# Patient Record
Sex: MALE | Race: WHITE | URBAN - METROPOLITAN AREA
[De-identification: names, ages, dates, MRNs, and addresses within clinical notes are randomized per-mention and may not be internally consistent; named-entity substitution may affect disease eponyms.]

---

## 2017-01-01 ENCOUNTER — APPOINTMENT (OUTPATIENT)
Dept: OCCUPATIONAL THERAPY | Facility: CLINIC | Age: 52
DRG: 166 | End: 2017-01-01
Payer: COMMERCIAL

## 2017-01-01 ENCOUNTER — APPOINTMENT (OUTPATIENT)
Dept: GENERAL RADIOLOGY | Facility: CLINIC | Age: 52
DRG: 166 | End: 2017-01-01
Attending: INTERNAL MEDICINE
Payer: COMMERCIAL

## 2017-01-01 ENCOUNTER — APPOINTMENT (OUTPATIENT)
Dept: GENERAL RADIOLOGY | Facility: CLINIC | Age: 52
DRG: 166 | End: 2017-01-01
Attending: PHYSICIAN ASSISTANT
Payer: COMMERCIAL

## 2017-01-01 ENCOUNTER — APPOINTMENT (OUTPATIENT)
Dept: CT IMAGING | Facility: CLINIC | Age: 52
DRG: 166 | End: 2017-01-01
Attending: INTERNAL MEDICINE
Payer: COMMERCIAL

## 2017-01-01 ENCOUNTER — APPOINTMENT (OUTPATIENT)
Dept: CARDIOLOGY | Facility: CLINIC | Age: 52
DRG: 166 | End: 2017-01-01
Attending: INTERNAL MEDICINE
Payer: COMMERCIAL

## 2017-01-01 ENCOUNTER — APPOINTMENT (OUTPATIENT)
Dept: OCCUPATIONAL THERAPY | Facility: CLINIC | Age: 52
DRG: 166 | End: 2017-01-01
Attending: INTERNAL MEDICINE
Payer: COMMERCIAL

## 2017-01-01 ENCOUNTER — HOSPITAL ENCOUNTER (INPATIENT)
Facility: CLINIC | Age: 52
LOS: 27 days | Discharge: NURSING, REHAB OR LONG-TERM CARE | DRG: 166 | End: 2017-09-18
Attending: EMERGENCY MEDICINE | Admitting: INTERNAL MEDICINE
Payer: COMMERCIAL

## 2017-01-01 ENCOUNTER — APPOINTMENT (OUTPATIENT)
Dept: PHYSICAL THERAPY | Facility: CLINIC | Age: 52
DRG: 166 | End: 2017-01-01
Attending: INTERNAL MEDICINE
Payer: COMMERCIAL

## 2017-01-01 ENCOUNTER — APPOINTMENT (OUTPATIENT)
Dept: GENERAL RADIOLOGY | Facility: CLINIC | Age: 52
DRG: 166 | End: 2017-01-01
Attending: EMERGENCY MEDICINE
Payer: COMMERCIAL

## 2017-01-01 ENCOUNTER — DISCHARGE SUMMARY NURSING HOME (OUTPATIENT)
Dept: GERIATRICS | Facility: CLINIC | Age: 52
End: 2017-01-01
Payer: COMMERCIAL

## 2017-01-01 ENCOUNTER — APPOINTMENT (OUTPATIENT)
Dept: GENERAL RADIOLOGY | Facility: CLINIC | Age: 52
DRG: 166 | End: 2017-01-01
Attending: HOSPITALIST
Payer: COMMERCIAL

## 2017-01-01 ENCOUNTER — ANESTHESIA (OUTPATIENT)
Dept: INTENSIVE CARE | Facility: CLINIC | Age: 52
DRG: 166 | End: 2017-01-01
Payer: COMMERCIAL

## 2017-01-01 ENCOUNTER — APPOINTMENT (OUTPATIENT)
Dept: ULTRASOUND IMAGING | Facility: CLINIC | Age: 52
DRG: 166 | End: 2017-01-01
Attending: INTERNAL MEDICINE
Payer: COMMERCIAL

## 2017-01-01 ENCOUNTER — NURSING HOME VISIT (OUTPATIENT)
Dept: GERIATRICS | Facility: CLINIC | Age: 52
End: 2017-01-01
Payer: COMMERCIAL

## 2017-01-01 ENCOUNTER — APPOINTMENT (OUTPATIENT)
Dept: PHYSICAL THERAPY | Facility: CLINIC | Age: 52
DRG: 166 | End: 2017-01-01
Payer: COMMERCIAL

## 2017-01-01 ENCOUNTER — APPOINTMENT (OUTPATIENT)
Dept: SPEECH THERAPY | Facility: CLINIC | Age: 52
DRG: 166 | End: 2017-01-01
Payer: COMMERCIAL

## 2017-01-01 ENCOUNTER — ANESTHESIA EVENT (OUTPATIENT)
Dept: INTENSIVE CARE | Facility: CLINIC | Age: 52
DRG: 166 | End: 2017-01-01
Payer: COMMERCIAL

## 2017-01-01 ENCOUNTER — APPOINTMENT (OUTPATIENT)
Dept: SPEECH THERAPY | Facility: CLINIC | Age: 52
DRG: 166 | End: 2017-01-01
Attending: INTERNAL MEDICINE
Payer: COMMERCIAL

## 2017-01-01 ENCOUNTER — APPOINTMENT (OUTPATIENT)
Dept: CT IMAGING | Facility: CLINIC | Age: 52
DRG: 166 | End: 2017-01-01
Attending: EMERGENCY MEDICINE
Payer: COMMERCIAL

## 2017-01-01 ENCOUNTER — APPOINTMENT (OUTPATIENT)
Dept: NUCLEAR MEDICINE | Facility: CLINIC | Age: 52
DRG: 166 | End: 2017-01-01
Attending: INTERNAL MEDICINE
Payer: COMMERCIAL

## 2017-01-01 VITALS
BODY MASS INDEX: 28.37 KG/M2 | RESPIRATION RATE: 18 BRPM | WEIGHT: 215 LBS | SYSTOLIC BLOOD PRESSURE: 119 MMHG | OXYGEN SATURATION: 98 % | DIASTOLIC BLOOD PRESSURE: 81 MMHG | TEMPERATURE: 97.2 F | HEART RATE: 92 BPM

## 2017-01-01 VITALS
RESPIRATION RATE: 18 BRPM | HEIGHT: 73 IN | OXYGEN SATURATION: 95 % | WEIGHT: 200.84 LBS | HEART RATE: 96 BPM | BODY MASS INDEX: 26.62 KG/M2 | TEMPERATURE: 98.6 F | DIASTOLIC BLOOD PRESSURE: 68 MMHG | SYSTOLIC BLOOD PRESSURE: 112 MMHG

## 2017-01-01 VITALS
TEMPERATURE: 98.3 F | SYSTOLIC BLOOD PRESSURE: 119 MMHG | WEIGHT: 215 LBS | DIASTOLIC BLOOD PRESSURE: 72 MMHG | OXYGEN SATURATION: 97 % | BODY MASS INDEX: 28.37 KG/M2 | HEART RATE: 86 BPM | RESPIRATION RATE: 18 BRPM

## 2017-01-01 VITALS
RESPIRATION RATE: 18 BRPM | OXYGEN SATURATION: 98 % | DIASTOLIC BLOOD PRESSURE: 72 MMHG | HEART RATE: 88 BPM | TEMPERATURE: 99.1 F | SYSTOLIC BLOOD PRESSURE: 119 MMHG

## 2017-01-01 DIAGNOSIS — M54.40 CHRONIC LOW BACK PAIN WITH SCIATICA, SCIATICA LATERALITY UNSPECIFIED, UNSPECIFIED BACK PAIN LATERALITY: ICD-10-CM

## 2017-01-01 DIAGNOSIS — J80 ARDS (ADULT RESPIRATORY DISTRESS SYNDROME) (H): Primary | ICD-10-CM

## 2017-01-01 DIAGNOSIS — K57.32 DIVERTICULITIS OF COLON: ICD-10-CM

## 2017-01-01 DIAGNOSIS — G92.9 TOXIC ENCEPHALOPATHY: ICD-10-CM

## 2017-01-01 DIAGNOSIS — N28.9 ACUTE RENAL INSUFFICIENCY: ICD-10-CM

## 2017-01-01 DIAGNOSIS — G62.9 NEUROPATHY: ICD-10-CM

## 2017-01-01 DIAGNOSIS — J80 ARDS (ADULT RESPIRATORY DISTRESS SYNDROME) (H): ICD-10-CM

## 2017-01-01 DIAGNOSIS — F33.1 MODERATE EPISODE OF RECURRENT MAJOR DEPRESSIVE DISORDER (H): ICD-10-CM

## 2017-01-01 DIAGNOSIS — I10 ESSENTIAL HYPERTENSION: ICD-10-CM

## 2017-01-01 DIAGNOSIS — I10 BENIGN ESSENTIAL HYPERTENSION: ICD-10-CM

## 2017-01-01 DIAGNOSIS — R53.81 PHYSICAL DECONDITIONING: ICD-10-CM

## 2017-01-01 DIAGNOSIS — J18.9 PNEUMONIA OF LEFT LOWER LOBE DUE TO INFECTIOUS ORGANISM: ICD-10-CM

## 2017-01-01 DIAGNOSIS — G89.29 CHRONIC LOW BACK PAIN WITH SCIATICA, SCIATICA LATERALITY UNSPECIFIED, UNSPECIFIED BACK PAIN LATERALITY: ICD-10-CM

## 2017-01-01 DIAGNOSIS — I82.4Y1 ACUTE DEEP VEIN THROMBOSIS (DVT) OF PROXIMAL VEIN OF RIGHT LOWER EXTREMITY (H): Primary | ICD-10-CM

## 2017-01-01 DIAGNOSIS — K21.9 GASTROESOPHAGEAL REFLUX DISEASE WITHOUT ESOPHAGITIS: ICD-10-CM

## 2017-01-01 DIAGNOSIS — J18.9 PNEUMONIA DUE TO INFECTIOUS ORGANISM, UNSPECIFIED LATERALITY, UNSPECIFIED PART OF LUNG: ICD-10-CM

## 2017-01-01 DIAGNOSIS — R13.10 DYSPHAGIA, UNSPECIFIED TYPE: ICD-10-CM

## 2017-01-01 DIAGNOSIS — F42.9 OBSESSIVE-COMPULSIVE DISORDER, UNSPECIFIED TYPE: ICD-10-CM

## 2017-01-01 DIAGNOSIS — J80: Primary | ICD-10-CM

## 2017-01-01 DIAGNOSIS — N32.81 OVERACTIVE BLADDER: ICD-10-CM

## 2017-01-01 DIAGNOSIS — I82.401 ACUTE DEEP VEIN THROMBOSIS (DVT) OF RIGHT LOWER EXTREMITY, UNSPECIFIED VEIN (H): ICD-10-CM

## 2017-01-01 DIAGNOSIS — E86.0 DEHYDRATION: ICD-10-CM

## 2017-01-01 DIAGNOSIS — R07.9 ACUTE CHEST PAIN: ICD-10-CM

## 2017-01-01 DIAGNOSIS — J15.9 COMMUNITY ACQUIRED BACTERIAL PNEUMONIA: Primary | ICD-10-CM

## 2017-01-01 LAB
ACID FAST STN SPEC QL: NORMAL
ALBUMIN SERPL-MCNC: 1.4 G/DL (ref 3.4–5)
ALBUMIN SERPL-MCNC: 1.5 G/DL (ref 3.4–5)
ALBUMIN SERPL-MCNC: 1.5 G/DL (ref 3.4–5)
ALBUMIN SERPL-MCNC: 1.7 G/DL (ref 3.4–5)
ALBUMIN SERPL-MCNC: 1.8 G/DL (ref 3.4–5)
ALBUMIN SERPL-MCNC: 1.8 G/DL (ref 3.4–5)
ALBUMIN SERPL-MCNC: 1.9 G/DL (ref 3.4–5)
ALBUMIN SERPL-MCNC: 2.1 G/DL (ref 3.4–5)
ALBUMIN SERPL-MCNC: 2.3 G/DL (ref 3.4–5)
ALBUMIN SERPL-MCNC: 2.4 G/DL (ref 3.4–5)
ALBUMIN SERPL-MCNC: 2.4 G/DL (ref 3.4–5)
ALBUMIN SERPL-MCNC: 2.5 G/DL (ref 3.4–5)
ALBUMIN SERPL-MCNC: NORMAL G/DL (ref 3.4–5)
ALBUMIN UR-MCNC: 10 MG/DL
ALBUMIN UR-MCNC: 10 MG/DL
ALP SERPL-CCNC: 104 U/L (ref 40–150)
ALP SERPL-CCNC: 109 U/L (ref 40–150)
ALP SERPL-CCNC: 112 U/L (ref 40–150)
ALP SERPL-CCNC: 116 U/L (ref 40–150)
ALP SERPL-CCNC: 120 U/L (ref 40–150)
ALP SERPL-CCNC: 138 U/L (ref 40–150)
ALP SERPL-CCNC: 146 U/L (ref 40–150)
ALP SERPL-CCNC: 152 U/L (ref 40–150)
ALP SERPL-CCNC: 154 U/L (ref 40–150)
ALP SERPL-CCNC: 156 U/L (ref 40–150)
ALP SERPL-CCNC: 158 U/L (ref 40–150)
ALP SERPL-CCNC: 183 U/L (ref 40–150)
ALP SERPL-CCNC: 83 U/L (ref 40–150)
ALP SERPL-CCNC: 87 U/L (ref 40–150)
ALP SERPL-CCNC: NORMAL U/L (ref 40–150)
ALT SERPL W P-5'-P-CCNC: 104 U/L (ref 0–70)
ALT SERPL W P-5'-P-CCNC: 34 U/L (ref 0–70)
ALT SERPL W P-5'-P-CCNC: 39 U/L (ref 0–70)
ALT SERPL W P-5'-P-CCNC: 42 U/L (ref 0–70)
ALT SERPL W P-5'-P-CCNC: 54 U/L (ref 0–70)
ALT SERPL W P-5'-P-CCNC: 66 U/L (ref 0–70)
ALT SERPL W P-5'-P-CCNC: 70 U/L (ref 0–70)
ALT SERPL W P-5'-P-CCNC: 70 U/L (ref 0–70)
ALT SERPL W P-5'-P-CCNC: 76 U/L (ref 0–70)
ALT SERPL W P-5'-P-CCNC: 89 U/L (ref 0–70)
ALT SERPL W P-5'-P-CCNC: 91 U/L (ref 0–70)
ALT SERPL W P-5'-P-CCNC: 96 U/L (ref 0–70)
ALT SERPL W P-5'-P-CCNC: 96 U/L (ref 0–70)
ALT SERPL W P-5'-P-CCNC: 98 U/L (ref 0–70)
ALT SERPL W P-5'-P-CCNC: NORMAL U/L (ref 0–70)
AMMONIA PLAS-SCNC: 15 UMOL/L (ref 10–50)
AMYLASE FLD-CCNC: 19 U/L
ANION GAP SERPL CALCULATED.3IONS-SCNC: 10 MMOL/L (ref 3–14)
ANION GAP SERPL CALCULATED.3IONS-SCNC: 11 MMOL/L (ref 3–14)
ANION GAP SERPL CALCULATED.3IONS-SCNC: 11 MMOL/L (ref 3–14)
ANION GAP SERPL CALCULATED.3IONS-SCNC: 5 MMOL/L (ref 3–14)
ANION GAP SERPL CALCULATED.3IONS-SCNC: 6 MMOL/L (ref 3–14)
ANION GAP SERPL CALCULATED.3IONS-SCNC: 7 MMOL/L (ref 3–14)
ANION GAP SERPL CALCULATED.3IONS-SCNC: 8 MMOL/L (ref 3–14)
ANION GAP SERPL CALCULATED.3IONS-SCNC: 9 MMOL/L (ref 3–14)
ANION GAP SERPL CALCULATED.3IONS-SCNC: NORMAL MMOL/L (ref 6–17)
APPEARANCE FLD: NORMAL
APPEARANCE UR: CLEAR
APPEARANCE UR: CLEAR
APTT PPP: 28 SEC (ref 22–37)
APTT PPP: 30 SEC (ref 22–37)
APTT PPP: 33 SEC (ref 22–37)
APTT PPP: 34 SEC (ref 22–37)
APTT PPP: 37 SEC (ref 22–37)
AST SERPL W P-5'-P-CCNC: 102 U/L (ref 0–45)
AST SERPL W P-5'-P-CCNC: 104 U/L (ref 0–45)
AST SERPL W P-5'-P-CCNC: 112 U/L (ref 0–45)
AST SERPL W P-5'-P-CCNC: 114 U/L (ref 0–45)
AST SERPL W P-5'-P-CCNC: 126 U/L (ref 0–45)
AST SERPL W P-5'-P-CCNC: 130 U/L (ref 0–45)
AST SERPL W P-5'-P-CCNC: 33 U/L (ref 0–45)
AST SERPL W P-5'-P-CCNC: 34 U/L (ref 0–45)
AST SERPL W P-5'-P-CCNC: 38 U/L (ref 0–45)
AST SERPL W P-5'-P-CCNC: 49 U/L (ref 0–45)
AST SERPL W P-5'-P-CCNC: 56 U/L (ref 0–45)
AST SERPL W P-5'-P-CCNC: 73 U/L (ref 0–45)
AST SERPL W P-5'-P-CCNC: 85 U/L (ref 0–45)
AST SERPL W P-5'-P-CCNC: 93 U/L (ref 0–45)
AST SERPL W P-5'-P-CCNC: NORMAL U/L (ref 0–45)
BACTERIA SPEC CULT: ABNORMAL
BACTERIA SPEC CULT: NO GROWTH
BACTERIA SPEC CULT: NORMAL
BASE DEFICIT BLDA-SCNC: 4.6 MMOL/L
BASE DEFICIT BLDA-SCNC: 5.6 MMOL/L
BASE DEFICIT BLDA-SCNC: 5.8 MMOL/L
BASE DEFICIT BLDA-SCNC: 6.1 MMOL/L
BASE DEFICIT BLDA-SCNC: 6.1 MMOL/L
BASE DEFICIT BLDA-SCNC: 6.3 MMOL/L
BASE DEFICIT BLDA-SCNC: 6.7 MMOL/L
BASE DEFICIT BLDA-SCNC: 6.8 MMOL/L
BASE DEFICIT BLDA-SCNC: 6.9 MMOL/L
BASE DEFICIT BLDA-SCNC: 7.1 MMOL/L
BASE DEFICIT BLDA-SCNC: 7.2 MMOL/L
BASE DEFICIT BLDA-SCNC: 7.2 MMOL/L
BASE DEFICIT BLDA-SCNC: 7.3 MMOL/L
BASE DEFICIT BLDA-SCNC: 7.3 MMOL/L
BASE DEFICIT BLDA-SCNC: 7.4 MMOL/L
BASE DEFICIT BLDA-SCNC: 7.5 MMOL/L
BASE DEFICIT BLDA-SCNC: 7.5 MMOL/L
BASE DEFICIT BLDA-SCNC: 8.5 MMOL/L
BASE DEFICIT BLDA-SCNC: 8.6 MMOL/L
BASE DEFICIT BLDA-SCNC: 9.3 MMOL/L
BASE EXCESS BLDA CALC-SCNC: 2.3 MMOL/L
BASE EXCESS BLDA CALC-SCNC: 5.5 MMOL/L
BASE EXCESS BLDA CALC-SCNC: 5.9 MMOL/L
BASE EXCESS BLDA CALC-SCNC: 7.4 MMOL/L
BASE EXCESS BLDA CALC-SCNC: 8.5 MMOL/L
BASE EXCESS BLDV CALC-SCNC: 6.4 MMOL/L
BASOPHILS # BLD AUTO: 0 10E9/L (ref 0–0.2)
BASOPHILS # BLD AUTO: 0.1 10E9/L (ref 0–0.2)
BASOPHILS NFR BLD AUTO: 0.1 %
BASOPHILS NFR BLD AUTO: 0.2 %
BASOPHILS NFR BLD AUTO: 0.3 %
BASOPHILS NFR BLD AUTO: 0.3 %
BASOPHILS NFR BLD AUTO: 0.4 %
BASOPHILS NFR BLD AUTO: 0.4 %
BILIRUB DIRECT SERPL-MCNC: 0.9 MG/DL (ref 0–0.2)
BILIRUB DIRECT SERPL-MCNC: 1 MG/DL (ref 0–0.2)
BILIRUB DIRECT SERPL-MCNC: 2.8 MG/DL (ref 0–0.2)
BILIRUB DIRECT SERPL-MCNC: 5.6 MG/DL (ref 0–0.2)
BILIRUB SERPL-MCNC: 0.8 MG/DL (ref 0.2–1.3)
BILIRUB SERPL-MCNC: 1 MG/DL (ref 0.2–1.3)
BILIRUB SERPL-MCNC: 1.2 MG/DL (ref 0.2–1.3)
BILIRUB SERPL-MCNC: 1.2 MG/DL (ref 0.2–1.3)
BILIRUB SERPL-MCNC: 1.3 MG/DL (ref 0.2–1.3)
BILIRUB SERPL-MCNC: 1.3 MG/DL (ref 0.2–1.3)
BILIRUB SERPL-MCNC: 1.4 MG/DL (ref 0.2–1.3)
BILIRUB SERPL-MCNC: 1.4 MG/DL (ref 0.2–1.3)
BILIRUB SERPL-MCNC: 1.5 MG/DL (ref 0.2–1.3)
BILIRUB SERPL-MCNC: 2.7 MG/DL (ref 0.2–1.3)
BILIRUB SERPL-MCNC: 3.2 MG/DL (ref 0.2–1.3)
BILIRUB SERPL-MCNC: 6.3 MG/DL (ref 0.2–1.3)
BILIRUB SERPL-MCNC: NORMAL MG/DL (ref 0.2–1.3)
BILIRUB UR QL STRIP: ABNORMAL
BILIRUB UR QL STRIP: NEGATIVE
BUN SERPL-MCNC: 20 MG/DL (ref 7–30)
BUN SERPL-MCNC: 22 MG/DL (ref 7–30)
BUN SERPL-MCNC: 23 MG/DL (ref 7–30)
BUN SERPL-MCNC: 23 MG/DL (ref 7–30)
BUN SERPL-MCNC: 25 MG/DL (ref 7–30)
BUN SERPL-MCNC: 26 MG/DL (ref 7–30)
BUN SERPL-MCNC: 27 MG/DL (ref 7–30)
BUN SERPL-MCNC: 28 MG/DL (ref 7–30)
BUN SERPL-MCNC: 29 MG/DL (ref 7–30)
BUN SERPL-MCNC: 29 MG/DL (ref 7–30)
BUN SERPL-MCNC: 30 MG/DL (ref 7–30)
BUN SERPL-MCNC: 32 MG/DL (ref 7–30)
BUN SERPL-MCNC: 33 MG/DL (ref 7–30)
BUN SERPL-MCNC: 35 MG/DL (ref 7–30)
BUN SERPL-MCNC: 40 MG/DL (ref 7–30)
BUN SERPL-MCNC: 41 MG/DL (ref 7–30)
BUN SERPL-MCNC: 43 MG/DL (ref 7–30)
BUN SERPL-MCNC: 45 MG/DL (ref 7–30)
BUN SERPL-MCNC: 47 MG/DL (ref 7–30)
BUN SERPL-MCNC: 48 MG/DL (ref 7–30)
BUN SERPL-MCNC: 52 MG/DL (ref 7–30)
BUN SERPL-MCNC: 54 MG/DL (ref 7–30)
BUN SERPL-MCNC: 55 MG/DL (ref 7–30)
BUN SERPL-MCNC: NORMAL MG/DL (ref 7–30)
CALCIUM SERPL-MCNC: 8.2 MG/DL (ref 8.5–10.1)
CALCIUM SERPL-MCNC: 8.3 MG/DL (ref 8.5–10.1)
CALCIUM SERPL-MCNC: 8.4 MG/DL (ref 8.5–10.1)
CALCIUM SERPL-MCNC: 8.5 MG/DL (ref 8.5–10.1)
CALCIUM SERPL-MCNC: 8.5 MG/DL (ref 8.5–10.1)
CALCIUM SERPL-MCNC: 8.6 MG/DL (ref 8.5–10.1)
CALCIUM SERPL-MCNC: 8.7 MG/DL (ref 8.5–10.1)
CALCIUM SERPL-MCNC: 8.8 MG/DL (ref 8.5–10.1)
CALCIUM SERPL-MCNC: 8.9 MG/DL (ref 8.5–10.1)
CALCIUM SERPL-MCNC: 9 MG/DL (ref 8.5–10.1)
CALCIUM SERPL-MCNC: 9.1 MG/DL (ref 8.5–10.1)
CALCIUM SERPL-MCNC: 9.1 MG/DL (ref 8.5–10.1)
CALCIUM SERPL-MCNC: 9.4 MG/DL (ref 8.5–10.1)
CALCIUM SERPL-MCNC: 9.4 MG/DL (ref 8.5–10.1)
CALCIUM SERPL-MCNC: 9.6 MG/DL (ref 8.5–10.1)
CALCIUM SERPL-MCNC: NORMAL MG/DL (ref 8.5–10.1)
CHLORIDE SERPL-SCNC: 100 MMOL/L (ref 94–109)
CHLORIDE SERPL-SCNC: 103 MMOL/L (ref 94–109)
CHLORIDE SERPL-SCNC: 105 MMOL/L (ref 94–109)
CHLORIDE SERPL-SCNC: 105 MMOL/L (ref 94–109)
CHLORIDE SERPL-SCNC: 106 MMOL/L (ref 94–109)
CHLORIDE SERPL-SCNC: 107 MMOL/L (ref 94–109)
CHLORIDE SERPL-SCNC: 107 MMOL/L (ref 94–109)
CHLORIDE SERPL-SCNC: 108 MMOL/L (ref 94–109)
CHLORIDE SERPL-SCNC: 109 MMOL/L (ref 94–109)
CHLORIDE SERPL-SCNC: 110 MMOL/L (ref 94–109)
CHLORIDE SERPL-SCNC: 110 MMOL/L (ref 94–109)
CHLORIDE SERPL-SCNC: 111 MMOL/L (ref 94–109)
CHLORIDE SERPL-SCNC: 111 MMOL/L (ref 94–109)
CHLORIDE SERPL-SCNC: 112 MMOL/L (ref 94–109)
CHLORIDE SERPL-SCNC: 113 MMOL/L (ref 94–109)
CHLORIDE SERPL-SCNC: 114 MMOL/L (ref 94–109)
CHLORIDE SERPL-SCNC: 115 MMOL/L (ref 94–109)
CHLORIDE SERPL-SCNC: 115 MMOL/L (ref 94–109)
CHLORIDE SERPL-SCNC: 116 MMOL/L (ref 94–109)
CHLORIDE SERPL-SCNC: 117 MMOL/L (ref 94–109)
CHLORIDE SERPL-SCNC: 118 MMOL/L (ref 94–109)
CHLORIDE SERPL-SCNC: 118 MMOL/L (ref 94–109)
CHLORIDE SERPL-SCNC: 119 MMOL/L (ref 94–109)
CHLORIDE SERPL-SCNC: NORMAL MMOL/L (ref 94–109)
CK SERPL-CCNC: 439 U/L (ref 30–300)
CK SERPL-CCNC: 454 U/L (ref 30–300)
CK SERPL-CCNC: 775 U/L (ref 30–300)
CK SERPL-CCNC: 998 U/L (ref 30–300)
CO2 SERPL-SCNC: 19 MMOL/L (ref 20–32)
CO2 SERPL-SCNC: 19 MMOL/L (ref 20–32)
CO2 SERPL-SCNC: 20 MMOL/L (ref 20–32)
CO2 SERPL-SCNC: 21 MMOL/L (ref 20–32)
CO2 SERPL-SCNC: 22 MMOL/L (ref 20–32)
CO2 SERPL-SCNC: 23 MMOL/L (ref 20–32)
CO2 SERPL-SCNC: 24 MMOL/L (ref 20–32)
CO2 SERPL-SCNC: 26 MMOL/L (ref 20–32)
CO2 SERPL-SCNC: 27 MMOL/L (ref 20–32)
CO2 SERPL-SCNC: 28 MMOL/L (ref 20–32)
CO2 SERPL-SCNC: 28 MMOL/L (ref 20–32)
CO2 SERPL-SCNC: 29 MMOL/L (ref 20–32)
CO2 SERPL-SCNC: 31 MMOL/L (ref 20–32)
CO2 SERPL-SCNC: 32 MMOL/L (ref 20–32)
CO2 SERPL-SCNC: 32 MMOL/L (ref 20–32)
CO2 SERPL-SCNC: NORMAL MMOL/L (ref 20–32)
COLOR FLD: NORMAL
COLOR FLD: NORMAL
COLOR FLD: YELLOW
COLOR UR AUTO: ABNORMAL
COLOR UR AUTO: YELLOW
COPATH REPORT: NORMAL
COPATH REPORT: NORMAL
CREAT SERPL-MCNC: 1 MG/DL (ref 0.66–1.25)
CREAT SERPL-MCNC: 1.04 MG/DL (ref 0.66–1.25)
CREAT SERPL-MCNC: 1.05 MG/DL (ref 0.66–1.25)
CREAT SERPL-MCNC: 1.05 MG/DL (ref 0.66–1.25)
CREAT SERPL-MCNC: 1.06 MG/DL (ref 0.66–1.25)
CREAT SERPL-MCNC: 1.09 MG/DL (ref 0.66–1.25)
CREAT SERPL-MCNC: 1.16 MG/DL (ref 0.66–1.25)
CREAT SERPL-MCNC: 1.22 MG/DL (ref 0.66–1.25)
CREAT SERPL-MCNC: 1.22 MG/DL (ref 0.66–1.25)
CREAT SERPL-MCNC: 1.23 MG/DL (ref 0.66–1.25)
CREAT SERPL-MCNC: 1.25 MG/DL (ref 0.66–1.25)
CREAT SERPL-MCNC: 1.25 MG/DL (ref 0.66–1.25)
CREAT SERPL-MCNC: 1.26 MG/DL (ref 0.66–1.25)
CREAT SERPL-MCNC: 1.28 MG/DL (ref 0.66–1.25)
CREAT SERPL-MCNC: 1.32 MG/DL (ref 0.66–1.25)
CREAT SERPL-MCNC: 1.33 MG/DL (ref 0.66–1.25)
CREAT SERPL-MCNC: 1.35 MG/DL (ref 0.66–1.25)
CREAT SERPL-MCNC: 1.37 MG/DL (ref 0.66–1.25)
CREAT SERPL-MCNC: 1.4 MG/DL (ref 0.66–1.25)
CREAT SERPL-MCNC: 1.42 MG/DL (ref 0.66–1.25)
CREAT SERPL-MCNC: 1.43 MG/DL (ref 0.66–1.25)
CREAT SERPL-MCNC: 1.44 MG/DL (ref 0.66–1.25)
CREAT SERPL-MCNC: 1.45 MG/DL (ref 0.66–1.25)
CREAT SERPL-MCNC: 1.47 MG/DL (ref 0.66–1.25)
CREAT SERPL-MCNC: 1.5 MG/DL (ref 0.66–1.25)
CREAT SERPL-MCNC: 1.51 MG/DL (ref 0.66–1.25)
CREAT SERPL-MCNC: 1.54 MG/DL (ref 0.66–1.25)
CREAT SERPL-MCNC: 2.31 MG/DL (ref 0.66–1.25)
CREAT SERPL-MCNC: NORMAL MG/DL (ref 0.66–1.25)
D DIMER PPP FEU-MCNC: 0.7 UG/ML FEU (ref 0–0.5)
DIFFERENTIAL METHOD BLD: ABNORMAL
DIFFERENTIAL METHOD BLD: NORMAL
DIFFERENTIAL METHOD BLD: NORMAL
EOSINOPHIL # BLD AUTO: 0 10E9/L (ref 0–0.7)
EOSINOPHIL # BLD AUTO: 0 10E9/L (ref 0–0.7)
EOSINOPHIL # BLD AUTO: 0.1 10E9/L (ref 0–0.7)
EOSINOPHIL # BLD AUTO: 0.2 10E9/L (ref 0–0.7)
EOSINOPHIL # BLD AUTO: 0.3 10E9/L (ref 0–0.7)
EOSINOPHIL # BLD AUTO: 0.3 10E9/L (ref 0–0.7)
EOSINOPHIL NFR BLD AUTO: 0.2 %
EOSINOPHIL NFR BLD AUTO: 0.4 %
EOSINOPHIL NFR BLD AUTO: 0.6 %
EOSINOPHIL NFR BLD AUTO: 1.9 %
EOSINOPHIL NFR BLD AUTO: 2.7 %
EOSINOPHIL NFR BLD AUTO: 3.3 %
ERYTHROCYTE [DISTWIDTH] IN BLOOD BY AUTOMATED COUNT: 13.7 % (ref 10–15)
ERYTHROCYTE [DISTWIDTH] IN BLOOD BY AUTOMATED COUNT: 13.7 % (ref 10–15)
ERYTHROCYTE [DISTWIDTH] IN BLOOD BY AUTOMATED COUNT: 14.1 % (ref 10–15)
ERYTHROCYTE [DISTWIDTH] IN BLOOD BY AUTOMATED COUNT: 14.2 % (ref 10–15)
ERYTHROCYTE [DISTWIDTH] IN BLOOD BY AUTOMATED COUNT: 14.6 % (ref 10–15)
ERYTHROCYTE [DISTWIDTH] IN BLOOD BY AUTOMATED COUNT: 14.7 % (ref 10–15)
ERYTHROCYTE [DISTWIDTH] IN BLOOD BY AUTOMATED COUNT: 14.8 % (ref 10–15)
ERYTHROCYTE [DISTWIDTH] IN BLOOD BY AUTOMATED COUNT: 14.9 % (ref 10–15)
ERYTHROCYTE [DISTWIDTH] IN BLOOD BY AUTOMATED COUNT: 14.9 % (ref 10–15)
ERYTHROCYTE [DISTWIDTH] IN BLOOD BY AUTOMATED COUNT: 15 % (ref 10–15)
ERYTHROCYTE [DISTWIDTH] IN BLOOD BY AUTOMATED COUNT: 15.1 % (ref 10–15)
ERYTHROCYTE [DISTWIDTH] IN BLOOD BY AUTOMATED COUNT: 15.2 % (ref 10–15)
ERYTHROCYTE [DISTWIDTH] IN BLOOD BY AUTOMATED COUNT: 15.2 % (ref 10–15)
ERYTHROCYTE [DISTWIDTH] IN BLOOD BY AUTOMATED COUNT: NORMAL % (ref 10–15)
ERYTHROCYTE [DISTWIDTH] IN BLOOD BY AUTOMATED COUNT: NORMAL % (ref 10–15)
FLUAV H1 2009 PAND RNA SPEC QL NAA+PROBE: NEGATIVE
FLUAV H1 2009 PAND RNA SPEC QL NAA+PROBE: NEGATIVE
FLUAV H1 RNA SPEC QL NAA+PROBE: NEGATIVE
FLUAV H1 RNA SPEC QL NAA+PROBE: NEGATIVE
FLUAV H3 RNA SPEC QL NAA+PROBE: NEGATIVE
FLUAV H3 RNA SPEC QL NAA+PROBE: NEGATIVE
FLUAV RNA SPEC QL NAA+PROBE: NEGATIVE
FLUAV RNA SPEC QL NAA+PROBE: NEGATIVE
FLUBV RNA SPEC QL NAA+PROBE: NEGATIVE
FLUBV RNA SPEC QL NAA+PROBE: NEGATIVE
FUNGUS SPEC CULT: ABNORMAL
FUNGUS SPEC CULT: NORMAL
GFR SERPL CREATININE-BSD FRML MDRD: 30 ML/MIN/1.7M2
GFR SERPL CREATININE-BSD FRML MDRD: 47 ML/MIN/1.7M2
GFR SERPL CREATININE-BSD FRML MDRD: 49 ML/MIN/1.7M2
GFR SERPL CREATININE-BSD FRML MDRD: 49 ML/MIN/1.7M2
GFR SERPL CREATININE-BSD FRML MDRD: 50 ML/MIN/1.7M2
GFR SERPL CREATININE-BSD FRML MDRD: 51 ML/MIN/1.7M2
GFR SERPL CREATININE-BSD FRML MDRD: 51 ML/MIN/1.7M2
GFR SERPL CREATININE-BSD FRML MDRD: 52 ML/MIN/1.7M2
GFR SERPL CREATININE-BSD FRML MDRD: 52 ML/MIN/1.7M2
GFR SERPL CREATININE-BSD FRML MDRD: 53 ML/MIN/1.7M2
GFR SERPL CREATININE-BSD FRML MDRD: 54 ML/MIN/1.7M2
GFR SERPL CREATININE-BSD FRML MDRD: 55 ML/MIN/1.7M2
GFR SERPL CREATININE-BSD FRML MDRD: 56 ML/MIN/1.7M2
GFR SERPL CREATININE-BSD FRML MDRD: 57 ML/MIN/1.7M2
GFR SERPL CREATININE-BSD FRML MDRD: 59 ML/MIN/1.7M2
GFR SERPL CREATININE-BSD FRML MDRD: 60 ML/MIN/1.7M2
GFR SERPL CREATININE-BSD FRML MDRD: 62 ML/MIN/1.7M2
GFR SERPL CREATININE-BSD FRML MDRD: 66 ML/MIN/1.7M2
GFR SERPL CREATININE-BSD FRML MDRD: 71 ML/MIN/1.7M2
GFR SERPL CREATININE-BSD FRML MDRD: 73 ML/MIN/1.7M2
GFR SERPL CREATININE-BSD FRML MDRD: 74 ML/MIN/1.7M2
GFR SERPL CREATININE-BSD FRML MDRD: 74 ML/MIN/1.7M2
GFR SERPL CREATININE-BSD FRML MDRD: 75 ML/MIN/1.7M2
GFR SERPL CREATININE-BSD FRML MDRD: 78 ML/MIN/1.7M2
GFR SERPL CREATININE-BSD FRML MDRD: NORMAL ML/MIN/1.7M2
GLUCOSE BLDC GLUCOMTR-MCNC: 100 MG/DL (ref 70–99)
GLUCOSE BLDC GLUCOMTR-MCNC: 100 MG/DL (ref 70–99)
GLUCOSE BLDC GLUCOMTR-MCNC: 101 MG/DL (ref 70–99)
GLUCOSE BLDC GLUCOMTR-MCNC: 102 MG/DL (ref 70–99)
GLUCOSE BLDC GLUCOMTR-MCNC: 102 MG/DL (ref 70–99)
GLUCOSE BLDC GLUCOMTR-MCNC: 103 MG/DL (ref 70–99)
GLUCOSE BLDC GLUCOMTR-MCNC: 104 MG/DL (ref 70–99)
GLUCOSE BLDC GLUCOMTR-MCNC: 104 MG/DL (ref 70–99)
GLUCOSE BLDC GLUCOMTR-MCNC: 105 MG/DL (ref 70–99)
GLUCOSE BLDC GLUCOMTR-MCNC: 105 MG/DL (ref 70–99)
GLUCOSE BLDC GLUCOMTR-MCNC: 107 MG/DL (ref 70–99)
GLUCOSE BLDC GLUCOMTR-MCNC: 108 MG/DL (ref 70–99)
GLUCOSE BLDC GLUCOMTR-MCNC: 109 MG/DL (ref 70–99)
GLUCOSE BLDC GLUCOMTR-MCNC: 110 MG/DL (ref 70–99)
GLUCOSE BLDC GLUCOMTR-MCNC: 111 MG/DL (ref 70–99)
GLUCOSE BLDC GLUCOMTR-MCNC: 112 MG/DL (ref 70–99)
GLUCOSE BLDC GLUCOMTR-MCNC: 112 MG/DL (ref 70–99)
GLUCOSE BLDC GLUCOMTR-MCNC: 113 MG/DL (ref 70–99)
GLUCOSE BLDC GLUCOMTR-MCNC: 114 MG/DL (ref 70–99)
GLUCOSE BLDC GLUCOMTR-MCNC: 114 MG/DL (ref 70–99)
GLUCOSE BLDC GLUCOMTR-MCNC: 116 MG/DL (ref 70–99)
GLUCOSE BLDC GLUCOMTR-MCNC: 117 MG/DL (ref 70–99)
GLUCOSE BLDC GLUCOMTR-MCNC: 118 MG/DL (ref 70–99)
GLUCOSE BLDC GLUCOMTR-MCNC: 119 MG/DL (ref 70–99)
GLUCOSE BLDC GLUCOMTR-MCNC: 120 MG/DL (ref 70–99)
GLUCOSE BLDC GLUCOMTR-MCNC: 121 MG/DL (ref 70–99)
GLUCOSE BLDC GLUCOMTR-MCNC: 122 MG/DL (ref 70–99)
GLUCOSE BLDC GLUCOMTR-MCNC: 122 MG/DL (ref 70–99)
GLUCOSE BLDC GLUCOMTR-MCNC: 123 MG/DL (ref 70–99)
GLUCOSE BLDC GLUCOMTR-MCNC: 124 MG/DL (ref 70–99)
GLUCOSE BLDC GLUCOMTR-MCNC: 125 MG/DL (ref 70–99)
GLUCOSE BLDC GLUCOMTR-MCNC: 126 MG/DL (ref 70–99)
GLUCOSE BLDC GLUCOMTR-MCNC: 127 MG/DL (ref 70–99)
GLUCOSE BLDC GLUCOMTR-MCNC: 128 MG/DL (ref 70–99)
GLUCOSE BLDC GLUCOMTR-MCNC: 129 MG/DL (ref 70–99)
GLUCOSE BLDC GLUCOMTR-MCNC: 130 MG/DL (ref 70–99)
GLUCOSE BLDC GLUCOMTR-MCNC: 131 MG/DL (ref 70–99)
GLUCOSE BLDC GLUCOMTR-MCNC: 132 MG/DL (ref 70–99)
GLUCOSE BLDC GLUCOMTR-MCNC: 133 MG/DL (ref 70–99)
GLUCOSE BLDC GLUCOMTR-MCNC: 134 MG/DL (ref 70–99)
GLUCOSE BLDC GLUCOMTR-MCNC: 135 MG/DL (ref 70–99)
GLUCOSE BLDC GLUCOMTR-MCNC: 137 MG/DL (ref 70–99)
GLUCOSE BLDC GLUCOMTR-MCNC: 139 MG/DL (ref 70–99)
GLUCOSE BLDC GLUCOMTR-MCNC: 141 MG/DL (ref 70–99)
GLUCOSE BLDC GLUCOMTR-MCNC: 142 MG/DL (ref 70–99)
GLUCOSE BLDC GLUCOMTR-MCNC: 147 MG/DL (ref 70–99)
GLUCOSE BLDC GLUCOMTR-MCNC: 152 MG/DL (ref 70–99)
GLUCOSE BLDC GLUCOMTR-MCNC: 166 MG/DL (ref 70–99)
GLUCOSE BLDC GLUCOMTR-MCNC: 76 MG/DL (ref 70–99)
GLUCOSE BLDC GLUCOMTR-MCNC: 77 MG/DL (ref 70–99)
GLUCOSE BLDC GLUCOMTR-MCNC: 78 MG/DL (ref 70–99)
GLUCOSE BLDC GLUCOMTR-MCNC: 80 MG/DL (ref 70–99)
GLUCOSE BLDC GLUCOMTR-MCNC: 81 MG/DL (ref 70–99)
GLUCOSE BLDC GLUCOMTR-MCNC: 82 MG/DL (ref 70–99)
GLUCOSE BLDC GLUCOMTR-MCNC: 87 MG/DL (ref 70–99)
GLUCOSE BLDC GLUCOMTR-MCNC: 87 MG/DL (ref 70–99)
GLUCOSE BLDC GLUCOMTR-MCNC: 89 MG/DL (ref 70–99)
GLUCOSE BLDC GLUCOMTR-MCNC: 91 MG/DL (ref 70–99)
GLUCOSE BLDC GLUCOMTR-MCNC: 92 MG/DL (ref 70–99)
GLUCOSE BLDC GLUCOMTR-MCNC: 92 MG/DL (ref 70–99)
GLUCOSE BLDC GLUCOMTR-MCNC: 94 MG/DL (ref 70–99)
GLUCOSE BLDC GLUCOMTR-MCNC: 94 MG/DL (ref 70–99)
GLUCOSE BLDC GLUCOMTR-MCNC: 99 MG/DL (ref 70–99)
GLUCOSE FLD-MCNC: 72 MG/DL
GLUCOSE SERPL-MCNC: 108 MG/DL (ref 70–99)
GLUCOSE SERPL-MCNC: 108 MG/DL (ref 70–99)
GLUCOSE SERPL-MCNC: 109 MG/DL (ref 70–99)
GLUCOSE SERPL-MCNC: 112 MG/DL (ref 70–99)
GLUCOSE SERPL-MCNC: 113 MG/DL (ref 70–99)
GLUCOSE SERPL-MCNC: 114 MG/DL (ref 70–99)
GLUCOSE SERPL-MCNC: 117 MG/DL (ref 70–99)
GLUCOSE SERPL-MCNC: 117 MG/DL (ref 70–99)
GLUCOSE SERPL-MCNC: 118 MG/DL (ref 70–99)
GLUCOSE SERPL-MCNC: 120 MG/DL (ref 70–99)
GLUCOSE SERPL-MCNC: 121 MG/DL (ref 70–99)
GLUCOSE SERPL-MCNC: 122 MG/DL (ref 70–99)
GLUCOSE SERPL-MCNC: 127 MG/DL (ref 70–99)
GLUCOSE SERPL-MCNC: 129 MG/DL (ref 70–99)
GLUCOSE SERPL-MCNC: 130 MG/DL (ref 70–99)
GLUCOSE SERPL-MCNC: 131 MG/DL (ref 70–99)
GLUCOSE SERPL-MCNC: 133 MG/DL (ref 70–99)
GLUCOSE SERPL-MCNC: 135 MG/DL (ref 70–99)
GLUCOSE SERPL-MCNC: 138 MG/DL (ref 70–99)
GLUCOSE SERPL-MCNC: 140 MG/DL (ref 70–99)
GLUCOSE SERPL-MCNC: 141 MG/DL (ref 70–99)
GLUCOSE SERPL-MCNC: 143 MG/DL (ref 70–99)
GLUCOSE SERPL-MCNC: 148 MG/DL (ref 70–99)
GLUCOSE SERPL-MCNC: 155 MG/DL (ref 70–99)
GLUCOSE SERPL-MCNC: 160 MG/DL (ref 70–99)
GLUCOSE SERPL-MCNC: 94 MG/DL (ref 70–99)
GLUCOSE SERPL-MCNC: 95 MG/DL (ref 70–99)
GLUCOSE SERPL-MCNC: 97 MG/DL (ref 70–99)
GLUCOSE SERPL-MCNC: NORMAL MG/DL (ref 70–99)
GLUCOSE UR STRIP-MCNC: NEGATIVE MG/DL
GLUCOSE UR STRIP-MCNC: NEGATIVE MG/DL
GRAM STN SPEC: ABNORMAL
GRAM STN SPEC: NORMAL
HADV DNA SPEC QL NAA+PROBE: NEGATIVE
HBA1C MFR BLD: 5.5 % (ref 4.3–6)
HCO3 BLD-SCNC: 18 MMOL/L (ref 21–28)
HCO3 BLD-SCNC: 18 MMOL/L (ref 21–28)
HCO3 BLD-SCNC: 19 MMOL/L (ref 21–28)
HCO3 BLD-SCNC: 20 MMOL/L (ref 21–28)
HCO3 BLD-SCNC: 21 MMOL/L (ref 21–28)
HCO3 BLD-SCNC: 22 MMOL/L (ref 21–28)
HCO3 BLD-SCNC: 27 MMOL/L (ref 21–28)
HCO3 BLD-SCNC: 30 MMOL/L (ref 21–28)
HCO3 BLD-SCNC: 31 MMOL/L (ref 21–28)
HCO3 BLD-SCNC: 32 MMOL/L (ref 21–28)
HCO3 BLD-SCNC: 33 MMOL/L (ref 21–28)
HCO3 BLDV-SCNC: 33 MMOL/L (ref 21–28)
HCT VFR BLD AUTO: 28.7 % (ref 40–53)
HCT VFR BLD AUTO: 29.2 % (ref 40–53)
HCT VFR BLD AUTO: 29.7 % (ref 40–53)
HCT VFR BLD AUTO: 30.8 % (ref 40–53)
HCT VFR BLD AUTO: 31.2 % (ref 40–53)
HCT VFR BLD AUTO: 31.6 % (ref 40–53)
HCT VFR BLD AUTO: 32.6 % (ref 40–53)
HCT VFR BLD AUTO: 33 % (ref 40–53)
HCT VFR BLD AUTO: 34 % (ref 40–53)
HCT VFR BLD AUTO: 34.2 % (ref 40–53)
HCT VFR BLD AUTO: 35.1 % (ref 40–53)
HCT VFR BLD AUTO: 35.2 % (ref 40–53)
HCT VFR BLD AUTO: 35.3 % (ref 40–53)
HCT VFR BLD AUTO: 36.6 % (ref 40–53)
HCT VFR BLD AUTO: 37.9 % (ref 40–53)
HCT VFR BLD AUTO: 38 % (ref 40–53)
HCT VFR BLD AUTO: 38.4 % (ref 40–53)
HCT VFR BLD AUTO: 40 % (ref 40–53)
HCT VFR BLD AUTO: 40 % (ref 40–53)
HCT VFR BLD AUTO: NORMAL % (ref 40–53)
HCT VFR BLD AUTO: NORMAL % (ref 40–53)
HGB BLD-MCNC: 10.2 G/DL (ref 13.3–17.7)
HGB BLD-MCNC: 10.5 G/DL (ref 13.3–17.7)
HGB BLD-MCNC: 10.7 G/DL (ref 13.3–17.7)
HGB BLD-MCNC: 10.8 G/DL (ref 13.3–17.7)
HGB BLD-MCNC: 10.9 G/DL (ref 13.3–17.7)
HGB BLD-MCNC: 11.3 G/DL (ref 13.3–17.7)
HGB BLD-MCNC: 11.4 G/DL (ref 13.3–17.7)
HGB BLD-MCNC: 11.5 G/DL (ref 13.3–17.7)
HGB BLD-MCNC: 11.8 G/DL (ref 13.3–17.7)
HGB BLD-MCNC: 11.9 G/DL (ref 13.3–17.7)
HGB BLD-MCNC: 12 G/DL (ref 13.3–17.7)
HGB BLD-MCNC: 12.1 G/DL (ref 13.3–17.7)
HGB BLD-MCNC: 12.1 G/DL (ref 13.3–17.7)
HGB BLD-MCNC: 12.6 G/DL (ref 13.3–17.7)
HGB BLD-MCNC: 12.7 G/DL (ref 13.3–17.7)
HGB BLD-MCNC: 9.3 G/DL (ref 13.3–17.7)
HGB BLD-MCNC: 9.6 G/DL (ref 13.3–17.7)
HGB BLD-MCNC: 9.8 G/DL (ref 13.3–17.7)
HGB BLD-MCNC: 9.8 G/DL (ref 13.3–17.7)
HGB BLD-MCNC: NORMAL G/DL (ref 13.3–17.7)
HGB BLD-MCNC: NORMAL G/DL (ref 13.3–17.7)
HGB UR QL STRIP: NEGATIVE
HGB UR QL STRIP: NEGATIVE
HMPV RNA SPEC QL NAA+PROBE: NEGATIVE
HMPV RNA SPEC QL NAA+PROBE: NEGATIVE
HPIV1 RNA SPEC QL NAA+PROBE: NEGATIVE
HPIV1 RNA SPEC QL NAA+PROBE: NEGATIVE
HPIV2 RNA SPEC QL NAA+PROBE: NEGATIVE
HPIV2 RNA SPEC QL NAA+PROBE: NEGATIVE
HPIV3 RNA SPEC QL NAA+PROBE: NEGATIVE
HPIV3 RNA SPEC QL NAA+PROBE: NEGATIVE
HYALINE CASTS #/AREA URNS LPF: 5 /LPF (ref 0–2)
IMM GRANULOCYTES # BLD: 0 10E9/L (ref 0–0.4)
IMM GRANULOCYTES # BLD: 0.1 10E9/L (ref 0–0.4)
IMM GRANULOCYTES # BLD: 0.2 10E9/L (ref 0–0.4)
IMM GRANULOCYTES # BLD: 0.2 10E9/L (ref 0–0.4)
IMM GRANULOCYTES # BLD: 0.3 10E9/L (ref 0–0.4)
IMM GRANULOCYTES NFR BLD: 0.2 %
IMM GRANULOCYTES NFR BLD: 0.4 %
IMM GRANULOCYTES NFR BLD: 0.5 %
IMM GRANULOCYTES NFR BLD: 0.5 %
IMM GRANULOCYTES NFR BLD: 0.9 %
IMM GRANULOCYTES NFR BLD: 1.8 %
IMM GRANULOCYTES NFR BLD: 2.6 %
IMM GRANULOCYTES NFR BLD: 3.2 %
INR PPP: 1.12 (ref 0.86–1.14)
INR PPP: 1.19 (ref 0.86–1.14)
INR PPP: 1.21 (ref 0.86–1.14)
INR PPP: 1.23 (ref 0.86–1.14)
INR PPP: 1.24 (ref 0.86–1.14)
INR PPP: 1.32 (ref 0.86–1.14)
INTERPRETATION ECG - MUSE: NORMAL
INTERPRETATION ECG - MUSE: NORMAL
KETONES UR STRIP-MCNC: NEGATIVE MG/DL
KETONES UR STRIP-MCNC: NEGATIVE MG/DL
L PNEUMO DNA SPEC QL NAA+PROBE: NOT DETECTED
LACTATE BLD-SCNC: 0.7 MMOL/L (ref 0.7–2)
LACTATE BLD-SCNC: 0.8 MMOL/L (ref 0.7–2)
LACTATE BLD-SCNC: 0.8 MMOL/L (ref 0.7–2)
LACTATE BLD-SCNC: 0.9 MMOL/L (ref 0.7–2)
LDH FLD L TO P-CCNC: 526 U/L
LDH SERPL L TO P-CCNC: 258 U/L (ref 85–227)
LEGIONELLA DNA SPEC NAA+PROBE: NOT DETECTED
LEUKOCYTE ESTERASE UR QL STRIP: NEGATIVE
LEUKOCYTE ESTERASE UR QL STRIP: NEGATIVE
LIPASE SERPL-CCNC: 1106 U/L (ref 73–393)
LIPASE SERPL-CCNC: 1312 U/L (ref 73–393)
LIPASE SERPL-CCNC: 1764 U/L (ref 73–393)
LIPASE SERPL-CCNC: 1840 U/L (ref 73–393)
LIPASE SERPL-CCNC: 40 U/L (ref 73–393)
LIPASE SERPL-CCNC: NORMAL U/L (ref 73–393)
LMWH PPP CHRO-ACNC: 0.12 IU/ML
LMWH PPP CHRO-ACNC: 0.21 IU/ML
LMWH PPP CHRO-ACNC: 0.27 IU/ML
LMWH PPP CHRO-ACNC: 1.41 IU/ML
LMWH PPP CHRO-ACNC: <0.1 IU/ML
LYMPHOCYTES # BLD AUTO: 0.5 10E9/L (ref 0.8–5.3)
LYMPHOCYTES # BLD AUTO: 0.5 10E9/L (ref 0.8–5.3)
LYMPHOCYTES # BLD AUTO: 0.6 10E9/L (ref 0.8–5.3)
LYMPHOCYTES # BLD AUTO: 0.8 10E9/L (ref 0.8–5.3)
LYMPHOCYTES # BLD AUTO: 1.4 10E9/L (ref 0.8–5.3)
LYMPHOCYTES # BLD AUTO: 1.5 10E9/L (ref 0.8–5.3)
LYMPHOCYTES # BLD AUTO: 1.6 10E9/L (ref 0.8–5.3)
LYMPHOCYTES # BLD AUTO: 1.9 10E9/L (ref 0.8–5.3)
LYMPHOCYTES NFR BLD AUTO: 12.3 %
LYMPHOCYTES NFR BLD AUTO: 15.7 %
LYMPHOCYTES NFR BLD AUTO: 3.2 %
LYMPHOCYTES NFR BLD AUTO: 4.7 %
LYMPHOCYTES NFR BLD AUTO: 6.3 %
LYMPHOCYTES NFR BLD AUTO: 6.7 %
LYMPHOCYTES NFR BLD AUTO: 7.5 %
LYMPHOCYTES NFR BLD AUTO: 9.6 %
LYMPHOCYTES NFR FLD MANUAL: 15 %
LYMPHOCYTES NFR FLD MANUAL: 2 %
LYMPHOCYTES NFR FLD MANUAL: 5 %
Lab: ABNORMAL
Lab: NORMAL
M PNEUMO DNA SPEC QL NAA+PROBE: NOT DETECTED
MAGNESIUM SERPL-MCNC: 2.1 MG/DL (ref 1.6–2.3)
MAGNESIUM SERPL-MCNC: 2.1 MG/DL (ref 1.6–2.3)
MAGNESIUM SERPL-MCNC: 2.4 MG/DL (ref 1.6–2.3)
MAGNESIUM SERPL-MCNC: 2.7 MG/DL (ref 1.6–2.3)
MCH RBC QN AUTO: 29.6 PG (ref 26.5–33)
MCH RBC QN AUTO: 29.7 PG (ref 26.5–33)
MCH RBC QN AUTO: 29.8 PG (ref 26.5–33)
MCH RBC QN AUTO: 29.9 PG (ref 26.5–33)
MCH RBC QN AUTO: 29.9 PG (ref 26.5–33)
MCH RBC QN AUTO: 30 PG (ref 26.5–33)
MCH RBC QN AUTO: 30.1 PG (ref 26.5–33)
MCH RBC QN AUTO: 30.2 PG (ref 26.5–33)
MCH RBC QN AUTO: 30.2 PG (ref 26.5–33)
MCH RBC QN AUTO: 30.3 PG (ref 26.5–33)
MCH RBC QN AUTO: 30.4 PG (ref 26.5–33)
MCH RBC QN AUTO: 30.6 PG (ref 26.5–33)
MCH RBC QN AUTO: 30.6 PG (ref 26.5–33)
MCH RBC QN AUTO: NORMAL PG (ref 26.5–33)
MCH RBC QN AUTO: NORMAL PG (ref 26.5–33)
MCHC RBC AUTO-ENTMCNC: 30.7 G/DL (ref 31.5–36.5)
MCHC RBC AUTO-ENTMCNC: 31.3 G/DL (ref 31.5–36.5)
MCHC RBC AUTO-ENTMCNC: 31.5 G/DL (ref 31.5–36.5)
MCHC RBC AUTO-ENTMCNC: 31.5 G/DL (ref 31.5–36.5)
MCHC RBC AUTO-ENTMCNC: 31.8 G/DL (ref 31.5–36.5)
MCHC RBC AUTO-ENTMCNC: 31.8 G/DL (ref 31.5–36.5)
MCHC RBC AUTO-ENTMCNC: 31.9 G/DL (ref 31.5–36.5)
MCHC RBC AUTO-ENTMCNC: 32.2 G/DL (ref 31.5–36.5)
MCHC RBC AUTO-ENTMCNC: 32.3 G/DL (ref 31.5–36.5)
MCHC RBC AUTO-ENTMCNC: 32.4 G/DL (ref 31.5–36.5)
MCHC RBC AUTO-ENTMCNC: 32.4 G/DL (ref 31.5–36.5)
MCHC RBC AUTO-ENTMCNC: 32.6 G/DL (ref 31.5–36.5)
MCHC RBC AUTO-ENTMCNC: 32.9 G/DL (ref 31.5–36.5)
MCHC RBC AUTO-ENTMCNC: 33 G/DL (ref 31.5–36.5)
MCHC RBC AUTO-ENTMCNC: 33.2 G/DL (ref 31.5–36.5)
MCHC RBC AUTO-ENTMCNC: 33.3 G/DL (ref 31.5–36.5)
MCHC RBC AUTO-ENTMCNC: 33.4 G/DL (ref 31.5–36.5)
MCHC RBC AUTO-ENTMCNC: 33.7 G/DL (ref 31.5–36.5)
MCHC RBC AUTO-ENTMCNC: 34.2 G/DL (ref 31.5–36.5)
MCHC RBC AUTO-ENTMCNC: NORMAL G/DL (ref 31.5–36.5)
MCHC RBC AUTO-ENTMCNC: NORMAL G/DL (ref 31.5–36.5)
MCV RBC AUTO: 88 FL (ref 78–100)
MCV RBC AUTO: 90 FL (ref 78–100)
MCV RBC AUTO: 91 FL (ref 78–100)
MCV RBC AUTO: 91 FL (ref 78–100)
MCV RBC AUTO: 92 FL (ref 78–100)
MCV RBC AUTO: 92 FL (ref 78–100)
MCV RBC AUTO: 93 FL (ref 78–100)
MCV RBC AUTO: 94 FL (ref 78–100)
MCV RBC AUTO: 95 FL (ref 78–100)
MCV RBC AUTO: 96 FL (ref 78–100)
MCV RBC AUTO: 96 FL (ref 78–100)
MCV RBC AUTO: NORMAL FL (ref 78–100)
MCV RBC AUTO: NORMAL FL (ref 78–100)
MICROBIOLOGIST REVIEW: NORMAL
MICROBIOLOGIST REVIEW: NORMAL
MONOCYTES # BLD AUTO: 0.6 10E9/L (ref 0–1.3)
MONOCYTES # BLD AUTO: 0.8 10E9/L (ref 0–1.3)
MONOCYTES # BLD AUTO: 1.1 10E9/L (ref 0–1.3)
MONOCYTES # BLD AUTO: 1.2 10E9/L (ref 0–1.3)
MONOCYTES # BLD AUTO: 1.4 10E9/L (ref 0–1.3)
MONOCYTES # BLD AUTO: 1.7 10E9/L (ref 0–1.3)
MONOCYTES # BLD AUTO: 1.9 10E9/L (ref 0–1.3)
MONOCYTES # BLD AUTO: 2 10E9/L (ref 0–1.3)
MONOCYTES NFR BLD AUTO: 10.6 %
MONOCYTES NFR BLD AUTO: 10.8 %
MONOCYTES NFR BLD AUTO: 11.1 %
MONOCYTES NFR BLD AUTO: 11.8 %
MONOCYTES NFR BLD AUTO: 6.5 %
MONOCYTES NFR BLD AUTO: 8.6 %
MONOCYTES NFR BLD AUTO: 9 %
MONOCYTES NFR BLD AUTO: 9.6 %
MONOS+MACROS NFR FLD MANUAL: 2 %
MONOS+MACROS NFR FLD MANUAL: 22 %
MRSA DNA SPEC QL NAA+PROBE: POSITIVE
MUCOUS THREADS #/AREA URNS LPF: PRESENT /LPF
MYCOBACTERIUM SPEC CULT: NORMAL
MYCOBACTERIUM SPEC CULT: NORMAL
NEUTROPHILS # BLD AUTO: 12.7 10E9/L (ref 1.6–8.3)
NEUTROPHILS # BLD AUTO: 13.1 10E9/L (ref 1.6–8.3)
NEUTROPHILS # BLD AUTO: 13.8 10E9/L (ref 1.6–8.3)
NEUTROPHILS # BLD AUTO: 14.7 10E9/L (ref 1.6–8.3)
NEUTROPHILS # BLD AUTO: 5.8 10E9/L (ref 1.6–8.3)
NEUTROPHILS # BLD AUTO: 8 10E9/L (ref 1.6–8.3)
NEUTROPHILS # BLD AUTO: 8.6 10E9/L (ref 1.6–8.3)
NEUTROPHILS # BLD AUTO: 8.8 10E9/L (ref 1.6–8.3)
NEUTROPHILS NFR BLD AUTO: 70.4 %
NEUTROPHILS NFR BLD AUTO: 73.4 %
NEUTROPHILS NFR BLD AUTO: 76.1 %
NEUTROPHILS NFR BLD AUTO: 78.1 %
NEUTROPHILS NFR BLD AUTO: 80.2 %
NEUTROPHILS NFR BLD AUTO: 83 %
NEUTROPHILS NFR BLD AUTO: 85.8 %
NEUTROPHILS NFR BLD AUTO: 87.1 %
NEUTS BAND NFR FLD MANUAL: 41 %
NEUTS BAND NFR FLD MANUAL: 83 %
NEUTS BAND NFR FLD MANUAL: 98 %
NITRATE UR QL: NEGATIVE
NITRATE UR QL: NEGATIVE
NRBC # BLD AUTO: 0 10*3/UL
NRBC BLD AUTO-RTO: 0 /100
NT-PROBNP SERPL-MCNC: 1120 PG/ML (ref 0–900)
O2/TOTAL GAS SETTING VFR VENT: 40 %
O2/TOTAL GAS SETTING VFR VENT: 40 %
O2/TOTAL GAS SETTING VFR VENT: 50 %
O2/TOTAL GAS SETTING VFR VENT: 60 %
O2/TOTAL GAS SETTING VFR VENT: 95 %
O2/TOTAL GAS SETTING VFR VENT: ABNORMAL %
OTHER CELLS FLD MANUAL: 32 %
OXYHGB MFR BLD: 82 % (ref 92–100)
OXYHGB MFR BLD: 89 % (ref 92–100)
OXYHGB MFR BLD: 90 % (ref 92–100)
OXYHGB MFR BLD: 90 % (ref 92–100)
OXYHGB MFR BLD: 91 % (ref 92–100)
OXYHGB MFR BLD: 91 % (ref 92–100)
OXYHGB MFR BLD: 92 % (ref 92–100)
OXYHGB MFR BLD: 93 % (ref 92–100)
OXYHGB MFR BLD: 94 % (ref 92–100)
OXYHGB MFR BLD: 95 % (ref 92–100)
OXYHGB MFR BLD: 95 % (ref 92–100)
OXYHGB MFR BLD: 96 % (ref 92–100)
OXYHGB MFR BLD: 97 % (ref 92–100)
OXYHGB MFR BLD: 98 % (ref 92–100)
OXYHGB MFR BLD: 98 % (ref 92–100)
OXYHGB MFR BLDV: 70 %
PCO2 BLD: 38 MM HG (ref 35–45)
PCO2 BLD: 38 MM HG (ref 35–45)
PCO2 BLD: 40 MM HG (ref 35–45)
PCO2 BLD: 41 MM HG (ref 35–45)
PCO2 BLD: 42 MM HG (ref 35–45)
PCO2 BLD: 43 MM HG (ref 35–45)
PCO2 BLD: 45 MM HG (ref 35–45)
PCO2 BLD: 46 MM HG (ref 35–45)
PCO2 BLD: 47 MM HG (ref 35–45)
PCO2 BLD: 49 MM HG (ref 35–45)
PCO2 BLD: 50 MM HG (ref 35–45)
PCO2 BLD: 53 MM HG (ref 35–45)
PCO2 BLD: 54 MM HG (ref 35–45)
PCO2 BLD: 56 MM HG (ref 35–45)
PCO2 BLD: 57 MM HG (ref 35–45)
PCO2 BLD: 59 MM HG (ref 35–45)
PCO2 BLD: 62 MM HG (ref 35–45)
PCO2 BLDV: 54 MM HG (ref 40–50)
PH BLD: 7.14 PH (ref 7.35–7.45)
PH BLD: 7.18 PH (ref 7.35–7.45)
PH BLD: 7.19 PH (ref 7.35–7.45)
PH BLD: 7.19 PH (ref 7.35–7.45)
PH BLD: 7.2 PH (ref 7.35–7.45)
PH BLD: 7.2 PH (ref 7.35–7.45)
PH BLD: 7.21 PH (ref 7.35–7.45)
PH BLD: 7.21 PH (ref 7.35–7.45)
PH BLD: 7.22 PH (ref 7.35–7.45)
PH BLD: 7.24 PH (ref 7.35–7.45)
PH BLD: 7.25 PH (ref 7.35–7.45)
PH BLD: 7.26 PH (ref 7.35–7.45)
PH BLD: 7.27 PH (ref 7.35–7.45)
PH BLD: 7.28 PH (ref 7.35–7.45)
PH BLD: 7.29 PH (ref 7.35–7.45)
PH BLD: 7.3 PH (ref 7.35–7.45)
PH BLD: 7.31 PH (ref 7.35–7.45)
PH BLD: 7.32 PH (ref 7.35–7.45)
PH BLD: 7.32 PH (ref 7.35–7.45)
PH BLD: 7.42 PH (ref 7.35–7.45)
PH BLD: 7.42 PH (ref 7.35–7.45)
PH BLD: 7.45 PH (ref 7.35–7.45)
PH BLD: 7.46 PH (ref 7.35–7.45)
PH BLD: 7.48 PH (ref 7.35–7.45)
PH BLDV: 7.39 PH (ref 7.32–7.43)
PH FLD: 8 PH
PH UR STRIP: 5.5 PH (ref 5–7)
PH UR STRIP: 5.5 PH (ref 5–7)
PHOSPHATE SERPL-MCNC: 2.5 MG/DL (ref 2.5–4.5)
PHOSPHATE SERPL-MCNC: 3 MG/DL (ref 2.5–4.5)
PHOSPHATE SERPL-MCNC: 4.1 MG/DL (ref 2.5–4.5)
PHOSPHATE SERPL-MCNC: 4.4 MG/DL (ref 2.5–4.5)
PLATELET # BLD AUTO: 242 10E9/L (ref 150–450)
PLATELET # BLD AUTO: 261 10E9/L (ref 150–450)
PLATELET # BLD AUTO: 274 10E9/L (ref 150–450)
PLATELET # BLD AUTO: 283 10E9/L (ref 150–450)
PLATELET # BLD AUTO: 283 10E9/L (ref 150–450)
PLATELET # BLD AUTO: 287 10E9/L (ref 150–450)
PLATELET # BLD AUTO: 299 10E9/L (ref 150–450)
PLATELET # BLD AUTO: 334 10E9/L (ref 150–450)
PLATELET # BLD AUTO: 361 10E9/L (ref 150–450)
PLATELET # BLD AUTO: 374 10E9/L (ref 150–450)
PLATELET # BLD AUTO: 382 10E9/L (ref 150–450)
PLATELET # BLD AUTO: 399 10E9/L (ref 150–450)
PLATELET # BLD AUTO: 402 10E9/L (ref 150–450)
PLATELET # BLD AUTO: 407 10E9/L (ref 150–450)
PLATELET # BLD AUTO: 407 10E9/L (ref 150–450)
PLATELET # BLD AUTO: 417 10E9/L (ref 150–450)
PLATELET # BLD AUTO: 418 10E9/L (ref 150–450)
PLATELET # BLD AUTO: 432 10E9/L (ref 150–450)
PLATELET # BLD AUTO: 438 10E9/L (ref 150–450)
PLATELET # BLD AUTO: 463 10E9/L (ref 150–450)
PLATELET # BLD AUTO: 468 10E9/L (ref 150–450)
PLATELET # BLD AUTO: NORMAL 10E9/L (ref 150–450)
PLATELET # BLD AUTO: NORMAL 10E9/L (ref 150–450)
PO2 BLD: 101 MM HG (ref 80–105)
PO2 BLD: 110 MM HG (ref 80–105)
PO2 BLD: 111 MM HG (ref 80–105)
PO2 BLD: 113 MM HG (ref 80–105)
PO2 BLD: 118 MM HG (ref 80–105)
PO2 BLD: 143 MM HG (ref 80–105)
PO2 BLD: 159 MM HG (ref 80–105)
PO2 BLD: 257 MM HG (ref 80–105)
PO2 BLD: 50 MM HG (ref 80–105)
PO2 BLD: 59 MM HG (ref 80–105)
PO2 BLD: 61 MM HG (ref 80–105)
PO2 BLD: 63 MM HG (ref 80–105)
PO2 BLD: 65 MM HG (ref 80–105)
PO2 BLD: 68 MM HG (ref 80–105)
PO2 BLD: 71 MM HG (ref 80–105)
PO2 BLD: 75 MM HG (ref 80–105)
PO2 BLD: 76 MM HG (ref 80–105)
PO2 BLD: 77 MM HG (ref 80–105)
PO2 BLD: 77 MM HG (ref 80–105)
PO2 BLD: 79 MM HG (ref 80–105)
PO2 BLD: 82 MM HG (ref 80–105)
PO2 BLD: 82 MM HG (ref 80–105)
PO2 BLD: 84 MM HG (ref 80–105)
PO2 BLD: 85 MM HG (ref 80–105)
PO2 BLD: 88 MM HG (ref 80–105)
PO2 BLD: 90 MM HG (ref 80–105)
PO2 BLD: 93 MM HG (ref 80–105)
PO2 BLDV: 39 MM HG (ref 25–47)
POTASSIUM SERPL-SCNC: 2.7 MMOL/L (ref 3.4–5.3)
POTASSIUM SERPL-SCNC: 3.1 MMOL/L (ref 3.4–5.3)
POTASSIUM SERPL-SCNC: 3.6 MMOL/L (ref 3.4–5.3)
POTASSIUM SERPL-SCNC: 3.6 MMOL/L (ref 3.4–5.3)
POTASSIUM SERPL-SCNC: 3.7 MMOL/L (ref 3.4–5.3)
POTASSIUM SERPL-SCNC: 3.8 MMOL/L (ref 3.4–5.3)
POTASSIUM SERPL-SCNC: 3.9 MMOL/L (ref 3.4–5.3)
POTASSIUM SERPL-SCNC: 4 MMOL/L (ref 3.4–5.3)
POTASSIUM SERPL-SCNC: 4.1 MMOL/L (ref 3.4–5.3)
POTASSIUM SERPL-SCNC: 4.2 MMOL/L (ref 3.4–5.3)
POTASSIUM SERPL-SCNC: 4.3 MMOL/L (ref 3.4–5.3)
POTASSIUM SERPL-SCNC: 4.4 MMOL/L (ref 3.4–5.3)
POTASSIUM SERPL-SCNC: 4.5 MMOL/L (ref 3.4–5.3)
POTASSIUM SERPL-SCNC: 4.6 MMOL/L (ref 3.4–5.3)
POTASSIUM SERPL-SCNC: 4.7 MMOL/L (ref 3.4–5.3)
POTASSIUM SERPL-SCNC: 4.7 MMOL/L (ref 3.4–5.3)
POTASSIUM SERPL-SCNC: 4.8 MMOL/L (ref 3.4–5.3)
POTASSIUM SERPL-SCNC: 4.8 MMOL/L (ref 3.4–5.3)
POTASSIUM SERPL-SCNC: 5 MMOL/L (ref 3.4–5.3)
POTASSIUM SERPL-SCNC: 5.4 MMOL/L (ref 3.4–5.3)
POTASSIUM SERPL-SCNC: NORMAL MMOL/L (ref 3.4–5.3)
PREALB SERPL IA-MCNC: 27 MG/DL (ref 15–45)
PREALB SERPL IA-MCNC: 28 MG/DL (ref 15–45)
PROCALCITONIN SERPL-MCNC: 0.12 NG/ML
PROCALCITONIN SERPL-MCNC: 0.3 NG/ML
PROCALCITONIN SERPL-MCNC: 0.34 NG/ML
PROCALCITONIN SERPL-MCNC: 0.7 NG/ML
PROCALCITONIN SERPL-MCNC: 0.87 NG/ML
PROCALCITONIN SERPL-MCNC: 1.05 NG/ML
PROCALCITONIN SERPL-MCNC: <0.05 NG/ML
PROCALCITONIN SERPL-MCNC: NORMAL NG/ML
PROT FLD-MCNC: 4.7 G/DL
PROT SERPL-MCNC: 6.1 G/DL (ref 6.8–8.8)
PROT SERPL-MCNC: 6.1 G/DL (ref 6.8–8.8)
PROT SERPL-MCNC: 6.4 G/DL (ref 6.8–8.8)
PROT SERPL-MCNC: 6.7 G/DL (ref 6.8–8.8)
PROT SERPL-MCNC: 6.8 G/DL (ref 6.8–8.8)
PROT SERPL-MCNC: 6.8 G/DL (ref 6.8–8.8)
PROT SERPL-MCNC: 7 G/DL (ref 6.8–8.8)
PROT SERPL-MCNC: 7.2 G/DL (ref 6.8–8.8)
PROT SERPL-MCNC: 7.3 G/DL (ref 6.8–8.8)
PROT SERPL-MCNC: 7.3 G/DL (ref 6.8–8.8)
PROT SERPL-MCNC: 7.5 G/DL (ref 6.8–8.8)
PROT SERPL-MCNC: 7.6 G/DL (ref 6.8–8.8)
PROT SERPL-MCNC: 8.1 G/DL (ref 6.8–8.8)
PROT SERPL-MCNC: 8.5 G/DL (ref 6.8–8.8)
PROT SERPL-MCNC: NORMAL G/DL (ref 6.8–8.8)
RBC # BLD AUTO: 3.1 10E12/L (ref 4.4–5.9)
RBC # BLD AUTO: 3.22 10E12/L (ref 4.4–5.9)
RBC # BLD AUTO: 3.27 10E12/L (ref 4.4–5.9)
RBC # BLD AUTO: 3.29 10E12/L (ref 4.4–5.9)
RBC # BLD AUTO: 3.35 10E12/L (ref 4.4–5.9)
RBC # BLD AUTO: 3.53 10E12/L (ref 4.4–5.9)
RBC # BLD AUTO: 3.53 10E12/L (ref 4.4–5.9)
RBC # BLD AUTO: 3.61 10E12/L (ref 4.4–5.9)
RBC # BLD AUTO: 3.65 10E12/L (ref 4.4–5.9)
RBC # BLD AUTO: 3.72 10E12/L (ref 4.4–5.9)
RBC # BLD AUTO: 3.77 10E12/L (ref 4.4–5.9)
RBC # BLD AUTO: 3.87 10E12/L (ref 4.4–5.9)
RBC # BLD AUTO: 3.92 10E12/L (ref 4.4–5.9)
RBC # BLD AUTO: 3.97 10E12/L (ref 4.4–5.9)
RBC # BLD AUTO: 3.98 10E12/L (ref 4.4–5.9)
RBC # BLD AUTO: 4.01 10E12/L (ref 4.4–5.9)
RBC # BLD AUTO: 4.06 10E12/L (ref 4.4–5.9)
RBC # BLD AUTO: 4.22 10E12/L (ref 4.4–5.9)
RBC # BLD AUTO: 4.22 10E12/L (ref 4.4–5.9)
RBC # BLD AUTO: NORMAL 10E12/L (ref 4.4–5.9)
RBC # BLD AUTO: NORMAL 10E12/L (ref 4.4–5.9)
RBC #/AREA URNS AUTO: 0 /HPF (ref 0–2)
RBC #/AREA URNS AUTO: 2 /HPF (ref 0–2)
RHINOVIRUS RNA SPEC QL NAA+PROBE: NEGATIVE
RHINOVIRUS RNA SPEC QL NAA+PROBE: NEGATIVE
RSV RNA SPEC QL NAA+PROBE: NEGATIVE
SODIUM SERPL-SCNC: 132 MMOL/L (ref 133–144)
SODIUM SERPL-SCNC: 136 MMOL/L (ref 133–144)
SODIUM SERPL-SCNC: 136 MMOL/L (ref 133–144)
SODIUM SERPL-SCNC: 137 MMOL/L (ref 133–144)
SODIUM SERPL-SCNC: 138 MMOL/L (ref 133–144)
SODIUM SERPL-SCNC: 139 MMOL/L (ref 133–144)
SODIUM SERPL-SCNC: 140 MMOL/L (ref 133–144)
SODIUM SERPL-SCNC: 141 MMOL/L (ref 133–144)
SODIUM SERPL-SCNC: 141 MMOL/L (ref 133–144)
SODIUM SERPL-SCNC: 143 MMOL/L (ref 133–144)
SODIUM SERPL-SCNC: 144 MMOL/L (ref 133–144)
SODIUM SERPL-SCNC: 144 MMOL/L (ref 133–144)
SODIUM SERPL-SCNC: 145 MMOL/L (ref 133–144)
SODIUM SERPL-SCNC: 146 MMOL/L (ref 133–144)
SODIUM SERPL-SCNC: 147 MMOL/L (ref 133–144)
SODIUM SERPL-SCNC: 147 MMOL/L (ref 133–144)
SODIUM SERPL-SCNC: 148 MMOL/L (ref 133–144)
SODIUM SERPL-SCNC: 149 MMOL/L (ref 133–144)
SODIUM SERPL-SCNC: 151 MMOL/L (ref 133–144)
SODIUM SERPL-SCNC: 152 MMOL/L (ref 133–144)
SODIUM SERPL-SCNC: NORMAL MMOL/L (ref 133–144)
SOURCE: ABNORMAL
SOURCE: ABNORMAL
SP GR UR STRIP: 1.01 (ref 1–1.03)
SP GR UR STRIP: 1.02 (ref 1–1.03)
SPECIMEN SOURCE FLD: NORMAL
SPECIMEN SOURCE: ABNORMAL
SPECIMEN SOURCE: NORMAL
TRIGL SERPL-MCNC: 360 MG/DL
TRIGL SERPL-MCNC: 388 MG/DL
TRIGL SERPL-MCNC: 463 MG/DL
TRIGL SERPL-MCNC: 496 MG/DL
TROPONIN I SERPL-MCNC: <0.015 UG/L (ref 0–0.04)
TROPONIN I SERPL-MCNC: <0.015 UG/L (ref 0–0.04)
UROBILINOGEN UR STRIP-MCNC: >12 MG/DL (ref 0–2)
UROBILINOGEN UR STRIP-MCNC: NORMAL MG/DL (ref 0–2)
VANCOMYCIN SERPL-MCNC: 13.8 MG/L
VANCOMYCIN SERPL-MCNC: 17.4 MG/L
VANCOMYCIN SERPL-MCNC: 18.7 MG/L
VANCOMYCIN SERPL-MCNC: 22.1 MG/L
VANCOMYCIN SERPL-MCNC: 22.3 MG/L
VANCOMYCIN SERPL-MCNC: 22.4 MG/L
WBC # BLD AUTO: 10.3 10E9/L (ref 4–11)
WBC # BLD AUTO: 10.4 10E9/L (ref 4–11)
WBC # BLD AUTO: 10.7 10E9/L (ref 4–11)
WBC # BLD AUTO: 10.7 10E9/L (ref 4–11)
WBC # BLD AUTO: 11.8 10E9/L (ref 4–11)
WBC # BLD AUTO: 12 10E9/L (ref 4–11)
WBC # BLD AUTO: 12.2 10E9/L (ref 4–11)
WBC # BLD AUTO: 12.5 10E9/L (ref 4–11)
WBC # BLD AUTO: 12.6 10E9/L (ref 4–11)
WBC # BLD AUTO: 14.8 10E9/L (ref 4–11)
WBC # BLD AUTO: 15.8 10E9/L (ref 4–11)
WBC # BLD AUTO: 15.9 10E9/L (ref 4–11)
WBC # BLD AUTO: 16.3 10E9/L (ref 4–11)
WBC # BLD AUTO: 18.3 10E9/L (ref 4–11)
WBC # BLD AUTO: 7 10E9/L (ref 4–11)
WBC # BLD AUTO: 7.6 10E9/L (ref 4–11)
WBC # BLD AUTO: 7.8 10E9/L (ref 4–11)
WBC # BLD AUTO: 9.1 10E9/L (ref 4–11)
WBC # BLD AUTO: 9.2 10E9/L (ref 4–11)
WBC # BLD AUTO: 9.4 10E9/L (ref 4–11)
WBC # BLD AUTO: NORMAL 10E9/L (ref 4–11)
WBC # BLD AUTO: NORMAL 10E9/L (ref 4–11)
WBC # FLD AUTO: 1091 /UL
WBC # FLD AUTO: 2150 /UL
WBC # FLD AUTO: 780 /UL
WBC #/AREA URNS AUTO: 1 /HPF (ref 0–2)
WBC #/AREA URNS AUTO: 3 /HPF (ref 0–2)

## 2017-01-01 PROCEDURE — 85730 THROMBOPLASTIN TIME PARTIAL: CPT | Performed by: INTERNAL MEDICINE

## 2017-01-01 PROCEDURE — 80048 BASIC METABOLIC PNL TOTAL CA: CPT | Performed by: INTERNAL MEDICINE

## 2017-01-01 PROCEDURE — 87015 SPECIMEN INFECT AGNT CONCNTJ: CPT | Performed by: INTERNAL MEDICINE

## 2017-01-01 PROCEDURE — 40000133 ZZH STATISTIC OT WARD VISIT: Performed by: OCCUPATIONAL THERAPY ASSISTANT

## 2017-01-01 PROCEDURE — 40000275 ZZH STATISTIC RCP TIME EA 10 MIN

## 2017-01-01 PROCEDURE — 25000132 ZZH RX MED GY IP 250 OP 250 PS 637: Performed by: INTERNAL MEDICINE

## 2017-01-01 PROCEDURE — 99207 ZZC MOONLIGHTING INDICATOR: CPT | Performed by: INTERNAL MEDICINE

## 2017-01-01 PROCEDURE — 76705 ECHO EXAM OF ABDOMEN: CPT

## 2017-01-01 PROCEDURE — 25000128 H RX IP 250 OP 636: Performed by: INTERNAL MEDICINE

## 2017-01-01 PROCEDURE — 20000003 ZZH R&B ICU

## 2017-01-01 PROCEDURE — 36593 DECLOT VASCULAR DEVICE: CPT

## 2017-01-01 PROCEDURE — 87541 LEGION PNEUMO DNA AMP PROB: CPT | Performed by: INTERNAL MEDICINE

## 2017-01-01 PROCEDURE — 12000000 ZZH R&B MED SURG/OB

## 2017-01-01 PROCEDURE — 99239 HOSP IP/OBS DSCHRG MGMT >30: CPT | Performed by: HOSPITALIST

## 2017-01-01 PROCEDURE — 94645 CONT INHLJ TX EACH ADDL HOUR: CPT

## 2017-01-01 PROCEDURE — 85520 HEPARIN ASSAY: CPT | Performed by: INTERNAL MEDICINE

## 2017-01-01 PROCEDURE — 94640 AIRWAY INHALATION TREATMENT: CPT

## 2017-01-01 PROCEDURE — 87075 CULTR BACTERIA EXCEPT BLOOD: CPT | Performed by: PHYSICIAN ASSISTANT

## 2017-01-01 PROCEDURE — 40000193 ZZH STATISTIC PT WARD VISIT: Performed by: PHYSICAL THERAPIST

## 2017-01-01 PROCEDURE — 87106 FUNGI IDENTIFICATION YEAST: CPT | Performed by: INTERNAL MEDICINE

## 2017-01-01 PROCEDURE — 99291 CRITICAL CARE FIRST HOUR: CPT | Performed by: INTERNAL MEDICINE

## 2017-01-01 PROCEDURE — 94003 VENT MGMT INPAT SUBQ DAY: CPT

## 2017-01-01 PROCEDURE — 99291 CRITICAL CARE FIRST HOUR: CPT | Mod: 25 | Performed by: INTERNAL MEDICINE

## 2017-01-01 PROCEDURE — 00000146 ZZHCL STATISTIC GLUCOSE BY METER IP

## 2017-01-01 PROCEDURE — 97530 THERAPEUTIC ACTIVITIES: CPT | Mod: GP

## 2017-01-01 PROCEDURE — 27210995 ZZH RX 272: Performed by: INTERNAL MEDICINE

## 2017-01-01 PROCEDURE — 85027 COMPLETE CBC AUTOMATED: CPT | Performed by: INTERNAL MEDICINE

## 2017-01-01 PROCEDURE — 40000239 ZZH STATISTIC VAT ROUNDS

## 2017-01-01 PROCEDURE — 27210429 ZZH NUTRITION PRODUCT INTERMEDIATE LITER

## 2017-01-01 PROCEDURE — 94640 AIRWAY INHALATION TREATMENT: CPT | Mod: 76

## 2017-01-01 PROCEDURE — 82945 GLUCOSE OTHER FLUID: CPT | Performed by: PHYSICIAN ASSISTANT

## 2017-01-01 PROCEDURE — 25000125 ZZHC RX 250: Performed by: INTERNAL MEDICINE

## 2017-01-01 PROCEDURE — 36569 INSJ PICC 5 YR+ W/O IMAGING: CPT

## 2017-01-01 PROCEDURE — 82805 BLOOD GASES W/O2 SATURATION: CPT | Performed by: INTERNAL MEDICINE

## 2017-01-01 PROCEDURE — 27210300 ZZH CANNULA HIGH FLOW, ADULT

## 2017-01-01 PROCEDURE — 80053 COMPREHEN METABOLIC PANEL: CPT | Performed by: INTERNAL MEDICINE

## 2017-01-01 PROCEDURE — 94799 UNLISTED PULMONARY SVC/PX: CPT

## 2017-01-01 PROCEDURE — 88313 SPECIAL STAINS GROUP 2: CPT | Performed by: INTERNAL MEDICINE

## 2017-01-01 PROCEDURE — 40000986 XR ABDOMEN PORT F1 VW

## 2017-01-01 PROCEDURE — 89051 BODY FLUID CELL COUNT: CPT | Performed by: INTERNAL MEDICINE

## 2017-01-01 PROCEDURE — 84478 ASSAY OF TRIGLYCERIDES: CPT | Performed by: INTERNAL MEDICINE

## 2017-01-01 PROCEDURE — 27210995 ZZH RX 272

## 2017-01-01 PROCEDURE — S0166 INJ OLANZAPINE 2.5MG: HCPCS | Performed by: INTERNAL MEDICINE

## 2017-01-01 PROCEDURE — 97530 THERAPEUTIC ACTIVITIES: CPT | Mod: GO | Performed by: OCCUPATIONAL THERAPY ASSISTANT

## 2017-01-01 PROCEDURE — 80202 ASSAY OF VANCOMYCIN: CPT | Performed by: INTERNAL MEDICINE

## 2017-01-01 PROCEDURE — 84145 PROCALCITONIN (PCT): CPT | Performed by: INTERNAL MEDICINE

## 2017-01-01 PROCEDURE — 85610 PROTHROMBIN TIME: CPT | Performed by: INTERNAL MEDICINE

## 2017-01-01 PROCEDURE — 36600 WITHDRAWAL OF ARTERIAL BLOOD: CPT

## 2017-01-01 PROCEDURE — 99310 SBSQ NF CARE HIGH MDM 45: CPT | Performed by: NURSE PRACTITIONER

## 2017-01-01 PROCEDURE — 27210222 ZZH KIT SHRLOCK 6FR POWER PICC

## 2017-01-01 PROCEDURE — 84132 ASSAY OF SERUM POTASSIUM: CPT | Performed by: INTERNAL MEDICINE

## 2017-01-01 PROCEDURE — 25000131 ZZH RX MED GY IP 250 OP 636 PS 637: Performed by: INTERNAL MEDICINE

## 2017-01-01 PROCEDURE — 87070 CULTURE OTHR SPECIMN AEROBIC: CPT | Performed by: INTERNAL MEDICINE

## 2017-01-01 PROCEDURE — 99292 CRITICAL CARE ADDL 30 MIN: CPT | Mod: 25 | Performed by: INTERNAL MEDICINE

## 2017-01-01 PROCEDURE — 99207 ZZC NON-BILLABLE SERV PER CHARTING: CPT | Performed by: INTERNAL MEDICINE

## 2017-01-01 PROCEDURE — 97530 THERAPEUTIC ACTIVITIES: CPT | Mod: GO | Performed by: OCCUPATIONAL THERAPIST

## 2017-01-01 PROCEDURE — 40000257 ZZH STATISTIC CONSULT NO CHARGE VASC ACCESS

## 2017-01-01 PROCEDURE — 93970 EXTREMITY STUDY: CPT

## 2017-01-01 PROCEDURE — 87081 CULTURE SCREEN ONLY: CPT | Performed by: INTERNAL MEDICINE

## 2017-01-01 PROCEDURE — 36415 COLL VENOUS BLD VENIPUNCTURE: CPT | Performed by: INTERNAL MEDICINE

## 2017-01-01 PROCEDURE — 82550 ASSAY OF CK (CPK): CPT | Performed by: INTERNAL MEDICINE

## 2017-01-01 PROCEDURE — 25000128 H RX IP 250 OP 636

## 2017-01-01 PROCEDURE — 87206 SMEAR FLUORESCENT/ACID STAI: CPT | Performed by: INTERNAL MEDICINE

## 2017-01-01 PROCEDURE — 94644 CONT INHLJ TX 1ST HOUR: CPT

## 2017-01-01 PROCEDURE — 85048 AUTOMATED LEUKOCYTE COUNT: CPT | Performed by: INTERNAL MEDICINE

## 2017-01-01 PROCEDURE — 99233 SBSQ HOSP IP/OBS HIGH 50: CPT | Performed by: INTERNAL MEDICINE

## 2017-01-01 PROCEDURE — 87116 MYCOBACTERIA CULTURE: CPT | Performed by: INTERNAL MEDICINE

## 2017-01-01 PROCEDURE — 80076 HEPATIC FUNCTION PANEL: CPT | Performed by: INTERNAL MEDICINE

## 2017-01-01 PROCEDURE — 97110 THERAPEUTIC EXERCISES: CPT | Mod: GP

## 2017-01-01 PROCEDURE — 88312 SPECIAL STAINS GROUP 1: CPT | Mod: 26 | Performed by: INTERNAL MEDICINE

## 2017-01-01 PROCEDURE — 84100 ASSAY OF PHOSPHORUS: CPT | Performed by: INTERNAL MEDICINE

## 2017-01-01 PROCEDURE — 88112 CYTOPATH CELL ENHANCE TECH: CPT | Mod: 26 | Performed by: PHYSICIAN ASSISTANT

## 2017-01-01 PROCEDURE — 40000225 ZZH STATISTIC SLP WARD VISIT: Performed by: SPEECH-LANGUAGE PATHOLOGIST

## 2017-01-01 PROCEDURE — 80048 BASIC METABOLIC PNL TOTAL CA: CPT | Performed by: EMERGENCY MEDICINE

## 2017-01-01 PROCEDURE — 85025 COMPLETE CBC W/AUTO DIFF WBC: CPT | Performed by: INTERNAL MEDICINE

## 2017-01-01 PROCEDURE — 40000008 ZZH STATISTIC AIRWAY CARE

## 2017-01-01 PROCEDURE — 27210338 ZZH CIRCUIT HUMID FACE/TRACH MSK

## 2017-01-01 PROCEDURE — 97535 SELF CARE MNGMENT TRAINING: CPT | Mod: GO | Performed by: OCCUPATIONAL THERAPY ASSISTANT

## 2017-01-01 PROCEDURE — 87798 DETECT AGENT NOS DNA AMP: CPT | Performed by: INTERNAL MEDICINE

## 2017-01-01 PROCEDURE — 99232 SBSQ HOSP IP/OBS MODERATE 35: CPT | Performed by: INTERNAL MEDICINE

## 2017-01-01 PROCEDURE — 87641 MR-STAPH DNA AMP PROBE: CPT | Performed by: INTERNAL MEDICINE

## 2017-01-01 PROCEDURE — 5A1955Z RESPIRATORY VENTILATION, GREATER THAN 96 CONSECUTIVE HOURS: ICD-10-PCS | Performed by: INTERNAL MEDICINE

## 2017-01-01 PROCEDURE — 84484 ASSAY OF TROPONIN QUANT: CPT | Performed by: EMERGENCY MEDICINE

## 2017-01-01 PROCEDURE — 93005 ELECTROCARDIOGRAM TRACING: CPT

## 2017-01-01 PROCEDURE — 25000132 ZZH RX MED GY IP 250 OP 250 PS 637: Performed by: PSYCHIATRY & NEUROLOGY

## 2017-01-01 PROCEDURE — 97110 THERAPEUTIC EXERCISES: CPT | Mod: GP | Performed by: PHYSICAL THERAPIST

## 2017-01-01 PROCEDURE — 83605 ASSAY OF LACTIC ACID: CPT | Performed by: INTERNAL MEDICINE

## 2017-01-01 PROCEDURE — A9567 TECHNETIUM TC-99M AEROSOL: HCPCS | Performed by: INTERNAL MEDICINE

## 2017-01-01 PROCEDURE — 87205 SMEAR GRAM STAIN: CPT | Performed by: INTERNAL MEDICINE

## 2017-01-01 PROCEDURE — 85049 AUTOMATED PLATELET COUNT: CPT | Performed by: INTERNAL MEDICINE

## 2017-01-01 PROCEDURE — 84134 ASSAY OF PREALBUMIN: CPT | Performed by: INTERNAL MEDICINE

## 2017-01-01 PROCEDURE — 81001 URINALYSIS AUTO W/SCOPE: CPT | Performed by: INTERNAL MEDICINE

## 2017-01-01 PROCEDURE — 85025 COMPLETE CBC W/AUTO DIFF WBC: CPT | Performed by: EMERGENCY MEDICINE

## 2017-01-01 PROCEDURE — 27210210 NM LUNG SCAN VENTILATION AND PERFUSION

## 2017-01-01 PROCEDURE — 32555 ASPIRATE PLEURA W/ IMAGING: CPT | Performed by: INTERNAL MEDICINE

## 2017-01-01 PROCEDURE — 99223 1ST HOSP IP/OBS HIGH 75: CPT | Mod: AI | Performed by: INTERNAL MEDICINE

## 2017-01-01 PROCEDURE — 25000128 H RX IP 250 OP 636: Performed by: EMERGENCY MEDICINE

## 2017-01-01 PROCEDURE — 71010 XR CHEST PORT 1 VW: CPT | Mod: 76

## 2017-01-01 PROCEDURE — 87205 SMEAR GRAM STAIN: CPT | Performed by: PHYSICIAN ASSISTANT

## 2017-01-01 PROCEDURE — 88112 CYTOPATH CELL ENHANCE TECH: CPT | Performed by: PHYSICIAN ASSISTANT

## 2017-01-01 PROCEDURE — 84484 ASSAY OF TROPONIN QUANT: CPT | Performed by: INTERNAL MEDICINE

## 2017-01-01 PROCEDURE — 85379 FIBRIN DEGRADATION QUANT: CPT | Performed by: EMERGENCY MEDICINE

## 2017-01-01 PROCEDURE — 88108 CYTOPATH CONCENTRATE TECH: CPT | Mod: 26 | Performed by: INTERNAL MEDICINE

## 2017-01-01 PROCEDURE — 87040 BLOOD CULTURE FOR BACTERIA: CPT | Performed by: INTERNAL MEDICINE

## 2017-01-01 PROCEDURE — 71010 XR CHEST PORT 1 VW: CPT

## 2017-01-01 PROCEDURE — 40000986 XR CHEST PORT 1 VW

## 2017-01-01 PROCEDURE — 97530 THERAPEUTIC ACTIVITIES: CPT | Mod: GP | Performed by: PHYSICAL THERAPIST

## 2017-01-01 PROCEDURE — 99316 NF DSCHRG MGMT 30 MIN+: CPT | Performed by: NURSE PRACTITIONER

## 2017-01-01 PROCEDURE — 93010 ELECTROCARDIOGRAM REPORT: CPT | Performed by: INTERNAL MEDICINE

## 2017-01-01 PROCEDURE — 36415 COLL VENOUS BLD VENIPUNCTURE: CPT

## 2017-01-01 PROCEDURE — 25500064 ZZH RX 255 OP 636: Performed by: INTERNAL MEDICINE

## 2017-01-01 PROCEDURE — 99221 1ST HOSP IP/OBS SF/LOW 40: CPT | Performed by: PSYCHIATRY & NEUROLOGY

## 2017-01-01 PROCEDURE — 0B9D8ZX DRAINAGE OF RIGHT MIDDLE LUNG LOBE, VIA NATURAL OR ARTIFICIAL OPENING ENDOSCOPIC, DIAGNOSTIC: ICD-10-PCS | Performed by: INTERNAL MEDICINE

## 2017-01-01 PROCEDURE — 40000193 ZZH STATISTIC PT WARD VISIT

## 2017-01-01 PROCEDURE — 40000133 ZZH STATISTIC OT WARD VISIT

## 2017-01-01 PROCEDURE — 96365 THER/PROPH/DIAG IV INF INIT: CPT | Mod: 59

## 2017-01-01 PROCEDURE — 82803 BLOOD GASES ANY COMBINATION: CPT | Performed by: INTERNAL MEDICINE

## 2017-01-01 PROCEDURE — 82805 BLOOD GASES W/O2 SATURATION: CPT | Performed by: PHYSICIAN ASSISTANT

## 2017-01-01 PROCEDURE — 99232 SBSQ HOSP IP/OBS MODERATE 35: CPT | Performed by: PSYCHIATRY & NEUROLOGY

## 2017-01-01 PROCEDURE — 84145 PROCALCITONIN (PCT): CPT | Performed by: EMERGENCY MEDICINE

## 2017-01-01 PROCEDURE — 97535 SELF CARE MNGMENT TRAINING: CPT | Mod: GO

## 2017-01-01 PROCEDURE — 36556 INSERT NON-TUNNEL CV CATH: CPT | Performed by: PHYSICIAN ASSISTANT

## 2017-01-01 PROCEDURE — 40000965 ZZH STATISTIC END TITIAL CO2 MONITORING

## 2017-01-01 PROCEDURE — 87102 FUNGUS ISOLATION CULTURE: CPT | Performed by: PHYSICIAN ASSISTANT

## 2017-01-01 PROCEDURE — 71260 CT THORAX DX C+: CPT

## 2017-01-01 PROCEDURE — 31500 INSERT EMERGENCY AIRWAY: CPT | Performed by: ANESTHESIOLOGY

## 2017-01-01 PROCEDURE — 87070 CULTURE OTHR SPECIMN AEROBIC: CPT | Performed by: PHYSICIAN ASSISTANT

## 2017-01-01 PROCEDURE — 87102 FUNGUS ISOLATION CULTURE: CPT | Performed by: INTERNAL MEDICINE

## 2017-01-01 PROCEDURE — 97161 PT EVAL LOW COMPLEX 20 MIN: CPT | Mod: GP | Performed by: PHYSICAL THERAPIST

## 2017-01-01 PROCEDURE — 76604 US EXAM CHEST: CPT

## 2017-01-01 PROCEDURE — 71020 XR CHEST 2 VW: CPT

## 2017-01-01 PROCEDURE — 92526 ORAL FUNCTION THERAPY: CPT | Mod: GN | Performed by: SPEECH-LANGUAGE PATHOLOGIST

## 2017-01-01 PROCEDURE — 92610 EVALUATE SWALLOWING FUNCTION: CPT | Mod: GN | Performed by: SPEECH-LANGUAGE PATHOLOGIST

## 2017-01-01 PROCEDURE — 93306 TTE W/DOPPLER COMPLETE: CPT | Mod: 26 | Performed by: INTERNAL MEDICINE

## 2017-01-01 PROCEDURE — 89051 BODY FLUID CELL COUNT: CPT | Performed by: PHYSICIAN ASSISTANT

## 2017-01-01 PROCEDURE — 99207 ZZC CDG-MDM COMPONENT: MEETS LOW - DOWN CODED: CPT | Performed by: INTERNAL MEDICINE

## 2017-01-01 PROCEDURE — 83690 ASSAY OF LIPASE: CPT | Performed by: INTERNAL MEDICINE

## 2017-01-01 PROCEDURE — 83735 ASSAY OF MAGNESIUM: CPT | Performed by: INTERNAL MEDICINE

## 2017-01-01 PROCEDURE — 88312 SPECIAL STAINS GROUP 1: CPT | Performed by: INTERNAL MEDICINE

## 2017-01-01 PROCEDURE — 94660 CPAP INITIATION&MGMT: CPT

## 2017-01-01 PROCEDURE — 99306 1ST NF CARE HIGH MDM 50: CPT | Performed by: INTERNAL MEDICINE

## 2017-01-01 PROCEDURE — 31622 DX BRONCHOSCOPE/WASH: CPT | Performed by: INTERNAL MEDICINE

## 2017-01-01 PROCEDURE — 87040 BLOOD CULTURE FOR BACTERIA: CPT | Performed by: EMERGENCY MEDICINE

## 2017-01-01 PROCEDURE — 83036 HEMOGLOBIN GLYCOSYLATED A1C: CPT | Performed by: INTERNAL MEDICINE

## 2017-01-01 PROCEDURE — 25000128 H RX IP 250 OP 636: Performed by: SURGERY

## 2017-01-01 PROCEDURE — 25000132 ZZH RX MED GY IP 250 OP 250 PS 637: Performed by: HOSPITALIST

## 2017-01-01 PROCEDURE — A9540 TC99M MAA: HCPCS | Performed by: INTERNAL MEDICINE

## 2017-01-01 PROCEDURE — 74177 CT ABD & PELVIS W/CONTRAST: CPT

## 2017-01-01 PROCEDURE — 34300033 ZZH RX 343: Performed by: INTERNAL MEDICINE

## 2017-01-01 PROCEDURE — 88305 TISSUE EXAM BY PATHOLOGIST: CPT | Performed by: PHYSICIAN ASSISTANT

## 2017-01-01 PROCEDURE — 96375 TX/PRO/DX INJ NEW DRUG ADDON: CPT

## 2017-01-01 PROCEDURE — 25000128 H RX IP 250 OP 636: Performed by: NURSE PRACTITIONER

## 2017-01-01 PROCEDURE — 40000133 ZZH STATISTIC OT WARD VISIT: Performed by: OCCUPATIONAL THERAPIST

## 2017-01-01 PROCEDURE — 88313 SPECIAL STAINS GROUP 2: CPT | Mod: 26 | Performed by: INTERNAL MEDICINE

## 2017-01-01 PROCEDURE — 94002 VENT MGMT INPAT INIT DAY: CPT

## 2017-01-01 PROCEDURE — 87107 FUNGI IDENTIFICATION MOLD: CPT | Performed by: INTERNAL MEDICINE

## 2017-01-01 PROCEDURE — 87633 RESP VIRUS 12-25 TARGETS: CPT | Performed by: INTERNAL MEDICINE

## 2017-01-01 PROCEDURE — 82140 ASSAY OF AMMONIA: CPT | Performed by: INTERNAL MEDICINE

## 2017-01-01 PROCEDURE — 27210339 ZZH CIRCUIT HUMIDITY W/CPAP BIP

## 2017-01-01 PROCEDURE — 0W9B3ZX DRAINAGE OF LEFT PLEURAL CAVITY, PERCUTANEOUS APPROACH, DIAGNOSTIC: ICD-10-PCS | Performed by: INTERNAL MEDICINE

## 2017-01-01 PROCEDURE — 97116 GAIT TRAINING THERAPY: CPT | Mod: GP | Performed by: PHYSICAL THERAPIST

## 2017-01-01 PROCEDURE — 83615 LACTATE (LD) (LDH) ENZYME: CPT | Performed by: PHYSICIAN ASSISTANT

## 2017-01-01 PROCEDURE — 97165 OT EVAL LOW COMPLEX 30 MIN: CPT | Mod: GO | Performed by: OCCUPATIONAL THERAPIST

## 2017-01-01 PROCEDURE — 88305 TISSUE EXAM BY PATHOLOGIST: CPT | Mod: 26 | Performed by: PHYSICIAN ASSISTANT

## 2017-01-01 PROCEDURE — 40000671 ZZH STATISTIC ANESTHESIA CASE

## 2017-01-01 PROCEDURE — 97116 GAIT TRAINING THERAPY: CPT | Mod: GP

## 2017-01-01 PROCEDURE — 96360 HYDRATION IV INFUSION INIT: CPT | Mod: 59

## 2017-01-01 PROCEDURE — 70450 CT HEAD/BRAIN W/O DYE: CPT

## 2017-01-01 PROCEDURE — 99211 OFF/OP EST MAY X REQ PHY/QHP: CPT

## 2017-01-01 PROCEDURE — 31624 DX BRONCHOSCOPE/LAVAGE: CPT | Performed by: INTERNAL MEDICINE

## 2017-01-01 PROCEDURE — 99207 ZZC APP CREDIT; MD BILLING SHARED VISIT: CPT | Performed by: INTERNAL MEDICINE

## 2017-01-01 PROCEDURE — 80053 COMPREHEN METABOLIC PANEL: CPT | Performed by: PHYSICIAN ASSISTANT

## 2017-01-01 PROCEDURE — 74000 XR ABDOMEN PORT F1 VW: CPT

## 2017-01-01 PROCEDURE — 36415 COLL VENOUS BLD VENIPUNCTURE: CPT | Performed by: PHYSICIAN ASSISTANT

## 2017-01-01 PROCEDURE — 40000986 XR CHEST 1 VW

## 2017-01-01 PROCEDURE — 37000011 ZZH ANESTHESIA WARD SERVICE

## 2017-01-01 PROCEDURE — 99233 SBSQ HOSP IP/OBS HIGH 50: CPT | Performed by: PSYCHIATRY & NEUROLOGY

## 2017-01-01 PROCEDURE — 84157 ASSAY OF PROTEIN OTHER: CPT | Performed by: PHYSICIAN ASSISTANT

## 2017-01-01 PROCEDURE — 82150 ASSAY OF AMYLASE: CPT | Performed by: PHYSICIAN ASSISTANT

## 2017-01-01 PROCEDURE — 87640 STAPH A DNA AMP PROBE: CPT | Performed by: INTERNAL MEDICINE

## 2017-01-01 PROCEDURE — 83986 ASSAY PH BODY FLUID NOS: CPT | Performed by: PHYSICIAN ASSISTANT

## 2017-01-01 PROCEDURE — 36620 INSERTION CATHETER ARTERY: CPT | Performed by: PHYSICIAN ASSISTANT

## 2017-01-01 PROCEDURE — 87581 M.PNEUMON DNA AMP PROBE: CPT | Performed by: INTERNAL MEDICINE

## 2017-01-01 PROCEDURE — 88108 CYTOPATH CONCENTRATE TECH: CPT | Performed by: INTERNAL MEDICINE

## 2017-01-01 PROCEDURE — 99285 EMERGENCY DEPT VISIT HI MDM: CPT | Mod: 25

## 2017-01-01 PROCEDURE — 96361 HYDRATE IV INFUSION ADD-ON: CPT

## 2017-01-01 PROCEDURE — 83880 ASSAY OF NATRIURETIC PEPTIDE: CPT | Performed by: INTERNAL MEDICINE

## 2017-01-01 PROCEDURE — 74176 CT ABD & PELVIS W/O CONTRAST: CPT

## 2017-01-01 PROCEDURE — 87086 URINE CULTURE/COLONY COUNT: CPT | Performed by: INTERNAL MEDICINE

## 2017-01-01 PROCEDURE — 40000893 ZZH STATISTIC PT IP EVAL DEFER

## 2017-01-01 PROCEDURE — 00000155 ZZHCL STATISTIC H-CELL BLOCK W/STAIN: Performed by: PHYSICIAN ASSISTANT

## 2017-01-01 PROCEDURE — 80048 BASIC METABOLIC PNL TOTAL CA: CPT | Performed by: NURSE PRACTITIONER

## 2017-01-01 PROCEDURE — 40000264 ECHO COMPLETE WITH LUMASON

## 2017-01-01 PROCEDURE — 25000128 H RX IP 250 OP 636: Performed by: NURSE ANESTHETIST, CERTIFIED REGISTERED

## 2017-01-01 PROCEDURE — 0B9H8ZX DRAINAGE OF LUNG LINGULA, VIA NATURAL OR ARTIFICIAL OPENING ENDOSCOPIC, DIAGNOSTIC: ICD-10-PCS | Performed by: INTERNAL MEDICINE

## 2017-01-01 RX ORDER — DEXTROSE MONOHYDRATE 25 G/50ML
25-50 INJECTION, SOLUTION INTRAVENOUS
Status: DISCONTINUED | OUTPATIENT
Start: 2017-01-01 | End: 2017-01-01

## 2017-01-01 RX ORDER — IPRATROPIUM BROMIDE AND ALBUTEROL SULFATE 2.5; .5 MG/3ML; MG/3ML
3 SOLUTION RESPIRATORY (INHALATION)
Status: DISCONTINUED | OUTPATIENT
Start: 2017-01-01 | End: 2017-01-01 | Stop reason: HOSPADM

## 2017-01-01 RX ORDER — VECURONIUM BROMIDE 1 MG/ML
10 INJECTION, POWDER, LYOPHILIZED, FOR SOLUTION INTRAVENOUS ONCE
Status: COMPLETED | OUTPATIENT
Start: 2017-01-01 | End: 2017-01-01

## 2017-01-01 RX ORDER — FUROSEMIDE 10 MG/ML
20 INJECTION INTRAMUSCULAR; INTRAVENOUS ONCE
Status: COMPLETED | OUTPATIENT
Start: 2017-01-01 | End: 2017-01-01

## 2017-01-01 RX ORDER — CEFTRIAXONE 2 G/1
2 INJECTION, POWDER, FOR SOLUTION INTRAMUSCULAR; INTRAVENOUS EVERY 24 HOURS
Status: DISCONTINUED | OUTPATIENT
Start: 2017-01-01 | End: 2017-01-01

## 2017-01-01 RX ORDER — LIDOCAINE HYDROCHLORIDE 10 MG/ML
10 INJECTION, SOLUTION INFILTRATION; PERINEURAL ONCE
Status: DISCONTINUED | OUTPATIENT
Start: 2017-01-01 | End: 2017-01-01

## 2017-01-01 RX ORDER — FENTANYL CITRATE 50 UG/ML
50 INJECTION, SOLUTION INTRAMUSCULAR; INTRAVENOUS ONCE
Status: COMPLETED | OUTPATIENT
Start: 2017-01-01 | End: 2017-01-01

## 2017-01-01 RX ORDER — NALOXONE HYDROCHLORIDE 0.4 MG/ML
.1-.4 INJECTION, SOLUTION INTRAMUSCULAR; INTRAVENOUS; SUBCUTANEOUS
Status: DISCONTINUED | OUTPATIENT
Start: 2017-01-01 | End: 2017-01-01

## 2017-01-01 RX ORDER — PROPOFOL 10 MG/ML
INJECTION, EMULSION INTRAVENOUS PRN
Status: DISCONTINUED | OUTPATIENT
Start: 2017-01-01 | End: 2017-01-01

## 2017-01-01 RX ORDER — IOPAMIDOL 755 MG/ML
80 INJECTION, SOLUTION INTRAVASCULAR ONCE
Status: COMPLETED | OUTPATIENT
Start: 2017-01-01 | End: 2017-01-01

## 2017-01-01 RX ORDER — CLONAZEPAM 0.5 MG/1
0.5 TABLET ORAL 2 TIMES DAILY
Qty: 30 TABLET | Refills: 0 | Status: SHIPPED | OUTPATIENT
Start: 2017-01-01

## 2017-01-01 RX ORDER — BUSPIRONE HYDROCHLORIDE 5 MG/1
30 TABLET ORAL 2 TIMES DAILY
Qty: 90 TABLET | COMMUNITY
Start: 2017-01-01 | End: 2017-01-01

## 2017-01-01 RX ORDER — SODIUM CHLORIDE 9 MG/ML
INJECTION, SOLUTION INTRAVENOUS CONTINUOUS
Status: DISCONTINUED | OUTPATIENT
Start: 2017-01-01 | End: 2017-01-01

## 2017-01-01 RX ORDER — HEPARIN SODIUM,PORCINE 10 UNIT/ML
2-5 VIAL (ML) INTRAVENOUS
Status: DISCONTINUED | OUTPATIENT
Start: 2017-01-01 | End: 2017-01-01

## 2017-01-01 RX ORDER — MIRTAZAPINE 15 MG/1
15 TABLET, ORALLY DISINTEGRATING ORAL AT BEDTIME
Status: DISCONTINUED | OUTPATIENT
Start: 2017-01-01 | End: 2017-01-01 | Stop reason: HOSPADM

## 2017-01-01 RX ORDER — NITROGLYCERIN 0.4 MG/1
0.4 TABLET SUBLINGUAL EVERY 5 MIN PRN
Status: DISCONTINUED | OUTPATIENT
Start: 2017-01-01 | End: 2017-01-01

## 2017-01-01 RX ORDER — DIPHENHYDRAMINE HYDROCHLORIDE 50 MG/ML
50 INJECTION INTRAMUSCULAR; INTRAVENOUS EVERY 6 HOURS PRN
Status: DISCONTINUED | OUTPATIENT
Start: 2017-01-01 | End: 2017-01-01

## 2017-01-01 RX ORDER — LORAZEPAM 2 MG/ML
.5-1 INJECTION INTRAMUSCULAR EVERY 4 HOURS PRN
Status: DISCONTINUED | OUTPATIENT
Start: 2017-01-01 | End: 2017-01-01

## 2017-01-01 RX ORDER — CLONAZEPAM 0.5 MG/1
0.5 TABLET ORAL 2 TIMES DAILY
Status: DISCONTINUED | OUTPATIENT
Start: 2017-01-01 | End: 2017-01-01 | Stop reason: HOSPADM

## 2017-01-01 RX ORDER — QUETIAPINE FUMARATE 25 MG/1
25 TABLET, FILM COATED ORAL EVERY 6 HOURS PRN
Status: DISCONTINUED | OUTPATIENT
Start: 2017-01-01 | End: 2017-01-01 | Stop reason: HOSPADM

## 2017-01-01 RX ORDER — MIRTAZAPINE 15 MG/1
15 TABLET, ORALLY DISINTEGRATING ORAL AT BEDTIME
Status: DISCONTINUED | OUTPATIENT
Start: 2017-01-01 | End: 2017-01-01

## 2017-01-01 RX ORDER — CHLORHEXIDINE GLUCONATE ORAL RINSE 1.2 MG/ML
15 SOLUTION DENTAL EVERY 12 HOURS
Status: DISCONTINUED | OUTPATIENT
Start: 2017-01-01 | End: 2017-01-01

## 2017-01-01 RX ORDER — ACETAMINOPHEN 650 MG/1
650 SUPPOSITORY RECTAL EVERY 4 HOURS PRN
Status: DISCONTINUED | OUTPATIENT
Start: 2017-01-01 | End: 2017-01-01

## 2017-01-01 RX ORDER — QUETIAPINE FUMARATE 25 MG/1
25 TABLET, FILM COATED ORAL EVERY 6 HOURS PRN
Qty: 60 TABLET | Refills: 0 | Status: SHIPPED | OUTPATIENT
Start: 2017-01-01

## 2017-01-01 RX ORDER — POTASSIUM CHLORIDE 1.5 G/1.58G
20-40 POWDER, FOR SOLUTION ORAL
Status: DISCONTINUED | OUTPATIENT
Start: 2017-01-01 | End: 2017-01-01 | Stop reason: HOSPADM

## 2017-01-01 RX ORDER — MIDAZOLAM (PF) 1 MG/ML IN 0.9 % SODIUM CHLORIDE INTRAVENOUS SOLUTION
1-8 CONTINUOUS
Status: DISCONTINUED | OUTPATIENT
Start: 2017-01-01 | End: 2017-01-01

## 2017-01-01 RX ORDER — LIDOCAINE 40 MG/G
CREAM TOPICAL
Status: DISCONTINUED | OUTPATIENT
Start: 2017-01-01 | End: 2017-01-01

## 2017-01-01 RX ORDER — LABETALOL HYDROCHLORIDE 5 MG/ML
10-20 INJECTION, SOLUTION INTRAVENOUS EVERY 6 HOURS PRN
Status: DISCONTINUED | OUTPATIENT
Start: 2017-01-01 | End: 2017-01-01 | Stop reason: HOSPADM

## 2017-01-01 RX ORDER — AMOXICILLIN 250 MG
1-2 CAPSULE ORAL 2 TIMES DAILY PRN
Status: DISCONTINUED | OUTPATIENT
Start: 2017-01-01 | End: 2017-01-01 | Stop reason: HOSPADM

## 2017-01-01 RX ORDER — FLUTICASONE PROPIONATE 50 MCG
2 SPRAY, SUSPENSION (ML) NASAL DAILY
Status: DISCONTINUED | OUTPATIENT
Start: 2017-01-01 | End: 2017-01-01

## 2017-01-01 RX ORDER — METOPROLOL TARTRATE 50 MG
50 TABLET ORAL 2 TIMES DAILY
Status: DISCONTINUED | OUTPATIENT
Start: 2017-01-01 | End: 2017-01-01

## 2017-01-01 RX ORDER — AZITHROMYCIN 250 MG/1
250 TABLET, FILM COATED ORAL EVERY 24 HOURS
Status: DISCONTINUED | OUTPATIENT
Start: 2017-01-01 | End: 2017-01-01

## 2017-01-01 RX ORDER — NALOXONE HYDROCHLORIDE 0.4 MG/ML
.1-.4 INJECTION, SOLUTION INTRAMUSCULAR; INTRAVENOUS; SUBCUTANEOUS
Status: DISCONTINUED | OUTPATIENT
Start: 2017-01-01 | End: 2017-01-01 | Stop reason: HOSPADM

## 2017-01-01 RX ORDER — CLONAZEPAM 0.5 MG/1
1.5 TABLET ORAL AT BEDTIME
Status: DISCONTINUED | OUTPATIENT
Start: 2017-01-01 | End: 2017-01-01

## 2017-01-01 RX ORDER — BUSPIRONE HYDROCHLORIDE 15 MG/1
30 TABLET ORAL 2 TIMES DAILY
Qty: 180 TABLET | Refills: 3 | COMMUNITY
Start: 2017-01-01

## 2017-01-01 RX ORDER — POTASSIUM CL/LIDO/0.9 % NACL 10MEQ/0.1L
10 INTRAVENOUS SOLUTION, PIGGYBACK (ML) INTRAVENOUS
Status: DISCONTINUED | OUTPATIENT
Start: 2017-01-01 | End: 2017-01-01 | Stop reason: HOSPADM

## 2017-01-01 RX ORDER — FLUOXETINE 20 MG/5ML
20 SOLUTION ORAL DAILY
Status: DISCONTINUED | OUTPATIENT
Start: 2017-01-01 | End: 2017-01-01

## 2017-01-01 RX ORDER — OLANZAPINE 2.5 MG/1
2.5 TABLET, FILM COATED ORAL AT BEDTIME
Qty: 60 TABLET | Refills: 0 | Status: SHIPPED | OUTPATIENT
Start: 2017-01-01

## 2017-01-01 RX ORDER — ALBUTEROL SULFATE 0.83 MG/ML
2.5 SOLUTION RESPIRATORY (INHALATION) EVERY 6 HOURS PRN
Status: DISCONTINUED | OUTPATIENT
Start: 2017-01-01 | End: 2017-01-01 | Stop reason: DRUGHIGH

## 2017-01-01 RX ORDER — PANTOPRAZOLE SODIUM 40 MG/1
40 TABLET, DELAYED RELEASE ORAL EVERY MORNING
Qty: 30 TABLET | Refills: 0 | Status: SHIPPED | OUTPATIENT
Start: 2017-01-01

## 2017-01-01 RX ORDER — BUSPIRONE HYDROCHLORIDE 10 MG/1
30 TABLET ORAL 2 TIMES DAILY
Status: DISCONTINUED | OUTPATIENT
Start: 2017-01-01 | End: 2017-01-01

## 2017-01-01 RX ORDER — METOCLOPRAMIDE HYDROCHLORIDE 5 MG/ML
10 INJECTION INTRAMUSCULAR; INTRAVENOUS ONCE
Status: COMPLETED | OUTPATIENT
Start: 2017-01-01 | End: 2017-01-01

## 2017-01-01 RX ORDER — BUSPIRONE HYDROCHLORIDE 15 MG/1
15 TABLET ORAL
Status: DISCONTINUED | OUTPATIENT
Start: 2017-01-01 | End: 2017-01-01

## 2017-01-01 RX ORDER — WATER 10 ML/10ML
INJECTION INTRAMUSCULAR; INTRAVENOUS; SUBCUTANEOUS
Status: COMPLETED
Start: 2017-01-01 | End: 2017-01-01

## 2017-01-01 RX ORDER — FUROSEMIDE 10 MG/ML
40 INJECTION INTRAMUSCULAR; INTRAVENOUS
Status: DISCONTINUED | OUTPATIENT
Start: 2017-01-01 | End: 2017-01-01

## 2017-01-01 RX ORDER — FUROSEMIDE 10 MG/ML
20 INJECTION INTRAMUSCULAR; INTRAVENOUS
Status: DISCONTINUED | OUTPATIENT
Start: 2017-01-01 | End: 2017-01-01

## 2017-01-01 RX ORDER — POTASSIUM CHLORIDE 1500 MG/1
20-40 TABLET, EXTENDED RELEASE ORAL
Status: DISCONTINUED | OUTPATIENT
Start: 2017-01-01 | End: 2017-01-01 | Stop reason: HOSPADM

## 2017-01-01 RX ORDER — IPRATROPIUM BROMIDE AND ALBUTEROL SULFATE 2.5; .5 MG/3ML; MG/3ML
3 SOLUTION RESPIRATORY (INHALATION)
Status: DISCONTINUED | OUTPATIENT
Start: 2017-01-01 | End: 2017-01-01

## 2017-01-01 RX ORDER — OLANZAPINE 10 MG/2ML
10 INJECTION, POWDER, FOR SOLUTION INTRAMUSCULAR ONCE
Status: COMPLETED | OUTPATIENT
Start: 2017-01-01 | End: 2017-01-01

## 2017-01-01 RX ORDER — HYDROMORPHONE HYDROCHLORIDE 1 MG/ML
0.2 INJECTION, SOLUTION INTRAMUSCULAR; INTRAVENOUS; SUBCUTANEOUS
Status: DISCONTINUED | OUTPATIENT
Start: 2017-01-01 | End: 2017-01-01 | Stop reason: DRUGHIGH

## 2017-01-01 RX ORDER — WATER 10 ML/10ML
INJECTION INTRAMUSCULAR; INTRAVENOUS; SUBCUTANEOUS
Status: DISPENSED
Start: 2017-01-01 | End: 2017-01-01

## 2017-01-01 RX ORDER — AMLODIPINE BESYLATE 10 MG/1
10 TABLET ORAL DAILY
Status: DISCONTINUED | OUTPATIENT
Start: 2017-01-01 | End: 2017-01-01 | Stop reason: HOSPADM

## 2017-01-01 RX ORDER — HYDROMORPHONE HYDROCHLORIDE 1 MG/ML
.3-.5 INJECTION, SOLUTION INTRAMUSCULAR; INTRAVENOUS; SUBCUTANEOUS
Status: DISCONTINUED | OUTPATIENT
Start: 2017-01-01 | End: 2017-01-01

## 2017-01-01 RX ORDER — VECURONIUM BROMIDE 1 MG/ML
INJECTION, POWDER, LYOPHILIZED, FOR SOLUTION INTRAVENOUS
Status: COMPLETED
Start: 2017-01-01 | End: 2017-01-01

## 2017-01-01 RX ORDER — PIPERACILLIN SODIUM, TAZOBACTAM SODIUM 4; .5 G/20ML; G/20ML
4.5 INJECTION, POWDER, LYOPHILIZED, FOR SOLUTION INTRAVENOUS EVERY 6 HOURS
Status: DISCONTINUED | OUTPATIENT
Start: 2017-01-01 | End: 2017-01-01

## 2017-01-01 RX ORDER — OLANZAPINE 2.5 MG/1
2.5 TABLET, FILM COATED ORAL AT BEDTIME
Status: DISCONTINUED | OUTPATIENT
Start: 2017-01-01 | End: 2017-01-01 | Stop reason: HOSPADM

## 2017-01-01 RX ORDER — DIPHENHYDRAMINE HYDROCHLORIDE 50 MG/ML
50 INJECTION INTRAMUSCULAR; INTRAVENOUS ONCE
Status: COMPLETED | OUTPATIENT
Start: 2017-01-01 | End: 2017-01-01

## 2017-01-01 RX ORDER — METOPROLOL TARTRATE 50 MG
50 TABLET ORAL 2 TIMES DAILY
Status: DISCONTINUED | OUTPATIENT
Start: 2017-01-01 | End: 2017-01-01 | Stop reason: HOSPADM

## 2017-01-01 RX ORDER — POTASSIUM CHLORIDE 29.8 MG/ML
20 INJECTION INTRAVENOUS
Status: DISCONTINUED | OUTPATIENT
Start: 2017-01-01 | End: 2017-01-01 | Stop reason: HOSPADM

## 2017-01-01 RX ORDER — IOPAMIDOL 755 MG/ML
98 INJECTION, SOLUTION INTRAVASCULAR ONCE
Status: COMPLETED | OUTPATIENT
Start: 2017-01-01 | End: 2017-01-01

## 2017-01-01 RX ORDER — MIRTAZAPINE 15 MG/1
15 TABLET, FILM COATED ORAL AT BEDTIME
Status: DISCONTINUED | OUTPATIENT
Start: 2017-01-01 | End: 2017-01-01

## 2017-01-01 RX ORDER — AMLODIPINE BESYLATE 5 MG/1
5 TABLET ORAL DAILY
Status: DISCONTINUED | OUTPATIENT
Start: 2017-01-01 | End: 2017-01-01

## 2017-01-01 RX ORDER — CEFTRIAXONE 2 G/1
2 INJECTION, POWDER, FOR SOLUTION INTRAMUSCULAR; INTRAVENOUS ONCE
Status: COMPLETED | OUTPATIENT
Start: 2017-01-01 | End: 2017-01-01

## 2017-01-01 RX ORDER — ACETAMINOPHEN 325 MG/1
650 TABLET ORAL EVERY 4 HOURS PRN
Status: DISCONTINUED | OUTPATIENT
Start: 2017-01-01 | End: 2017-01-01 | Stop reason: HOSPADM

## 2017-01-01 RX ORDER — CLONAZEPAM 0.5 MG/1
0.5 TABLET ORAL 2 TIMES DAILY
Status: DISCONTINUED | OUTPATIENT
Start: 2017-01-01 | End: 2017-01-01

## 2017-01-01 RX ORDER — ONDANSETRON 4 MG/1
4 TABLET, ORALLY DISINTEGRATING ORAL EVERY 6 HOURS PRN
Status: DISCONTINUED | OUTPATIENT
Start: 2017-01-01 | End: 2017-01-01 | Stop reason: DRUGHIGH

## 2017-01-01 RX ORDER — MIRTAZAPINE 15 MG/1
15 TABLET, ORALLY DISINTEGRATING ORAL AT BEDTIME
Qty: 30 TABLET | Refills: 0 | Status: SHIPPED | OUTPATIENT
Start: 2017-01-01

## 2017-01-01 RX ORDER — SODIUM CHLORIDE 450 MG/100ML
INJECTION, SOLUTION INTRAVENOUS CONTINUOUS
Status: DISCONTINUED | OUTPATIENT
Start: 2017-01-01 | End: 2017-01-01

## 2017-01-01 RX ORDER — LIDOCAINE 40 MG/G
CREAM TOPICAL
Status: DISCONTINUED | OUTPATIENT
Start: 2017-01-01 | End: 2017-01-01 | Stop reason: HOSPADM

## 2017-01-01 RX ORDER — FENTANYL CITRATE 50 UG/ML
50-100 INJECTION, SOLUTION INTRAMUSCULAR; INTRAVENOUS
Status: DISCONTINUED | OUTPATIENT
Start: 2017-01-01 | End: 2017-01-01

## 2017-01-01 RX ORDER — DIPHENHYDRAMINE HYDROCHLORIDE 50 MG/ML
INJECTION INTRAMUSCULAR; INTRAVENOUS
Status: COMPLETED
Start: 2017-01-01 | End: 2017-01-01

## 2017-01-01 RX ORDER — METOPROLOL TARTRATE 25 MG/1
25 TABLET, FILM COATED ORAL 2 TIMES DAILY
Status: DISCONTINUED | OUTPATIENT
Start: 2017-01-01 | End: 2017-01-01

## 2017-01-01 RX ORDER — PANTOPRAZOLE SODIUM 40 MG/1
40 TABLET, DELAYED RELEASE ORAL EVERY MORNING
Status: DISCONTINUED | OUTPATIENT
Start: 2017-01-01 | End: 2017-01-01 | Stop reason: HOSPADM

## 2017-01-01 RX ORDER — BUSPIRONE HYDROCHLORIDE 15 MG/1
30 TABLET ORAL 2 TIMES DAILY
Status: DISCONTINUED | OUTPATIENT
Start: 2017-01-01 | End: 2017-01-01

## 2017-01-01 RX ORDER — EPOPROSTENOL SODIUM 0.5 MG/1
INJECTION, POWDER, LYOPHILIZED, FOR SOLUTION INTRAVENOUS EVERY 8 HOURS
Status: DISCONTINUED | OUTPATIENT
Start: 2017-01-01 | End: 2017-01-01

## 2017-01-01 RX ORDER — PROPOFOL 10 MG/ML
INJECTION, EMULSION INTRAVENOUS
Status: COMPLETED
Start: 2017-01-01 | End: 2017-01-01

## 2017-01-01 RX ORDER — PIPERACILLIN SODIUM, TAZOBACTAM SODIUM 3; .375 G/15ML; G/15ML
3.38 INJECTION, POWDER, LYOPHILIZED, FOR SOLUTION INTRAVENOUS EVERY 8 HOURS SCHEDULED
Status: DISCONTINUED | OUTPATIENT
Start: 2017-01-01 | End: 2017-01-01

## 2017-01-01 RX ORDER — POTASSIUM CHLORIDE 7.45 MG/ML
10 INJECTION INTRAVENOUS
Status: DISCONTINUED | OUTPATIENT
Start: 2017-01-01 | End: 2017-01-01 | Stop reason: HOSPADM

## 2017-01-01 RX ORDER — AMLODIPINE BESYLATE 10 MG/1
10 TABLET ORAL DAILY
Qty: 30 TABLET | Refills: 0 | Status: SHIPPED | OUTPATIENT
Start: 2017-01-01

## 2017-01-01 RX ORDER — FLUTICASONE PROPIONATE 50 MCG
2 SPRAY, SUSPENSION (ML) NASAL DAILY
Status: ON HOLD | COMMUNITY
End: 2017-01-01

## 2017-01-01 RX ORDER — LEVOFLOXACIN 5 MG/ML
750 INJECTION, SOLUTION INTRAVENOUS EVERY 24 HOURS
Status: DISCONTINUED | OUTPATIENT
Start: 2017-01-01 | End: 2017-01-01

## 2017-01-01 RX ORDER — PIPERACILLIN SODIUM, TAZOBACTAM SODIUM 3; .375 G/15ML; G/15ML
3.38 INJECTION, POWDER, LYOPHILIZED, FOR SOLUTION INTRAVENOUS EVERY 6 HOURS
Status: DISCONTINUED | OUTPATIENT
Start: 2017-01-01 | End: 2017-01-01

## 2017-01-01 RX ORDER — CEFAZOLIN SODIUM 1 G/50ML
1250 SOLUTION INTRAVENOUS EVERY 8 HOURS
Status: DISCONTINUED | OUTPATIENT
Start: 2017-01-01 | End: 2017-01-01

## 2017-01-01 RX ORDER — NICOTINE POLACRILEX 4 MG
15-30 LOZENGE BUCCAL
Status: DISCONTINUED | OUTPATIENT
Start: 2017-01-01 | End: 2017-01-01

## 2017-01-01 RX ORDER — WATER 10 ML/10ML
INJECTION INTRAMUSCULAR; INTRAVENOUS; SUBCUTANEOUS
Status: DISCONTINUED
Start: 2017-01-01 | End: 2017-01-01 | Stop reason: HOSPADM

## 2017-01-01 RX ORDER — HEPARIN SODIUM 5000 [USP'U]/.5ML
5000 INJECTION, SOLUTION INTRAVENOUS; SUBCUTANEOUS EVERY 8 HOURS
Status: DISCONTINUED | OUTPATIENT
Start: 2017-01-01 | End: 2017-01-01

## 2017-01-01 RX ORDER — ONDANSETRON 2 MG/ML
4 INJECTION INTRAMUSCULAR; INTRAVENOUS EVERY 6 HOURS PRN
Status: DISCONTINUED | OUTPATIENT
Start: 2017-01-01 | End: 2017-01-01 | Stop reason: HOSPADM

## 2017-01-01 RX ORDER — ONDANSETRON 2 MG/ML
4 INJECTION INTRAMUSCULAR; INTRAVENOUS EVERY 6 HOURS PRN
Status: DISCONTINUED | OUTPATIENT
Start: 2017-01-01 | End: 2017-01-01 | Stop reason: DRUGHIGH

## 2017-01-01 RX ORDER — PROPOFOL 10 MG/ML
5-75 INJECTION, EMULSION INTRAVENOUS CONTINUOUS
Status: DISCONTINUED | OUTPATIENT
Start: 2017-01-01 | End: 2017-01-01

## 2017-01-01 RX ORDER — AMLODIPINE BESYLATE 10 MG/1
10 TABLET ORAL DAILY
Status: DISCONTINUED | OUTPATIENT
Start: 2017-01-01 | End: 2017-01-01

## 2017-01-01 RX ORDER — BUSPIRONE HYDROCHLORIDE 10 MG/1
30 TABLET ORAL 2 TIMES DAILY
Status: DISCONTINUED | OUTPATIENT
Start: 2017-01-01 | End: 2017-01-01 | Stop reason: HOSPADM

## 2017-01-01 RX ORDER — IOPAMIDOL 755 MG/ML
98 INJECTION, SOLUTION INTRAVASCULAR ONCE
Status: DISCONTINUED | OUTPATIENT
Start: 2017-01-01 | End: 2017-01-01

## 2017-01-01 RX ORDER — HYDROCODONE BITARTRATE AND ACETAMINOPHEN 5; 325 MG/1; MG/1
1-2 TABLET ORAL EVERY 4 HOURS PRN
Status: DISCONTINUED | OUTPATIENT
Start: 2017-01-01 | End: 2017-01-01

## 2017-01-01 RX ORDER — ONDANSETRON 4 MG/1
4 TABLET, ORALLY DISINTEGRATING ORAL EVERY 6 HOURS PRN
Status: DISCONTINUED | OUTPATIENT
Start: 2017-01-01 | End: 2017-01-01

## 2017-01-01 RX ORDER — BISACODYL 10 MG
10 SUPPOSITORY, RECTAL RECTAL DAILY PRN
Status: DISCONTINUED | OUTPATIENT
Start: 2017-01-01 | End: 2017-01-01 | Stop reason: HOSPADM

## 2017-01-01 RX ADMIN — PIPERACILLIN SODIUM,TAZOBACTAM SODIUM 4.5 G: 4; .5 INJECTION, POWDER, FOR SOLUTION INTRAVENOUS at 11:03

## 2017-01-01 RX ADMIN — HEPARIN SODIUM 5000 UNITS: 5000 INJECTION, SOLUTION INTRAVENOUS; SUBCUTANEOUS at 16:01

## 2017-01-01 RX ADMIN — Medication 4 MG/HR: at 02:02

## 2017-01-01 RX ADMIN — PIPERACILLIN SODIUM,TAZOBACTAM SODIUM 3.38 G: 3; .375 INJECTION, POWDER, FOR SOLUTION INTRAVENOUS at 05:11

## 2017-01-01 RX ADMIN — MIDAZOLAM HYDROCHLORIDE 4 MG: 1 INJECTION, SOLUTION INTRAMUSCULAR; INTRAVENOUS at 08:52

## 2017-01-01 RX ADMIN — SODIUM CHLORIDE: 9 INJECTION, SOLUTION INTRAVENOUS at 20:18

## 2017-01-01 RX ADMIN — PIPERACILLIN SODIUM,TAZOBACTAM SODIUM 3.38 G: 3; .375 INJECTION, POWDER, FOR SOLUTION INTRAVENOUS at 15:11

## 2017-01-01 RX ADMIN — POTASSIUM CHLORIDE 20 MEQ: 29.8 INJECTION, SOLUTION INTRAVENOUS at 06:20

## 2017-01-01 RX ADMIN — MIDAZOLAM HYDROCHLORIDE 2 MG: 1 INJECTION, SOLUTION INTRAMUSCULAR; INTRAVENOUS at 15:04

## 2017-01-01 RX ADMIN — PROPOFOL 75 MCG/KG/MIN: 10 INJECTION, EMULSION INTRAVENOUS at 13:31

## 2017-01-01 RX ADMIN — Medication 0.5 MG: at 21:38

## 2017-01-01 RX ADMIN — CHLORHEXIDINE GLUCONATE 15 ML: 1.2 RINSE ORAL at 07:58

## 2017-01-01 RX ADMIN — POTASSIUM CHLORIDE 20 MEQ: 29.8 INJECTION, SOLUTION INTRAVENOUS at 01:04

## 2017-01-01 RX ADMIN — Medication 40 MG: at 08:29

## 2017-01-01 RX ADMIN — ACETAMINOPHEN 650 MG: 325 TABLET, FILM COATED ORAL at 20:09

## 2017-01-01 RX ADMIN — PROPOFOL 40 MCG/KG/MIN: 10 INJECTION, EMULSION INTRAVENOUS at 09:57

## 2017-01-01 RX ADMIN — PIPERACILLIN SODIUM,TAZOBACTAM SODIUM 3.38 G: 3; .375 INJECTION, POWDER, FOR SOLUTION INTRAVENOUS at 05:45

## 2017-01-01 RX ADMIN — SODIUM CHLORIDE: 9 INJECTION, SOLUTION INTRAVENOUS at 08:19

## 2017-01-01 RX ADMIN — EPOPROSTENOL SODIUM: 0.5 INJECTION, POWDER, LYOPHILIZED, FOR SOLUTION INTRAVENOUS at 09:39

## 2017-01-01 RX ADMIN — ENOXAPARIN SODIUM 40 MG: 40 INJECTION SUBCUTANEOUS at 15:28

## 2017-01-01 RX ADMIN — WATER 20 NG/KG/MIN: 1 SOLUTION INTRAVENOUS at 08:28

## 2017-01-01 RX ADMIN — AMLODIPINE BESYLATE 10 MG: 10 TABLET ORAL at 10:26

## 2017-01-01 RX ADMIN — HEPARIN SODIUM 5000 UNITS: 5000 INJECTION, SOLUTION INTRAVENOUS; SUBCUTANEOUS at 08:07

## 2017-01-01 RX ADMIN — METOPROLOL TARTRATE 50 MG: 50 TABLET, FILM COATED ORAL at 22:17

## 2017-01-01 RX ADMIN — PROPOFOL 50 MCG/KG/MIN: 10 INJECTION, EMULSION INTRAVENOUS at 14:57

## 2017-01-01 RX ADMIN — PROPOFOL 45 MCG/KG/MIN: 10 INJECTION, EMULSION INTRAVENOUS at 21:01

## 2017-01-01 RX ADMIN — AZITHROMYCIN MONOHYDRATE 500 MG: 500 INJECTION, POWDER, LYOPHILIZED, FOR SOLUTION INTRAVENOUS at 05:24

## 2017-01-01 RX ADMIN — VANCOMYCIN HYDROCHLORIDE 1500 MG: 5 INJECTION, POWDER, LYOPHILIZED, FOR SOLUTION INTRAVENOUS at 16:10

## 2017-01-01 RX ADMIN — MICONAZOLE NITRATE: 2 POWDER TOPICAL at 05:06

## 2017-01-01 RX ADMIN — IPRATROPIUM BROMIDE AND ALBUTEROL SULFATE 3 ML: .5; 3 SOLUTION RESPIRATORY (INHALATION) at 07:01

## 2017-01-01 RX ADMIN — PIPERACILLIN SODIUM,TAZOBACTAM SODIUM 3.38 G: 3; .375 INJECTION, POWDER, FOR SOLUTION INTRAVENOUS at 00:03

## 2017-01-01 RX ADMIN — PIPERACILLIN SODIUM,TAZOBACTAM SODIUM 3.38 G: 3; .375 INJECTION, POWDER, FOR SOLUTION INTRAVENOUS at 17:37

## 2017-01-01 RX ADMIN — FUROSEMIDE 20 MG: 10 INJECTION, SOLUTION INTRAVENOUS at 19:57

## 2017-01-01 RX ADMIN — DIPHENHYDRAMINE HYDROCHLORIDE 50 MG: 50 INJECTION INTRAMUSCULAR; INTRAVENOUS at 09:29

## 2017-01-01 RX ADMIN — CHLORHEXIDINE GLUCONATE 15 ML: 1.2 RINSE ORAL at 08:25

## 2017-01-01 RX ADMIN — OLANZAPINE 10 MG: 10 INJECTION, POWDER, FOR SOLUTION INTRAMUSCULAR at 03:37

## 2017-01-01 RX ADMIN — HEPARIN SODIUM 1400 UNITS/HR: 10000 INJECTION, SOLUTION INTRAVENOUS at 14:23

## 2017-01-01 RX ADMIN — HEPARIN SODIUM 5000 UNITS: 5000 INJECTION, SOLUTION INTRAVENOUS; SUBCUTANEOUS at 16:52

## 2017-01-01 RX ADMIN — Medication 0.5 MG: at 09:43

## 2017-01-01 RX ADMIN — MIDAZOLAM HYDROCHLORIDE 2 MG: 1 INJECTION, SOLUTION INTRAMUSCULAR; INTRAVENOUS at 12:52

## 2017-01-01 RX ADMIN — BUSPIRONE HYDROCHLORIDE 30 MG: 10 TABLET ORAL at 09:19

## 2017-01-01 RX ADMIN — ACETAMINOPHEN 650 MG: 325 SOLUTION ORAL at 21:30

## 2017-01-01 RX ADMIN — WATER 20 NG/KG/MIN: 1 SOLUTION INTRAVENOUS at 06:07

## 2017-01-01 RX ADMIN — IPRATROPIUM BROMIDE AND ALBUTEROL SULFATE 3 ML: .5; 3 SOLUTION RESPIRATORY (INHALATION) at 11:27

## 2017-01-01 RX ADMIN — PROPOFOL 75 MCG/KG/MIN: 10 INJECTION, EMULSION INTRAVENOUS at 11:31

## 2017-01-01 RX ADMIN — METOPROLOL TARTRATE 50 MG: 50 TABLET, FILM COATED ORAL at 21:40

## 2017-01-01 RX ADMIN — WATER 20 NG/KG/MIN: 1 SOLUTION INTRAVENOUS at 09:37

## 2017-01-01 RX ADMIN — VECURONIUM BROMIDE 10 MG: 1 INJECTION, POWDER, LYOPHILIZED, FOR SOLUTION INTRAVENOUS at 13:46

## 2017-01-01 RX ADMIN — PIPERACILLIN SODIUM,TAZOBACTAM SODIUM 4.5 G: 4; .5 INJECTION, POWDER, FOR SOLUTION INTRAVENOUS at 22:26

## 2017-01-01 RX ADMIN — CHLORHEXIDINE GLUCONATE 15 ML: 1.2 RINSE ORAL at 08:07

## 2017-01-01 RX ADMIN — IPRATROPIUM BROMIDE AND ALBUTEROL SULFATE 3 ML: .5; 3 SOLUTION RESPIRATORY (INHALATION) at 07:52

## 2017-01-01 RX ADMIN — LABETALOL HYDROCHLORIDE 20 MG: 5 INJECTION, SOLUTION INTRAVENOUS at 17:29

## 2017-01-01 RX ADMIN — FUROSEMIDE 20 MG: 10 INJECTION, SOLUTION INTRAVENOUS at 14:32

## 2017-01-01 RX ADMIN — EPOPROSTENOL SODIUM: 0.5 INJECTION, POWDER, LYOPHILIZED, FOR SOLUTION INTRAVENOUS at 01:28

## 2017-01-01 RX ADMIN — Medication 75 MCG/HR: at 11:18

## 2017-01-01 RX ADMIN — MIDAZOLAM HYDROCHLORIDE 2 MG: 1 INJECTION, SOLUTION INTRAMUSCULAR; INTRAVENOUS at 18:04

## 2017-01-01 RX ADMIN — PIPERACILLIN SODIUM,TAZOBACTAM SODIUM 3.38 G: 3; .375 INJECTION, POWDER, FOR SOLUTION INTRAVENOUS at 13:00

## 2017-01-01 RX ADMIN — Medication 0.5 MG: at 08:36

## 2017-01-01 RX ADMIN — HEPARIN SODIUM 5000 UNITS: 5000 INJECTION, SOLUTION INTRAVENOUS; SUBCUTANEOUS at 02:24

## 2017-01-01 RX ADMIN — AMLODIPINE BESYLATE 10 MG: 10 TABLET ORAL at 09:43

## 2017-01-01 RX ADMIN — AMLODIPINE BESYLATE 10 MG: 10 TABLET ORAL at 08:07

## 2017-01-01 RX ADMIN — PROPOFOL 65 MCG/KG/MIN: 10 INJECTION, EMULSION INTRAVENOUS at 23:50

## 2017-01-01 RX ADMIN — ACETAMINOPHEN 650 MG: 325 TABLET, FILM COATED ORAL at 10:08

## 2017-01-01 RX ADMIN — SODIUM CHLORIDE 1000 ML: 9 INJECTION, SOLUTION INTRAVENOUS at 00:33

## 2017-01-01 RX ADMIN — VANCOMYCIN HYDROCHLORIDE 1250 MG: 5 INJECTION, POWDER, LYOPHILIZED, FOR SOLUTION INTRAVENOUS at 19:08

## 2017-01-01 RX ADMIN — SODIUM CHLORIDE: 9 INJECTION, SOLUTION INTRAVENOUS at 03:38

## 2017-01-01 RX ADMIN — VANCOMYCIN HYDROCHLORIDE 1500 MG: 5 INJECTION, POWDER, LYOPHILIZED, FOR SOLUTION INTRAVENOUS at 10:23

## 2017-01-01 RX ADMIN — MIRTAZAPINE 15 MG: 15 TABLET, ORALLY DISINTEGRATING ORAL at 21:38

## 2017-01-01 RX ADMIN — EPOPROSTENOL SODIUM: 0.5 INJECTION, POWDER, LYOPHILIZED, FOR SOLUTION INTRAVENOUS at 21:32

## 2017-01-01 RX ADMIN — SODIUM CHLORIDE 101 ML: 9 INJECTION, SOLUTION INTRAVENOUS at 12:14

## 2017-01-01 RX ADMIN — IPRATROPIUM BROMIDE AND ALBUTEROL SULFATE 3 ML: .5; 3 SOLUTION RESPIRATORY (INHALATION) at 19:39

## 2017-01-01 RX ADMIN — METOPROLOL TARTRATE 50 MG: 50 TABLET, FILM COATED ORAL at 22:32

## 2017-01-01 RX ADMIN — AMLODIPINE BESYLATE 10 MG: 10 TABLET ORAL at 09:40

## 2017-01-01 RX ADMIN — PROPOFOL 65 MCG/KG/MIN: 10 INJECTION, EMULSION INTRAVENOUS at 18:03

## 2017-01-01 RX ADMIN — INSULIN ASPART 1 UNITS: 100 INJECTION, SOLUTION INTRAVENOUS; SUBCUTANEOUS at 08:32

## 2017-01-01 RX ADMIN — AZITHROMYCIN 250 MG: 250 TABLET, FILM COATED ORAL at 09:09

## 2017-01-01 RX ADMIN — WATER 20 NG/KG/MIN: 1 SOLUTION INTRAVENOUS at 03:51

## 2017-01-01 RX ADMIN — METOPROLOL TARTRATE 50 MG: 50 TABLET, FILM COATED ORAL at 07:51

## 2017-01-01 RX ADMIN — PIPERACILLIN SODIUM,TAZOBACTAM SODIUM 4.5 G: 4; .5 INJECTION, POWDER, FOR SOLUTION INTRAVENOUS at 08:07

## 2017-01-01 RX ADMIN — CHLORHEXIDINE GLUCONATE 15 ML: 1.2 RINSE ORAL at 20:17

## 2017-01-01 RX ADMIN — ENOXAPARIN SODIUM 150 MG: 150 INJECTION SUBCUTANEOUS at 19:10

## 2017-01-01 RX ADMIN — ENOXAPARIN SODIUM 150 MG: 150 INJECTION SUBCUTANEOUS at 16:05

## 2017-01-01 RX ADMIN — PIPERACILLIN SODIUM,TAZOBACTAM SODIUM 3.38 G: 3; .375 INJECTION, POWDER, FOR SOLUTION INTRAVENOUS at 21:45

## 2017-01-01 RX ADMIN — HEPARIN SODIUM 5000 UNITS: 5000 INJECTION, SOLUTION INTRAVENOUS; SUBCUTANEOUS at 00:28

## 2017-01-01 RX ADMIN — EPOPROSTENOL SODIUM: 0.5 INJECTION, POWDER, LYOPHILIZED, FOR SOLUTION INTRAVENOUS at 23:55

## 2017-01-01 RX ADMIN — PIPERACILLIN SODIUM,TAZOBACTAM SODIUM 4.5 G: 4; .5 INJECTION, POWDER, FOR SOLUTION INTRAVENOUS at 21:50

## 2017-01-01 RX ADMIN — SODIUM CHLORIDE: 9 INJECTION, SOLUTION INTRAVENOUS at 07:20

## 2017-01-01 RX ADMIN — Medication 0.5 MG: at 21:32

## 2017-01-01 RX ADMIN — BUSPIRONE HYDROCHLORIDE 30 MG: 10 TABLET ORAL at 20:54

## 2017-01-01 RX ADMIN — Medication 40 MG: at 10:14

## 2017-01-01 RX ADMIN — HYDROCODONE BITARTRATE AND ACETAMINOPHEN 2 TABLET: 5; 325 TABLET ORAL at 09:23

## 2017-01-01 RX ADMIN — ACETAMINOPHEN 650 MG: 325 TABLET, FILM COATED ORAL at 08:07

## 2017-01-01 RX ADMIN — ACETAMINOPHEN 650 MG: 325 SOLUTION ORAL at 22:36

## 2017-01-01 RX ADMIN — MIDAZOLAM HYDROCHLORIDE 3 MG: 1 INJECTION, SOLUTION INTRAMUSCULAR; INTRAVENOUS at 13:38

## 2017-01-01 RX ADMIN — EPOPROSTENOL SODIUM: 0.5 INJECTION, POWDER, LYOPHILIZED, FOR SOLUTION INTRAVENOUS at 01:19

## 2017-01-01 RX ADMIN — ACETAMINOPHEN 650 MG: 325 TABLET, FILM COATED ORAL at 20:05

## 2017-01-01 RX ADMIN — EPOPROSTENOL SODIUM: 0.5 INJECTION, POWDER, LYOPHILIZED, FOR SOLUTION INTRAVENOUS at 16:41

## 2017-01-01 RX ADMIN — WATER 20 NG/KG/MIN: 1 SOLUTION INTRAVENOUS at 16:13

## 2017-01-01 RX ADMIN — WATER 10 NG/KG/MIN: 1 SOLUTION INTRAVENOUS at 16:47

## 2017-01-01 RX ADMIN — IPRATROPIUM BROMIDE AND ALBUTEROL SULFATE 3 ML: .5; 3 SOLUTION RESPIRATORY (INHALATION) at 23:51

## 2017-01-01 RX ADMIN — Medication 40 MG: at 10:44

## 2017-01-01 RX ADMIN — CEFTRIAXONE 2 G: 2 INJECTION, POWDER, FOR SOLUTION INTRAMUSCULAR; INTRAVENOUS at 03:24

## 2017-01-01 RX ADMIN — Medication 50 MCG/HR: at 15:47

## 2017-01-01 RX ADMIN — PIPERACILLIN SODIUM,TAZOBACTAM SODIUM 3.38 G: 3; .375 INJECTION, POWDER, FOR SOLUTION INTRAVENOUS at 17:52

## 2017-01-01 RX ADMIN — Medication 0.5 MG: at 20:54

## 2017-01-01 RX ADMIN — WATER 20 NG/KG/MIN: 1 SOLUTION INTRAVENOUS at 05:12

## 2017-01-01 RX ADMIN — EPOPROSTENOL SODIUM: 0.5 INJECTION, POWDER, LYOPHILIZED, FOR SOLUTION INTRAVENOUS at 11:06

## 2017-01-01 RX ADMIN — PANTOPRAZOLE SODIUM 40 MG: 40 INJECTION, POWDER, FOR SOLUTION INTRAVENOUS at 16:10

## 2017-01-01 RX ADMIN — OLANZAPINE 2.5 MG: 2.5 TABLET, FILM COATED ORAL at 22:17

## 2017-01-01 RX ADMIN — PROPOFOL 20 MCG/KG/MIN: 10 INJECTION, EMULSION INTRAVENOUS at 08:42

## 2017-01-01 RX ADMIN — Medication 40 MG: at 09:22

## 2017-01-01 RX ADMIN — INSULIN ASPART 1 UNITS: 100 INJECTION, SOLUTION INTRAVENOUS; SUBCUTANEOUS at 00:20

## 2017-01-01 RX ADMIN — METOPROLOL TARTRATE 50 MG: 50 TABLET, FILM COATED ORAL at 10:10

## 2017-01-01 RX ADMIN — ENOXAPARIN SODIUM 135 MG: 150 INJECTION SUBCUTANEOUS at 16:56

## 2017-01-01 RX ADMIN — AMLODIPINE BESYLATE 5 MG: 5 TABLET ORAL at 12:24

## 2017-01-01 RX ADMIN — BISACODYL 10 MG: 10 SUPPOSITORY RECTAL at 13:18

## 2017-01-01 RX ADMIN — ACETAMINOPHEN 650 MG: 325 SOLUTION ORAL at 22:14

## 2017-01-01 RX ADMIN — INSULIN ASPART 1 UNITS: 100 INJECTION, SOLUTION INTRAVENOUS; SUBCUTANEOUS at 20:11

## 2017-01-01 RX ADMIN — ENOXAPARIN SODIUM 40 MG: 40 INJECTION SUBCUTANEOUS at 16:36

## 2017-01-01 RX ADMIN — CHLORHEXIDINE GLUCONATE 15 ML: 1.2 RINSE ORAL at 20:54

## 2017-01-01 RX ADMIN — SODIUM CHLORIDE: 9 INJECTION, SOLUTION INTRAVENOUS at 04:48

## 2017-01-01 RX ADMIN — METOPROLOL TARTRATE 50 MG: 50 TABLET, FILM COATED ORAL at 09:30

## 2017-01-01 RX ADMIN — SODIUM CHLORIDE: 9 INJECTION, SOLUTION INTRAVENOUS at 20:14

## 2017-01-01 RX ADMIN — HEPARIN SODIUM 5000 UNITS: 5000 INJECTION, SOLUTION INTRAVENOUS; SUBCUTANEOUS at 16:03

## 2017-01-01 RX ADMIN — PROPOFOL 65 MCG/KG/MIN: 10 INJECTION, EMULSION INTRAVENOUS at 22:32

## 2017-01-01 RX ADMIN — HEPARIN SODIUM 5000 UNITS: 5000 INJECTION, SOLUTION INTRAVENOUS; SUBCUTANEOUS at 08:29

## 2017-01-01 RX ADMIN — PROPOFOL 75 MCG/KG/MIN: 10 INJECTION, EMULSION INTRAVENOUS at 01:49

## 2017-01-01 RX ADMIN — PIPERACILLIN SODIUM,TAZOBACTAM SODIUM 4.5 G: 4; .5 INJECTION, POWDER, FOR SOLUTION INTRAVENOUS at 04:49

## 2017-01-01 RX ADMIN — FENTANYL CITRATE 50 MCG: 50 INJECTION, SOLUTION INTRAMUSCULAR; INTRAVENOUS at 09:29

## 2017-01-01 RX ADMIN — HEPARIN SODIUM 5000 UNITS: 5000 INJECTION, SOLUTION INTRAVENOUS; SUBCUTANEOUS at 07:58

## 2017-01-01 RX ADMIN — SODIUM CHLORIDE: 9 INJECTION, SOLUTION INTRAVENOUS at 05:33

## 2017-01-01 RX ADMIN — Medication 100 MCG/HR: at 02:48

## 2017-01-01 RX ADMIN — ENOXAPARIN SODIUM 135 MG: 150 INJECTION SUBCUTANEOUS at 17:52

## 2017-01-01 RX ADMIN — IPRATROPIUM BROMIDE AND ALBUTEROL SULFATE 3 ML: .5; 3 SOLUTION RESPIRATORY (INHALATION) at 11:03

## 2017-01-01 RX ADMIN — IPRATROPIUM BROMIDE AND ALBUTEROL SULFATE 3 ML: .5; 3 SOLUTION RESPIRATORY (INHALATION) at 23:16

## 2017-01-01 RX ADMIN — INSULIN ASPART 1 UNITS: 100 INJECTION, SOLUTION INTRAVENOUS; SUBCUTANEOUS at 05:51

## 2017-01-01 RX ADMIN — Medication 40 MG: at 08:09

## 2017-01-01 RX ADMIN — PANTOPRAZOLE SODIUM 40 MG: 40 INJECTION, POWDER, FOR SOLUTION INTRAVENOUS at 06:33

## 2017-01-01 RX ADMIN — WATER 20 NG/KG/MIN: 1 SOLUTION INTRAVENOUS at 18:51

## 2017-01-01 RX ADMIN — VECURONIUM BROMIDE 10 MG: 1 INJECTION, POWDER, LYOPHILIZED, FOR SOLUTION INTRAVENOUS at 15:16

## 2017-01-01 RX ADMIN — WATER 20 NG/KG/MIN: 1 SOLUTION INTRAVENOUS at 11:05

## 2017-01-01 RX ADMIN — APIXABAN 10 MG: 5 TABLET, FILM COATED ORAL at 13:58

## 2017-01-01 RX ADMIN — PIPERACILLIN SODIUM,TAZOBACTAM SODIUM 4.5 G: 4; .5 INJECTION, POWDER, FOR SOLUTION INTRAVENOUS at 15:56

## 2017-01-01 RX ADMIN — AMLODIPINE BESYLATE 10 MG: 10 TABLET ORAL at 09:20

## 2017-01-01 RX ADMIN — Medication 0.5 MG: at 22:17

## 2017-01-01 RX ADMIN — Medication 3 MG/HR: at 00:09

## 2017-01-01 RX ADMIN — Medication 0.5 MG: at 12:46

## 2017-01-01 RX ADMIN — AMLODIPINE BESYLATE 10 MG: 10 TABLET ORAL at 09:36

## 2017-01-01 RX ADMIN — PROPOFOL 50 MG: 10 INJECTION, EMULSION INTRAVENOUS at 08:21

## 2017-01-01 RX ADMIN — FENTANYL CITRATE 100 MCG: 50 INJECTION, SOLUTION INTRAMUSCULAR; INTRAVENOUS at 17:29

## 2017-01-01 RX ADMIN — FLUOXETINE HYDROCHLORIDE 20 MG: 20 LIQUID ORAL at 09:32

## 2017-01-01 RX ADMIN — AZITHROMYCIN MONOHYDRATE 250 MG: 500 INJECTION, POWDER, LYOPHILIZED, FOR SOLUTION INTRAVENOUS at 11:21

## 2017-01-01 RX ADMIN — PIPERACILLIN SODIUM,TAZOBACTAM SODIUM 4.5 G: 4; .5 INJECTION, POWDER, FOR SOLUTION INTRAVENOUS at 22:37

## 2017-01-01 RX ADMIN — WATER 20 NG/KG/MIN: 1 SOLUTION INTRAVENOUS at 08:01

## 2017-01-01 RX ADMIN — CHLORHEXIDINE GLUCONATE 15 ML: 1.2 RINSE ORAL at 08:26

## 2017-01-01 RX ADMIN — VANCOMYCIN HYDROCHLORIDE 1500 MG: 5 INJECTION, POWDER, LYOPHILIZED, FOR SOLUTION INTRAVENOUS at 14:32

## 2017-01-01 RX ADMIN — FLUOXETINE 20 MG: 20 CAPSULE ORAL at 08:36

## 2017-01-01 RX ADMIN — BUSPIRONE HYDROCHLORIDE 30 MG: 10 TABLET ORAL at 21:18

## 2017-01-01 RX ADMIN — FLUOXETINE HYDROCHLORIDE 20 MG: 20 LIQUID ORAL at 10:44

## 2017-01-01 RX ADMIN — ENOXAPARIN SODIUM 150 MG: 150 INJECTION SUBCUTANEOUS at 15:16

## 2017-01-01 RX ADMIN — ENOXAPARIN SODIUM 40 MG: 40 INJECTION SUBCUTANEOUS at 17:34

## 2017-01-01 RX ADMIN — ENOXAPARIN SODIUM 135 MG: 150 INJECTION SUBCUTANEOUS at 16:37

## 2017-01-01 RX ADMIN — WATER 20 NG/KG/MIN: 1 SOLUTION INTRAVENOUS at 23:18

## 2017-01-01 RX ADMIN — PROPOFOL 75 MCG/KG/MIN: 10 INJECTION, EMULSION INTRAVENOUS at 09:46

## 2017-01-01 RX ADMIN — METOPROLOL TARTRATE 50 MG: 50 TABLET, FILM COATED ORAL at 08:36

## 2017-01-01 RX ADMIN — VECURONIUM BROMIDE 0.8 MCG/KG/MIN: 1 INJECTION, POWDER, LYOPHILIZED, FOR SOLUTION INTRAVENOUS at 07:30

## 2017-01-01 RX ADMIN — PROPOFOL 75 MCG/KG/MIN: 10 INJECTION, EMULSION INTRAVENOUS at 03:47

## 2017-01-01 RX ADMIN — LEVOFLOXACIN 750 MG: 5 INJECTION, SOLUTION INTRAVENOUS at 08:49

## 2017-01-01 RX ADMIN — PROPOFOL 65 MCG/KG/MIN: 10 INJECTION, EMULSION INTRAVENOUS at 07:33

## 2017-01-01 RX ADMIN — PIPERACILLIN SODIUM,TAZOBACTAM SODIUM 4.5 G: 4; .5 INJECTION, POWDER, FOR SOLUTION INTRAVENOUS at 16:36

## 2017-01-01 RX ADMIN — POTASSIUM CHLORIDE 20 MEQ: 29.8 INJECTION, SOLUTION INTRAVENOUS at 00:00

## 2017-01-01 RX ADMIN — Medication 40 MG: at 09:36

## 2017-01-01 RX ADMIN — AZITHROMYCIN 250 MG: 250 TABLET, FILM COATED ORAL at 09:19

## 2017-01-01 RX ADMIN — PIPERACILLIN SODIUM,TAZOBACTAM SODIUM 4.5 G: 4; .5 INJECTION, POWDER, FOR SOLUTION INTRAVENOUS at 09:36

## 2017-01-01 RX ADMIN — PANTOPRAZOLE SODIUM 40 MG: 40 TABLET, DELAYED RELEASE ORAL at 08:58

## 2017-01-01 RX ADMIN — MIDAZOLAM HYDROCHLORIDE 2 MG: 1 INJECTION, SOLUTION INTRAMUSCULAR; INTRAVENOUS at 08:54

## 2017-01-01 RX ADMIN — PROPOFOL 60 MCG/KG/MIN: 10 INJECTION, EMULSION INTRAVENOUS at 11:54

## 2017-01-01 RX ADMIN — SODIUM CHLORIDE: 9 INJECTION, SOLUTION INTRAVENOUS at 13:28

## 2017-01-01 RX ADMIN — HEPARIN SODIUM 5000 UNITS: 5000 INJECTION, SOLUTION INTRAVENOUS; SUBCUTANEOUS at 15:34

## 2017-01-01 RX ADMIN — WATER 20 NG/KG/MIN: 1 SOLUTION INTRAVENOUS at 21:14

## 2017-01-01 RX ADMIN — ENOXAPARIN SODIUM 150 MG: 150 INJECTION SUBCUTANEOUS at 18:04

## 2017-01-01 RX ADMIN — PROPOFOL 65 MCG/KG/MIN: 10 INJECTION, EMULSION INTRAVENOUS at 15:45

## 2017-01-01 RX ADMIN — PIPERACILLIN SODIUM,TAZOBACTAM SODIUM 3.38 G: 3; .375 INJECTION, POWDER, FOR SOLUTION INTRAVENOUS at 05:56

## 2017-01-01 RX ADMIN — POTASSIUM CHLORIDE 20 MEQ: 29.8 INJECTION, SOLUTION INTRAVENOUS at 07:24

## 2017-01-01 RX ADMIN — WATER 20 NG/KG/MIN: 1 SOLUTION INTRAVENOUS at 14:33

## 2017-01-01 RX ADMIN — PROPOFOL 40 MCG/KG/MIN: 10 INJECTION, EMULSION INTRAVENOUS at 16:11

## 2017-01-01 RX ADMIN — AZITHROMYCIN MONOHYDRATE 250 MG: 500 INJECTION, POWDER, LYOPHILIZED, FOR SOLUTION INTRAVENOUS at 11:46

## 2017-01-01 RX ADMIN — WATER 20 NG/KG/MIN: 1 SOLUTION INTRAVENOUS at 13:26

## 2017-01-01 RX ADMIN — IPRATROPIUM BROMIDE AND ALBUTEROL SULFATE 3 ML: .5; 3 SOLUTION RESPIRATORY (INHALATION) at 10:53

## 2017-01-01 RX ADMIN — WATER 20 NG/KG/MIN: 1 SOLUTION INTRAVENOUS at 11:37

## 2017-01-01 RX ADMIN — WATER 20 NG/KG/MIN: 1 SOLUTION INTRAVENOUS at 08:50

## 2017-01-01 RX ADMIN — ACETAMINOPHEN 650 MG: 325 TABLET, FILM COATED ORAL at 01:36

## 2017-01-01 RX ADMIN — METOPROLOL TARTRATE 25 MG: 25 TABLET ORAL at 13:38

## 2017-01-01 RX ADMIN — PROPOFOL 45 MCG/KG/MIN: 10 INJECTION, EMULSION INTRAVENOUS at 06:33

## 2017-01-01 RX ADMIN — MIDAZOLAM HYDROCHLORIDE 2 MG: 1 INJECTION, SOLUTION INTRAMUSCULAR; INTRAVENOUS at 08:21

## 2017-01-01 RX ADMIN — IPRATROPIUM BROMIDE AND ALBUTEROL SULFATE 3 ML: .5; 3 SOLUTION RESPIRATORY (INHALATION) at 19:59

## 2017-01-01 RX ADMIN — HEPARIN SODIUM 1600 UNITS/HR: 10000 INJECTION, SOLUTION INTRAVENOUS at 06:30

## 2017-01-01 RX ADMIN — AMLODIPINE BESYLATE 10 MG: 10 TABLET ORAL at 08:11

## 2017-01-01 RX ADMIN — HEPARIN SODIUM 5000 UNITS: 5000 INJECTION, SOLUTION INTRAVENOUS; SUBCUTANEOUS at 10:00

## 2017-01-01 RX ADMIN — PROPOFOL 75 MCG/KG/MIN: 10 INJECTION, EMULSION INTRAVENOUS at 14:34

## 2017-01-01 RX ADMIN — ACETAMINOPHEN 650 MG: 325 TABLET, FILM COATED ORAL at 09:10

## 2017-01-01 RX ADMIN — ACETAMINOPHEN 650 MG: 325 SOLUTION ORAL at 14:16

## 2017-01-01 RX ADMIN — LIDOCAINE HYDROCHLORIDE: 20 JELLY TOPICAL at 11:03

## 2017-01-01 RX ADMIN — ACETAMINOPHEN 650 MG: 325 TABLET, FILM COATED ORAL at 17:43

## 2017-01-01 RX ADMIN — METOPROLOL TARTRATE 50 MG: 50 TABLET, FILM COATED ORAL at 21:32

## 2017-01-01 RX ADMIN — EPOPROSTENOL SODIUM: 0.5 INJECTION, POWDER, LYOPHILIZED, FOR SOLUTION INTRAVENOUS at 08:01

## 2017-01-01 RX ADMIN — IPRATROPIUM BROMIDE AND ALBUTEROL SULFATE 3 ML: .5; 3 SOLUTION RESPIRATORY (INHALATION) at 11:57

## 2017-01-01 RX ADMIN — Medication 4000 UNITS: at 00:10

## 2017-01-01 RX ADMIN — Medication 40 MG: at 08:53

## 2017-01-01 RX ADMIN — OLANZAPINE 2.5 MG: 2.5 TABLET, FILM COATED ORAL at 22:04

## 2017-01-01 RX ADMIN — ACETAMINOPHEN 650 MG: 325 SOLUTION ORAL at 21:50

## 2017-01-01 RX ADMIN — HEPARIN SODIUM 5000 UNITS: 5000 INJECTION, SOLUTION INTRAVENOUS; SUBCUTANEOUS at 23:42

## 2017-01-01 RX ADMIN — EPOPROSTENOL SODIUM: 0.5 INJECTION, POWDER, LYOPHILIZED, FOR SOLUTION INTRAVENOUS at 03:30

## 2017-01-01 RX ADMIN — BUSPIRONE HYDROCHLORIDE 30 MG: 10 TABLET ORAL at 12:46

## 2017-01-01 RX ADMIN — WATER 10 NG/KG/MIN: 1 SOLUTION INTRAVENOUS at 02:33

## 2017-01-01 RX ADMIN — PROPOFOL 50 MG: 10 INJECTION, EMULSION INTRAVENOUS at 08:22

## 2017-01-01 RX ADMIN — CHLORHEXIDINE GLUCONATE 15 ML: 1.2 RINSE ORAL at 20:08

## 2017-01-01 RX ADMIN — Medication 4 MG/HR: at 11:10

## 2017-01-01 RX ADMIN — PIPERACILLIN SODIUM,TAZOBACTAM SODIUM 4.5 G: 4; .5 INJECTION, POWDER, FOR SOLUTION INTRAVENOUS at 14:08

## 2017-01-01 RX ADMIN — BUSPIRONE HYDROCHLORIDE 30 MG: 10 TABLET ORAL at 21:32

## 2017-01-01 RX ADMIN — Medication 50 MCG/HR: at 13:38

## 2017-01-01 RX ADMIN — HEPARIN SODIUM 5000 UNITS: 5000 INJECTION, SOLUTION INTRAVENOUS; SUBCUTANEOUS at 02:30

## 2017-01-01 RX ADMIN — METOPROLOL TARTRATE 50 MG: 50 TABLET, FILM COATED ORAL at 08:25

## 2017-01-01 RX ADMIN — VANCOMYCIN HYDROCHLORIDE 2000 MG: 10 INJECTION, POWDER, LYOPHILIZED, FOR SOLUTION INTRAVENOUS at 10:54

## 2017-01-01 RX ADMIN — METOPROLOL TARTRATE 50 MG: 50 TABLET, FILM COATED ORAL at 20:54

## 2017-01-01 RX ADMIN — SODIUM CHLORIDE: 9 INJECTION, SOLUTION INTRAVENOUS at 21:45

## 2017-01-01 RX ADMIN — PIPERACILLIN SODIUM,TAZOBACTAM SODIUM 4.5 G: 4; .5 INJECTION, POWDER, FOR SOLUTION INTRAVENOUS at 03:21

## 2017-01-01 RX ADMIN — HEPARIN SODIUM 5000 UNITS: 5000 INJECTION, SOLUTION INTRAVENOUS; SUBCUTANEOUS at 23:31

## 2017-01-01 RX ADMIN — WATER: 1 INJECTION INTRAMUSCULAR; INTRAVENOUS; SUBCUTANEOUS at 13:46

## 2017-01-01 RX ADMIN — SODIUM CHLORIDE, PRESERVATIVE FREE 70 ML: 5 INJECTION INTRAVENOUS at 18:19

## 2017-01-01 RX ADMIN — PIPERACILLIN SODIUM,TAZOBACTAM SODIUM 4.5 G: 4; .5 INJECTION, POWDER, FOR SOLUTION INTRAVENOUS at 10:16

## 2017-01-01 RX ADMIN — LEVOFLOXACIN 750 MG: 5 INJECTION, SOLUTION INTRAVENOUS at 10:58

## 2017-01-01 RX ADMIN — MIRTAZAPINE 15 MG: 15 TABLET, ORALLY DISINTEGRATING ORAL at 22:17

## 2017-01-01 RX ADMIN — EPOPROSTENOL SODIUM: 0.5 INJECTION, POWDER, LYOPHILIZED, FOR SOLUTION INTRAVENOUS at 11:38

## 2017-01-01 RX ADMIN — PIPERACILLIN SODIUM,TAZOBACTAM SODIUM 4.5 G: 4; .5 INJECTION, POWDER, FOR SOLUTION INTRAVENOUS at 14:11

## 2017-01-01 RX ADMIN — EPOPROSTENOL SODIUM: 0.5 INJECTION, POWDER, LYOPHILIZED, FOR SOLUTION INTRAVENOUS at 22:48

## 2017-01-01 RX ADMIN — PIPERACILLIN SODIUM,TAZOBACTAM SODIUM 4.5 G: 4; .5 INJECTION, POWDER, FOR SOLUTION INTRAVENOUS at 14:16

## 2017-01-01 RX ADMIN — FUROSEMIDE 20 MG: 10 INJECTION, SOLUTION INTRAVENOUS at 14:01

## 2017-01-01 RX ADMIN — Medication 7000 UNITS: at 03:24

## 2017-01-01 RX ADMIN — Medication 0.5 MG: at 20:57

## 2017-01-01 RX ADMIN — FUROSEMIDE 20 MG: 10 INJECTION, SOLUTION INTRAVENOUS at 12:24

## 2017-01-01 RX ADMIN — MICONAZOLE NITRATE: 2 POWDER TOPICAL at 22:41

## 2017-01-01 RX ADMIN — HEPARIN SODIUM 5000 UNITS: 5000 INJECTION, SOLUTION INTRAVENOUS; SUBCUTANEOUS at 16:23

## 2017-01-01 RX ADMIN — PIPERACILLIN SODIUM,TAZOBACTAM SODIUM 4.5 G: 4; .5 INJECTION, POWDER, FOR SOLUTION INTRAVENOUS at 00:45

## 2017-01-01 RX ADMIN — VANCOMYCIN HYDROCHLORIDE 1500 MG: 5 INJECTION, POWDER, LYOPHILIZED, FOR SOLUTION INTRAVENOUS at 08:34

## 2017-01-01 RX ADMIN — EPOPROSTENOL SODIUM: 0.5 INJECTION, POWDER, LYOPHILIZED, FOR SOLUTION INTRAVENOUS at 20:24

## 2017-01-01 RX ADMIN — IPRATROPIUM BROMIDE AND ALBUTEROL SULFATE 3 ML: .5; 3 SOLUTION RESPIRATORY (INHALATION) at 19:32

## 2017-01-01 RX ADMIN — PROPOFOL 45 MCG/KG/MIN: 10 INJECTION, EMULSION INTRAVENOUS at 17:33

## 2017-01-01 RX ADMIN — METOPROLOL TARTRATE 25 MG: 25 TABLET ORAL at 08:38

## 2017-01-01 RX ADMIN — METOPROLOL TARTRATE 50 MG: 50 TABLET, FILM COATED ORAL at 20:05

## 2017-01-01 RX ADMIN — Medication 75 MCG/HR: at 16:29

## 2017-01-01 RX ADMIN — IPRATROPIUM BROMIDE AND ALBUTEROL SULFATE 3 ML: .5; 3 SOLUTION RESPIRATORY (INHALATION) at 23:14

## 2017-01-01 RX ADMIN — IOPAMIDOL 80 ML: 755 INJECTION, SOLUTION INTRAVENOUS at 12:14

## 2017-01-01 RX ADMIN — BUSPIRONE HYDROCHLORIDE 30 MG: 10 TABLET ORAL at 10:26

## 2017-01-01 RX ADMIN — FENTANYL CITRATE 100 MCG: 50 INJECTION, SOLUTION INTRAMUSCULAR; INTRAVENOUS at 08:53

## 2017-01-01 RX ADMIN — HEPARIN SODIUM 5000 UNITS: 5000 INJECTION, SOLUTION INTRAVENOUS; SUBCUTANEOUS at 00:04

## 2017-01-01 RX ADMIN — HEPARIN SODIUM 5000 UNITS: 5000 INJECTION, SOLUTION INTRAVENOUS; SUBCUTANEOUS at 08:37

## 2017-01-01 RX ADMIN — METOPROLOL TARTRATE 50 MG: 50 TABLET, FILM COATED ORAL at 22:37

## 2017-01-01 RX ADMIN — EPOPROSTENOL SODIUM: 0.5 INJECTION, POWDER, LYOPHILIZED, FOR SOLUTION INTRAVENOUS at 08:28

## 2017-01-01 RX ADMIN — AZITHROMYCIN MONOHYDRATE 250 MG: 500 INJECTION, POWDER, LYOPHILIZED, FOR SOLUTION INTRAVENOUS at 10:48

## 2017-01-01 RX ADMIN — FUROSEMIDE 20 MG: 10 INJECTION, SOLUTION INTRAVENOUS at 08:27

## 2017-01-01 RX ADMIN — MICONAZOLE NITRATE: 2 POWDER TOPICAL at 17:58

## 2017-01-01 RX ADMIN — BUSPIRONE HYDROCHLORIDE 30 MG: 15 TABLET ORAL at 21:30

## 2017-01-01 RX ADMIN — METOPROLOL TARTRATE 50 MG: 50 TABLET, FILM COATED ORAL at 22:13

## 2017-01-01 RX ADMIN — EPOPROSTENOL SODIUM: 0.5 INJECTION, POWDER, LYOPHILIZED, FOR SOLUTION INTRAVENOUS at 18:10

## 2017-01-01 RX ADMIN — Medication 3.3 MILLICURIE: at 09:27

## 2017-01-01 RX ADMIN — PIPERACILLIN SODIUM,TAZOBACTAM SODIUM 3.38 G: 3; .375 INJECTION, POWDER, FOR SOLUTION INTRAVENOUS at 17:47

## 2017-01-01 RX ADMIN — MIDAZOLAM HYDROCHLORIDE 3 MG: 1 INJECTION, SOLUTION INTRAMUSCULAR; INTRAVENOUS at 15:09

## 2017-01-01 RX ADMIN — ACETAMINOPHEN 650 MG: 650 SUPPOSITORY RECTAL at 02:14

## 2017-01-01 RX ADMIN — METOPROLOL TARTRATE 50 MG: 50 TABLET, FILM COATED ORAL at 09:20

## 2017-01-01 RX ADMIN — BUSPIRONE HYDROCHLORIDE 30 MG: 10 TABLET ORAL at 22:17

## 2017-01-01 RX ADMIN — FLUTICASONE PROPIONATE 2 SPRAY: 50 SPRAY, METERED NASAL at 19:53

## 2017-01-01 RX ADMIN — SODIUM CHLORIDE 20 ML: 9 INJECTION, SOLUTION INTRAVENOUS at 10:20

## 2017-01-01 RX ADMIN — EPOPROSTENOL SODIUM: 0.5 INJECTION, POWDER, LYOPHILIZED, FOR SOLUTION INTRAVENOUS at 08:45

## 2017-01-01 RX ADMIN — MIDAZOLAM HYDROCHLORIDE 2 MG: 1 INJECTION, SOLUTION INTRAMUSCULAR; INTRAVENOUS at 03:08

## 2017-01-01 RX ADMIN — FLUOXETINE 20 MG: 20 CAPSULE ORAL at 08:58

## 2017-01-01 RX ADMIN — IPRATROPIUM BROMIDE AND ALBUTEROL SULFATE 3 ML: .5; 3 SOLUTION RESPIRATORY (INHALATION) at 19:26

## 2017-01-01 RX ADMIN — VANCOMYCIN HYDROCHLORIDE 1500 MG: 5 INJECTION, POWDER, LYOPHILIZED, FOR SOLUTION INTRAVENOUS at 18:03

## 2017-01-01 RX ADMIN — MIDAZOLAM HYDROCHLORIDE 2 MG: 1 INJECTION, SOLUTION INTRAMUSCULAR; INTRAVENOUS at 14:58

## 2017-01-01 RX ADMIN — WATER 20 NG/KG/MIN: 1 SOLUTION INTRAVENOUS at 13:05

## 2017-01-01 RX ADMIN — BUSPIRONE HYDROCHLORIDE 30 MG: 10 TABLET ORAL at 22:04

## 2017-01-01 RX ADMIN — WATER 20 NG/KG/MIN: 1 SOLUTION INTRAVENOUS at 20:22

## 2017-01-01 RX ADMIN — Medication 0.5 MG: at 12:32

## 2017-01-01 RX ADMIN — METOPROLOL TARTRATE 50 MG: 50 TABLET, FILM COATED ORAL at 08:27

## 2017-01-01 RX ADMIN — SUCCINYLCHOLINE CHLORIDE 120 MG: 20 INJECTION, SOLUTION INTRAMUSCULAR; INTRAVENOUS at 08:24

## 2017-01-01 RX ADMIN — EPOPROSTENOL SODIUM: 0.5 INJECTION, POWDER, LYOPHILIZED, FOR SOLUTION INTRAVENOUS at 15:57

## 2017-01-01 RX ADMIN — CHLORHEXIDINE GLUCONATE 15 ML: 1.2 RINSE ORAL at 21:11

## 2017-01-01 RX ADMIN — IPRATROPIUM BROMIDE AND ALBUTEROL SULFATE 3 ML: .5; 3 SOLUTION RESPIRATORY (INHALATION) at 19:38

## 2017-01-01 RX ADMIN — AMLODIPINE BESYLATE 10 MG: 10 TABLET ORAL at 08:58

## 2017-01-01 RX ADMIN — IPRATROPIUM BROMIDE AND ALBUTEROL SULFATE 3 ML: .5; 3 SOLUTION RESPIRATORY (INHALATION) at 10:58

## 2017-01-01 RX ADMIN — PIPERACILLIN SODIUM,TAZOBACTAM SODIUM 3.38 G: 3; .375 INJECTION, POWDER, FOR SOLUTION INTRAVENOUS at 00:28

## 2017-01-01 RX ADMIN — SODIUM CHLORIDE 20 ML/HR: 9 INJECTION, SOLUTION INTRAVENOUS at 16:01

## 2017-01-01 RX ADMIN — ALTEPLASE 2 MG: 2.2 INJECTION, POWDER, LYOPHILIZED, FOR SOLUTION INTRAVENOUS at 13:30

## 2017-01-01 RX ADMIN — VANCOMYCIN HYDROCHLORIDE 1500 MG: 5 INJECTION, POWDER, LYOPHILIZED, FOR SOLUTION INTRAVENOUS at 01:00

## 2017-01-01 RX ADMIN — AMLODIPINE BESYLATE 10 MG: 10 TABLET ORAL at 08:34

## 2017-01-01 RX ADMIN — PIPERACILLIN SODIUM,TAZOBACTAM SODIUM 4.5 G: 4; .5 INJECTION, POWDER, FOR SOLUTION INTRAVENOUS at 08:56

## 2017-01-01 RX ADMIN — BUSPIRONE HYDROCHLORIDE 30 MG: 10 TABLET ORAL at 08:36

## 2017-01-01 RX ADMIN — CLONAZEPAM 1.5 MG: 1 TABLET ORAL at 21:30

## 2017-01-01 RX ADMIN — PIPERACILLIN SODIUM,TAZOBACTAM SODIUM 4.5 G: 4; .5 INJECTION, POWDER, FOR SOLUTION INTRAVENOUS at 22:01

## 2017-01-01 RX ADMIN — BUSPIRONE HYDROCHLORIDE 30 MG: 10 TABLET ORAL at 08:07

## 2017-01-01 RX ADMIN — SODIUM CHLORIDE: 9 INJECTION, SOLUTION INTRAVENOUS at 07:21

## 2017-01-01 RX ADMIN — PANTOPRAZOLE SODIUM 40 MG: 40 INJECTION, POWDER, FOR SOLUTION INTRAVENOUS at 08:23

## 2017-01-01 RX ADMIN — EPOPROSTENOL SODIUM: 0.5 INJECTION, POWDER, LYOPHILIZED, FOR SOLUTION INTRAVENOUS at 18:49

## 2017-01-01 RX ADMIN — PIPERACILLIN SODIUM,TAZOBACTAM SODIUM 4.5 G: 4; .5 INJECTION, POWDER, FOR SOLUTION INTRAVENOUS at 08:24

## 2017-01-01 RX ADMIN — WATER 20 NG/KG/MIN: 1 SOLUTION INTRAVENOUS at 23:55

## 2017-01-01 RX ADMIN — WATER 20 NG/KG/MIN: 1 SOLUTION INTRAVENOUS at 01:19

## 2017-01-01 RX ADMIN — PANTOPRAZOLE SODIUM 40 MG: 40 TABLET, DELAYED RELEASE ORAL at 09:40

## 2017-01-01 RX ADMIN — VANCOMYCIN HYDROCHLORIDE 1500 MG: 5 INJECTION, POWDER, LYOPHILIZED, FOR SOLUTION INTRAVENOUS at 17:40

## 2017-01-01 RX ADMIN — PIPERACILLIN SODIUM,TAZOBACTAM SODIUM 3.38 G: 3; .375 INJECTION, POWDER, FOR SOLUTION INTRAVENOUS at 05:54

## 2017-01-01 RX ADMIN — AMLODIPINE BESYLATE 5 MG: 5 TABLET ORAL at 09:30

## 2017-01-01 RX ADMIN — Medication 40 MG: at 09:32

## 2017-01-01 RX ADMIN — FLUOXETINE HYDROCHLORIDE 20 MG: 20 LIQUID ORAL at 12:46

## 2017-01-01 RX ADMIN — PROPOFOL 45 MCG/KG/MIN: 10 INJECTION, EMULSION INTRAVENOUS at 03:14

## 2017-01-01 RX ADMIN — AMLODIPINE BESYLATE 10 MG: 10 TABLET ORAL at 07:51

## 2017-01-01 RX ADMIN — HEPARIN SODIUM 5000 UNITS: 5000 INJECTION, SOLUTION INTRAVENOUS; SUBCUTANEOUS at 10:27

## 2017-01-01 RX ADMIN — IPRATROPIUM BROMIDE AND ALBUTEROL SULFATE 3 ML: .5; 3 SOLUTION RESPIRATORY (INHALATION) at 07:17

## 2017-01-01 RX ADMIN — ENOXAPARIN SODIUM 150 MG: 150 INJECTION SUBCUTANEOUS at 15:56

## 2017-01-01 RX ADMIN — BUSPIRONE HYDROCHLORIDE 30 MG: 10 TABLET ORAL at 21:37

## 2017-01-01 RX ADMIN — MIRTAZAPINE 15 MG: 15 TABLET, ORALLY DISINTEGRATING ORAL at 21:31

## 2017-01-01 RX ADMIN — PANTOPRAZOLE SODIUM 40 MG: 40 INJECTION, POWDER, FOR SOLUTION INTRAVENOUS at 08:07

## 2017-01-01 RX ADMIN — WATER 20 NG/KG/MIN: 1 SOLUTION INTRAVENOUS at 22:31

## 2017-01-01 RX ADMIN — IPRATROPIUM BROMIDE AND ALBUTEROL SULFATE 3 ML: .5; 3 SOLUTION RESPIRATORY (INHALATION) at 15:08

## 2017-01-01 RX ADMIN — AMLODIPINE BESYLATE 10 MG: 10 TABLET ORAL at 08:09

## 2017-01-01 RX ADMIN — ACETAMINOPHEN 650 MG: 325 SOLUTION ORAL at 10:48

## 2017-01-01 RX ADMIN — ENOXAPARIN SODIUM 150 MG: 150 INJECTION SUBCUTANEOUS at 17:25

## 2017-01-01 RX ADMIN — METOPROLOL TARTRATE 50 MG: 50 TABLET, FILM COATED ORAL at 20:57

## 2017-01-01 RX ADMIN — BUSPIRONE HYDROCHLORIDE 30 MG: 10 TABLET ORAL at 09:39

## 2017-01-01 RX ADMIN — MICONAZOLE NITRATE: 2 POWDER TOPICAL at 18:30

## 2017-01-01 RX ADMIN — POTASSIUM CHLORIDE 20 MEQ: 29.8 INJECTION, SOLUTION INTRAVENOUS at 02:12

## 2017-01-01 RX ADMIN — WATER 20 NG/KG/MIN: 1 SOLUTION INTRAVENOUS at 18:10

## 2017-01-01 RX ADMIN — PIPERACILLIN SODIUM,TAZOBACTAM SODIUM 3.38 G: 3; .375 INJECTION, POWDER, FOR SOLUTION INTRAVENOUS at 23:30

## 2017-01-01 RX ADMIN — ALTEPLASE 2 MG: 2.2 INJECTION, POWDER, LYOPHILIZED, FOR SOLUTION INTRAVENOUS at 15:18

## 2017-01-01 RX ADMIN — CHLORHEXIDINE GLUCONATE 15 ML: 1.2 RINSE ORAL at 21:07

## 2017-01-01 RX ADMIN — VECURONIUM BROMIDE 0.8 MCG/KG/MIN: 1 INJECTION, POWDER, LYOPHILIZED, FOR SOLUTION INTRAVENOUS at 16:23

## 2017-01-01 RX ADMIN — MIDAZOLAM HYDROCHLORIDE 2 MG: 1 INJECTION, SOLUTION INTRAMUSCULAR; INTRAVENOUS at 22:06

## 2017-01-01 RX ADMIN — DIPHENHYDRAMINE HYDROCHLORIDE 50 MG: 50 INJECTION, SOLUTION INTRAMUSCULAR; INTRAVENOUS at 09:29

## 2017-01-01 RX ADMIN — ACETAMINOPHEN 650 MG: 325 SOLUTION ORAL at 23:53

## 2017-01-01 RX ADMIN — FENTANYL CITRATE 100 MCG: 50 INJECTION, SOLUTION INTRAMUSCULAR; INTRAVENOUS at 05:37

## 2017-01-01 RX ADMIN — CHLORHEXIDINE GLUCONATE 15 ML: 1.2 RINSE ORAL at 07:40

## 2017-01-01 RX ADMIN — AMLODIPINE BESYLATE 5 MG: 5 TABLET ORAL at 08:25

## 2017-01-01 RX ADMIN — METOPROLOL TARTRATE 50 MG: 50 TABLET, FILM COATED ORAL at 21:29

## 2017-01-01 RX ADMIN — VANCOMYCIN HYDROCHLORIDE 1500 MG: 5 INJECTION, POWDER, LYOPHILIZED, FOR SOLUTION INTRAVENOUS at 09:01

## 2017-01-01 RX ADMIN — ENOXAPARIN SODIUM 40 MG: 40 INJECTION SUBCUTANEOUS at 16:22

## 2017-01-01 RX ADMIN — WATER 20 NG/KG/MIN: 1 SOLUTION INTRAVENOUS at 18:47

## 2017-01-01 RX ADMIN — Medication 1 MG/HR: at 17:14

## 2017-01-01 RX ADMIN — WATER 20 NG/KG/MIN: 1 SOLUTION INTRAVENOUS at 10:45

## 2017-01-01 RX ADMIN — PROPOFOL 40 MCG/KG/MIN: 10 INJECTION, EMULSION INTRAVENOUS at 12:59

## 2017-01-01 RX ADMIN — PROPOFOL 45 MCG/KG/MIN: 10 INJECTION, EMULSION INTRAVENOUS at 23:59

## 2017-01-01 RX ADMIN — PIPERACILLIN SODIUM,TAZOBACTAM SODIUM 4.5 G: 4; .5 INJECTION, POWDER, FOR SOLUTION INTRAVENOUS at 05:44

## 2017-01-01 RX ADMIN — ENOXAPARIN SODIUM 150 MG: 150 INJECTION SUBCUTANEOUS at 17:06

## 2017-01-01 RX ADMIN — PIPERACILLIN SODIUM,TAZOBACTAM SODIUM 4.5 G: 4; .5 INJECTION, POWDER, FOR SOLUTION INTRAVENOUS at 20:52

## 2017-01-01 RX ADMIN — IPRATROPIUM BROMIDE AND ALBUTEROL SULFATE 3 ML: .5; 3 SOLUTION RESPIRATORY (INHALATION) at 23:23

## 2017-01-01 RX ADMIN — PIPERACILLIN SODIUM,TAZOBACTAM SODIUM 4.5 G: 4; .5 INJECTION, POWDER, FOR SOLUTION INTRAVENOUS at 01:28

## 2017-01-01 RX ADMIN — Medication 40 MG: at 07:51

## 2017-01-01 RX ADMIN — PROPOFOL 75 MCG/KG/MIN: 10 INJECTION, EMULSION INTRAVENOUS at 07:56

## 2017-01-01 RX ADMIN — HEPARIN SODIUM 5000 UNITS: 5000 INJECTION, SOLUTION INTRAVENOUS; SUBCUTANEOUS at 08:23

## 2017-01-01 RX ADMIN — CEFTRIAXONE 2 G: 2 INJECTION, POWDER, FOR SOLUTION INTRAMUSCULAR; INTRAVENOUS at 04:09

## 2017-01-01 RX ADMIN — PIPERACILLIN SODIUM,TAZOBACTAM SODIUM 3.38 G: 3; .375 INJECTION, POWDER, FOR SOLUTION INTRAVENOUS at 23:44

## 2017-01-01 RX ADMIN — FLUOXETINE HYDROCHLORIDE 20 MG: 20 LIQUID ORAL at 08:11

## 2017-01-01 RX ADMIN — METOPROLOL TARTRATE 50 MG: 50 TABLET, FILM COATED ORAL at 09:36

## 2017-01-01 RX ADMIN — WATER 20 NG/KG/MIN: 1 SOLUTION INTRAVENOUS at 01:45

## 2017-01-01 RX ADMIN — PANTOPRAZOLE SODIUM 40 MG: 40 TABLET, DELAYED RELEASE ORAL at 08:36

## 2017-01-01 RX ADMIN — QUETIAPINE FUMARATE 25 MG: 25 TABLET, FILM COATED ORAL at 02:06

## 2017-01-01 RX ADMIN — HEPARIN SODIUM 5000 UNITS: 5000 INJECTION, SOLUTION INTRAVENOUS; SUBCUTANEOUS at 17:59

## 2017-01-01 RX ADMIN — METOPROLOL TARTRATE 50 MG: 50 TABLET, FILM COATED ORAL at 09:40

## 2017-01-01 RX ADMIN — VANCOMYCIN HYDROCHLORIDE 1500 MG: 5 INJECTION, POWDER, LYOPHILIZED, FOR SOLUTION INTRAVENOUS at 03:29

## 2017-01-01 RX ADMIN — PROPOFOL 60 MCG/KG/MIN: 10 INJECTION, EMULSION INTRAVENOUS at 09:38

## 2017-01-01 RX ADMIN — MIRTAZAPINE 15 MG: 15 TABLET, ORALLY DISINTEGRATING ORAL at 20:54

## 2017-01-01 RX ADMIN — PIPERACILLIN SODIUM,TAZOBACTAM SODIUM 3.38 G: 3; .375 INJECTION, POWDER, FOR SOLUTION INTRAVENOUS at 12:32

## 2017-01-01 RX ADMIN — METOPROLOL TARTRATE 50 MG: 50 TABLET, FILM COATED ORAL at 21:38

## 2017-01-01 RX ADMIN — METOCLOPRAMIDE 10 MG: 5 INJECTION, SOLUTION INTRAMUSCULAR; INTRAVENOUS at 11:04

## 2017-01-01 RX ADMIN — SODIUM CHLORIDE: 9 INJECTION, SOLUTION INTRAVENOUS at 21:31

## 2017-01-01 RX ADMIN — MIDAZOLAM HYDROCHLORIDE 2 MG: 1 INJECTION, SOLUTION INTRAMUSCULAR; INTRAVENOUS at 16:11

## 2017-01-01 RX ADMIN — HYDROMORPHONE HYDROCHLORIDE 0.5 MG: 1 INJECTION, SOLUTION INTRAMUSCULAR; INTRAVENOUS; SUBCUTANEOUS at 00:44

## 2017-01-01 RX ADMIN — HYDROCODONE BITARTRATE AND ACETAMINOPHEN 2 TABLET: 5; 325 TABLET ORAL at 16:59

## 2017-01-01 RX ADMIN — ENOXAPARIN SODIUM 40 MG: 40 INJECTION SUBCUTANEOUS at 16:05

## 2017-01-01 RX ADMIN — METOPROLOL TARTRATE 50 MG: 50 TABLET, FILM COATED ORAL at 08:10

## 2017-01-01 RX ADMIN — BUSPIRONE HYDROCHLORIDE 15 MG: 15 TABLET ORAL at 19:53

## 2017-01-01 RX ADMIN — METOPROLOL TARTRATE 50 MG: 50 TABLET, FILM COATED ORAL at 08:58

## 2017-01-01 RX ADMIN — METOPROLOL TARTRATE 50 MG: 50 TABLET, FILM COATED ORAL at 20:09

## 2017-01-01 RX ADMIN — FENTANYL CITRATE 50 MCG: 50 INJECTION, SOLUTION INTRAMUSCULAR; INTRAVENOUS at 08:47

## 2017-01-01 RX ADMIN — INSULIN ASPART 1 UNITS: 100 INJECTION, SOLUTION INTRAVENOUS; SUBCUTANEOUS at 08:41

## 2017-01-01 RX ADMIN — WATER 20 NG/KG/MIN: 1 SOLUTION INTRAVENOUS at 16:29

## 2017-01-01 RX ADMIN — VECURONIUM BROMIDE 0.4 MCG/KG/MIN: 1 INJECTION, POWDER, LYOPHILIZED, FOR SOLUTION INTRAVENOUS at 04:30

## 2017-01-01 RX ADMIN — HYDROCODONE BITARTRATE AND ACETAMINOPHEN 2 TABLET: 5; 325 TABLET ORAL at 12:44

## 2017-01-01 RX ADMIN — METOPROLOL TARTRATE 50 MG: 50 TABLET, FILM COATED ORAL at 08:08

## 2017-01-01 RX ADMIN — INSULIN ASPART 1 UNITS: 100 INJECTION, SOLUTION INTRAVENOUS; SUBCUTANEOUS at 20:07

## 2017-01-01 RX ADMIN — VANCOMYCIN HYDROCHLORIDE 1500 MG: 5 INJECTION, POWDER, LYOPHILIZED, FOR SOLUTION INTRAVENOUS at 08:52

## 2017-01-01 RX ADMIN — SODIUM CHLORIDE 1000 ML: 9 INJECTION, SOLUTION INTRAVENOUS at 01:12

## 2017-01-01 RX ADMIN — Medication 0.5 MG: at 08:59

## 2017-01-01 RX ADMIN — VANCOMYCIN HYDROCHLORIDE 1250 MG: 5 INJECTION, POWDER, LYOPHILIZED, FOR SOLUTION INTRAVENOUS at 02:41

## 2017-01-01 RX ADMIN — MIDAZOLAM HYDROCHLORIDE 2 MG: 1 INJECTION, SOLUTION INTRAMUSCULAR; INTRAVENOUS at 08:43

## 2017-01-01 RX ADMIN — WATER 20 NG/KG/MIN: 1 SOLUTION INTRAVENOUS at 03:29

## 2017-01-01 RX ADMIN — PROPOFOL 75 MCG/KG/MIN: 10 INJECTION, EMULSION INTRAVENOUS at 20:06

## 2017-01-01 RX ADMIN — VECURONIUM BROMIDE 0.4 MCG/KG/MIN: 1 INJECTION, POWDER, LYOPHILIZED, FOR SOLUTION INTRAVENOUS at 10:31

## 2017-01-01 RX ADMIN — PIPERACILLIN SODIUM,TAZOBACTAM SODIUM 4.5 G: 4; .5 INJECTION, POWDER, FOR SOLUTION INTRAVENOUS at 16:29

## 2017-01-01 RX ADMIN — AMLODIPINE BESYLATE 10 MG: 10 TABLET ORAL at 08:36

## 2017-01-01 RX ADMIN — MIRTAZAPINE 15 MG: 15 TABLET, FILM COATED ORAL at 21:30

## 2017-01-01 RX ADMIN — BUSPIRONE HYDROCHLORIDE 30 MG: 10 TABLET ORAL at 20:57

## 2017-01-01 RX ADMIN — MIRTAZAPINE 15 MG: 15 TABLET, ORALLY DISINTEGRATING ORAL at 22:04

## 2017-01-01 RX ADMIN — ACETAMINOPHEN 650 MG: 325 SOLUTION ORAL at 08:18

## 2017-01-01 RX ADMIN — MICONAZOLE NITRATE: 2 POWDER TOPICAL at 10:27

## 2017-01-01 RX ADMIN — CHLORHEXIDINE GLUCONATE 15 ML: 1.2 RINSE ORAL at 19:57

## 2017-01-01 RX ADMIN — METOPROLOL TARTRATE 50 MG: 50 TABLET, FILM COATED ORAL at 10:26

## 2017-01-01 RX ADMIN — ONDANSETRON 4 MG: 2 SOLUTION INTRAMUSCULAR; INTRAVENOUS at 08:55

## 2017-01-01 RX ADMIN — PROPOFOL 75 MCG/KG/MIN: 10 INJECTION, EMULSION INTRAVENOUS at 23:49

## 2017-01-01 RX ADMIN — PROPOFOL 75 MCG/KG/MIN: 10 INJECTION, EMULSION INTRAVENOUS at 21:45

## 2017-01-01 RX ADMIN — METOPROLOL TARTRATE 50 MG: 50 TABLET, FILM COATED ORAL at 20:14

## 2017-01-01 RX ADMIN — VANCOMYCIN HYDROCHLORIDE 1500 MG: 5 INJECTION, POWDER, LYOPHILIZED, FOR SOLUTION INTRAVENOUS at 20:44

## 2017-01-01 RX ADMIN — LORAZEPAM 1 MG: 2 INJECTION INTRAMUSCULAR; INTRAVENOUS at 23:10

## 2017-01-01 RX ADMIN — CHLORHEXIDINE GLUCONATE 15 ML: 1.2 RINSE ORAL at 21:31

## 2017-01-01 RX ADMIN — SODIUM CHLORIDE: 9 INJECTION, SOLUTION INTRAVENOUS at 00:12

## 2017-01-01 RX ADMIN — HEPARIN SODIUM 5000 UNITS: 5000 INJECTION, SOLUTION INTRAVENOUS; SUBCUTANEOUS at 10:07

## 2017-01-01 RX ADMIN — PIPERACILLIN SODIUM,TAZOBACTAM SODIUM 4.5 G: 4; .5 INJECTION, POWDER, FOR SOLUTION INTRAVENOUS at 04:33

## 2017-01-01 RX ADMIN — WATER 20 NG/KG/MIN: 1 SOLUTION INTRAVENOUS at 00:31

## 2017-01-01 RX ADMIN — ACETAMINOPHEN 650 MG: 325 SOLUTION ORAL at 21:32

## 2017-01-01 RX ADMIN — MIRTAZAPINE 15 MG: 15 TABLET, FILM COATED ORAL at 21:07

## 2017-01-01 RX ADMIN — Medication 0.5 MG: at 10:27

## 2017-01-01 RX ADMIN — ACETAMINOPHEN 650 MG: 325 SOLUTION ORAL at 04:04

## 2017-01-01 RX ADMIN — WATER 20 NG/KG/MIN: 1 SOLUTION INTRAVENOUS at 08:46

## 2017-01-01 RX ADMIN — LEVOFLOXACIN 750 MG: 5 INJECTION, SOLUTION INTRAVENOUS at 11:02

## 2017-01-01 RX ADMIN — Medication 0.5 MG: at 22:04

## 2017-01-01 RX ADMIN — ACETAMINOPHEN 650 MG: 325 TABLET, FILM COATED ORAL at 08:37

## 2017-01-01 RX ADMIN — METOPROLOL TARTRATE 25 MG: 25 TABLET ORAL at 21:07

## 2017-01-01 RX ADMIN — INSULIN ASPART 1 UNITS: 100 INJECTION, SOLUTION INTRAVENOUS; SUBCUTANEOUS at 08:24

## 2017-01-01 RX ADMIN — AMLODIPINE BESYLATE 5 MG: 5 TABLET ORAL at 08:27

## 2017-01-01 RX ADMIN — Medication 0.5 MG: at 09:39

## 2017-01-01 RX ADMIN — CHLORHEXIDINE GLUCONATE 15 ML: 1.2 RINSE ORAL at 08:23

## 2017-01-01 RX ADMIN — PROPOFOL 75 MCG/KG/MIN: 10 INJECTION, EMULSION INTRAVENOUS at 16:10

## 2017-01-01 RX ADMIN — PROPOFOL 75 MCG/KG/MIN: 10 INJECTION, EMULSION INTRAVENOUS at 17:59

## 2017-01-01 RX ADMIN — IPRATROPIUM BROMIDE AND ALBUTEROL SULFATE 3 ML: .5; 3 SOLUTION RESPIRATORY (INHALATION) at 10:57

## 2017-01-01 RX ADMIN — WATER 20 NG/KG/MIN: 1 SOLUTION INTRAVENOUS at 01:51

## 2017-01-01 RX ADMIN — METOPROLOL TARTRATE 50 MG: 50 TABLET, FILM COATED ORAL at 09:37

## 2017-01-01 RX ADMIN — EPOPROSTENOL SODIUM: 0.5 INJECTION, POWDER, LYOPHILIZED, FOR SOLUTION INTRAVENOUS at 23:18

## 2017-01-01 RX ADMIN — FENTANYL CITRATE 50 MCG: 50 INJECTION, SOLUTION INTRAMUSCULAR; INTRAVENOUS at 09:25

## 2017-01-01 RX ADMIN — WATER 20 NG/KG/MIN: 1 SOLUTION INTRAVENOUS at 15:55

## 2017-01-01 RX ADMIN — METOPROLOL TARTRATE 50 MG: 50 TABLET, FILM COATED ORAL at 08:34

## 2017-01-01 RX ADMIN — AMLODIPINE BESYLATE 10 MG: 10 TABLET ORAL at 09:10

## 2017-01-01 RX ADMIN — BUSPIRONE HYDROCHLORIDE 30 MG: 10 TABLET ORAL at 08:59

## 2017-01-01 RX ADMIN — CHLORHEXIDINE GLUCONATE 15 ML: 1.2 RINSE ORAL at 19:48

## 2017-01-01 RX ADMIN — SODIUM CHLORIDE: 4.5 INJECTION, SOLUTION INTRAVENOUS at 04:08

## 2017-01-01 RX ADMIN — AMLODIPINE BESYLATE 5 MG: 5 TABLET ORAL at 09:37

## 2017-01-01 RX ADMIN — PIPERACILLIN SODIUM,TAZOBACTAM SODIUM 3.38 G: 3; .375 INJECTION, POWDER, FOR SOLUTION INTRAVENOUS at 05:34

## 2017-01-01 RX ADMIN — PIPERACILLIN SODIUM,TAZOBACTAM SODIUM 4.5 G: 4; .5 INJECTION, POWDER, FOR SOLUTION INTRAVENOUS at 04:31

## 2017-01-01 RX ADMIN — ACETAMINOPHEN 650 MG: 325 SOLUTION ORAL at 12:19

## 2017-01-01 RX ADMIN — VANCOMYCIN HYDROCHLORIDE 1500 MG: 5 INJECTION, POWDER, LYOPHILIZED, FOR SOLUTION INTRAVENOUS at 21:33

## 2017-01-01 RX ADMIN — PANTOPRAZOLE SODIUM 40 MG: 40 INJECTION, POWDER, FOR SOLUTION INTRAVENOUS at 07:59

## 2017-01-01 RX ADMIN — Medication 40 MG: at 09:37

## 2017-01-01 RX ADMIN — HEPARIN SODIUM 5000 UNITS: 5000 INJECTION, SOLUTION INTRAVENOUS; SUBCUTANEOUS at 23:49

## 2017-01-01 RX ADMIN — IPRATROPIUM BROMIDE AND ALBUTEROL SULFATE 3 ML: .5; 3 SOLUTION RESPIRATORY (INHALATION) at 22:18

## 2017-01-01 RX ADMIN — EPOPROSTENOL SODIUM: 0.5 INJECTION, POWDER, LYOPHILIZED, FOR SOLUTION INTRAVENOUS at 10:45

## 2017-01-01 RX ADMIN — METOPROLOL TARTRATE 50 MG: 50 TABLET, FILM COATED ORAL at 21:50

## 2017-01-01 RX ADMIN — IPRATROPIUM BROMIDE AND ALBUTEROL SULFATE 3 ML: .5; 3 SOLUTION RESPIRATORY (INHALATION) at 15:18

## 2017-01-01 RX ADMIN — PIPERACILLIN SODIUM,TAZOBACTAM SODIUM 4.5 G: 4; .5 INJECTION, POWDER, FOR SOLUTION INTRAVENOUS at 20:54

## 2017-01-01 RX ADMIN — PROPOFOL 65 MCG/KG/MIN: 10 INJECTION, EMULSION INTRAVENOUS at 20:21

## 2017-01-01 RX ADMIN — Medication 40 MG: at 09:31

## 2017-01-01 RX ADMIN — BUSPIRONE HYDROCHLORIDE 30 MG: 15 TABLET ORAL at 09:09

## 2017-01-01 RX ADMIN — METOPROLOL TARTRATE 50 MG: 50 TABLET, FILM COATED ORAL at 08:53

## 2017-01-01 RX ADMIN — HEPARIN SODIUM 5000 UNITS: 5000 INJECTION, SOLUTION INTRAVENOUS; SUBCUTANEOUS at 18:29

## 2017-01-01 RX ADMIN — SODIUM CHLORIDE: 9 INJECTION, SOLUTION INTRAVENOUS at 08:07

## 2017-01-01 RX ADMIN — HYDROMORPHONE HYDROCHLORIDE 0.5 MG: 1 INJECTION, SOLUTION INTRAMUSCULAR; INTRAVENOUS; SUBCUTANEOUS at 04:22

## 2017-01-01 RX ADMIN — FUROSEMIDE 40 MG: 10 INJECTION, SOLUTION INTRAVENOUS at 15:34

## 2017-01-01 RX ADMIN — IOPAMIDOL 98 ML: 755 INJECTION, SOLUTION INTRAVENOUS at 18:19

## 2017-01-01 RX ADMIN — WATER 20 NG/KG/MIN: 1 SOLUTION INTRAVENOUS at 14:11

## 2017-01-01 RX ADMIN — MIDAZOLAM HYDROCHLORIDE 3 MG: 1 INJECTION, SOLUTION INTRAMUSCULAR; INTRAVENOUS at 23:30

## 2017-01-01 RX ADMIN — Medication 50 MCG/HR: at 20:05

## 2017-01-01 RX ADMIN — PIPERACILLIN SODIUM,TAZOBACTAM SODIUM 4.5 G: 4; .5 INJECTION, POWDER, FOR SOLUTION INTRAVENOUS at 19:09

## 2017-01-01 RX ADMIN — MIRTAZAPINE 15 MG: 15 TABLET, FILM COATED ORAL at 21:11

## 2017-01-01 RX ADMIN — Medication 40 MG: at 08:36

## 2017-01-01 RX ADMIN — ENOXAPARIN SODIUM 80 MG: 80 INJECTION SUBCUTANEOUS at 17:27

## 2017-01-01 RX ADMIN — VANCOMYCIN HYDROCHLORIDE 1500 MG: 5 INJECTION, POWDER, LYOPHILIZED, FOR SOLUTION INTRAVENOUS at 08:26

## 2017-01-01 RX ADMIN — BISACODYL 10 MG: 10 SUPPOSITORY RECTAL at 13:14

## 2017-01-01 RX ADMIN — CHLORHEXIDINE GLUCONATE 15 ML: 1.2 RINSE ORAL at 20:00

## 2017-01-01 RX ADMIN — HEPARIN SODIUM 1400 UNITS/HR: 10000 INJECTION, SOLUTION INTRAVENOUS at 14:30

## 2017-01-01 RX ADMIN — SODIUM CHLORIDE: 4.5 INJECTION, SOLUTION INTRAVENOUS at 09:01

## 2017-01-01 RX ADMIN — PIPERACILLIN SODIUM,TAZOBACTAM SODIUM 3.38 G: 3; .375 INJECTION, POWDER, FOR SOLUTION INTRAVENOUS at 17:43

## 2017-01-01 RX ADMIN — IPRATROPIUM BROMIDE AND ALBUTEROL SULFATE 3 ML: .5; 3 SOLUTION RESPIRATORY (INHALATION) at 15:43

## 2017-01-01 RX ADMIN — PANTOPRAZOLE SODIUM 40 MG: 40 INJECTION, POWDER, FOR SOLUTION INTRAVENOUS at 08:25

## 2017-01-01 RX ADMIN — PROPOFOL 75 MCG/KG/MIN: 10 INJECTION, EMULSION INTRAVENOUS at 05:43

## 2017-01-01 RX ADMIN — WATER 20 NG/KG/MIN: 1 SOLUTION INTRAVENOUS at 17:57

## 2017-01-01 RX ADMIN — FUROSEMIDE 20 MG: 10 INJECTION, SOLUTION INTRAVENOUS at 11:40

## 2017-01-01 RX ADMIN — VECURONIUM BROMIDE 0.8 MCG/KG/MIN: 1 INJECTION, POWDER, LYOPHILIZED, FOR SOLUTION INTRAVENOUS at 21:46

## 2017-01-01 RX ADMIN — IPRATROPIUM BROMIDE AND ALBUTEROL SULFATE 3 ML: .5; 3 SOLUTION RESPIRATORY (INHALATION) at 14:40

## 2017-01-01 RX ADMIN — MIRTAZAPINE 15 MG: 15 TABLET, ORALLY DISINTEGRATING ORAL at 21:03

## 2017-01-01 RX ADMIN — ACETAMINOPHEN 650 MG: 325 TABLET, FILM COATED ORAL at 23:52

## 2017-01-01 RX ADMIN — SULFUR HEXAFLUORIDE 2 ML: KIT at 14:48

## 2017-01-01 RX ADMIN — FENTANYL CITRATE 100 MCG: 50 INJECTION, SOLUTION INTRAMUSCULAR; INTRAVENOUS at 22:49

## 2017-01-01 RX ADMIN — EPOPROSTENOL SODIUM: 0.5 INJECTION, POWDER, LYOPHILIZED, FOR SOLUTION INTRAVENOUS at 01:53

## 2017-01-01 RX ADMIN — METOPROLOL TARTRATE 50 MG: 50 TABLET, FILM COATED ORAL at 20:08

## 2017-01-01 RX ADMIN — IPRATROPIUM BROMIDE AND ALBUTEROL SULFATE 3 ML: .5; 3 SOLUTION RESPIRATORY (INHALATION) at 07:22

## 2017-01-01 RX ADMIN — WATER 20 NG/KG/MIN: 1 SOLUTION INTRAVENOUS at 20:45

## 2017-01-01 RX ADMIN — METOPROLOL TARTRATE 50 MG: 50 TABLET, FILM COATED ORAL at 21:11

## 2017-01-01 RX ADMIN — MICONAZOLE NITRATE: 2 POWDER TOPICAL at 12:52

## 2017-01-01 RX ADMIN — FLUOXETINE 20 MG: 20 CAPSULE ORAL at 09:40

## 2017-01-01 RX ADMIN — WATER 20 NG/KG/MIN: 1 SOLUTION INTRAVENOUS at 05:53

## 2017-01-01 RX ADMIN — WATER 20 NG/KG/MIN: 1 SOLUTION INTRAVENOUS at 05:21

## 2017-01-01 RX ADMIN — KIT FOR THE PREPARATION OF TECHNETIUM TC 99M PENTETATE 72 MILLICURIE: 20 INJECTION, POWDER, LYOPHILIZED, FOR SOLUTION INTRAVENOUS; RESPIRATORY (INHALATION) at 09:28

## 2017-01-01 RX ADMIN — PIPERACILLIN SODIUM,TAZOBACTAM SODIUM 4.5 G: 4; .5 INJECTION, POWDER, FOR SOLUTION INTRAVENOUS at 09:35

## 2017-01-01 RX ADMIN — PIPERACILLIN SODIUM,TAZOBACTAM SODIUM 3.38 G: 3; .375 INJECTION, POWDER, FOR SOLUTION INTRAVENOUS at 12:02

## 2017-01-01 RX ADMIN — SODIUM CHLORIDE 1000 ML: 9 INJECTION, SOLUTION INTRAVENOUS at 16:10

## 2017-01-01 RX ADMIN — Medication 50 MCG/HR: at 23:42

## 2017-01-01 RX ADMIN — VANCOMYCIN HYDROCHLORIDE 2000 MG: 10 INJECTION, POWDER, LYOPHILIZED, FOR SOLUTION INTRAVENOUS at 09:01

## 2017-01-01 RX ADMIN — METOPROLOL TARTRATE 50 MG: 50 TABLET, FILM COATED ORAL at 09:43

## 2017-01-01 RX ADMIN — Medication 40 MG: at 08:11

## 2017-01-01 RX ADMIN — SODIUM CHLORIDE: 9 INJECTION, SOLUTION INTRAVENOUS at 12:51

## 2017-01-01 RX ADMIN — METOPROLOL TARTRATE 50 MG: 50 TABLET, FILM COATED ORAL at 20:56

## 2017-01-01 RX ADMIN — EPOPROSTENOL SODIUM: 0.5 INJECTION, POWDER, LYOPHILIZED, FOR SOLUTION INTRAVENOUS at 16:52

## 2017-01-01 RX ADMIN — VANCOMYCIN HYDROCHLORIDE 1500 MG: 5 INJECTION, POWDER, LYOPHILIZED, FOR SOLUTION INTRAVENOUS at 20:02

## 2017-01-01 RX ADMIN — MICONAZOLE NITRATE: 2 POWDER TOPICAL at 06:50

## 2017-01-01 RX ADMIN — PIPERACILLIN SODIUM,TAZOBACTAM SODIUM 3.38 G: 3; .375 INJECTION, POWDER, FOR SOLUTION INTRAVENOUS at 13:33

## 2017-01-01 RX ADMIN — FLUOXETINE HYDROCHLORIDE 20 MG: 20 LIQUID ORAL at 09:43

## 2017-01-01 RX ADMIN — IPRATROPIUM BROMIDE AND ALBUTEROL SULFATE 3 ML: .5; 3 SOLUTION RESPIRATORY (INHALATION) at 07:12

## 2017-01-01 RX ADMIN — METOPROLOL TARTRATE 50 MG: 50 TABLET, FILM COATED ORAL at 22:04

## 2017-01-01 RX ADMIN — VANCOMYCIN HYDROCHLORIDE 1250 MG: 5 INJECTION, POWDER, LYOPHILIZED, FOR SOLUTION INTRAVENOUS at 11:00

## 2017-01-01 RX ADMIN — CHLORHEXIDINE GLUCONATE 15 ML: 1.2 RINSE ORAL at 19:51

## 2017-01-01 RX ADMIN — MIRTAZAPINE 15 MG: 15 TABLET, ORALLY DISINTEGRATING ORAL at 21:33

## 2017-01-01 RX ADMIN — METOPROLOL TARTRATE 50 MG: 50 TABLET, FILM COATED ORAL at 09:10

## 2017-01-01 RX ADMIN — EPOPROSTENOL SODIUM: 0.5 INJECTION, POWDER, LYOPHILIZED, FOR SOLUTION INTRAVENOUS at 16:00

## 2017-01-01 RX ADMIN — METOPROLOL TARTRATE 50 MG: 50 TABLET, FILM COATED ORAL at 08:09

## 2017-01-01 RX ADMIN — PIPERACILLIN SODIUM,TAZOBACTAM SODIUM 3.38 G: 3; .375 INJECTION, POWDER, FOR SOLUTION INTRAVENOUS at 23:49

## 2017-01-01 RX ADMIN — CHLORHEXIDINE GLUCONATE 15 ML: 1.2 RINSE ORAL at 08:31

## 2017-01-01 RX ADMIN — WATER 20 NG/KG/MIN: 1 SOLUTION INTRAVENOUS at 10:46

## 2017-01-01 RX ADMIN — BUSPIRONE HYDROCHLORIDE 30 MG: 10 TABLET ORAL at 09:43

## 2017-01-01 RX ADMIN — PIPERACILLIN SODIUM,TAZOBACTAM SODIUM 4.5 G: 4; .5 INJECTION, POWDER, FOR SOLUTION INTRAVENOUS at 21:42

## 2017-01-01 RX ADMIN — CHLORHEXIDINE GLUCONATE 15 ML: 1.2 RINSE ORAL at 08:18

## 2017-01-01 RX ADMIN — PIPERACILLIN SODIUM,TAZOBACTAM SODIUM 4.5 G: 4; .5 INJECTION, POWDER, FOR SOLUTION INTRAVENOUS at 12:45

## 2017-01-01 RX ADMIN — Medication 3 MG/HR: at 09:38

## 2017-01-01 RX ADMIN — MIDAZOLAM HYDROCHLORIDE 2 MG: 1 INJECTION, SOLUTION INTRAMUSCULAR; INTRAVENOUS at 08:32

## 2017-01-01 RX ADMIN — Medication 0.5 MG: at 08:07

## 2017-01-01 RX ADMIN — CHLORHEXIDINE GLUCONATE 15 ML: 1.2 RINSE ORAL at 08:29

## 2017-01-01 RX ADMIN — ENOXAPARIN SODIUM 150 MG: 150 INJECTION SUBCUTANEOUS at 16:55

## 2017-01-01 RX ADMIN — PIPERACILLIN SODIUM,TAZOBACTAM SODIUM 4.5 G: 4; .5 INJECTION, POWDER, FOR SOLUTION INTRAVENOUS at 10:40

## 2017-01-01 RX ADMIN — METOPROLOL TARTRATE 50 MG: 50 TABLET, FILM COATED ORAL at 20:16

## 2017-01-01 RX ADMIN — PIPERACILLIN SODIUM,TAZOBACTAM SODIUM 3.38 G: 3; .375 INJECTION, POWDER, FOR SOLUTION INTRAVENOUS at 11:04

## 2017-01-01 RX ADMIN — AMLODIPINE BESYLATE 10 MG: 10 TABLET ORAL at 08:53

## 2017-01-01 RX ADMIN — HEPARIN SODIUM 1700 UNITS/HR: 10000 INJECTION, SOLUTION INTRAVENOUS at 03:24

## 2017-01-01 RX ADMIN — HEPARIN SODIUM 5000 UNITS: 5000 INJECTION, SOLUTION INTRAVENOUS; SUBCUTANEOUS at 23:44

## 2017-01-01 RX ADMIN — PIPERACILLIN SODIUM,TAZOBACTAM SODIUM 4.5 G: 4; .5 INJECTION, POWDER, FOR SOLUTION INTRAVENOUS at 01:11

## 2017-01-01 RX ADMIN — PROPOFOL 65 MCG/KG/MIN: 10 INJECTION, EMULSION INTRAVENOUS at 05:11

## 2017-01-01 RX ADMIN — VANCOMYCIN HYDROCHLORIDE 2000 MG: 5 INJECTION, POWDER, LYOPHILIZED, FOR SOLUTION INTRAVENOUS at 09:24

## 2017-01-01 RX ADMIN — IPRATROPIUM BROMIDE AND ALBUTEROL SULFATE 3 ML: .5; 3 SOLUTION RESPIRATORY (INHALATION) at 15:20

## 2017-01-01 RX ADMIN — PIPERACILLIN SODIUM,TAZOBACTAM SODIUM 3.38 G: 3; .375 INJECTION, POWDER, FOR SOLUTION INTRAVENOUS at 18:30

## 2017-01-01 RX ADMIN — WATER 20 NG/KG/MIN: 1 SOLUTION INTRAVENOUS at 04:45

## 2017-01-01 RX ADMIN — WATER 20 NG/KG/MIN: 1 SOLUTION INTRAVENOUS at 17:04

## 2017-01-01 RX ADMIN — VECURONIUM BROMIDE 0.8 MCG/KG/MIN: 1 INJECTION, POWDER, LYOPHILIZED, FOR SOLUTION INTRAVENOUS at 17:46

## 2017-01-01 RX ADMIN — Medication 40 MG: at 10:08

## 2017-01-01 RX ADMIN — PROPOFOL 65 MCG/KG/MIN: 10 INJECTION, EMULSION INTRAVENOUS at 02:52

## 2017-01-01 RX ADMIN — INSULIN ASPART 1 UNITS: 100 INJECTION, SOLUTION INTRAVENOUS; SUBCUTANEOUS at 12:19

## 2017-01-01 RX ADMIN — Medication 0.5 MG: at 21:19

## 2017-01-01 RX ADMIN — LEVOFLOXACIN 750 MG: 5 INJECTION, SOLUTION INTRAVENOUS at 10:18

## 2017-01-01 RX ADMIN — WATER 20 NG/KG/MIN: 1 SOLUTION INTRAVENOUS at 22:23

## 2017-01-01 RX ADMIN — ENOXAPARIN SODIUM 150 MG: 150 INJECTION SUBCUTANEOUS at 15:57

## 2017-01-01 RX ADMIN — PIPERACILLIN SODIUM,TAZOBACTAM SODIUM 4.5 G: 4; .5 INJECTION, POWDER, FOR SOLUTION INTRAVENOUS at 16:56

## 2017-01-01 ASSESSMENT — ACTIVITIES OF DAILY LIVING (ADL)
TOILETING: 0-->INDEPENDENT
TRANSFERRING: 0-->INDEPENDENT
RETIRED_EATING: 0-->INDEPENDENT
COGNITION: 0 - NO COGNITION ISSUES REPORTED
AMBULATION: 0-->INDEPENDENT
SWALLOWING: 0-->SWALLOWS FOODS/LIQUIDS WITHOUT DIFFICULTY
FALL_HISTORY_WITHIN_LAST_SIX_MONTHS: NO
BATHING: 0-->INDEPENDENT
DRESS: 0-->INDEPENDENT
RETIRED_COMMUNICATION: 0-->UNDERSTANDS/COMMUNICATES WITHOUT DIFFICULTY

## 2017-01-01 ASSESSMENT — PAIN DESCRIPTION - DESCRIPTORS
DESCRIPTORS: DISCOMFORT
DESCRIPTORS: CONSTANT;SHARP
DESCRIPTORS: DISCOMFORT

## 2017-01-01 ASSESSMENT — ENCOUNTER SYMPTOMS
CHILLS: 0
VOMITING: 0
ABDOMINAL PAIN: 1
ABDOMINAL DISTENTION: 0
SHORTNESS OF BREATH: 1
FEVER: 0
DIARRHEA: 0

## 2017-08-22 PROBLEM — J15.9 COMMUNITY ACQUIRED BACTERIAL PNEUMONIA: Status: ACTIVE | Noted: 2017-01-01

## 2017-08-22 NOTE — PLAN OF CARE
Problem: Goal Outcome Summary  Goal: Goal Outcome Summary  Outcome: No Change  Pt arrived on 88 around 0430.  Disoriented to time and place.  Very lethargic, difficult to arouse.  VSS on 2L.  Tele SR with 1st degree AVB and BBB.  LS clear but diminished.  Heparin infusing at 1700 units/hr.  Pt c/o 3/10 chest/abdominal pain, too lethargic to administer medication.  Abdomen distended.  BS hypo.  Plan for BLE US today to r/o DVT.  Continue to monitor.

## 2017-08-22 NOTE — H&P
DATE OF SERVICE:  08/22/2017.       CODE STATUS:  Full code.      CHIEF COMPLAINT:  Chest pain, shortness of breath.      HISTORY OF PRESENT ILLNESS:  Mr. Niko Mireles is a 52-year-old male who has a past medical history of diverticulitis, high blood pressure and nephrolithiasis.  This patient has had pain for about the past week.  He says it started in his left lower quadrant, felt like his prior bouts of diverticulitis.  He did not seek treatment for this.  Over the past few days, it has changed location from his left lower quadrant to his left lower chest.  He says that the pain increases significantly when he tries to take deep breaths.  He has had no productive cough with this.  He has had increased shortness of breath for some time.  He knows that his urine looks darker in color.  He is denying any diarrhea, nausea or vomiting.  He has not noted any fever.  His laboratory workup in ED shows acute renal insufficiency with elevated creatinine at 2.31 and a BUN of 40.  Troponin is negative.  He does have a white cell count of 15.8 with a left shift and they did an abdominal x-ray which did not show anything in the abdomen, but indicated there was some airspace disease in the left lower lungs so they followed this up with a chest x-ray which shows basically the same finding, but the radiologist commented concern for possible pulmonary embolism.  A D-dimer was drawn and this was also positive at about 0.7.  Unfortunately, due to his GFR being 30 and his elevated creatinine, he is unable to get a CT scan, so he will be admitted for workup of a community-acquired pneumonia versus pulmonary embolism or possibly for both.  The patient denies any sick contacts.  He denies any long periods of travel or inactivity.      ALLERGIES:  Compazine.      HOME MEDICATIONS:  None.      PAST MEDICAL HISTORY:   1.  Nephrolithiasis.   2.  Obsessive-compulsive disorder.   3.  Hypertension.   4.  Diverticulitis.      PAST SURGICAL  HISTORY:  Orthopedic surgery.      FAMILY HISTORY:  Reviewed with patient and found be noncontributory.      SOCIAL HISTORY:  He has never smoked.  He does not drink.  He has no illicit drug history.  He is , lives independently.      REVIEW OF SYSTEMS:  A 10-system review with the patient.  Other than those systems listed in history present illness are negative.      PHYSICAL EXAMINATION:   VITAL SIGNS:  Blood pressure 112/16, O2 sats are 94% on 2 liters nasal cannula, he is 88% on room air, heart rate is 114, temperature 98.5 Fahrenheit rectal.   GENERAL:  This is an obese, middle-aged male, appears to be in a mild amount of distress, is diaphoretic, not opening eyes but answering all questions appropriately.  He is oriented x4.   HEENT:  Normocephalic, atraumatic.  No oropharyngeal erythema.   NECK:  No cervical lymphadenopathy, no thyromegaly.   RESPIRATORY:  Lungs clear to auscultation bilaterally, no wheezes, rales or rhonchi.   CARDIOVASCULAR:  Rapid but normal sinus rhythm, no murmurs, rubs or gallops, 2+ pulses palpable in all 4 extremities.   ABDOMEN:  Normal bowel sounds.  Abdomen is soft, nontender, nondistended.  No hepatosplenomegaly or mass palpable.   EXTREMITIES:  No clubbing, cyanosis or edema.   NEUROLOGIC:  Cranial nerves 2 through 12 grossly intact, 5/5 strength in all 4 extremities.      LABORATORY DATA:  Basic metabolic profile:  Sodium is low at 132, BUN is high at 40, creatinine is high at 2.31, GFR is low at 30, all other values are normal.  CBC:  White cell count was high at 15.8, hemoglobin is low at 12.0.  ANC is elevated at 13.1.  All other values are normal.  Troponin is 0.015.  D-dimer is 0.7.      IMAGING STUDIES:  Abdominal CT without contrast shows no urinary stones or hydronephrosis.  No other acute findings in the abdomen or pelvis.  Small partially loculated left pleural effusion with patchy airspace disease at the left base, signs that it could be pneumonia.  Other  etiologies such as pulmonary embolism should be considered.  Correlate clinically.  Chest x-ray, 2-view, shows shallow inspiration, moderate patchy airspace disease, left lung and a small amount of pleural fluid at the left base, this to be pneumonia, clinically correlate.  Heart size exaggerated by shallow inspiration, likely normal.  EKG shows normal sinus rhythm, left bundle branch block which is present on previous EKGs.      ASSESSMENT:  This 52-year-old male with history of hypertension, obsessive compulsive disorder, diverticulitis and nephrolithiasis.  He is presenting tonight with about a week and a half of pain has migrated from the left lower abdomen to the left lower chest and also some shortness of breath.  He has laboratory findings that support the diagnosis of pneumonia but also some that support the diagnosis of pulmonary embolism.  He will be admitted tonight for further workup and treatment.      PLAN:   1.  Chest pain and shortness of breath.  This could very well be community-acquired pneumonia.  He has findings of patchy airspace disease on imaging, elevated white blood cell count with left shift, increased O2 demands and tachycardia, all of which could support infection, so I asked the ED provider to start the patient on the usual antibiotics for CAP which include ceftriaxone and azithromycin.  He does have an elevated D-dimer level of 0.7 and unfortunately we are unable to get a CT of his chest.  We asked to get a V/Q scan and this could take some time.  We are going to go ahead and treat empirically and start him on a heparin drip.  In the meantime, I will get bilateral lower extremity venous Dopplers, although he has not made any complaints of any pain or swelling in the lower extremity.  Will also get a procalcitonin, which could help to distinguish if this is an infectious etiology.  Other things we need to consider would be cardiac etiology.  The first troponin is negative and is barely  detectable.  EKG shows no changes concerning for acute ischemia.  Will continue to get serial troponins, monitor on cardiac telemetry.   2.  History of hypertension.  Right now his blood pressure is slightly low.  He will be getting IV fluids.  He is not on any home antihypertensives.  We are going to hold off on putting in any p.r.n. orders at this time.   3.  Acute renal insufficiency, not sure what the etiology is exactly.  It could be decreased p.o. intake.  We will give the patient normal saline at 100 mL an hour continuously, check a UA once he is able to provide some urine.    4.  DVT prophylaxis.  He is going to be on a heparin drip empirically while we workup his PE.   5.  CODE STATUS:  The patient confirms full code.   2.  Regardless of the diagnosis, I would anticipate he is probably going to be in-house for at least 2 overnight stays.         MARTINE JOSE MD             D: 2017 03:16   T: 2017 04:18   MT: VALERIO      Name:     PATRICK AHUJA   MRN:      [id number]        Account:      [id number]   :      1965           Admitted:     [id number]      Document: [id number]

## 2017-08-22 NOTE — IP AVS SNAPSHOT
"` `           [place] [address] [facility]: [phone]                                              INTERAGENCY TRANSFER FORM - NURSING   2017                    Hospital Admission Date: 2017  PATRICK AHUJA   : 1965  Sex: Male        Attending Provider: Humberto Wilkins MD     Allergies:  Compazine [Prochlorperazine]    Infection:  MRSA-Contact Isolation   Service:  HOSPITALIST    Ht:  1.854 m (6' 0.99\")   Wt:  91.1 kg (200 lb 13.4 oz)   Admission Wt:  113.4 kg (250 lb)    BMI:  26.5 kg/m 2   BSA:  2.17 m 2            Patient PCP Information     Provider PCP Type    No Ref-Primary Verified General      Current Code Status     Date Active Code Status Order ID Comments User Context       Prior      Code Status History     Date Active Date Inactive Code Status Order ID Comments User Context    2017  2:22 PM  Full Code [id number]  Chino Angel DO Outpatient    2017  2:44 PM 2017  2:22 PM Full Code [id number]  Ricco Lopez MD Inpatient    2017  4:20 AM 2017  2:44 PM Full Code [id number]  Marcos Esquivel MD Inpatient      Advance Directives        Does patient have a scanned Advance Directive/ACP document in EPIC?           No        Hospital Problems as of 2017              Priority Class Noted POA    Community acquired bacterial pneumonia Medium  2017 Yes    ARDS (adult respiratory distress syndrome) (H) Medium  2017 Yes    Parapneumonic effusion Medium  2017 Yes    Encephalopathy Medium  2017 Yes    Non morbid obesity due to excess calories Medium  2017 Yes      Non-Hospital Problems as of 2017     None      Immunizations     None         END      ASSESSMENT     Discharge Profile Flowsheet     EXPECTED DISCHARGE     Passing flatus  yes 17 0139    Expected Discharge Date  17 (TCU) 17 1217   COMMUNICATION ASSESSMENT      DISCHARGE NEEDS ASSESSMENT     Patient's communication " style  spoken language (English or Bilingual) 08/21/17 2353    Equipment Currently Used at Home  none 09/04/17 1616   SKIN      Transportation Available  car;family or friend will provide 09/04/17 1616   Inspection of bony prominences  Full 09/18/17 1102    # of Referrals Placed by CTS  Post Acute Facilities 09/14/17 1506   Skin WDL  ex 09/18/17 1102    ASSESSMENT OF FUNCTIONAL STATUS     Full except areas not inspected   Coccyx;Scapula, left;Scapula, right 09/11/17 1641    Assesssment of Functional Status  Needs placement in a SNF/TCF for rehabilitation 09/13/17 1528   Skin Temperature  warm 09/18/17 1102    GASTROINTESTINAL (ADULT,PEDIATRIC,OB)     Skin Moisture  dry 09/18/17 1102    GI WDL  ex 09/18/17 1054   Skin Integrity  bruise(s);abrasion(s) 09/18/17 1102    Abdominal Appearance  obese;rounded 09/18/17 1054   Skin Color/Characteristics  redness blanchable 09/18/17 1102    All Quadrants Bowel Sounds  audible and normoactive 09/18/17 1054   Skin Elasticity  quick return to original state 09/16/17 0429    LUQ Bowel Sounds  faint 09/12/17 2154   Procedural focused assessment (identify areas inspected)   Coccyx;Foot, right;Foot, left;Heel, right;Heel, left;Ankle, right;Ankle, left;Knee, left;Knee, right 09/12/17 0814    RUQ Bowel Sounds  faint 09/12/17 2154   SAFETY      LLQ Bowel Sounds  faint 09/12/17 2154   Safety WDL  WDL 09/18/17 1102    RLQ Bowel Sounds  faint 09/12/17 2154   Safety Equipment  oxygen flowmeter 09/07/17 1511    Last Bowel Movement  09/17/17 09/17/17 0913   Safety Factors  side rails raised x 4;wheels locked;call light in reach;ID band on;patient up in chair 09/18/17 0139    GI Signs/Symptoms  no gastrointestinal signs/symptoms 09/18/17 0139                      Assessment WDL (Within Defined Limits) Definitions           Safety WDL     Effective: 09/28/15    Row Information: <b>WDL Definition:</b> Bed in low position, wheels locked; call light in reach; upper side rails up x 2; ID band  "on<br> <font color=\"gray\"><i>Item=AS safety wdl>>List=AS safety wdl>>Version=F14</i></font>      Skin WDL     Effective: 09/28/15    Row Information: <b>WDL Definition:</b> Warm; dry; intact; elastic; without discoloration; pressure points without redness<br> <font color=\"gray\"><i>Item=AS skin wdl>>List=AS skin wdl>>Version=F14</i></font>      Vitals     Vital Signs Flowsheet     QUICK ADDS     ANALGESIA SIDE EFFECTS MONITORING      Quick Adds  -- 09/08/17 1834   Side Effects Monitoring: Respiratory Quality  R 09/17/17 2349    COMMENTS     Side Effects Monitoring: Respiratory Depth  N 09/17/17 2349    Comments  CT 09/05/17 1536   Side Effects Monitoring: Sedation Level  1 09/17/17 2349    VITAL SIGNS     RAÚL COMA SCALE      Temp  98.6  F (37  C) 09/18/17 1600   Best Eye Response  3-->(E3) to speech 09/06/17 1327    Temp src  Oral 09/18/17 1600   Best Motor Response  4-->(M4) withdraws from pain 09/06/17 1327    Resp  18 09/18/17 1600   Best Verbal Response  4-->(V4) confused 09/06/17 1327    Pulse  96 09/16/17 1629   Raúl Coma Scale Score  11 09/06/17 1327    Heart Rate  97 09/18/17 1600   Assessment Qualifiers  patient not sedated/intubated 09/04/17 2015    Pulse/Heart Rate Source  Monitor 09/18/17 1600   HEIGHT AND WEIGHT      BP  112/68 09/18/17 1600   Height  1.854 m (6' 0.99\") 09/04/17 0623    BP Location  Left arm 09/18/17 1600   Weight  91.1 kg (200 lb 13.4 oz) 09/18/17 0655    OXYGEN THERAPY     Weight Method  Bed scale 09/15/17 0706    SpO2  95 % 09/18/17 1600   Bed Scale  Pillow (add comment for number);Fitted sheet (2 pillows, lift sheet) 09/09/17 1143    O2 Device  None (Room air) 09/18/17 1600   BSA (Calculated - sq m)  2.13 09/04/17 0623    FiO2 (%)  35 % 09/04/17 0332   BMI (Calculated)  25.57 09/04/17 0623    Oxygen Delivery  2 LPM 09/09/17 0833   POSITIONING      PAIN/COMFORT     Body Position  supine, head elevated 09/18/17 1102    Patient Currently in Pain  yes 09/17/17 2349   Head of Bed " (HOB)  HOB at 60-90 degrees 09/18/17 1102    Preferred Pain Scale  number (Numeric Rating Pain Scale) 09/17/17 2349   Positioning/Transfer Devices  in use;pillows 09/18/17 1102    Patient's Stated Pain Goal  No pain 09/15/17 0002   Chair  Recline and up in chair 09/17/17 2349    0-10 Pain Scale  8 09/17/17 2349   DAILY CARE      Pain Location  Back 09/17/17 2349   Activity Type  ambulated in room 09/18/17 1044    Pain Orientation  Left 08/24/17 0500   Activity Level of Assistance  assistance, 1 person 09/18/17 1044    Pain Descriptors  Aching 09/17/17 2349   Activity Assistive Device  walker;gait belt 09/18/17 1044    Nonverbal Indicators Of Pain  -- (none present) 09/11/17 1002   ECG      Pain Management Interventions  analgesia administered 09/17/17 2349   ECG Rhythm  Sinus tachycardia 09/05/17 2140    Pain Intervention(s)  Medication (See eMAR) 09/17/17 2349   Ectopy  PVC 09/05/17 0903    Response to Interventions  Absence of nonverbal indicators of pain 09/04/17 1715   Ectopy Frequency  Rare 09/05/17 0903    CRITICAL-CARE PAIN OBSERVATION TOOL (CPOT)     Lead Monitored  Lead II;V 1 09/04/17 1715    Facial Expression  0 09/09/17 0045   Equipment  electrodes changed;telemetry batteries changed 09/08/17 1246    Body Movements  0 09/09/17 0045   Rhythm Comment  -- (bbb) 08/25/17 1303    Compliance w/ventilator (intubated patients)  -- 09/08/17 1122   POINT OF CARE TESTING      Vocalization (extubated patients)  0 09/05/17 1603   Puncture Site  fingertip 09/05/17 1603    Muscle Tension  0 09/09/17 0045   Bedside Glucose (mg/dl )   121 mg/dl 09/05/17 1603    Total  0 09/05/17 1603                 Patient Lines/Drains/Airways Status    Active LINES/DRAINS/AIRWAYS     Name: Placement date: Placement time: Site: Days: Last dressing change:    Wound 09/08/17 Posterior Neck Abrasion(s) small abrasion back of neck   09/08/17      Neck   10             Patient Lines/Drains/Airways Status    Active PICC/CVC     None             Intake/Output Detail Report     Date Intake         Output     Net    Shift P.O. I.V. NG/GT IV Piggyback Enteral Total Urine Emesis/NG output Stool Total       Day 09/17/17 0700 - 09/17/17 1459 1240 -- 120 -- -- 1360 -- -- -- -- 1360    Keren 09/17/17 1500 - 09/17/17 2259 240 -- -- -- -- 240 -- -- -- -- 240    Noc 09/17/17 2300 - 09/18/17 0659 60 -- 575 -- -- 635 750 -- -- 750 -115    Day 09/18/17 0700 - 09/18/17 1459 -- -- -- -- -- -- 500 -- -- 500 -500    Keren 09/18/17 1500 - 09/18/17 2259 -- -- -- -- -- -- -- -- -- -- 0      Last Void/BM       Most Recent Value    Urine Occurrence 1 at 09/18/2017 0541    Stool Occurrence 1 at 09/17/2017 1612      Case Management/Discharge Planning     Case Management/Discharge Planning Flowsheet     REFERRAL INFORMATION     ASSESSMENT OF FUNCTIONAL STATUS      Admission Type  inpatient 09/13/17 1528   Assesssment of Functional Status  Needs placement in a SNF/[facility] for rehabilitation 09/13/17 1528    Referral Source  case finding 09/13/17 1528   COPING/STRESS      # of Referrals Placed by CTS  Post Acute Facilities 09/14/17 1506   Major Change/Loss/Stressor  unable to assess 08/25/17 1501    Post Acute Facilities  TCU 09/14/17 1506   EXPECTED DISCHARGE      Reason For Consult  discharge planning;facility placement 09/13/17 1528   Expected Discharge Date  09/18/17 (TCU) 09/17/17 1217    Record Reviewed  clinical discipline documentation;history and physical;medical record 09/13/17 1528   DISCHARGE PLANNING       Assigned to Case  Angelique Franz 09/14/17 1506   Transportation Available  car;family or friend will provide 09/04/17 1616    LIVING ENVIRONMENT     FINAL NOTE      Lives With  spouse 09/13/17 1528   Final Note  -- (D/C to [facility]) 09/18/17 1536    Living Arrangements  house 09/13/17 1528   FINAL RESOURCES      Provides Primary Care For  no one 09/13/17 1528   Equipment Currently Used at Home  none 09/04/17 1616    Quality Of Family  Relationships  involved;supportive 09/13/17 1528   ABUSE RISK SCREEN      Able to Return to Prior Living Arrangements  no 09/13/17 1528   QUESTION TO PATIENT:  Has a member of your family or a partner(now or in the past) intimidated, hurt, manipulated, or controlled you in any way?  no 08/21/17 2356    ASSESSMENT OF FAMILY/SOCIAL SUPPORT     QUESTION TO PATIENT: Do you feel safe going back to the place where you are living?  yes 08/21/17 2356    Marital Status   09/13/17 1528   OBSERVATION: Is there reason to believe there has been maltreatment of a vulnerable adult (ie. Physical/Sexual/Emotional abuse, self neglect, lack of adequate food, shelter, medical care, or financial exploitation)?  no 08/21/17 2356    Who is your support system?  Wife;Sibling(s) 09/13/17 1528   (R) MENTAL HEALTH SUICIDE RISK      Description of Support System  Involved;Supportive 09/13/17 1528   Are you depressed or being treated for depression?  No (recieves tx for OCD) 08/22/17 1027    Support Assessment  Adequate family and caregiver support 09/13/17 1528   HOMICIDE RISK      Quality of Family Relationships  supportive;involved 09/13/17 1528   Homicidal Ideation  no 08/21/17 2356

## 2017-08-22 NOTE — PHARMACY-ADMISSION MEDICATION HISTORY
Admission medication history interview status for the 8/22/2017  admission is complete. See EPIC admission navigator for prior to admission medications     Medication history source reliability:Moderate    Actions taken by pharmacist (provider contacted, etc):reviewed care everywhere and confirmed all changes with patient     Additional medication history information not noted on PTA med list :patient confirms that he takes nothing for cholesterol, and that his prozac is tid.    Medication reconciliation/reorder completed by provider prior to medication history? No    Time spent in this activity: 30 minutes    Prior to Admission medications    Medication Sig Last Dose Taking? Auth Provider   fluticasone (FLONASE) 50 MCG/ACT spray Spray 2 sprays into both nostrils daily  Yes Unknown, Entered By History   Gabapentin (NEURONTIN PO) Take 400 mg by mouth At Bedtime  Yes Unknown, Entered By History   LISINOPRIL PO Take 20 mg by mouth At Bedtime  Yes Unknown, Entered By History   NAPROXEN PO Take 500 mg by mouth 2 times daily (with meals)  Yes Unknown, Entered By History   OMEPRAZOLE PO Take 20 mg by mouth every morning  Yes Unknown, Entered By History   SILDENAFIL CITRATE PO Take 20 mg by mouth once as needed 8/12/2017 Yes Unknown, Entered By History   Tolterodine Tartrate (DETROL LA PO) Take 4 mg by mouth daily  Yes Unknown, Entered By History   FLUoxetine HCl (PROZAC PO) Take 20 mg by mouth 3 times daily Am, noon, hs  Yes Unknown, Entered By History   BusPIRone HCl (BUSPAR PO) Take 30 mg by mouth 2 times daily Also see dinner dose  Yes Unknown, Entered By History   BusPIRone HCl (BUSPAR PO) Take 15 mg by mouth daily (with dinner)  Yes Unknown, Entered By History   ClonazePAM (KLONOPIN PO) Take 1.5 mg by mouth At Bedtime  Yes Unknown, Entered By History   Mirtazapine (REMERON PO) Take 15 mg by mouth At Bedtime  Yes Unknown, Entered By History   VITAMIN D, CHOLECALCIFEROL, PO Take 1,000 Units by mouth daily  Yes Unknown,  Entered By History   Omega-3 Fatty Acids (OMEGA 3 PO) Take 1,000 mg by mouth 2 times daily  Yes Unknown, Entered By History   Amitriptyline HCl (ELAVIL PO) Take 100 mg by mouth At Bedtime  Yes Unknown, Entered By History

## 2017-08-22 NOTE — IP AVS SNAPSHOT
` [place] [id number] [facility]: [phone]            Medication Administration Report for Niko Mireles as of 09/18/17 1608   Legend:    Given Hold Not Given Due Canceled Entry Other Actions    Time Time (Time) Time  Time-Action       Inactive    Active    Linked        Medications 09/12/17 09/13/17 09/14/17 09/15/17 09/16/17 09/17/17 09/18/17    acetaminophen (TYLENOL) tablet 650 mg  Dose: 650 mg Freq: EVERY 4 HOURS PRN Route: PO  PRN Reason: mild pain  Start: 08/22/17 0420   Admin Instructions: Alternate ibuprofen (if ordered) with acetaminophen.  Maximum acetaminophen dose from all sources = 75 mg/kg/day not to exceed 4 grams/day.          2352 (650 mg)-Given            apixaban ANTICOAGULANT (ELIQUIS) tablet 10 mg  Dose: 10 mg Freq: 2 TIMES DAILY Route: PO  Start: 09/18/17 1245   End: 09/25/17 0859          1358 (10 mg)-Given       [ ] 2100           bisacodyl (DULCOLAX) Suppository 10 mg  Dose: 10 mg Freq: DAILY PRN Route: RE  PRN Reason: constipation  Start: 08/22/17 0420   Admin Instructions: Hold for loose stools.  This is the third step of a three step constipation treatment protocol.               FLUoxetine (PROzac) capsule 20 mg  Dose: 20 mg Freq: DAILY Route: PO  Start: 09/16/17 0900        0858 (20 mg)-Given        0836 (20 mg)-Given        0940 (20 mg)-Given           HOLD: All Oral Medications  Freq: HOLD Route: XX  Start: 09/02/17 1443   Admin Instructions: I (provider) have reviewed/adjusted the medications and it is okay to hold all oral medication doses while on BIPAP/AVAPS/AVAPS AE until patient is able to be off BIPAP/AVAPS/AVAPS AE for 2 hours with patient off BIPAP/AVAPS/AVAPS AE  for at least 30 minutes before and 30 minutes after taking the medication.  No lozenges or gum should be given while patient on BIPAP/AVAPS/AVAPS AE .               hypromellose-dextran (ARTIFICAL TEARS) ophthalmic solution 1 drop  Dose: 1 drop Freq: EVERY 1 HOUR PRN Route: Both  "Eyes  PRN Reason: dry eyes  Start: 08/23/17 1124              ipratropium - albuterol 0.5 mg/2.5 mg/3 mL (DUONEB) neb solution 3 mL  Dose: 3 mL Freq: EVERY 2 HOURS PRN Route: NEBULIZATION  PRN Reasons: wheezing,shortness of breath / dyspnea  Start: 09/06/17 0900              labetalol (NORMODYNE/TRANDATE) injection 10-20 mg  Dose: 10-20 mg Freq: EVERY 6 HOURS PRN Route: IV  PRN Reason: high blood pressure  Start: 08/28/17 1721   Admin Instructions: SBP >180               lidocaine (LMX4) cream  Freq: EVERY 1 HOUR PRN Route: Top  PRN Reason: pain  PRN Comment: with VAD insertion or accessing implanted port.  Start: 08/23/17 1125   Admin Instructions: Do NOT give if patient has a history of allergy to any local anesthetic or any \"ebre\" product.   Apply 30 minutes prior to VAD insertion or port access.  MAX Dose:  2.5 g (  of 5 g tube)               lidocaine 1 % 0.5-5 mL  Dose: 0.5-5 mL Freq: ONCE PRN Route: OTHER  PRN Comment: mild pain For local anesthetic during PICC insertion.  Start: 09/01/17 1139   Admin Instructions: Give Sub-Q/Intradermal.  Give in divided doses as needed.               lidocaine 1 % 1 mL  Dose: 1 mL Freq: EVERY 1 HOUR PRN Route: OTHER  PRN Comment: mild pain with VAD insertion or accessing implanted port  Start: 08/23/17 1125   Admin Instructions: Do NOT give if patient has a history of allergy to any local anesthetic or any \"eber\" product. MAX dose 1 mL subcutaneous OR intradermal in divided doses.               miconazole (MICATIN; MICRO GUARD) 2 % powder  Freq: EVERY 1 HOUR PRN Route: Top  PRN Reason: other  PRN Comment: topical candidiasis  Start: 08/24/17 2365   Admin Instructions: Apply to affected area.                 naloxone (NARCAN) injection 0.1-0.4 mg  Dose: 0.1-0.4 mg Freq: EVERY 2 MIN PRN Route: IV  PRN Reason: opioid reversal  Start: 08/24/17 1535   Admin Instructions: For respiratory rate LESS than or EQUAL to 8.  Partial reversal dose:  0.1 mg titrated q 2 minutes for " Analgesia Side Effects Monitoring Sedation Level of 3 (frequently drowsy, arousable, drifts to sleep during conversation).Full reversal dose:  0.4 mg bolus for Analgesia Side Effects Monitoring Sedation Level of 4 (somnolent, minimal or no response to stimulation).               OLANZapine (zyPREXA) tablet 2.5 mg  Dose: 2.5 mg Freq: AT BEDTIME Route: PO  Start: 09/16/17 2200   Admin Instructions: Combined IM and PO doses may significantly increase the risk of orthostatic hypotension at 30 mg per day or higher.         2204 (2.5 mg)-Given        2217 (2.5 mg)-Given        [ ] 2200           ondansetron (ZOFRAN) injection 4 mg  Dose: 4 mg Freq: EVERY 6 HOURS PRN Route: IV  PRN Reasons: nausea,vomiting  Start: 08/22/17 0420   Admin Instructions: This is Step 1 of nausea and vomiting management.  If nausea not resolved in 15 minutes, go to Step 2 prochlorperazine (COMPAZINE).  Irritant.               pantoprazole (PROTONIX) EC tablet 40 mg  Dose: 40 mg Freq: EVERY MORNING Route: PO  Start: 09/16/17 0900   Admin Instructions: DO NOT CRUSH.         0858 (40 mg)-Given        0836 (40 mg)-Given        0940 (40 mg)-Given           Patient is already receiving anticoagulation with heparin, enoxaparin (LOVENOX), warfarin (COUMADIN)  or other anticoagulant medication  Freq: CONTINUOUS PRN Route: XX  Start: 08/22/17 0420              potassium chloride (KLOR-CON) Packet 20-40 mEq  Dose: 20-40 mEq Freq: EVERY 2 HOURS PRN Route: ORAL OR FEED  PRN Reason: potassium supplementation  Start: 08/26/17 2350   Admin Instructions: Use if unable to tolerate tablets.  If Serum K+ 3.0-3.3, dose = 60 mEq po total dose (40 mEq x1 followed in 2 hours by 20 mEq x1). Recheck K+ level 4 hours after dose and the next AM.  If Serum K+ 2.5-2.9, dose = 80 mEq po total dose (40 mEq Q2H x2). Recheck K+ level 4 hours after dose and the next AM.  If Serum K+ less than 2.5, See IV order.  Dissolve packet contents in 4-8 ounces of cold water or juice.                potassium chloride 10 mEq in 100 mL intermittent infusion  Dose: 10 mEq Freq: EVERY 1 HOUR PRN Route: IV  PRN Reason: potassium supplementation  Start: 08/26/17 2350   Admin Instructions: Infuse via PERIPHERAL LINE or CENTRAL LINE. Use for central line replacement if patient weight less than 65 kg, if patient is on TPN with high potassium content or if unit does not stock 20 mEq bags.   If Serum K+ 3.0-3.3, dose = 10 mEq/hr x4 doses (40 mEq IV total dose). Recheck K+ level 2 hours after dose and the next AM.   If Serum K+ less than 3.0, dose = 10 mEq/hr x6 doses (60 mEq IV total dose). Recheck K+ level 2 hours after dose and the next AM.               potassium chloride 10 mEq in 100 mL intermittent infusion with 10 mg lidocaine  Dose: 10 mEq Freq: EVERY 1 HOUR PRN Route: IV  PRN Reason: potassium supplementation  Start: 08/26/17 2350   Admin Instructions: Infuse via PERIPHERAL LINE. Use potassium with lidocaine for pain with peripheral administration.  If Serum K+ 3.0-3.3, dose = 10 mEq/hr x4 doses (40 mEq IV total dose). Recheck K+ level 2 hours after dose and the next AM.  If Serum K+ less than 3.0, dose = 10 mEq/hr x6 doses (60 mEq IV total dose). Recheck K+ level 2 hours after dose and the next AM.               potassium chloride 20 mEq in 50 mL intermittent infusion  Dose: 20 mEq Freq: EVERY 1 HOUR PRN Route: IV  PRN Reason: potassium supplementation  Last Dose: 20 mEq (08/27/17 0724)  Start: 08/26/17 2350   Admin Instructions: Infuse via CENTRAL LINE Only. May need EKG if less than 65 kg or on TPN - Max rate is 0.3 mEq/kg/hr for patients not on EKG monitoring.   If Serum K+ 3.0-3.3, dose = 20 mEq/hr x2 doses (40 mEq IV total dose). Recheck K+ level 2 hours after dose and the next AM.  If Serum K+ less than 3.0, dose = 20 mEq/hr x3 doses (60 mEq IV total dose). Recheck K+ level 2 hours after dose and the next AM.               potassium chloride SA (K-DUR/KLOR-CON M) CR tablet 20-40 mEq  Dose: 20-40  mEq Freq: EVERY 2 HOURS PRN Route: PO  PRN Reason: potassium supplementation  Start: 08/26/17 2350   Admin Instructions: Use if able to take PO.   If Serum K+ 3.0-3.3, dose = 60 mEq po total dose (40 mEq x1 followed in 2 hours by 20 mEq x1). Recheck K+ level 4 hours after dose and the next AM.  If Serum K+ 2.5-2.9, dose = 80 mEq po total dose (40 mEq Q2H x2). Recheck K+ level 4 hours after dose and the next AM.  If Serum K+ less than 2.5, See IV order.  DO NOT CRUSH               QUEtiapine (SEROquel) tablet 25 mg  Dose: 25 mg Freq: EVERY 6 HOURS PRN Route: PO  PRN Comment: agitation  Start: 09/14/17 0816        0206 (25 mg)-Given             senna-docusate (SENOKOT-S;PERICOLACE) 8.6-50 MG per tablet 1-2 tablet  Dose: 1-2 tablet Freq: 2 TIMES DAILY PRN Route: PO  PRN Comment: constipation   Start: 08/22/17 0420   Admin Instructions: If no bowel movement in 24 hours, increase to 2 tablets PO BID.  Hold for loose stools.   This is the first step of a three step constipation treatment protocol.               sodium chloride (PF) 0.9% PF flush 10 mL  Dose: 10 mL Freq: EVERY 7 DAYS Route: IK  Start: 09/01/17 1215   Admin Instructions: And Q1H PRN, to lock each CVC - Valved (Tunneled and Non-Tunneled) dormant lumen.        1246 (10 mL)-Given [C]              sodium chloride (PF) 0.9% PF flush 10-20 mL  Dose: 10-20 mL Freq: EVERY 1 HOUR PRN Route: IK  PRN Reasons: line flush,post meds or blood draw  Start: 09/01/17 1204   Admin Instructions: to flush CVC - Valved (Tunneled and Non-Tunneled).   10 mL post IV meds; 20 mL post blood draw.        0647 (20 mL)-Given              sodium chloride (PF) 0.9% PF flush 5-50 mL  Dose: 5-50 mL Freq: ONCE PRN Route: IK  PRN Reason: line flush  PRN Comment: to flush each lumen with line placement  Start: 09/01/17 1139   Admin Instructions: May repeat x 1              Future Medications  Medications 09/12/17 09/13/17 09/14/17 09/15/17 09/16/17 09/17/17 09/18/17       amLODIPine (NORVASC)  tablet 10 mg  Dose: 10 mg Freq: DAILY Route: PO  Start: 09/19/17 0900   Admin Instructions: Hold for SBP < 100               apixaban ANTICOAGULANT (ELIQUIS) tablet 5 mg  Dose: 5 mg Freq: 2 TIMES DAILY Route: PO  Start: 09/25/17 0900              busPIRone (BUSPAR) tablet 30 mg  Dose: 30 mg Freq: 2 TIMES DAILY Route: PO  Start: 09/18/17 2100   Admin Instructions: Crush tablets           [ ] 2100           clonazePAM (klonoPIN) half-tab 0.5 mg  Dose: 0.5 mg Freq: 2 TIMES DAILY Route: PO  Start: 09/18/17 2100          [ ] 2100           metoprolol (LOPRESSOR) tablet 50 mg  Dose: 50 mg Freq: 2 TIMES DAILY Route: PO  Start: 09/18/17 2100   Admin Instructions: Hold for SBP < 100 or HR < 60           [ ] 2100           mirtazapine (REMERON SOL-TAB) ODT tab 15 mg  Dose: 15 mg Freq: AT BEDTIME Route: PO  Start: 09/18/17 2200          [ ] 2200          Completed Medications  Medications 09/12/17 09/13/17 09/14/17 09/15/17 09/16/17 09/17/17 09/18/17         Dose: 10 mg Freq: ONCE Route: IM  Start: 09/16/17 0330   End: 09/16/17 0337   Admin Instructions: Dissolve the contents of the 10 mg vial using 2.1 mL of Sterile Water for Injection to provide a solution containing 5 mg/mL of olanzapine. Withdraw the ordered dose from vial. Use immediately (within 1 hour) after reconstitution. Discard any unused portion.         0337 (10 mg)-Given            Discontinued Medications  Medications 09/12/17 09/13/17 09/14/17 09/15/17 09/16/17 09/17/17 09/18/17         Dose: 650 mg Freq: EVERY 4 HOURS PRN Route: ORAL OR FEED  PRN Reasons: mild pain,fever  Start: 08/30/17 1731   End: 09/18/17 1137   Admin Instructions: Maximum acetaminophen dose from all sources= 75 mg/kg/day not to exceed 4 grams/day.           1137-Med Discontinued         Dose: 10 mg Freq: DAILY Route: PER FEEDING   Start: 09/04/17 0900   End: 09/18/17 1140   Admin Instructions: Hold for SBP < 100     1026 (10 mg)-Given        0807 (10 mg)-Given        0920 (10 mg)-Given         0943 (10 mg)-Given        0858 (10 mg)-Given        0836 (10 mg)-Given        0940 (10 mg)-Given       1140-Med Discontinued         Dose: 30 mg Freq: 2 TIMES DAILY Route: PER NG TUBE  Start: 09/11/17 0930   End: 09/18/17 1139   Admin Instructions: Crush tablets     1026 (30 mg)-Given       2057 (30 mg)-Given        0807 (30 mg)-Given       2054 (30 mg)-Given        0919 (30 mg)-Given       2132 (30 mg)-Given        0943 (30 mg)-Given       2118 (30 mg)-Given        0859 (30 mg)-Given       2204 (30 mg)-Given        0836 (30 mg)-Given       2217 (30 mg)-Given        0939 (30 mg)-Given       1139-Med Discontinued         Dose: 0.5 mg Freq: 2 TIMES DAILY Route: PER NG TUBE  Start: 09/11/17 1145   End: 09/18/17 1139    1027 (0.5 mg)-Given       2057 (0.5 mg)-Given        0807 (0.5 mg)-Given       2054 (0.5 mg)-Given        0933-Hold       1232 (0.5 mg)-Given       2132 (0.5 mg)-Given        0943 (0.5 mg)-Given       2119 (0.5 mg)-Given        0859 (0.5 mg)-Given       2204 (0.5 mg)-Given        0836 (0.5 mg)-Given       2217 (0.5 mg)-Given        0939 (0.5 mg)-Given       1139-Med Discontinued         Dose: 1.5 mg/kg Freq: EVERY 24 HOURS Route: SC  Start: 09/09/17 1600   End: 09/18/17 1241    1804 (150 mg)-Given        1516 (150 mg)-Given        1655 (150 mg)-Given        1557 (150 mg)-Given        1706 (150 mg)-Given        1605 (150 mg)-Given        1241-Med Discontinued         Dose: 15-30 g Freq: EVERY 15 MIN PRN Route: PO  PRN Reason: low blood sugar  Start: 08/29/17 0949   End: 09/16/17 1638   Admin Instructions: Give 15 g for BG 51 to 69 mg/dL IF patient is conscious and able to swallow. Give 30 g for BG less than or equal to 50 mg/dL IF patient is conscious and able to swallow. Do NOT give glucose gel via enteral tube.  IF patient has enteral tube: give apple juice 120 mL (4 oz or 15 g of CHO) via enteral tube for BG 51 to 69 mg/dL.  Give apple juice 240 mL (8 oz or 30 g of CHO) via enteral tube for BG less  than or equal to 50 mg/dL.    ~Oral gel is preferable for conscious and able to swallow patient.   ~IF gel unavailable or patient refuses may provide apple juice 120 mL (4 oz or 15 g of CHO). Document juice on I and O flowsheet.         1638-Med Discontinued        Or    Dose: 25-50 mL Freq: EVERY 15 MIN PRN Route: IV  PRN Reason: low blood sugar  Start: 08/29/17 0949   End: 09/16/17 1638   Admin Instructions: Use if have IV access, BG less than 70 mg/dL and meet dose criteria below:  Dose if conscious and alert (or disorientated) and NPO = 25 mL  Dose if unconscious / not alert = 50 mL  Vesicant.         1638-Med Discontinued        Or    Dose: 1 mg Freq: EVERY 15 MIN PRN Route: SC  PRN Reason: low blood sugar  PRN Comment: May repeat x 1 only  Start: 08/29/17 0949   End: 09/16/17 1638   Admin Instructions: May give SQ or IM. ONLY use glucagon IF patient has NO IV access AND is UNABLE to swallow AND blood glucose is LESS than or EQUAL to 50 mg/dL.         1638-Med Discontinued           Dose: 2-5 mL Freq: ONCE PRN Route: IK  PRN Reason: line flush  PRN Comment: for locking each dormant lumen with line placement  Start: 09/01/17 1139   End: 09/16/17 1638   Admin Instructions: May repeat x 1         1638-Med Discontinued           Dose: 1-6 Units Freq: EVERY 4 HOURS Route: SC  Start: 08/29/17 1000   End: 09/16/17 1637   Admin Instructions: Correction Scale - MEDIUM INSULIN RESISTANCE DOSING     Do Not give Correction Insulin if BG less than 140.  For  - 189 give 1 unit.  For  - 239 give 2 units.  For  - 289 give 3 units.  For  - 339 give 4 units.  For  - 399 give 5 units.  For BG greater than or equal to 400 give 6 units.  Check blood glucose Q4H and administer based on blood glucose.  Notify provider if glucose greater than or equal to 350 mg/dL after administration of correction dose.  If given at mealtime, must be administered 5 min before meal or immediately after.     (9843)-Not  Given       (0646)-Not Given       (0801)-Not Given [C]       (1222)-Not Given [C]       (1837)-Not Given       (2103)-Not Given        (0111)-Not Given       (0540)-Not Given       (0900)-Not Given       (1300)-Not Given       (1701)-Not Given       (2044)-Not Given        (0131)-Not Given       (0614)-Not Given       (0933)-Not Given [C]       (1416)-Not Given [C]       (1845)-Not Given [C]       (2128)-Not Given [C]        (0142)-Not Given [C]       (0516)-Not Given [C]       (0942)-Not Given [C]       (1320)-Not Given [C]       (1708)-Not Given       (2128)-Not Given        (0147)-Not Given       (0431)-Not Given       (0900)-Not Given       (1234)-Not Given       1637-Med Discontinued           Dose: 50 mg Freq: 2 TIMES DAILY Route: PER FEEDING   Start: 09/10/17 2145   End: 09/18/17 1139   Admin Instructions: Hold for SBP < 100 or HR < 60     1026 (50 mg)-Given       2057 (50 mg)-Given        0808 (50 mg)-Given       2054 (50 mg)-Given        0920 (50 mg)-Given       2132 (50 mg)-Given        0943 (50 mg)-Given       2129 (50 mg)-Given        0858 (50 mg)-Given       2204 (50 mg)-Given        0836 (50 mg)-Given       2217 (50 mg)-Given        0940 (50 mg)-Given       1139-Med Discontinued         Dose: 15 mg Freq: AT BEDTIME Route: PER NG TUBE  Start: 09/11/17 2200   End: 09/18/17 1139    2103 (15 mg)-Given        2054 (15 mg)-Given               2133 (15 mg)-Given        2131 (15 mg)-Given        2204 (15 mg)-Given        2217 (15 mg)-Given        1139-Med Discontinued    Medications 09/12/17 09/13/17 09/14/17 09/15/17 09/16/17 09/17/17 09/18/17

## 2017-08-22 NOTE — ED PROVIDER NOTES
"  History     Chief Complaint:  Chest Pain    HPI   Niko Mireles is a 52 year old male with a history of renal insufficiency, hypertension, and hyperlipidemia among others, who presents to the emergency department today for evaluation of sharp left-sided chest pain that is made worse with deep inspiration. The patient notes that he began having left lower quadrant abdominal pain starting about 10 days ago, but states that the pain moved up into his left chest with some shortness of breath. He notes an \"orange-red urine\" and that his urine output is less. No vomiting or diarrhea, no fever or chills, no abdominal distention, or any other symptoms at this time.     PE/DVT Risk Factors:   Hx of PE/DVT:   -  Hx of clotting disorder:  -  Hx of cancer:    -  Tobacco use:    -  Prolonged immobilization:  -  Recent surgery:   -  Recent travel:    -  Familial Hx of PE/DVT:  -    Allergies:  Compazine     Medications:    Zantac  Revatio  Prilosec  Naproxen  Lisinopril  Gabapentin     Past Medical History:    GERD  HLD  Depression  Insomnia  Neuropathy  Renal insufficiency  HTN  Anxiety    Past Surgical History:    Lumbar fusion   Shoulder surgery  Knee arthroscopy  Lumbar discectomy    Family History:    History reviewed. No pertinent family history.      Social History:  The patient was accompanied to the ED alone.  Smoking Status: never  Alcohol Use: No   Marital Status:   [2]     Review of Systems   Constitutional: Negative for chills and fever.   Respiratory: Positive for shortness of breath.    Cardiovascular: Positive for chest pain.   Gastrointestinal: Positive for abdominal pain. Negative for abdominal distention, diarrhea and vomiting.   All other systems reviewed and are negative.    Physical Exam     Patient Vitals for the past 24 hrs:   BP Temp Temp src Pulse Heart Rate Resp SpO2 Height Weight   08/22/17 0330 96/73 - - - 86 16 93 % - -   08/22/17 0300 103/58 97.7  F (36.5  C) Oral - 86 23 - - - " "  08/22/17 0245 - - - - 88 22 93 % - -   08/22/17 0114 - - - - 93 18 94 % - -   08/21/17 2346 112/60 98.5  F (36.9  C) Oral 114 - 28 92 % 1.854 m (6' 1\") 113.4 kg (250 lb)       Physical Exam  GENERAL: well developed, pleasant  HEAD: atraumatic  EYES: pupils reactive, extraocular muscles intact, conjunctivae normal  ENT:  mucus membranes dry  NECK:  trachea midline, normal range of motion  RESPIRATORY: no tachypnea, breath sounds clear to auscultation   CVS: Tachycardic, no murmurs, intact distal pulses  ABDOMEN: soft, nondistention, moderate left sided abdominal pain  MUSCULOSKELETAL: no deformities  SKIN: warm and dry, no acute rashes or ulceration  NEURO: GCS 15, cranial nerves intact, alert and oriented x3  PSYCH:  Mood/affect normal     Emergency Department Course     ECG:  ECG taken at 0000, ECG read at 0010  Sinus tachycardia  Possible left atrial enlargement   Left bundle branch block  Abnormal ECG  Rate 105 bpm. CT interval 174. QRS duration 166. QT/QTc 378/499. P-R-T axes 23,-6,119.     Imaging:  Radiology findings were communicated with the patient who voiced understanding of the findings.  XR Chest 2 Views  Shallow inspiration. Moderate patchy air-space disease in  the left lower lung and a small amount of pleural fluid at the left  base. Findings could be due to pneumonia. Correlate clinically. Heart  size exaggerated by shallow inspiration is likely normal.  Reading per radiology: PATRICK KAY MD    Abd/pelvis CT no contrast - Stone Protocol  1. No urinary stones or hydronephrosis. No other acute findings within  the abdomen or pelvis.  2. Small partially loculated left pleural effusion with patchy  air-space disease at the left base. Findings could be due to  pneumonia. Other etiologies such as pulmonary embolism should be  considered. Correlate clinically.  Reading per radiology: PATRICK KAY MD    Laboratory:  Laboratory findings were communicated with the patient who voiced understanding " of the findings.  Troponin (Collected 0025): <0.015   D Dimer (Collected 0025): 0.7 (H)  Procalcitonin: 0.70  CBC: WBC 15.8 (H), HGB 12.0 (L),   BMP: Na 132 (L), Glucose 117 (H), BUN 40 (H), GFR 30 (L), Creatinine 2.31 (H), o/w WNL  Blood Culture 1: pending  Blood Culture 2: pending     Interventions:  0033 normal saline 1 L IV  0122 normal saline 1 L IV  0324 Rocephin 2 g IV  0324 Heparin 1,700 units/hr  0324 Heparin 7,000 units IV  0338 normal saline 125mL/hr IV    Emergency Department Course:  Nursing notes and vitals reviewed.  0010 I entered the room.  0015 I performed an exam of the patient as documented above.   The patient received the above intervention(s).    IV was inserted and blood was drawn for laboratory testing, results above.  The patient was sent for a CT scan and x-ray while in the emergency department, results above.    0242 the patient was rechecked and he was updated on the results of his laboratory and imaging studies.    0247 I spoke with Dr. Esquivel of the hospitalist service regarding patient's presentation, findings, and plan of care.   0350 the patient was rechecked.    I discussed the treatment plan with the patient. They expressed understanding of this plan and consented to admission. I discussed the patient with Dr. Esquivel, who will admit the patient to a monitored bed for further evaluation and treatment.     Impression & Plan      Medical Decision Making:  Niko Mireles is a 52 year old male who presents to the emergency department today for evaluation of abdominal pain and chest pain. The patient notes belly pain preceded the chest pain and is now having some pleuritic left sided chest pain, cough as well. Considered differential diagnosis including diverticulitis, kidney stone in terms of the abdominal pain. Additionally, pneumonia, ACS, pulmonary embolism for the chest pain. The patient is fairly uncomfortable with the abdominal examination. The patient clinically looks  quite dry. I looked in care everywhere for old laboratory testing and see that he has a left bundle branch block in the computer. Initial troponin is normal. D-dimer is slightly elevated. Kidney function is increased compared to his baseline including a low GFR. Chest x-ray and CT show changes in the left lung base as noted above. White count is elevated and his procalcitonin is elevated as well. He has had a cough. I went back in to talk to him and found him to have beads of sweat on his face and moisture on his glasses. Did perform a rectal temperature, which was normal. Blood cultures and antibiotics were started. The patient was given IV fluids as he looked quite dry. Pulmonary embolism was considered as well, but given his low GFR, he was not a candidate for CT chest at this time with contrast. He does have an abnormal chest x-ray. Unclear how accurate the VQ scan is and it is going to take some time for this to occur in terms of calling in the tech to have the test done. The patient is going to require admission regardless with the kidney function and chest xray findings. I spoke with Dr. Esquivel regarding admission. The patient will be covered for both pneumonia as well as pulmonary embolism, pending future testing. The patient is slightly somnolent, but he does wake up. It is 4 am now before he goes upstairs. He is alert to person, time and place. Oxygen is in the mid-90's on 2 liters.     Diagnosis:    ICD-10-CM   1. Pneumonia of left lower lobe due to infectious organism (H) J18.1   2. Acute chest pain R07.9   3. Dehydration E86.0     Disposition:   The patient was admitted to the hospital for further evaluation and care.     Scribe Disclosure:  Alexis ABRAMS, am serving as a scribe at 12:07 AM on 8/22/2017 to document services personally performed by Jovany Kline MD, based on my observations and the provider's statements to me.    EMERGENCY DEPARTMENT       Jovany Kline MD  08/22/17 0924

## 2017-08-22 NOTE — IP AVS SNAPSHOT
` `     [place] [address] [facility]: [phone]                 INTERAGENCY TRANSFER FORM - NOTES (H&P, Discharge Summary, Consults, Procedures, Therapies)   2017                    Hospital Admission Date: 2017  NIKO MIRELES   : 1965  Sex: Male        Patient PCP Information     Provider PCP Type    No Ref-Primary Verified General         History & Physicals      H&P signed by Marcos Esquivel MD at 2017  8:40 PM      Author:  Marcos Esquivel MD Service:  Hospitalist Author Type:  Physician    Filed:  2017  8:40 PM Date of Service:  2017  3:16 AM Creation Time:  2017  5:33 AM    Status:  Signed :  Marcos Esquivel MD (Physician)         DATE OF SERVICE:  2017.       CODE STATUS:  Full code.      CHIEF COMPLAINT:  Chest pain, shortness of breath.      HISTORY OF PRESENT ILLNESS:  Mr. Niko Mireles is a 52-year-old male who has a past medical history of diverticulitis, high blood pressure and nephrolithiasis.  This patient has had pain for about the past week.  He says it started in his left lower quadrant, felt like his prior bouts of diverticulitis.  He did not seek treatment for this.  Over the past few days, it has changed location from his left lower quadrant to his left lower chest.  He says that the pain increases significantly when he tries to take deep breaths.  He has had no productive cough with this.  He has had increased shortness of breath for some time.  He knows that his urine looks darker in color.  He is denying any diarrhea, nausea or vomiting.  He has not noted any fever.  His laboratory workup in ED shows acute renal insufficiency with elevated creatinine at 2.31 and a BUN of 40.  Troponin is negative.  He does have a white cell count of 15.8 with a left shift and they did an abdominal x-ray which did not show anything in the abdomen, but indicated there was some airspace disease in the left lower lungs so  they followed this up with a chest x-ray which shows basically the same finding, but the radiologist commented concern for possible pulmonary embolism.  A D-dimer was drawn and this was also positive at about 0.7.  Unfortunately, due to his GFR being 30 and his elevated creatinine, he is unable to get a CT scan, so he will be admitted for workup of a community-acquired pneumonia versus pulmonary embolism or possibly for both.  The patient denies any sick contacts.  He denies any long periods of travel or inactivity.      ALLERGIES:  Compazine.      HOME MEDICATIONS:  None.      PAST MEDICAL HISTORY:   1.  Nephrolithiasis.   2.  Obsessive-compulsive disorder.   3.  Hypertension.   4.  Diverticulitis.      PAST SURGICAL HISTORY:  Orthopedic surgery.      FAMILY HISTORY:  Reviewed with patient and found be noncontributory.      SOCIAL HISTORY:  He has never smoked.  He does not drink.  He has no illicit drug history.  He is , lives independently.      REVIEW OF SYSTEMS:  A 10-system review with the patient.  Other than those systems listed in history present illness are negative.      PHYSICAL EXAMINATION:   VITAL SIGNS:  Blood pressure 112/16, O2 sats are 94% on 2 liters nasal cannula, he is 88% on room air, heart rate is 114, temperature 98.5 Fahrenheit rectal.   GENERAL:  This is an obese, middle-aged male, appears to be in a mild amount of distress, is diaphoretic, not opening eyes but answering all questions appropriately.  He is oriented x4.   HEENT:  Normocephalic, atraumatic.  No oropharyngeal erythema.   NECK:  No cervical lymphadenopathy, no thyromegaly.   RESPIRATORY:  Lungs clear to auscultation bilaterally, no wheezes, rales or rhonchi.   CARDIOVASCULAR:  Rapid but normal sinus rhythm, no murmurs, rubs or gallops, 2+ pulses palpable in all 4 extremities.   ABDOMEN:  Normal bowel sounds.  Abdomen is soft, nontender, nondistended.  No hepatosplenomegaly or mass palpable.   EXTREMITIES:  No clubbing,  cyanosis or edema.   NEUROLOGIC:  Cranial nerves 2 through 12 grossly intact, 5/5 strength in all 4 extremities.      LABORATORY DATA:  Basic metabolic profile:  Sodium is low at 132, BUN is high at 40, creatinine is high at 2.31, GFR is low at 30, all other values are normal.  CBC:  White cell count was high at 15.8, hemoglobin is low at 12.0.  ANC is elevated at 13.1.  All other values are normal.  Troponin is 0.015.  D-dimer is 0.7.      IMAGING STUDIES:  Abdominal CT without contrast shows no urinary stones or hydronephrosis.  No other acute findings in the abdomen or pelvis.  Small partially loculated left pleural effusion with patchy airspace disease at the left base, signs that it could be pneumonia.  Other etiologies such as pulmonary embolism should be considered.  Correlate clinically.  Chest x-ray, 2-view, shows shallow inspiration, moderate patchy airspace disease, left lung and a small amount of pleural fluid at the left base, this to be pneumonia, clinically correlate.  Heart size exaggerated by shallow inspiration, likely normal.  EKG shows normal sinus rhythm, left bundle branch block which is present on previous EKGs.      ASSESSMENT:  This 52-year-old male with history of hypertension, obsessive compulsive disorder, diverticulitis and nephrolithiasis.  He is presenting tonight with about a week and a half of pain has migrated from the left lower abdomen to the left lower chest and also some shortness of breath.  He has laboratory findings that support the diagnosis of pneumonia but also some that support the diagnosis of pulmonary embolism.  He will be admitted tonight for further workup and treatment.      PLAN:   1.  Chest pain and shortness of breath.  This could very well be community-acquired pneumonia.  He has findings of patchy airspace disease on imaging, elevated white blood cell count with left shift, increased O2 demands and tachycardia, all of which could support infection, so I asked  the ED provider to start the patient on the usual antibiotics for CAP which include ceftriaxone and azithromycin.  He does have an elevated D-dimer level of 0.7 and unfortunately we are unable to get a CT of his chest.  We asked to get a V/Q scan and this could take some time.  We are going to go ahead and treat empirically and start him on a heparin drip.  In the meantime, I will get bilateral lower extremity venous Dopplers, although he has not made any complaints of any pain or swelling in the lower extremity.  Will also get a procalcitonin, which could help to distinguish if this is an infectious etiology.  Other things we need to consider would be cardiac etiology.  The first troponin is negative and is barely detectable.  EKG shows no changes concerning for acute ischemia.  Will continue to get serial troponins, monitor on cardiac telemetry.   2.  History of hypertension.  Right now his blood pressure is slightly low.  He will be getting IV fluids.  He is not on any home antihypertensives.  We are going to hold off on putting in any p.r.n. orders at this time.   3.  Acute renal insufficiency, not sure what the etiology is exactly.  It could be decreased p.o. intake.  We will give the patient normal saline at 100 mL an hour continuously, check a UA once he is able to provide some urine.    4.  DVT prophylaxis.  He is going to be on a heparin drip empirically while we workup his PE.   5.  CODE STATUS:  The patient confirms full code.   2.  Regardless of the diagnosis, I would anticipate he is probably going to be in-house for at least 2 overnight stays.         MARTINE JOSE MD             D: 2017 03:16   T: 2017 04:18   MT: VALERIO      Name:     PATRICK AHUJA   MRN:      [id number]        Account:      [id number]   :      1965           Admitted:     [id number]      Document: [id number]    [MM1.1]   Revision History        User Key Date/Time User Provider Type Action    > MM1.1  "8/22/2017  8:40 PM Marcos Esquivel MD Physician Sign                  Discharge Summaries     No notes of this type exist for this encounter.         Consult Notes      Consults by Melva Parson PA-C at 9/18/2017 12:37 PM     Author:  Melva Parson PA-C Service:  Urology Author Type:  Physician Assistant - GM    Filed:  9/18/2017  2:37 PM Date of Service:  9/18/2017 12:37 PM Creation Time:  9/18/2017 12:37 PM    Status:  Cosign Needed :  Melva Parson PA-C (Physician Assistant - GM)    Cosign Required:  Yes         Consult Orders:    1. Urology IP Consult: hematuria; Consultant may enter orders: Yes; Patient to be seen: Routine - within 24 hours [[id number]] ordered by Chino Angel DO at 09/18/17 1103                Urology consult for: Hematuria, pt on Lovenox for DVT  Req provider: Dr. Angel of the hospitalist service      HPI: Pt is an assigned 51yo male pt of Dr. Monroy's with a urologic PMH significant for overactive bladder treated with Detrol[MS1.1], and kidney stones (no recent episodes)[MS1.2]. Pt was last seen 6/6/16 in the office. Pt first seen 1/24/15- pt had a cystoscopy at that time, which was benign. Pt was admitted to [facility] 8/22/17 for[MS1.1] c[MS1.2]hest pain and shortness of breath. Pt has remained admitted for pneumonia/respiratory distress, sepsis, new HCAP, encephalopathy, right DVT (on Lovenox), and OCD. Today, 9/18/17, pt was noted to have \"Bright red blood noted in pt's urine and at tip of penis\" by nursing, so urology was consulted.     Today, pt is found[MS1.1] sitting in a chair at bedside. Pt confirms the above history. Pt says he was having a little bit of right-sided abdominal pain this morning, which isn't unusual for him. It jazmín reminded him of pain like he's had with past stones. Pt reports it \"felt a little weird\" when he voided this morning, though it didn't burn. Pt reports it was more of a pressure or discomfort that required " straining with voiding. Pt says his urine that time was red. Since then, pt has voided fine and his urine has returned to yellow. Pt's baseline incontinence has remained the same. Pt is doing ok on Detrol, though he hasn't been taking it while hospitalized. Pt denies penile discharge or pain. Pt has had painless hematuria in the past with his kidney stones and stents.[MS1.2]       Past Medical History:   Diagnosis Date     Hypertension      OCD (obsessive compulsive disorder)      Review Of Systems:  General: Denies F/C  Respiratory: Denies SOB  Cardiovascular: Denies CP  Gastrointestinal: Denies N/V  Genitourinary: See HPI  Musculoskeletal: Denies LE pain  Neurologic: Denies HA  Psychiatric: Denies confusion  Skin: no concerning lesions  Hematology/immunology: no unexpected bruising    Past Surgical History:   Procedure Laterality Date     BRONCHOSCOPY FLEXIBLE AND RIGID N/A 8/24/2017    Procedure: BRONCHOSCOPY FLEXIBLE AND RIGID;  bronchoscopy;  Surgeon: Humberto Wilkins MD;  Location:  GI     ORTHOPEDIC SURGERY       Social Hx:  Social History     Social History     Marital status:      Spouse name: N/A     Number of children: N/A     Years of education: N/A     Occupational History     Not on file.     Social History Main Topics     Smoking status: Never Smoker     Smokeless tobacco: Never Used     Alcohol use No     Drug use: No     Sexual activity: Not on file     Other Topics Concern     Not on file     Social History Narrative     Meds:  Prescriptions Prior to Admission   Medication Sig Dispense Refill Last Dose     fluticasone (FLONASE) 50 MCG/ACT spray Spray 2 sprays into both nostrils daily        Gabapentin (NEURONTIN PO) Take 400 mg by mouth At Bedtime        LISINOPRIL PO Take 20 mg by mouth At Bedtime        NAPROXEN PO Take 500 mg by mouth 2 times daily (with meals)        OMEPRAZOLE PO Take 20 mg by mouth every morning        SILDENAFIL CITRATE PO Take 20 mg by mouth once as  needed   8/12/2017     Tolterodine Tartrate (DETROL LA PO) Take 4 mg by mouth daily        FLUoxetine HCl (PROZAC PO) Take 20 mg by mouth 3 times daily Am, noon, hs        BusPIRone HCl (BUSPAR PO) Take 30 mg by mouth 2 times daily Also see dinner dose        BusPIRone HCl (BUSPAR PO) Take 15 mg by mouth daily (with dinner)        ClonazePAM (KLONOPIN PO) Take 1.5 mg by mouth At Bedtime        Mirtazapine (REMERON PO) Take 15 mg by mouth At Bedtime        VITAMIN D, CHOLECALCIFEROL, PO Take 1,000 Units by mouth daily        Omega-3 Fatty Acids (OMEGA 3 PO) Take 1,000 mg by mouth 2 times daily        Amitriptyline HCl (ELAVIL PO) Take 100 mg by mouth At Bedtime        Allergies   Allergen Reactions     Compazine [Prochlorperazine]      Labs:  ROUTINE IP LABS (Last four results)  BMP  Recent Labs  Lab 09/18/17  0911 09/17/17  0805 09/16/17  0740 09/15/17  0645    138 136 141   POTASSIUM 4.0 3.9 3.6 4.2   CHLORIDE 106 105 103 107   BENOIT 9.0 9.6 9.4 9.0   CO2 22 22 23 24   BUN 20 20 23 20   CR 1.05 1.06 1.06 1.04   * 112* 120* 94     CBC  Recent Labs  Lab 09/17/17  0805 09/14/17  0910    382     INRNo lab results found in last 7 days.    UA RESULTS:  Recent Labs   Lab Test  09/05/17   1355   COLOR  Yellow   APPEARANCE  Clear   URINEGLC  Negative   URINEBILI  Negative   URINEKETONE  Negative   SG  1.022   UBLD  Negative   URINEPH  5.5   PROTEIN  10*   NITRITE  Negative   LEUKEST  Negative   RBCU  2   WBCU  3*     UC: UA with reflex to UC ordered    Imaging:CT ABDOMEN AND PELVIS WITH CONTRAST 9/5/2017 6:25 PM      HISTORY: Increasing lipase and bilirubin, fever workup.      COMPARISON: CT abdomen and pelvis 8/22/2017.     TECHNIQUE: Axial images from the lung bases to the symphysis are  performed with additional coronal reformatted images. 98 mL of Isovue  370 are given intravenously.  Radiation dose for this scan was reduced  using automated exposure control, adjustment of the mA and/or kV  according  "to patient size, or iterative reconstruction technique. Oral  contrast is also given.     FINDINGS:      Patchy infiltrate worse at the right lung base is noted and represents  a change from the prior exam. Pleural thickening posterior left lung  base is unchanged and the previously noted left lung base infiltrate  has resolved. No significant pleural fluid.     Abdomen: The liver, spleen, gallbladder, pancreas, adrenal glands and  kidneys are unremarkable. Small bilateral renal cortical cysts are  present all measuring 1 cm and smaller in size. No obvious renal  calculi or hydronephrosis. No enlarged abdominal lymph nodes. Feeding  tube terminates in the proximal jejunum in good position. No bowel  obstruction, diverticulitis or appendicitis. Possible constipation.     Pelvis: Bladder is decompressed with a Higgins catheter. Rectal tube is  present in the rectum. No enlarged pelvic lymph nodes or free fluid.  Bone window examination is unremarkable. Prior fusion at L5-S1.    IMPRESSION:  1. New right lung base infiltrate with clearing of the previously  noted left lung base infiltrate. Findings are concerning for a new  right lower lobe pneumonia.  2. No acute changes in the abdomen or pelvis to account for patient's  symptoms. No evidence of abscess. No bowel obstruction, diverticulitis  or appendicitis.     UNIQUE MOTA MD    Exam:  /80 (BP Location: Left arm)  Pulse 96  Temp 98.8  F (37.1  C) (Oral)  Resp 16  Ht 1.854 m (6' 0.99\")  Wt 91.1 kg (200 lb 13.4 oz)  SpO2 93%  BMI 26.5 kg/m2    Intake/Output Summary (Last 24 hours) at 09/18/17 1237  Last data filed at 09/18/17 0758   Gross per 24 hour   Intake             1615 ml   Output              950 ml   Net              665 ml     EXAM:   Constitutional: healthy, alert, no acute distress and cooperative   Cardiovascular: RRR  Respiratory: no secondary muscle use  Psychiatric: mentation appears normal and affect normal/bright  Neck: no " asymmetry  Abdomen: abdomen soft, non-tender. No masses, no CVAT  : No urinary catheter in place[MS1.1]. Uncirc penis without lesions or discharge per meatus; no blood per meatus. Bilat descended testes without mass or tenderness[MS1.2]  MSK: no calf tenderness, no edema.  Skin: no open lesions, extremities warm and well perfused  Hematology: no bruising on visible skin      Assessment/plan:    Hematuria, pt on Lovenox for DVT. Pt had a CT of the abdomen 9/5/17 and cystoscopy 1/2015, which were both benign.    -UA with reflex to UC ordered[MS1.1]. Pt has completed a course of vancomycin, Zosyn and azithromycin for his lung infection(s), but will check UA/reflex UC anyways[MS1.3]   -Resume PTA Detrol[MS1.4]   -Will have nursing check a PVR   -Will arrange for an outpt F/U in 4-6 weeks for repeat cystoscopy[MS1.1]- see AVS for details   -Pt clear to D/C from a urologic perspective, discussed with Dr. Angel of [MS1.2]    Discussed exam findings, lab and imaging results and plan with Dr. Monroy.  Please contact me with any questions or concerns.     Melva Parson PA-C  [facility]  Pager:  [phone]  After 4pm and on weekends, please call [phone][MS1.1]       Revision History        User Key Date/Time User Provider Type Action    > MS1.2 9/18/2017  2:37 PM Melva Parson PA-C Physician Assistant - GM Sign     MS1.3 9/18/2017  1:00 PM Melva Parson PA-C Physician Assistant - C      MS1.4 9/18/2017 12:58 PM Melva Parson PA-C Physician Assistant - C      MS1.1 9/18/2017 12:37 PM Melva Parson PA-C Physician Assistant - C             Consults by Sagar Daly MD at 9/18/2017  7:39 AM     Author:  Sagar Daly MD Service:  Psychiatry Author Type:  Physician    Filed:  9/18/2017  7:57 AM Date of Service:  9/18/2017  7:39 AM Creation Time:  9/18/2017  7:36 AM    Status:  Signed :  Sagar Daly MD (Physician)      "    [facility] Psychiatric Consult Progress Note      Interval History:   Pt seen, care reviewed with treatment team. He is calm and appropriate today. Denies suicidal or homicidal ideation. Tolerating medications without side effects. Side effects, risks, and benefits of medications reviewed with patient.  He seems to be much improved[PR1.1], slept well last night. He apparently sees Dr. Chen at [facility] for psych f/u. Denies suicidal or homicidal ideation. We discussed the rationale for zyprexa to help sleep and mood regulation.  He stated \"I guess they thought I might have that but it was more like OCD\".  It's interesting to note that he is doing better on less antidepressant medication small dose of mood stabilizer/atypical and psychotic.  The chart notes suggest he may have had some \"catatonic\" features on admission so I think it makes sense to have him on the mood stabilizer at this point.[PR1.2]  The Review of Systems is negative other than noted in the HPI     Medications:       OLANZapine  2.5 mg Oral At Bedtime     FLUoxetine  20 mg Oral Daily     pantoprazole  40 mg Oral QAM     busPIRone  30 mg Per NG tube BID     mirtazapine  15 mg Per NG tube At Bedtime     clonazePAM  0.5 mg Per NG tube BID     metoprolol  50 mg Per Feeding Tube BID     enoxaparin  1.5 mg/kg Subcutaneous Q24H     amLODIPine  10 mg Per Feeding Tube Daily     sodium chloride (PF)  10 mL Intracatheter Q7 Days     QUEtiapine, ipratropium - albuterol 0.5 mg/2.5 mg/3 mL, HOLD MEDICATION, lidocaine (buffered or not buffered), sodium chloride (PF), sodium chloride (PF), acetaminophen, labetalol, potassium chloride, potassium chloride, potassium chloride, potassium chloride with lidocaine, potassium chloride, miconazole, naloxone, hypromellose-dextran, lidocaine (buffered or not buffered), lidocaine 4%, - MEDICATION INSTRUCTIONS -, acetaminophen, senna-docusate, bisacodyl, [DISCONTINUED] ondansetron **OR** ondansetron      Mental Status " "Examination:     Appearance:  awake, alert and appeared older than stated age  Eye Contact:  better  Speech:  clear, coherent  Psychomotor Behavior:  no evidence of tardive dyskinesia, dystonia, or tics  Mood:  better  Affect:  appropriate and in normal range  Thought Process:  linear no loose associations  Thought Content:  no evidence of suicidal ideation or homicidal ideation and no evidence of psychotic thought  Oriented to:  time, person, and place  Attention Span and Concentration:  limited  Recent and Remote Memory:  poor  Fund of Knowledge: delayed  Muscle Strength and Tone: normal  Gait and Station: NA  Insight:  limited  Judgment:  limited        Labs/vitals:     Recent Results (from the past 24 hour(s))   Basic metabolic panel    Collection Time: 09/17/17  8:05 AM   Result Value Ref Range    Sodium 138 133 - 144 mmol/L    Potassium 3.9 3.4 - 5.3 mmol/L    Chloride 105 94 - 109 mmol/L    Carbon Dioxide 22 20 - 32 mmol/L    Anion Gap 11 3 - 14 mmol/L    Glucose 112 (H) 70 - 99 mg/dL    Urea Nitrogen 20 7 - 30 mg/dL    Creatinine 1.06 0.66 - 1.25 mg/dL    GFR Estimate 73 >60 mL/min/1.7m2    GFR Estimate If Black 88 >60 mL/min/1.7m2    Calcium 9.6 8.5 - 10.1 mg/dL   Platelet count    Collection Time: 09/17/17  8:05 AM   Result Value Ref Range    Platelet Count 374 150 - 450 10e9/L   Glucose by meter    Collection Time: 09/17/17  8:30 AM   Result Value Ref Range    Glucose 110 (H) 70 - 99 mg/dL[PR1.1]     Vitals:[PR1.2] /82 (BP Location: Right arm)  Pulse 96  Temp 98.3  F (36.8  C) (Oral)  Resp 18  Ht 1.854 m (6' 0.99\")  Wt 91.1 kg (200 lb 13.4 oz)  SpO2 96%  BMI 26.5 kg/m2[PR1.3]  BMI=[PR1.2] Body mass index is 26.5 kg/(m^2).[PR1.3]    Impression:   Niko[PR1.1] seems to be doing better, has complex psychiatric issues on top of his medical condition. His delirium sees to be clearing.[PR1.2]       DIagnoses:     1.  Delirium due to multiple etiologies.   2.  Obsessive-compulsive disorder, by " history.   3.  Major depressive disorder, recurrent, moderate.   4.  Sedating/hypnotic use disorder (iatrogenic).   5.  Possible right lower lobe aspiration pneumonia.   6.  Status post acute respiratory distress syndrome and hypoxic respiratory failure due to loculated left-sided pleural effusion with multifocal pneumonia.   7.  Dysphagia.   8.  Hypernatremia.    9.  Hyperglycemia.          Plan:   1. Written information given on medications. Side effects, risks, benefits reviewed.  2. Medical management as you are.  3.[PR1.1] Continue psych meds including low dose zyprexa  4. F/U with [facility], Dr. Grijalva from psych is his OP provider[PR1.2]  Attestation:  Patient has been seen and evaluated by me,  Sagar Daly MD[PR1.1]       Revision History        User Key Date/Time User Provider Type Action    > PR1.3 9/18/2017  7:57 AM Sagar Daly MD Physician Sign     PR1.2 9/18/2017  7:46 AM Sagar Daly MD Physician      PR1.1 9/18/2017  7:36 AM Sagar Daly MD Physician             Consults by Sagar Daly MD at 9/18/2017  7:36 AM     Author:  Sagar Daly MD Service:  Psychiatry Author Type:  Physician    Filed:  9/18/2017  7:36 AM Date of Service:  9/18/2017  7:36 AM Creation Time:  9/18/2017  7:36 AM    Status:  Signed :  Sagar Daly MD (Physician)     Consult Orders:    1. Psychiatry IP Consult: f/u, assess psych meds; Consultant may enter orders: Yes; Patient to be seen: Routine; Call back #: [phone] [[id number]] ordered by Sagar Daly MD at 09/18/17 0647                Pt seen for psychiatric consult follow-up, see my note    Sagar Daly MD[PR1.1]      Revision History        User Key Date/Time User Provider Type Action    > PR1.1 9/18/2017  7:36 AM Sagar Daly MD Physician Sign            Consults by Diallo Kohli MD at 9/16/2017  3:01 PM     Author:  Diallo Kohli  "MD Service:  Psychiatry Author Type:  Physician    Filed:  9/16/2017  3:15 PM Date of Service:  9/16/2017  3:01 PM Creation Time:  9/16/2017  2:58 PM    Status:  Signed :  Diallo Kohli MD (Physician)         [facility] Psychiatric Consult Progress Note      Interval History:   Pt seen, care reviewed with treatment team. Staff report that Code 21 was called last night on account of patient's agitation. He was insisting on being discharged and would not respond to redirection. He was also said to have been throwing punches and he had to be medicated with IM Zyprexa which he responded to. Today he has been very calm and cooperative. He has not required any more psychotropics. He is more alert and oriented. Denies suicidal or homicidal ideation.[OA1.1] He denies earlier delusions. He jokingly states that \"the people that wanted to give me money took all the money and left.\" He denies current A/V hallucinations.[OA1.2]     Review of systems:   The Review of Systems is negative other than noted in the HPI     Medications:       OLANZapine  2.5 mg Oral At Bedtime     FLUoxetine  20 mg Oral Daily     pantoprazole  40 mg Oral QAM     busPIRone  30 mg Per NG tube BID     mirtazapine  15 mg Per NG tube At Bedtime     clonazePAM  0.5 mg Per NG tube BID     metoprolol  50 mg Per Feeding Tube BID     enoxaparin  1.5 mg/kg Subcutaneous Q24H     amLODIPine  10 mg Per Feeding Tube Daily     sodium chloride (PF)  10 mL Intracatheter Q7 Days     insulin aspart  1-6 Units Subcutaneous Q4H     QUEtiapine, ipratropium - albuterol 0.5 mg/2.5 mg/3 mL, HOLD MEDICATION, lidocaine (buffered or not buffered), sodium chloride (PF), heparin lock flush, sodium chloride (PF), acetaminophen, glucose **OR** dextrose **OR** glucagon, labetalol, potassium chloride, potassium chloride, potassium chloride, potassium chloride with lidocaine, potassium chloride, miconazole, naloxone, hypromellose-dextran, lidocaine (buffered " or not buffered), lidocaine 4%, - MEDICATION INSTRUCTIONS -, acetaminophen, senna-docusate, bisacodyl, [DISCONTINUED] ondansetron **OR** ondansetron      Mental Status Examination:     Appearance:  awake, alert and appeared older than stated age  Eye Contact:[OA1.1]  better[OA1.2]  Speech:[OA1.1]  clear, coherent[OA1.2]  Psychomotor Behavior:  no evidence of tardive dyskinesia, dystonia, or tics  Mood:  better  Affect:[OA1.1]  appropriate and in normal range[OA1.2]  Thought Process:  linear no loose associations  Thought Content:[OA1.1]  no evidence of suicidal ideation or homicidal ideation and no evidence of psychotic thought[OA1.2]  Oriented to:[OA1.1]  time, person, and place[OA1.2]  Attention Span and Concentration:  limited  Recent and Remote Memory:  poor  Fund of Knowledge: delayed  Muscle Strength and Tone: normal  Gait and Station: NA  Insight:  limited  Judgment:[OA1.1]  limited[OA1.2]        Labs/vitals:     Recent Results (from the past 24 hour(s))   Glucose by meter    Collection Time: 09/15/17  5:01 PM   Result Value Ref Range    Glucose 92 70 - 99 mg/dL   Glucose by meter    Collection Time: 09/15/17  9:05 PM   Result Value Ref Range    Glucose 121 (H) 70 - 99 mg/dL   Glucose by meter    Collection Time: 09/16/17  1:14 AM   Result Value Ref Range    Glucose 94 70 - 99 mg/dL   Glucose by meter    Collection Time: 09/16/17  4:21 AM   Result Value Ref Range    Glucose 124 (H) 70 - 99 mg/dL   Basic metabolic panel    Collection Time: 09/16/17  7:40 AM   Result Value Ref Range    Sodium 136 133 - 144 mmol/L    Potassium 3.6 3.4 - 5.3 mmol/L    Chloride 103 94 - 109 mmol/L    Carbon Dioxide 23 20 - 32 mmol/L    Anion Gap 10 3 - 14 mmol/L    Glucose 120 (H) 70 - 99 mg/dL    Urea Nitrogen 23 7 - 30 mg/dL    Creatinine 1.06 0.66 - 1.25 mg/dL    GFR Estimate 73 >60 mL/min/1.7m2    GFR Estimate If Black 88 >60 mL/min/1.7m2    Calcium 9.4 8.5 - 10.1 mg/dL   Glucose by meter    Collection Time: 09/16/17  8:50 AM    Result Value Ref Range    Glucose 124 (H) 70 - 99 mg/dL   Glucose by meter    Collection Time: 09/16/17 12:30 PM   Result Value Ref Range    Glucose 103 (H) 70 - 99 mg/dL     B/P: 123/82, T: 98.1, P: 75, R: 18    Impression:   Niko Mireles is a 52-year-old man with longstanding history of depression and anxiety previously characterized as obsessive-compulsive disorder.  He presented to the hospital on account of chest pain and was found to have a loculated pleural effusion with multifocal pneumonia.  He had been catatonic at some point during his hospitalization and has continued to be intermittently confused.  It appears that he was taken off his Klonopin while he was in the ICU and may have experienced some encephalopathy from withdrawal from long-term benzodiazepine use.  He is currently on a combination of mirtazapine, Prozac, buspirone and clonazepam and claims he is doing well on these medications.          DIagnoses:     1.  Delirium due to multiple etiologies.   2.  Obsessive-compulsive disorder, by history.   3.  Major depressive disorder, recurrent, moderate.   4.  Sedating/hypnotic use disorder (iatrogenic).   5.  Possible right lower lobe aspiration pneumonia.   6.  Status post acute respiratory distress syndrome and hypoxic respiratory failure due to loculated left-sided pleural effusion with multifocal pneumonia.   7.  Dysphagia.   8.  Hypernatremia.    9.  Hyperglycemia.          Plan:   1. Written information given on medications. Side effects, risks, benefits reviewed.  2. Medical management as you are.  3. I will add low dose Zyprexa 2.5 mg[OA1.1] qhs[OA1.2]    Attestation:  Patient has been seen and evaluated by me,  Diallo Kohli MD[OA1.1]       Revision History        User Key Date/Time User Provider Type Action    > OA1.2 9/16/2017  3:15 PM Diallo Kohli MD Physician Sign     OA1.1 9/16/2017  2:58 PM Diallo Kolhi MD Physician              Consults by Diallo Kohli MD at 9/16/2017  2:58 PM     Author:  Diallo Kohli MD Service:  Psychiatry Author Type:  Physician    Filed:  9/16/2017  2:58 PM Date of Service:  9/16/2017  2:58 PM Creation Time:  9/16/2017  2:58 PM    Status:  Signed :  Diallo Kohli MD (Physician)     Consult Orders:    1. Psychiatry IP Consult: med review/addustments, v poor apetite; Consultant may enter orders: Yes; Patient to be seen: Routine; Call back #: hsopitalist [[id number]] ordered by Silvia Devine MD at 09/15/17 1303                Patient seen for follow-up psychiatric consultation. Please see my note for details. Thanks.[OA1.1]         Revision History        User Key Date/Time User Provider Type Action    > OA1.1 9/16/2017  2:58 PM Diallo Kohli MD Physician Sign            Consults by Amalia Hilario RD, LD at 9/14/2017  1:20 PM     Author:  Amalia Hilario RD, LD Service:  Nutrition Author Type:  Registered Dietitian    Filed:  9/14/2017  1:20 PM Date of Service:  9/14/2017  1:20 PM Creation Time:  9/14/2017  1:12 PM    Status:  Signed :  Amalia Hilario RD, LD (Registered Dietitian)     Consult Orders:    1. Nutrition Services Adult IP Consult [[id number]] ordered by Silvia Devine MD at 09/14/17 1304                Received RN consult - pt now on regular diet, provider wondering if TF can be d/c'd.  Pt ordered an omelet for lunch, nothing else.    Will start calorie count today and evaluate adequacy of oral intake. Ideally, pt will consume at least 2/3 of assessed needs before d/c'ing tahmina count.    Amalia Hilario RD  Pager [phone] (M-F)            [phone] (W/E & Hol)[CM1.1]       Revision History        User Key Date/Time User Provider Type Action    > CM1.1 9/14/2017  1:20 PM Amalia Hilario, RD, LD Registered Dietitian Sign            Consults by Sagar Daly MD at 9/14/2017  8:23 AM     Author:   Sagar Daly MD Service:  Psychiatry Author Type:  Physician    Filed:  9/14/2017  8:23 AM Date of Service:  9/14/2017  8:23 AM Creation Time:  9/14/2017  8:16 AM    Status:  Signed :  Sagar Daly MD (Physician)         [facility] Psychiatric Consult Progress Note      Interval History:   Pt seen, care reviewed with treatment team.  I reviewed Niko's records including the initial psychiatric consultation.  He is now taking his baseline psychiatric medications.  He has extremely complex medical issues and what appears to be a resolving delirium.  He is still disoriented, knows he is in the hospital, but thought he was at [facility], was unable to name the day of the week, month or date.  He looks disheveled, but he was calm during my visit.  He is not overtly delusional,  though Dr. Kohli's note yesterday suggested that he may been having some perceptual disturbances.  He mentioned in his note that he was going to start him on Zyprexa but never did so.  At this point since  he is showing general improvement, I would probably recommend we have some p.r.n. Seroquel available, and not schedule antipsychotics at this point unless he develops persistent agitation and psychotic symptomatology.     Review of systems:   The Review of Systems is negative other than noted in the HPI     Medications:       FLUoxetine  20 mg Per NG tube Daily     busPIRone  30 mg Per NG tube BID     mirtazapine  15 mg Per NG tube At Bedtime     clonazePAM  0.5 mg Per NG tube BID     metoprolol  50 mg Per Feeding Tube BID     piperacillin-tazobactam  4.5 g Intravenous Q6H     enoxaparin  1.5 mg/kg Subcutaneous Q24H     amLODIPine  10 mg Per Feeding Tube Daily     sodium chloride (PF)  10 mL Intracatheter Q7 Days     pantoprazole  40 mg Per Feeding Tube Daily     insulin aspart  1-6 Units Subcutaneous Q4H     QUEtiapine, ipratropium - albuterol 0.5 mg/2.5 mg/3 mL, HOLD MEDICATION,  lidocaine (buffered or not buffered), sodium chloride (PF), heparin lock flush, sodium chloride (PF), acetaminophen, glucose **OR** dextrose **OR** glucagon, labetalol, potassium chloride, potassium chloride, potassium chloride, potassium chloride with lidocaine, potassium chloride, miconazole, naloxone, hypromellose-dextran, lidocaine (buffered or not buffered), lidocaine 4%, - MEDICATION INSTRUCTIONS -, acetaminophen, senna-docusate, bisacodyl, [DISCONTINUED] ondansetron **OR** ondansetron      Mental Status Examination:     Appearance:  awake, alert and appeared older than stated age, disheveled, NG tube in place  Eye Contact:  poor   Speech:  decreased prosody and paucity of speech  Psychomotor Behavior:  no evidence of tardive dyskinesia, dystonia, or tics  Mood:  better  Affect:  mood congruent and intensity is blunted  Thought Process:  linear no loose associations  Thought Content:  no evidence of suicidal ideation or homicidal ideation and But he is a little bit distracted and confused  Oriented to:  Person only  Attention Span and Concentration:  poor  Recent and Remote Memory:  poor  Fund of Knowledge: delayed  Muscle Strength and Tone: normal  Gait and Station: Impaired due to weakness  Insight:  limited  Judgment:  poor        Labs/vitals:     Recent Results (from the past 24 hour(s))   Glucose by meter    Collection Time: 09/13/17  8:58 AM   Result Value Ref Range    Glucose 119 (H) 70 - 99 mg/dL   Glucose by meter    Collection Time: 09/13/17 11:52 AM   Result Value Ref Range    Glucose 120 (H) 70 - 99 mg/dL   Glucose by meter    Collection Time: 09/13/17  5:00 PM   Result Value Ref Range    Glucose 114 (H) 70 - 99 mg/dL   Glucose by meter    Collection Time: 09/13/17  8:43 PM   Result Value Ref Range    Glucose 104 (H) 70 - 99 mg/dL   Glucose by meter    Collection Time: 09/14/17  1:06 AM   Result Value Ref Range    Glucose 111 (H) 70 - 99 mg/dL   Glucose by meter    Collection Time: 09/14/17  5:12  AM   Result Value Ref Range    Glucose 116 (H) 70 - 99 mg/dL     B/P: 123/82, T: 98.1, P: 75, R: 18    Impression:   Niko Mireles is a 52-year-old man with longstanding history of depression and anxiety previously characterized as obsessive-compulsive disorder.  He presented to the hospital on account of chest pain and was found to have a loculated pleural effusion with multifocal pneumonia. He appears delirious, but is calm at this point.  I will write for some p.r.n. Seroquel to address agitation and mood regulation.    DIagnoses:     1.  Delirium due to multiple etiologies.   2.  Obsessive-compulsive disorder, by history.   3.  Major depressive disorder, recurrent, moderate.   4.  Sedating/hypnotic use disorder (iatrogenic).   5.  Possible right lower lobe aspiration pneumonia.   6.  Status post acute respiratory distress syndrome and hypoxic respiratory failure due to loculated left-sided pleural effusion with multifocal pneumonia.   7.  Dysphagia.   8.  Hypernatremia.    9.  Hyperglycemia.          Plan:   1. Written information given on medications. Side effects, risks, benefits reviewed.  2. Medical management as you are.  3. I will add low dose Seroquel 25 mg q.6 hours p.r.n.   4. Continue other medications as ordered. Psych will f/u as needed, please reconsult us if necessary  Attestation:  Patient has been seen and evaluated by me,  Sagar Daly MD[PR1.1]       Revision History        User Key Date/Time User Provider Type Action    > PR1.1 9/14/2017  8:23 AM Sagar Daly MD Physician Sign            Consults by Sagar Daly MD at 9/14/2017  8:08 AM     Author:  Sagar Daly MD Service:  Psychiatry Author Type:  Physician    Filed:  9/14/2017  8:08 AM Date of Service:  9/14/2017  8:08 AM Creation Time:  9/14/2017  8:07 AM    Status:  Signed :  Sagar Daly MD (Physician)     Consult Orders:    1. Psychiatry IP Consult: hx  depression, aggitation, encephalopathic, medication adjustment; Consultant may enter orders: Yes; Patient to be seen: Routine; Call back #: hospitalist [[phone]] ordered by Silvia Devine MD at 09/11/17 0915                Pt seen for psychiatric consult follow-up, see my note    Sagar Daly MD[PR1.1]      Revision History        User Key Date/Time User Provider Type Action    > PR1.1 9/14/2017  8:08 AM Sagar Daly MD Physician Sign            Consults by Diallo Kohli MD at 9/13/2017 11:05 AM     Author:  Diallo Kohli MD Service:  Psychiatry Author Type:  Physician    Filed:  9/13/2017 11:05 AM Date of Service:  9/13/2017 11:05 AM Creation Time:  9/13/2017 10:51 AM    Status:  Signed :  Diallo Kohli MD (Physician)         [facility] Psychiatric Consult Progress Note      Interval History:   Pt seen, care reviewed with treatment team. Patient is continuing to show improvement in his mentation. He is much less restless. He is sitting out of bed without restraints. He reports absence of medication side effects. However, he continues to talk about talking to people who want to give him money and he says they do not want him telling anyone else. It appears these are perceptual distortions that he finds ego syntonic as he is smiling broadly when describing this. Denies suicidal or homicidal ideation.     Review of systems:   The Review of Systems is negative other than noted in the HPI     Medications:       FLUoxetine  20 mg Per NG tube Daily     busPIRone  30 mg Per NG tube BID     mirtazapine  15 mg Per NG tube At Bedtime     clonazePAM  0.5 mg Per NG tube BID     metoprolol  50 mg Per Feeding Tube BID     piperacillin-tazobactam  4.5 g Intravenous Q6H     enoxaparin  1.5 mg/kg Subcutaneous Q24H     amLODIPine  10 mg Per Feeding Tube Daily     sodium chloride (PF)  10 mL Intracatheter Q7 Days     pantoprazole  40 mg Per Feeding  Tube Daily     insulin aspart  1-6 Units Subcutaneous Q4H     ipratropium - albuterol 0.5 mg/2.5 mg/3 mL, HOLD MEDICATION, lidocaine (buffered or not buffered), sodium chloride (PF), heparin lock flush, sodium chloride (PF), acetaminophen, glucose **OR** dextrose **OR** glucagon, labetalol, potassium chloride, potassium chloride, potassium chloride, potassium chloride with lidocaine, potassium chloride, miconazole, naloxone, hypromellose-dextran, lidocaine (buffered or not buffered), lidocaine 4%, - MEDICATION INSTRUCTIONS -, acetaminophen, senna-docusate, bisacodyl, [DISCONTINUED] ondansetron **OR** ondansetron      Mental Status Examination:     Appearance:  awake, alert and appeared older than stated age  Eye Contact:  poor   Speech:  decreased prosody and paucity of speech  Psychomotor Behavior:  no evidence of tardive dyskinesia, dystonia, or tics  Mood:  better  Affect:  mood congruent and intensity is blunted  Thought Process:  linear no loose associations  Thought Content:  no evidence of suicidal ideation or homicidal ideation and patient appears to be responding to internal stimuli  Oriented to:  person and place  Attention Span and Concentration:  limited  Recent and Remote Memory:  poor  Fund of Knowledge: delayed  Muscle Strength and Tone: normal  Gait and Station: NA  Insight:  limited  Judgment:  poor        Labs/vitals:     Recent Results (from the past 24 hour(s))   Glucose by meter    Collection Time: 09/12/17 12:18 PM   Result Value Ref Range    Glucose 118 (H) 70 - 99 mg/dL   Glucose by meter    Collection Time: 09/12/17 12:45 PM   Result Value Ref Range    Glucose 125 (H) 70 - 99 mg/dL   Glucose by meter    Collection Time: 09/12/17  6:19 PM   Result Value Ref Range    Glucose 128 (H) 70 - 99 mg/dL   Glucose by meter    Collection Time: 09/12/17  9:00 PM   Result Value Ref Range    Glucose 112 (H) 70 - 99 mg/dL   Glucose by meter    Collection Time: 09/13/17 12:58 AM   Result Value Ref Range     Glucose 116 (H) 70 - 99 mg/dL   Glucose by meter    Collection Time: 09/13/17  5:11 AM   Result Value Ref Range    Glucose 121 (H) 70 - 99 mg/dL   Glucose by meter    Collection Time: 09/13/17  8:58 AM   Result Value Ref Range    Glucose 119 (H) 70 - 99 mg/dL     B/P: 123/82, T: 98.1, P: 75, R: 18    Impression:   Niko Mireles is a 52-year-old man with longstanding history of depression and anxiety previously characterized as obsessive-compulsive disorder.  He presented to the hospital on account of chest pain and was found to have a loculated pleural effusion with multifocal pneumonia.  He had been catatonic at some point during his hospitalization and has continued to be intermittently confused.  It appears that he was taken off his Klonopin while he was in the ICU and may have experienced some encephalopathy from withdrawal from long-term benzodiazepine use.  He is currently on a combination of mirtazapine, Prozac, buspirone and clonazepam and claims he is doing well on these medications.  Conversation with his sister-in-law suggests that he is showing remarkable improvement today compared to the preceding days.           DIagnoses:     1.  Delirium due to multiple etiologies.   2.  Obsessive-compulsive disorder, by history.   3.  Major depressive disorder, recurrent, moderate.   4.  Sedating/hypnotic use disorder (iatrogenic).   5.  Possible right lower lobe aspiration pneumonia.   6.  Status post acute respiratory distress syndrome and hypoxic respiratory failure due to loculated left-sided pleural effusion with multifocal pneumonia.   7.  Dysphagia.   8.  Hypernatremia.    9.  Hyperglycemia.          Plan:   1. Written information given on medications. Side effects, risks, benefits reviewed.  2. Medical management as you are.  3. I will add low dose Zyprexa Zydis 2.5 mg bid  4. Continue other medications as ordered. Psych will f/u tomorrow.      Attestation:  Patient has been seen and evaluated by me,   Diallo Kohli MD[OA1.1]       Revision History        User Key Date/Time User Provider Type Action    > OA1.1 9/13/2017 11:05 AM Diallo Kohli MD Physician Sign            Consults by Diallo Kohli MD at 9/13/2017 10:51 AM     Author:  Diallo Kohli MD Service:  Psychiatry Author Type:  Physician    Filed:  9/13/2017 10:51 AM Date of Service:  9/13/2017 10:51 AM Creation Time:  9/13/2017 10:50 AM    Status:  Signed :  Diallo Kohli MD (Physician)     Consult Orders:    1. Psychiatry IP Consult: f/u, medication adjustment; Consultant may enter orders: Yes; Patient to be seen: Routine; Call back #: hospitalist [[id number]] ordered by Silvia Devine MD at 09/13/17 1036                Patient seen for follow-up psychiatric consultation. Please see my note for details. Thanks.[OA1.1]         Revision History        User Key Date/Time User Provider Type Action    > OA1.1 9/13/2017 10:51 AM Diallo Kohli MD Physician Sign            Consults signed by Diallo Kohli MD at 9/12/2017 10:34 PM      Author:  Diallo Kohli MD Service:  Psychiatry Author Type:  Physician    Filed:  9/12/2017 10:34 PM Date of Service:  9/12/2017  5:31 PM Creation Time:  9/12/2017  9:10 PM    Status:  Signed :  Diallo Kohli MD (Physician)         PSYCHIATRIC CONSULTATION       DATE OF SERVICE:  09/12/2017      REQUESTING PHYSICIAN:  Bassam Ocampo DO      REASON FOR CONSULTATION:  Catatonic, anxiety, psychiatric management.      IDENTIFYING DATA:  Niko Mireles is a 52-year-old man who reportedly is .  He works for [employer] in [place] and reportedly works with adults with disabilities.  He presented to the hospital on 08/22/2017 on account of chest pain and was found to have moderate left-sided abdominal pain, sinus tachycardia with left bundle branch block, radiographic  evidence of left lower lobe pneumonia on account of which he was admitted for stabilization.  He has a history of renal insufficiency, hypertension, hyperlipidemia, obsessive-compulsive disorder and major depressive disorder.  Psychiatry was consulted on account of his recent catatonia and ongoing mental status changes.  Information was gathered through direct patient contact in the presence of his sister-in-law as well as chart review.      CHIEF COMPLAINT:  None given.      HISTORY OF PRESENT ILLNESS:  Mr. Niko Mireles reportedly has an established history of hypertension and diverticulitis as well as depression and obsessive-compulsive disorder.  He presented to the hospital with left lower chest pain as well as shortness of breath and was found to have a loculated pleural effusion with multifocal pneumonia.  He subsequently developed hypoxic respiratory distress which quickly evolved to acute respiratory distress syndrome requiring intubation.  He underwent thoracentesis on 08/23 and bronchoscopy on 08/24 and was extubated on 09/02.  He has since been weaned off BiPAP and oxygen and has completed a course of vancomycin, Zosyn and azithromycin for his infection.  He has been having waxing and waning mentation but no fevers.  On direct interview his sister-in-law, who was present at the bedside with the patient's permission, indicated that this was the first time the patient was responding appropriately to her queries and even cracked a joke.  Per her report, he has a longstanding history of depression and obsessive-compulsive disorder.  She reports that he has never been one who has been very motivated and, per her account, she does not feel he had ever benefited from his prescribed Prozac and clonazepam; however, the patient reports that he has been on clonazepam for more than 30 years and feels that Prozac has been helpful.  He currently does not report neurovegetative symptoms of depression and denies  experiencing ruminative worry, panic attacks, obsessions or compulsions at the time of this assessment.  He admits that he has been having a hard time remembering things and even when asked what he does for a living, he struggled with responding to the question.  With prompting, he was able to recall the name of his employers and his sister-in-law guided him to describe what he does for a living.  He does not endorse the presence of auditory or visual hallucinations and does not endorse self-harm thoughts, plans or intent.  There is no history of illicit drug use or alcohol use.  He does not use tobacco products.  He claims he had been psychiatrically hospitalized in the past but could not recall at which facility, even though his sister-in-law doubted that this had been the case.  Review of records from [facility] does suggest that he had been on multiple psychotropic medications in the past, including Elavil, buspirone, clonazepam, fluoxetine, gabapentin, and had brief hospitalizations on account of excessive sedation.      CHEMICAL USE HISTORY:  None reported.      PAST MEDICAL HISTORY:     1.  Nephrolithiasis.     2.  Chronic kidney disease.     3.  Hypertension.     4.  Diverticulitis.      PAST SURGICAL HISTORY:  Orthopedic surgery.      FAMILY PSYCHIATRIC HISTORY:  Unknown.      SOCIAL HISTORY:  The patient is , lives independently.  He was employed.      REVIEW OF SYSTEMS:  A 10-point review of systems was attempted but could not be completed on account of the patient's waxing and waning mentation.      VITAL SIGNS:  Blood pressure 118/76, pulse 85, respirations 18, temperature 98.7.  Weight 222 pounds.      MENTAL STATUS EXAMINATION:  Niko Mireles is a 52-year-old man who appears older than his stated age.  He is seen at his bedside, where he is dressed in hospital garb and receiving nutrition via NG tube.  Has both his wrists in soft restraints.  He wears eyeglasses.  He keeps his eyes  "closed throughout most of this interview.  His mood is described as \"better.\"  His affect is restricted in range.  His thought process is difficult to assess, as he is minimally verbal.  He denies the presence of auditory hallucinations but claims he has been talking to people that are trying to give him money, but he is unclear what he means by this or if he is experiencing visual hallucinations.  There is no evidence of tremulousness.  He denies self-harm thoughts or plans.  His gait and station are not assessed, as he is currently bedbound.  His language is appropriate.  His associations are concrete.  His recall of recent and remote events is poor, but he is oriented to person and place.  His attention span and concentration are poor.  His impulse control is marginal.  He displays limited insight and judgment.  Risk assessment at this time is considered moderate.      DIAGNOSTIC IMPRESSION:  Niko Mireles is a 52-year-old man with longstanding history of depression and anxiety previously characterized as obsessive-compulsive disorder.  He presented to the hospital on account of chest pain and was found to have a loculated pleural effusion with multifocal pneumonia.  He had been catatonic at some point during his hospitalization and has continued to be intermittently confused.  It appears that he was taken off his Klonopin while he was in the ICU and may have experienced some encephalopathy from withdrawal from long-term benzodiazepine use.  He is currently on a combination of mirtazapine, Prozac, buspirone and clonazepam and claims he is doing well on these medications.  Conversation with his sister-in-law suggests that he is showing remarkable improvement today compared to the preceding days.      DIAGNOSES:   1.  Delirium due to multiple etiologies.   2.  Obsessive-compulsive disorder, by history.   3.  Major depressive disorder, recurrent, moderate.   4.  Sedating/hypnotic use disorder (iatrogenic).   5. "  Possible right lower lobe aspiration pneumonia.   6.  Status post acute respiratory distress syndrome and hypoxic respiratory failure due to loculated left-sided pleural effusion with multifocal pneumonia.   7.  Dysphagia.   8.  Hypernatremia.    9.  Hyperglycemia.      RECOMMENDATIONS:   1.  Medical management as you are.   2.  Continue current psychiatric medications as prescribed, as it appears that there has been improvement in his mentation and functioning over the last 24 hours per review of records and conversations with the patient and his sister-in-law.  He was not making any attempts to remove tubing from his nose today at the time of this assessment and it may be possible to attempt to discontinue soft restraints tomorrow.  Psychiatry will reevaluate him tomorrow to see if further medication revisions are necessary.      Thanks for the consult.         DIALLO ARIAS MD             D: 2017 17:31   T: 2017 20:12   MT:       Name:     PATRICK AHUJA   MRN:      [id number]        Account:       [id number]   :      1965           Consult Date:  2017      Document: [id number]    [OA1.1]   Revision History        User Key Date/Time User Provider Type Action    > OA1.1 2017 10:34 PM Diallo Arias MD Physician Sign            Consults by Diallo Arias MD at 2017  2:48 PM     Author:  Diallo Arias MD Service:  Psychiatry Author Type:  Physician    Filed:  2017  2:48 PM Date of Service:  2017  2:48 PM Creation Time:  2017  2:47 PM    Status:  Signed :  Diallo Arias MD (Physician)     Consult Orders:    1. Psychiatry IP Consult: was catatonic, anxiety, psychiatric management; Consultant may enter orders: Yes; Patient to be seen: Routine; Call back #: - [[id number]] ordered by Bassam Ocampo DO at 17 1442                Pt seen for initial psychiatric evaluation, please see my  dictation for details and recommendations.[OA1.1]     Revision History        User Key Date/Time User Provider Type Action    > OA1.1 9/12/2017  2:48 PM Diallo Kohli MD Physician Sign            Consults by Nuno Piña MD at 9/11/2017 11:33 AM     Author:  Nuno Piña MD Service:  Psychiatry Author Type:  Physician    Filed:  9/11/2017 11:34 AM Date of Service:  9/11/2017 11:33 AM Creation Time:  9/11/2017 11:29 AM    Status:  Signed :  Nuno Piña MD (Physician)         Attempted to see Pt. He[TW1.1] was unavailable as he[TW1.2] was undergoing X-Rays at the time, and appeared catatonic so determined it will be best to return tomorrow. Suggested Internal Medicine start him on Klonopin 0.5mg bid today, as he[TW1.1] was[TW1.2] maintained on Klonopin[TW1.1] prior to admission,[TW1.2] and it may help with catatonia. Will consider Abilify as well.     -Dr. Nuno Piña MD[TW1.1]     Revision History        User Key Date/Time User Provider Type Action    > TW1.2 9/11/2017 11:34 AM Nuno Piña MD Physician Sign     TW1.1 9/11/2017 11:29 AM Nuno Piña MD Physician             Consults by Mayi Sun RD, LD at 8/28/2017  1:11 PM     Author:  Mayi Sun RD, LD Service:  Nutrition Author Type:  Registered Dietitian    Filed:  8/28/2017  1:11 PM Date of Service:  8/28/2017  1:11 PM Creation Time:  8/28/2017 12:58 PM    Status:  Signed :  Mayi Sun RD, LD (Registered Dietitian)     Consult Orders:    1. Nutrition Services Adult IP Consult [[id number]] ordered by Jenna Grullon APRN CNP at 08/28/17 1033                CLINICAL NUTRITION SERVICES  -  ASSESSMENT NOTE      Recommendations Ordered by Registered Dietitian (PATTIE):   Begin TF Promote with Fiber at 15 mL/hr;  Increase by 20 mL every 12 hrs to goal 75 mL/hr = 1800 kcals (16 kcal/kg), 113 gm pro (1.4 gm/kg), 1494 mL H20, 25 gm fiber, 248 gm CHO  Free H20 60 mL  "every 4 hrs   Malnutrition:  Non-Severe malnutrition  In Context of:  Acute illness or injury        REASON FOR ASSESSMENT  Niko Mireles is a 52 year old male seen by Registered Dietitian for Provider Order - Registered Dietitian to Assess and Order TF per Medical Nutrition protocol      NUTRITION HISTORY  - Unable to obtain nutrition history as pt intubated and no family available.  Per EPIC records, pt had abdominal pain 1 week PTA.  No nausea or vomiting was reported.  No food allergies/intolerances.    CURRENT NUTRITION ORDERS  Diet Order:     NPO   Was asked to initiate TF for pt as RN will place a nasointestinal FT at the bedside today.    Current Intake/Tolerance:  NPO x 7 days.  Pt received a large amount of Propofol for sedation from 8/24-8/27 (Lipid).    PHYSICAL FINDINGS  Observed  No nutrition-related physical findings observed  Obtained from Chart/Interdisciplinary Team  Edema - 2+ mild in UE and LE  Proned for 2 sessions but now supine since 8/27.    ANTHROPOMETRICS  Height:[MH1.1] 6' 1\"[MH1.2]  Admit Weight: 114.9 kg  Current Wt:  114.6 kg (~ same)[MH1.1]  Body mass index is 33.33 kg/(m^2).[MH1.2]  Weight Status:  Obesity Grade I BMI 30-34.9  IBW:  83.6 kg  % IBW:  137%  Weight History:  Unknown PTA    LABS  Labs reviewed  Na 147 (H)  BUN 30 (NL)  Cr 1.45 (H) - Mild DINORA   (H, improved from 496) - Pt with possible PATSY    MEDICATIONS  Medications reviewed  Vecuronium and Propofol now off    Dosing Weight:  114.6 kg (energy), 83.6 kg (protein)    ASSESSED NUTRITION NEEDS PER APPROVED PRACTICE GUIDELINES:  Estimated Energy Needs:  4202-9246 kcals (14-17 Kcal/Kg)  Justification: obese  Estimated Protein Needs:  100-125 grams protein (1.2-1.5 g pro/Kg)  Justification: hypercatabolism with critical illness, obesity guidelines  and CKD  Estimated Fluid Needs:  5508-9087 mL (1 mL/Kcal)  Justification: maintenance    MALNUTRITION:  % Weight Loss:  Unable to determine without nutrition history  % " Intake:  </= 50% for >/= 5 days (severe malnutrition)  Subcutaneous Fat Loss:  None observed  Muscle Loss:  None observed  Fluid Retention:  Mild     Malnutrition Diagnosis: Non-Severe malnutrition  In Context of:  Acute illness or injury    NUTRITION DIAGNOSIS:  Inadequate protein-energy intake related to intubation as evidenced by NPO x 7 days, currently meeting 0% estimated needs      NUTRITION INTERVENTIONS  Recommendations / Nutrition Prescription  Begin TF Promote with Fiber at 15 mL/hr;  Increase by 20 mL every 12 hrs to goal 75 mL/hr = 1800 kcals (16 kcal/kg), 113 gm pro (1.4 gm/kg), 1494 mL H20, 25 gm fiber, 248 gm CHO  Free H20 60 mL every 4 hrs    Implementation  Nutrition education: Not appropriate at this time due to patient condition  Collaboration and Referral of Nutrition care - Discussed pt during ICU interdisciplinary rounds this am.  EN Composition, EN Schedule - Entered order in EPIC as above  Feeding Tube Flush - Ordered std H20 flushes in EPIC as above    Nutrition Goals  TF goal Promote with Fiber at 75 mL/hr will meet % estimated needs    MONITORING AND EVALUATION:  Progress towards goals will be monitored and evaluated per protocol and Practice Guidelines[MH1.1]    Mayi Sun[MH1.2], [facility] [employer], [facility][MH1.1]             Revision History        User Key Date/Time User Provider Type Action    > MH1.2 8/28/2017  1:11 PM Mayi Sun RD, WON Registered Dietitian Sign     MH1.1 8/28/2017 12:58 PM Mayi Sun RD, LD Registered Dietitian             Consults by Humberto Wilkins MD at 8/23/2017  1:31 PM     Author:  Humberto Wilkins MD Service:  ICU Author Type:  Physician    Filed:  8/23/2017  5:12 PM Date of Service:  8/23/2017  1:31 PM Creation Time:  8/23/2017  1:31 PM    Status:  Signed :  Humberto Wilkins MD (Physician)     Consult Orders:    1. Intensivist IP Consult: Patient to be seen: Routine - within 24 hours; Hypoxic respiratory  failure; Consultant may enter orders: Yes [[id number]] ordered by Ricco Lopez MD at 08/23/17 1339                [facility]    History and Physical/ Consult  Critical Care Service     Date of Admission:  8/22/2017  Date of Service (when I saw the patient):[AS1.1] 08/23/17[RR1.1]    Assessment & Plan   Niko Mireles is a 52 year old male with PMHx of HTN who presents on 8/22 with shortness or breath, concern for PE vs CAP. Respiratory status continued to decline on the floor, CT chest with evidence of large L sided complex pleural effusion[AS1.1] and bilateral infiltrates - combination pneumonia and effusion[RR1.2] prompting admission to the ICU.    Neuro  1. Acute pain  2. Depression/Anxiety/ OCD  Plan:  --[AS1.1] Norco Q4hr prn[KF1.1] - watch for sedation[RR1.2]    CV[AS1.1]  1. Tachycardia - LBBB wide complex on tele - reactive, initial c[RR1.2]oncern for PE[AS1.1] ruled out with CT, was on heparin empirically[RR1.2]   2. Hx of HTN  Plan:  --[AS1.1] repeat EKG, serial Troponin  -- ECHO in AM  --[RR1.2] lisinopril on hold given DINORA    Resp:  1. Acute hypoxic respiratory failure[AS1.1]: currently on bipap with[KF1.1] with large O2 requirement.[RR1.2]  High risk for intubation given severity of pneumonia and pleural effusion[KF1.1].  ARDS physiology though significant fluid/edema noted.     2. BIlateral infiltrates - ?pneumonia vs pneumonitis vs edema.[RR1.2]   3. L[KF1.1]arge left sided complex pleural effusion[AS1.1] with compressive atelecatsis[RR1.2]  Plan:  --[AS1.1] Thoracentesis after heparin drip has been off for approximately[KF1.1] >1[RR1.2] hours  -- Duonebs Q4hr[KF1.1]  -- BIPAP with intermittent hiflo- pt noted more of mouth breather may limit hiflo - hopefully will be able to decrease O2 requirement post procedure --  -- low threshold to intubate if no improvement with thoracentesis[RR1.2]       GI/Nutrition  1. Left lower quadrant pain on admission, unclear etiology[AS1.1]:  likely referred pain due to pneumonia  2. H[KF1.1]x of diverticulitis -- CT abdomen on admission was unremarkable   Plan:[AS1.1]  - continue to monitor  - NPO for now[RR1.2]     Renal  1. Acute on chronic kidney disease, CKD stage III. Baseline appears to be 1.53[AS1.1], elevated at 2.31 on admission, now improving. Likely due to decreased PO intake prior to admission in the setting of 10 days of abdominal pain. Reports decreased UOP prior to admission and ED provider reports dehydration.[KF1.2]  2. Non anion gap m[KF1.3]etabolic acidosis[AS1.1] : mild. Lactic acid 0.7[KF1.1], no obvious cause at this time.[KF1.3] Recent[RR1.2]  AB.28/43/88/20[AS1.1] on 100% - mixed metabolic and resp acidosis[RR1.2]   3. Mild h[KF1.2]yponatremia, resolved   Plan:  --[AS1.1] maintain hemodynamics,[RR1.2]   --[KF1.2] av[RR1.2]oid nephrotoxic medications, renally dose  -- Monitor electrolytes and replace per ICU protocols[KF1.2]  - serial ABGS[RR1.2]    ID[AS1.1]  1. Community acquired pneumonia: white count elevated at 16, procalcitonin 0.87. Has been afebrile. CT chest shows airspace disease bilaterally likely representing multifocal pneumonia[KF1.2] as well parapneumonic effusion.[RR1.2]    Plan:  -- started on ceftriaxone and azithromycin on admission[AS1.1], now changed to vancomycin and zosyn[KF1.1]  -- blood[AS1.1],[KF1.3] sputum[AS1.1] sent   -- plan for urgent thorac when off heparin for >1hr - see if can expand improve additional   - serial procalcitonins[RR1.2]    Endocrine  1. Stress Hyperglycemia  Plan:  --  Insulin gtt per protocol if indicated  -- Keep BG  <180 for optimal healing    Heme:  1. Acute blood loss anemia[AS1.1]: 12.0 on admission, decreased to 10.9. Likely dilutional[KF1.3]  2. Coagulopathy (heparin induced), reversed[AS1.1]: stopped  at approximately 1345[KF1.3]   Plan:  -- Monitor hemoglobin.  -- Transfuse to keep > 7.0      General cares:  DVT Prophylaxis:[AS1.1] Heparin SQ[RR1.2]  GI  Prophylaxis:[AS1.1] H2 Blocker[RR1.2]  Restraints: Restraints for medical healing needed:[AS1.1]No[RR1.2]  Family update by me today:[AS1.1] Yes[RR1.2]   Current lines are required for patient management  Access:[AS1.1]    Humberto Adrián Claudette[RR1.3]      Time Spent on this Encounter   Billing:  I spent[AS1.1] 6[RR1.2]5[AS1.2] minutes bedside[AS1.1], excluding procedures[RR1.2]  and on the inpatient unit today managing the critical care of Niko Mireles in relation to the issues listed in this note.     Code Status[AS1.1]   Full Code[KF1.1]      Chief Complaint   SOB, respiratory failure     History is obtained from the patient and electronic health record    History of Present Illness   Niko Mireles is a 52 year old male with PMHx of HTN[AS1.1], obesity[RR1.2] who presents on 8/22 with shortness or breath, concern for PE vs CAP. Per chart review, the patient has had a gradual increase in SOB, pleuritic chest pain and left lower quadrant abdominal pain[AS1.1] which he initially attributed to his diverticulitis however this moved more to his chest which prompted him to seek evaluation.[RR1.2] On admission, CXR showed diffuse infiltrates L >R. CT abdomen was unremarkable. Labs remarkable for DINORA and leukocytosis. CT scan was not ordered given the patients renal impairment. V/Q flores was indeterminate. Patient treated with abx and heparin gtt[AS1.1] empirically[RR1.2].[AS1.1]       His r[RR1.2]espiratory status continued to decline on the floor,[AS1.1] ABG with significant severe P/F ratio p[RR1.2]rompting initiation of BiPAP.[AS1.1] Pulmonary was consulted on the floor, however[RR1.2] CT chest with evidence of large L sided complex pleural effusion,[AS1.1] bilateral infiltrates[RR1.2]  prompting admission to the ICU.[AS1.1]      On arrival to ICU patient in mild distress but speaking in moderate to full sentences, browsing his phone. Bedside eval with US demonstrated limited windows of fluid on the left  but pocket available for tap in posterior field.[RR1.2]       Past Medical History    I have reviewed this patient's medical history and updated it with pertinent information if needed.[AS1.1]   Past Medical History:   Diagnosis Date     Hypertension      OCD (obsessive compulsive disorder)[AS1.3]        Past Surgical History   I have reviewed this patient's surgical history and updated it with pertinent information if needed.[AS1.1]  Past Surgical History:   Procedure Laterality Date     ORTHOPEDIC SURGERY[AS1.3]         Prior to Admission Medications   Prior to Admission Medications   Prescriptions Last Dose Informant Patient Reported? Taking?   Amitriptyline HCl (ELAVIL PO)  Self Yes Yes   Sig: Take 100 mg by mouth At Bedtime   BusPIRone HCl (BUSPAR PO)  Self Yes Yes   Sig: Take 30 mg by mouth 2 times daily Also see dinner dose   BusPIRone HCl (BUSPAR PO)  Self Yes Yes   Sig: Take 15 mg by mouth daily (with dinner)   ClonazePAM (KLONOPIN PO)  Self Yes Yes   Sig: Take 1.5 mg by mouth At Bedtime   FLUoxetine HCl (PROZAC PO)  Self Yes Yes   Sig: Take 20 mg by mouth 3 times daily Am, noon, hs   Gabapentin (NEURONTIN PO)  Self Yes Yes   Sig: Take 400 mg by mouth At Bedtime   LISINOPRIL PO  Self Yes Yes   Sig: Take 20 mg by mouth At Bedtime   Mirtazapine (REMERON PO)  Self Yes Yes   Sig: Take 15 mg by mouth At Bedtime   NAPROXEN PO  Self Yes Yes   Sig: Take 500 mg by mouth 2 times daily (with meals)   OMEPRAZOLE PO  Self Yes Yes   Sig: Take 20 mg by mouth every morning   Omega-3 Fatty Acids (OMEGA 3 PO)  Self Yes Yes   Sig: Take 1,000 mg by mouth 2 times daily   SILDENAFIL CITRATE PO 8/12/2017 Self Yes Yes   Sig: Take 20 mg by mouth once as needed   Tolterodine Tartrate (DETROL LA PO)  Self Yes Yes   Sig: Take 4 mg by mouth daily   VITAMIN D, CHOLECALCIFEROL, PO  Self Yes Yes   Sig: Take 1,000 Units by mouth daily   fluticasone (FLONASE) 50 MCG/ACT spray  Self Yes Yes   Sig: Spray 2 sprays into both nostrils daily       Facility-Administered Medications: None     Allergies   Allergies   Allergen Reactions     Compazine [Prochlorperazine]        Social History   I have reviewed this patient's social history and updated it with pertinent information if needed. Niko Mireles[AS1.1]  reports that he has never smoked. He has never used smokeless tobacco. He reports that he does not drink alcohol or use illicit drugs.[AS1.3]    Family History   I have reviewed this patient's family history and updated it with pertinent information if needed.[AS1.1]   No family history on file.[AS1.3]      Physical Exam   Temp: 97.9  F (36.6  C) Temp src: Oral Temp  Min: 97.9  F (36.6  C)  Max: 99  F (37.2  C) BP: 141/75   Heart Rate: 98 Resp: 22 SpO2: 97 % O2 Device: BiPAP/CPAP Oxygen Delivery: 15 LPM  Vital Signs with Ranges  Temp:  [97.9  F (36.6  C)-99  F (37.2  C)] 97.9  F (36.6  C)  Heart Rate:  [] 98  Resp:  [19-36] 22  BP: ()/(46-76) 141/75  FiO2 (%):  [100 %] 100 %  SpO2:  [86 %-97 %] 97 %  254 lbs 0 oz[AS1.1]    Constitutional: awake, alert, cooperative, mild resp distress, and appears stated age  Eyes: Lids and lashes normal, pupils equal, round and reactive to light, extra ocular muscles intact, sclera clear, conjunctiva normal  ENT: Normocephalic, without obvious abnormality, atraumatic, sinuses nontender on palpation, external ears without lesions, oral pharynx with moist mucous membranes, tonsils without erythema or exudates, gums normal and good dentition.  Hematologic / Lymphatic: no cervical lymphadenopathy  Respiratory: tachypneic, Mild respiratory distress, decreased air exchange and diminished breath sounds throughout lungs predominant based some coarse BS  Cardiovascular: Normal apical impulse, regular rate and rhythm, normal S1 and S2, no S3 or S4, and no murmur noted  GI: No scars, normal bowel sounds, soft, non-distended, non-tender, no masses palpated, no hepatosplenomegally  Skin: no bruising or  bleeding  Musculoskeletal: There is no redness, warmth, or swelling of the joints.  Full range of motion noted.  Motor strength is 5 out of 5 all extremities bilaterally.  Tone is normal.  Neurologic: Awake, alert, oriented to name, place and time.  Cranial nerves II-XII are grossly intact.  Motor is 5 out of 5 bilaterally.  Cerebellar finger to nose, heel to shin intact.  Sensory is intact.  Babinski down going, Romberg negative, and gait is normal.  Neuropsychiatric: General: fidgeting and normal eye contact[RR1.2]    Data[AS1.1]   ROUTINE ICU LABS (Last four results)  CMP[RR1.2]  Recent Labs  Lab 08/23/17  1609 08/23/17  0555 08/22/17  0025    136 132*   POTASSIUM 4.7 4.6 3.7   CHLORIDE 107 108 100   CO2 22 19* 21   ANIONGAP 9 9 11   * 114* 117*   BUN 23 27 40*   CR 1.42* 1.51* 2.31*   GFRESTIMATED 52* 49* 30*   GFRESTBLACK 63 59* 36*   BENOIT 8.5 8.2* 8.5   PROTTOTAL 6.8  --   --    ALBUMIN 2.3*  --   --    BILITOTAL 1.5*  --   --    ALKPHOS 104  --   --    AST 56*  --   --    ALT 54  --   --[RR1.4]      CBC[RR1.2]  Recent Labs  Lab 08/23/17  0555 08/22/17  0450 08/22/17  0025   WBC 15.9* 14.8* 15.8*   RBC 3.87* 3.61* 3.92*   HGB 11.5* 10.9* 12.0*   HCT 35.3* 32.6* 35.1*   MCV 91 90 90   MCH 29.7 30.2 30.6   MCHC 32.6 33.4 34.2   RDW 14.1 13.7 13.7    242 287[RR1.4]     INR[RR1.2]No lab results found in last 7 days.[RR1.4]  Arterial Blood Gas[RR1.2]  Recent Labs  Lab 08/23/17  1244 08/23/17  1050   PH 7.28* 7.27*   PCO2 43 43   PO2 88 61*   HCO3 20* 20*   O2PER 95  --      Recent Results (from the past 24 hour(s))   CT Chest Pulmonary Embolism w Contrast    Narrative    CT CHEST PULMONARY EMBOLISM WITH CONTRAST   8/23/2017 12:22 PM     HISTORY: Hypoxic respiratory failure with elevated D-dimer    TECHNIQUE: CT of the chest was performed following the administration  of 80 mL Isovue-370. Radiation dose for this scan was reduced using  automated exposure control, adjustment of the mA and/or kV  according  to patient size, or iterative reconstruction technique.    COMPARISON: None.    FINDINGS: No evidence for a pulmonary embolism.    Large multilobulated left pleural effusion. This may be partially  loculated. Associated adjacent compressive atelectasis/consolidation  of the left lower lobe.    Small right pleural effusion. Diffuse patchy and confluent airspace  opacities in the right upper lobe, right middle lobe, and to a lesser  extent in the right lower lobe.    Multiple lymph nodes measuring 1 cm or less in the mediastinum. Fluid  density within the superior anterior mediastinum.      Impression    IMPRESSION:  1. Airspace disease in the lungs could represent multifocal pneumonia.  2. Large partially loculated left pleural effusion with adjacent  compressive atelectasis/consolidation of the left lower lobe.  3. Nonspecific fluid density within the anterior superior mediastinum.  4. No pulmonary embolism.    JOHN BRISENO MD   XR Chest Port 1 View    Narrative    XR CHEST PORT 1 VW  8/23/2017 4:08 PM     HISTORY:  recent thoracentesis, concern for pneumo    COMPARISON: CT dated 8/23    FINDINGS:  Left pleural effusion is seen. No pneumothorax is  identified. The left pleural effusion has increased since the film  from 8/22. Increase infiltrate is seen in the left lung. There is also  been increased infiltrate in the right lung. The infiltrates could be  due to pulmonary edema. Pneumonitis could also have this appearance.      Impression    IMPRESSION:  1. No pneumothorax identified.  2. Increasing bilateral infiltrates. This could be due to pulmonary  edema. It could also be due to pneumonitis.  3. Left pleural effusion also appears to have increased.[RR1.4]       EKG LBBB[RR1.2]        Revision History        User Key Date/Time User Provider Type Action    > RR1.4 8/23/2017  5:12 PM Humberto Wilkins MD Physician Sign     RR1.3 8/23/2017  5:04 PM Humberto Wilkins MD Physician       RR1.1 8/23/2017  4:38 PM Humberto Wilkins MD Physician      RR1.2 8/23/2017  4:24 PM Humberto Wilkins MD Physician      AS1.2 8/23/2017  4:17 PM Emy Rebolledo PA-C Physician Assistant - C Share     [N/A] 8/23/2017  4:05 PM HaynesJimZelda L Medical Student Share     KF1.2 8/23/2017  3:50 PM HaynesJimZelda L Medical Student Share     KF1.3 8/23/2017  3:46 PM Jim Haynesadela HOUGH Medical Student      [N/A] 8/23/2017  2:46 PM Zelda Hanyes Medical Student Share     AS1.3 8/23/2017  2:33 PM Emy Rebolledo PA-C Physician Assistant - C Share     KF1.1 8/23/2017  2:33 PM Zelda Haynes Medical Student      AS1.1 8/23/2017  1:31 PM Emy Rebolledo PA-C Physician Assistant - C             Consults by Андрей Rosado MD at 8/23/2017 10:49 AM     Author:  Андрей Rosado MD Service:  Pulmonology Author Type:  Physician    Filed:  8/23/2017 10:50 AM Date of Service:  8/23/2017 10:49 AM Creation Time:  8/23/2017 10:32 AM    Status:  Addendum :  Андрей Rosado MD (Physician)     Consult Orders:    1. Pulmonology IP Consult: Patient to be seen: Routine - within 24 hours; resp failure.; Consultant may enter orders: Yes [[id number]] ordered by Ricco Lopez MD at 08/23/17 0729                  Pulmonary Medicine Consultation        Date of Admission: 8/22/2017  Primary Attending:  Marcos Esquivel MD  Consulting Physician: Андрей Amado MD      History:      Niko is a pleasant 52-year-old male patient with a medical history of diverticulitis, high blood pressure, obesity and nephrolithiasis.  This patient has had pain for about the past week.  He says it started in his left lower quadrant, felt like his prior bouts of diverticulitis.  He did not seek treatment for this.  Over the past few days, it has changed location from his left lower quadrant to his left lower chest.  He says that the pain increases significantly when he tries to take deep  breaths.    He has had no productive cough with this.  He has had increased shortness of breath for some time.  He knows that his urine looks darker in color.  He is denying any diarrhea, nausea or vomiting.  He has not noted any fever.    Chest x-ray which showed left lower lobe opacity.  D-dimer was drawn and this was also positive at about 0.7.  The patient denies any sick contacts.  He denies any long periods of travel or inactivity.   V/Q scan revealed large matched defect in the region of the posterior segment left upper lobe and superior segment left lower lobe is noted and corresponds to an infiltrate on chest x-ray performed  earlier today. Findings would indicate a triple match and is consistent with an intermediate probability for pulmonary embolism.  Was also found to be hypoxic    Review of system:   ROS is negative except for items mentioned above and in HPI.       Prior medical history:  Past Medical History:   Diagnosis Date     Hypertension      OCD (obsessive compulsive disorder)        Past Surgical History:   Procedure Laterality Date     ORTHOPEDIC SURGERY         Patient Active Problem List   Diagnosis     Community acquired bacterial pneumonia       Social History     Social History     Social History     Marital status:      Spouse name: N/A     Number of children: N/A     Years of education: N/A     Occupational History     Not on file.     Social History Main Topics     Smoking status: Never Smoker     Smokeless tobacco: Never Used     Alcohol use No     Drug use: No     Sexual activity: Not on file     Other Topics Concern     Not on file     Social History Narrative     No narrative on file         Family History  No family history on file.        Medications  No current outpatient prescriptions on file.     Current Facility-Administered Medications Ordered in Epic   Medication Dose Route Frequency Last Rate Last Dose     albuterol neb solution 2.5 mg  2.5 mg Nebulization Q6H PRN          ipratropium - albuterol 0.5 mg/2.5 mg/3 mL (DUONEB) neb solution 3 mL  3 mL Nebulization 4x daily         LORazepam (ATIVAN) injection 0.5-1 mg  0.5-1 mg Intravenous Q4H PRN         heparin infusion 25,000 units in 0.45% NaCl 250 mL  1,700 Units/hr Intravenous Continuous 17 mL/hr at 08/23/17 0645 1,700 Units/hr at 08/23/17 0645     naloxone (NARCAN) injection 0.1-0.4 mg  0.1-0.4 mg Intravenous Q2 Min PRN         0.9% sodium chloride infusion   Intravenous Continuous 100 mL/hr at 08/23/17 0819       cefTRIAXone (ROCEPHIN) 2 g vial to attach to  ml bag for ADULTS or NS 50 ml bag for PEDS  2 g Intravenous Q24H 200 mL/hr at 08/23/17 0409 2 g at 08/23/17 0409     azithromycin (ZITHROMAX) tablet 250 mg  250 mg Oral Q24H   250 mg at 08/23/17 0909     Patient is already receiving anticoagulation with heparin, enoxaparin (LOVENOX), warfarin (COUMADIN)  or other anticoagulant medication   Does not apply Continuous PRN         acetaminophen (TYLENOL) tablet 650 mg  650 mg Oral Q4H PRN         acetaminophen (TYLENOL) Suppository 650 mg  650 mg Rectal Q4H PRN         HYDROcodone-acetaminophen (NORCO) 5-325 MG per tablet 1-2 tablet  1-2 tablet Oral Q4H PRN   2 tablet at 08/23/17 0923     HYDROmorphone (PF) (DILAUDID) injection 0.2 mg  0.2 mg Intravenous Q2H PRN         senna-docusate (SENOKOT-S;PERICOLACE) 8.6-50 MG per tablet 1-2 tablet  1-2 tablet Oral BID PRN         bisacodyl (DULCOLAX) Suppository 10 mg  10 mg Rectal Daily PRN         ondansetron (ZOFRAN-ODT) ODT tab 4 mg  4 mg Oral Q6H PRN        Or     ondansetron (ZOFRAN) injection 4 mg  4 mg Intravenous Q6H PRN         busPIRone (BUSPAR) tablet 30 mg  30 mg Oral BID   30 mg at 08/23/17 0909     busPIRone (BUSPAR) tablet 15 mg  15 mg Oral Daily with supper   15 mg at 08/22/17 1953     clonazePAM (klonoPIN) tablet 1.5 mg  1.5 mg Oral At Bedtime   1.5 mg at 08/22/17 2130     fluticasone (FLONASE) 50 MCG/ACT spray 2 spray  2 spray Both Nostrils Daily   2 spray at  17     mirtazapine (REMERON) tablet TABS 15 mg  15 mg Oral At Bedtime   15 mg at 17     No current Epic-ordered outpatient prescriptions on file.       Allergies   Allergen Reactions     Compazine [Prochlorperazine]                Physical Examination:   Vitals:    17 0215 17 0654 17 0722 17 1015   BP:   127/76    BP Location:   Right arm    Pulse:       Resp: 26  28 (!) 36   Temp:   98.2  F (36.8  C)    TempSrc:   Oral    SpO2: 92%  92%    Weight:  115.2 kg (254 lb)     Height:         Body mass index is 33.51 kg/(m^2).  Temp (24hrs), Av.1  F (36.7  C), Min:97.2  F (36.2  C), Max:99  F (37.2  C)        Constitutional:  Wearing mask mild respiratory distress.  HENT:  mucous membranes moist.  Eyes: PERRLA, no icterus, no pallor.   Neck: No lymphadenopathy or thyromegaly, trachea midline, no carotid bruits.  Cardiovascular: tachycardic, no murmurs, rubs or gallops, no peripheral edema.  Respiratory/Chest:Bronchyal breath sounds on left base diffuse wheezing, shallow respirations no crackles.  Gastrointestinal: Abdomen was soft, non-tender, non-distended, no masses felt, no hepatosplenomegaly.  Musculoskeletal: No clubbing or cyanosis, full range of motion in all extremities.  Neurological: No focal motor or sensory deficits. DTR's are 2+ and symmetric.  Skin: No skin rash, hives, petechiae, or breakdown.        CMP  Recent Labs  Lab 17  0555 17  0025    132*   POTASSIUM 4.6 3.7   CHLORIDE 108 100   CO2 19* 21   ANIONGAP 9 11   * 117*   BUN 27 40*   CR 1.51* 2.31*   GFRESTIMATED 49* 30*   GFRESTBLACK 59* 36*   BENOIT 8.2* 8.5     CBC  Recent Labs  Lab 17  0555 17  0450 17  0025   WBC 15.9* 14.8* 15.8*   RBC 3.87* 3.61* 3.92*   HGB 11.5* 10.9* 12.0*   HCT 35.3* 32.6* 35.1*   MCV 91 90 90   MCH 29.7 30.2 30.6   MCHC 32.6 33.4 34.2   RDW 14.1 13.7 13.7    242 287     INRNo lab results found in last 7 days.  Arterial Blood  GasNo lab results found in last 7 days.    Recent Labs  Lab 08/22/17  0308 08/22/17  0305   CULT No growth after 19 hours No growth after 19 hours       Diagnostic Studies:  Radiology:    CT ABDOMEN PELVIS W/O CONTRAST  8/22/2017 1:49 AM      INDICATION: Ten days history of left lower quadrant abdominal pain,  now pleuritic chest pain and abdominal pain, renal insufficiency,  evaluate diverticulitis with perforation.       TECHNIQUE: Thin axial images through the abdomen and pelvis without  contrast. Coronal reformatted images. Radiation dose for this scan was  reduced using automated exposure control, adjustment of the mA and/or  kV according to patient size, or iterative reconstruction technique.     COMPARISON: None.     FINDINGS: No urinary stones or hydronephrosis. Liver, gallbladder,  spleen, pancreas, adrenal glands and kidneys demonstrate no worrisome  findings. Small cyst upper pole right kidney.     Pelvic structures within normal limits. No bowel obstruction or  ascites. Moderate stool in the colon. No diverticulitis or free  intraperitoneal air. Small left pleural effusion is partially  loculated with patchy associated infiltrate or atelectasis at the  right base.         IMPRESSION:  1. No urinary stones or hydronephrosis. No other acute findings within  the abdomen or pelvis.  2. Small partially loculated left pleural effusion with patchy  air-space disease at the left base. Findings could be due to  pneumonia. Other etiologies such as pulmonary embolism should be  considered. Correlate clinically.         XR CHEST 2 VW  8/22/2017 2:09 AM      INDICATION: Chest pain.     COMPARISON: CT 8/22/2017.         IMPRESSION: Shallow inspiration. Moderate patchy air-space disease in the left lower lung and a small amount of pleural fluid at the left base. Findings could be due to pneumonia. Correlate clinically. Heart  size exaggerated by shallow inspiration is likely normal.         Assessment:     52-year-old  male patient with a medical history of diverticulitis, high blood pressure, obesity and nephrolithiasis.  This patient has had pain for about the past week.  He says it started in his left lower quadrant, felt like his prior bouts of diverticulitis.  He did not seek treatment for this.  Over the past few days, it has changed location from his left lower quadrant to his left lower chest.  He says that the pain increases significantly when he tries to take deep breaths.    He has had no productive cough with this.  He has had increased shortness of breath for some time.  He knows that his urine looks darker in color.  He is denying any diarrhea, nausea or vomiting.  He has not noted any fever.    Chest x-ray which showed left lower lobe opacity.  D-dimer was drawn and this was also positive at about 0.7.  The patient denies any sick contacts.  He denies any long periods of travel or inactivity.   V/Q scan revealed large matched defect in the region of the posterior segment left upper lobe and superior segment left lower lobe is noted and corresponds to an infiltrate on chest x-ray performed  earlier today. Findings would indicate a triple match and is consistent with an intermediate probability for pulmonary embolism.  Was also found to be hypoxic        Pulmonary Diagnoses: Abnl CT/CXR R91.8, GUERRERO R06.09, Hpoxemia R09.02, Pnemonia unspec J18.9 and Pulm embolism I26.99    Recommendations        Started Duonebs every four hours as needed   Supplemental oxygen target pulse oxymetry >89%, wean as able  Continue Abx complete 7-10 days , if able obtain sputum culture  Continue IV heparin with coumadin bridge if PE confirmed on CT   May transition to oral on discharge  Encourage incentive spirometer Q4H  Will order 2D echo to assess for evidence of RV strain  The natural history, consequences, physiology, and treatment options for Pulmonary  discussed in detail.  Extensively counseled on smoking cessation  Physical activity  as tolerated  Will need outpatient PFTs  Arrange pulmonary follow up [phone]  Please call if any questions            Андрей Rosado M.D.  Pulmonary, Critical Care and Sleep Medicine  [facility]   Pager: [phone]  Office:[phone]    [KG1.1]           Revision History        User Key Date/Time User Provider Type Action    > [N/A] 8/23/2017 10:50 AM Андрей Rosado MD Physician Addend     KG1.1 8/23/2017 10:49 AM Андрей Rosado MD Physician Sign                     Progress Notes - Physician (Notes from 09/15/17 through 09/18/17)      Progress Notes by Anderson Stubbs at 9/18/2017  3:30 PM     Author:  Anderson Stubbs Service:  Social Work Author Type:      Filed:  9/18/2017  3:34 PM Date of Service:  9/18/2017  3:30 PM Creation Time:  9/18/2017  3:30 PM    Status:  Signed :  Anderson Stubbs ()         PAS-RR    D: Per DHS regulation, SW completed and submitted PAS-RR to [place] [employer] Direct Connect via the Senior LinkAge Line.  PAS-RR confirmation # is : [id number]    I: SW spoke with patient and they are aware a PAS-RR has been submitted.  SW reviewed with patient that they may be contacted for a follow up appointment within 10 days of hospital discharge if their SNF stay is < 30 days.  Contact information for [facility] LinkAge Line was also provided.    A: Patient verbalized understanding.    P: Further questions may be directed to [facility] LinkAge Line at #[phone], option #4 for PAS-RR staff.    Patient was declined by [facility].   Lou at [facility] has accepted patient for today. Orders and the PAS were faxed. Spoke with patient about transport and he states his spouse can provide this. Spoke with spouse, Lula who will be here at 1630 to transport patient.  D/C to [facility].[RH1.1]       Revision History        User Key Date/Time User Provider Type Action    > RH1.1 9/18/2017  3:34 PM Enoc  Anderson  Sign            Progress Notes by Chino Angel DO at 9/18/2017  8:42 AM     Author:  Chino Angel DO Service:  Hospitalist Author Type:  Physician    Filed:  9/18/2017 12:42 PM Date of Service:  9/18/2017  8:42 AM Creation Time:  9/18/2017  8:42 AM    Status:  Signed :  Chino Angel DO (Physician)         [facility]  Hospitalist Progress Note        Chino Angel DO  09/18/2017        Interval History:[ZA1.1]      Patient states that he is doing well. Tolerating diet, however, reports bleeding from penis today. Discussed urology evaluation with patient and nursing staff.[ZA1.2]          Assessment and Plan:        Mr Niko Mireles is a 52 year old male with a past medical history of hypertension, depression, obsessive-compulsive disorder and diverticulitis.  He presented with left lower chest pain and shortness of breath.  He was found to have a loculated pleural effusion with multifocal pneumonia, developed hypoxic respiratory distress and eventually developed acute respiratory distress syndrome, required intubation and pronging on 08/23, status post thoracentesis on 08/23 and bronchoscopy on 08/24.  He was extubated on 09/02, weaned from BiPAP now stable on RA.  He has completed a course of vancomycin, Zosyn and azithromycin for his infection.  He is still suffering from numerous metabolic issues, encephalopathy and new fever but has been transferred to the hospitalist service for ongoing management on 9/5.       S/p Acute respiratory distress syndrome and hypoxic respiratory failure due to Loculated left-sided pleural effusion with multifocal pneumonia with subsequent: This patient was initially seen and thought to have either PE versus pneumonia and was started on heparin drip and ceftriaxone with azithromycin. CT chest w/c showed loculated pleural effusion with multifocal pneumonia. His respiratory status deteriorated rapidly  and he developed hypoxic respiratory failure and then ARDS. Transferred to ICU & intubated on 08/23, spent a significant time on vent, up until 09/02, spent 2 days on BiPAP,  weaned down to 4 liters nasal cannula, now stable on RA . Completed a course of vancomycin, Zosyn and azithromycin 9/1. Transferred out of ICU on 9/5.[ZA1.1]  - Monitor.   - CXR  - Procalcitonin.[ZA1.2]     [ZA1.1]  Hematuria: Patient with bleeding reported on 9/18.   - Urology consulted.[ZA1.2]     Suspect new RLL HCAP vs aspiration[ZA1.1]:[ZA1.2] CT abdomen completed 9/5 for ongoing fevers and elevated lipase shows a new RLL infiltrate compared to previous exam 8/22 with resolution of the previously seen LLL infiltrate. No abd source for fever noted.[ZA1.1]  -[ZA1.2] Completed Vaco (9/6-11)and Zosyn (9/6 -13)      Dysphagia/ increased risk for aspiration: SLP following, recommended reg diet 9/14[ZA1.1].[ZA1.2] Pt tolerating regular diet.[ZA1.1]   - Nutrition/SLP following.[ZA1.2]       Prolonged encephalopathy/catatonia[ZA1.1],[ZA1.2] Hx obsessive- compulsive disorder[ZA1.1],[ZA1.2] Improved initially related to his prolonged ICU stay/ delirium , as he was requiring paralytic agents to remain on vent and he was also critically ill. Later  due to catatonia[ZA1.1].[ZA1.2] CT head 9/5 showed ml atrophy,nl ammonia level 9/7[ZA1.1].   - Monitor.[ZA1.2]       Elevated LFTs & Lipase etiology likely non GI infection or possibly related to sepsis[ZA1.1].[ZA1.2] Abd US on 8/29 showed GB sludge with no stones or evidence of cholecystitis. CT abd 9/5 shows a normal GB and no inflamation around the pancrease.   [ZA1.1]  - Resolved.[ZA1.2]       Hypernatremia resolved[ZA1.1]  - Monitor.[ZA1.2]       Acute renal insufficiency due to sepsis, cr on 2.31 on 8/22  -[ZA1.1] R[ZA1.2]esolved      Acute right Gastrocnemius thrombus[ZA1.1] ([ZA1.2]9/[ZA1.1]2)[ZA1.2]Acute DVT right peroneal[ZA1.1] vein ([ZA1.2]9/5[ZA1.1])[ZA1.2] Found on RLE LE US completed for edema  "and fever . Suspect this is a result  from prolonged ICU stay, but had not required anticoagulation to this point.  The intensivist put in orders for Do[place] of the area to assess for any change in the clot  and noted with a new occlusive DVT in the right peroneal vein.  - Initiated on therapeutic Lovenox 1.5 mg/kg daily .   - Continue Lovenox --if po intake improves further, TF done,  would change to NOAC[ZA1.1]  -[ZA1.2] Will need at least 3 months of therapy on discharge.        DVT Prophylaxis: [ZA1.1]Eliquis.[ZA1.2]     Code: Full Code       Disposition:[ZA1.1] Plan for urology evaluation today, possible discharge to TCU today or tomorrow pending course.[ZA1.2]                    Physical Exam:      Heart Rate: 81    Blood pressure 119/80, pulse 96, temperature 98.8  F (37.1  C), temperature source Oral, resp. rate 16, height 1.854 m (6' 0.99\"), weight 91.1 kg (200 lb 13.4 oz), SpO2 93 %.    Vitals:    09/15/17 0700 17 0639 17 0654   Weight: 92.2 kg (203 lb 4.2 oz) 91.2 kg (201 lb 1 oz) 91.1 kg (200 lb 13.4 oz)       Vital Sign Ranges  Temperature Temp  Av.6  F (37  C)  Min: 98.3  F (36.8  C)  Max: 98.8  F (37.1  C)   Blood pressure Systolic (24hrs), Av , Min:113 , Max:140        Diastolic (24hrs), Av, Min:77, Max:86      Pulse No Data Recorded   Respirations Resp  Av.3  Min: 16  Max: 18   Pulse oximetry SpO2  Av.3 %  Min: 93 %  Max: 97 %     Vital Signs with Ranges  Temp:  [98.3  F (36.8  C)-98.8  F (37.1  C)] 98.8  F (37.1  C)  Heart Rate:  [81-97] 81  Resp:  [16-18] 16  BP: (113-140)/(77-86) 119/80  SpO2:  [93 %-97 %] 93 %    I/O Last 3 Shifts:   I/O last 3 completed shifts:  In: 2235 [P.O.:1540; NG/GT:695]  Out: 750 [Urine:750]    I/O past 24 hours:     Intake/Output Summary (Last 24 hours) at 17 0842  Last data filed at 17 0758   Gross per 24 hour   Intake             2235 ml   Output              950 ml   Net             1285 ml     GENERAL: Alert " and oriented. NAD. Conversational, appropriate.   HEENT: Normocephalic. EOMI. No icterus or injection. Nares normal.[ZA1.1] NG in place.[ZA1.2]   LUNGS: Clear to auscultation. No dyspnea at rest.   HEART: Regular rate. Extremities perfused.   ABDOMEN: Soft, nontender, and nondistended. Positive bowel sounds.   EXTREMITIES: No LE edema noted.   NEUROLOGIC: Moves extremities x4. No acute focal neurologic abnormalities noted.          Prior to Admission Medications:        Prescriptions Prior to Admission   Medication Sig Dispense Refill Last Dose     fluticasone (FLONASE) 50 MCG/ACT spray Spray 2 sprays into both nostrils daily        Gabapentin (NEURONTIN PO) Take 400 mg by mouth At Bedtime        LISINOPRIL PO Take 20 mg by mouth At Bedtime        NAPROXEN PO Take 500 mg by mouth 2 times daily (with meals)        OMEPRAZOLE PO Take 20 mg by mouth every morning        SILDENAFIL CITRATE PO Take 20 mg by mouth once as needed   8/12/2017     Tolterodine Tartrate (DETROL LA PO) Take 4 mg by mouth daily        FLUoxetine HCl (PROZAC PO) Take 20 mg by mouth 3 times daily Am, noon, hs        BusPIRone HCl (BUSPAR PO) Take 30 mg by mouth 2 times daily Also see dinner dose        BusPIRone HCl (BUSPAR PO) Take 15 mg by mouth daily (with dinner)        ClonazePAM (KLONOPIN PO) Take 1.5 mg by mouth At Bedtime        Mirtazapine (REMERON PO) Take 15 mg by mouth At Bedtime        VITAMIN D, CHOLECALCIFEROL, PO Take 1,000 Units by mouth daily        Omega-3 Fatty Acids (OMEGA 3 PO) Take 1,000 mg by mouth 2 times daily        Amitriptyline HCl (ELAVIL PO) Take 100 mg by mouth At Bedtime               Medications:        Current Facility-Administered Medications   Medication Last Rate     - MEDICATION INSTRUCTIONS -       Current Facility-Administered Medications   Medication Dose Route Frequency     OLANZapine  2.5 mg Oral At Bedtime     FLUoxetine  20 mg Oral Daily     pantoprazole  40 mg Oral QAM     busPIRone  30 mg Per NG tube  BID     mirtazapine  15 mg Per NG tube At Bedtime     clonazePAM  0.5 mg Per NG tube BID     metoprolol  50 mg Per Feeding Tube BID     enoxaparin  1.5 mg/kg Subcutaneous Q24H     amLODIPine  10 mg Per Feeding Tube Daily     sodium chloride (PF)  10 mL Intracatheter Q7 Days     Current Facility-Administered Medications   Medication Dose Route Frequency     QUEtiapine  25 mg Oral Q6H PRN     ipratropium - albuterol 0.5 mg/2.5 mg/3 mL  3 mL Nebulization Q2H PRN     HOLD MEDICATION   Does not apply HOLD     lidocaine (buffered or not buffered)  0.5-5 mL Other Once PRN     sodium chloride (PF)  5-50 mL Intracatheter Once PRN     sodium chloride (PF)  10-20 mL Intracatheter Q1H PRN     acetaminophen  650 mg Oral or Feeding Tube Q4H PRN     labetalol  10-20 mg Intravenous Q6H PRN     potassium chloride  20-40 mEq Oral Q2H PRN     potassium chloride  20-40 mEq Oral or Feeding Tube Q2H PRN     potassium chloride  10 mEq Intravenous Q1H PRN     potassium chloride with lidocaine  10 mEq Intravenous Q1H PRN     potassium chloride  20 mEq Intravenous Q1H PRN     miconazole   Topical Q1H PRN     naloxone  0.1-0.4 mg Intravenous Q2 Min PRN     hypromellose-dextran  1 drop Both Eyes Q1H PRN     lidocaine (buffered or not buffered)  1 mL Other Q1H PRN     lidocaine 4%   Topical Q1H PRN     - MEDICATION INSTRUCTIONS -   Does not apply Continuous PRN     acetaminophen  650 mg Oral Q4H PRN     senna-docusate  1-2 tablet Oral BID PRN     bisacodyl  10 mg Rectal Daily PRN     ondansetron  4 mg Intravenous Q6H PRN            Data:      Lab data reviewed.     Recent Labs  Lab 09/17/17  0805 09/16/17  0740 09/15/17  0645 09/14/17  0910     --   --  382    136 141 138   POTASSIUM 3.9 3.6 4.2 4.2   CHLORIDE 105 103 107 106   CO2 22 23 24 24   BUN 20 23 20 22   CR 1.06 1.06 1.04 1.05   ANIONGAP 11 10 10 8   BENOIT 9.6 9.4 9.0 8.9   * 120* 94 117*           Imaging:      Imaging data reviewed.     Dr. Chino Angel,  REJI  [facility]  Pager [phone][ZA1.1]       Revision History        User Key Date/Time User Provider Type Action    > ZA1.2 9/18/2017 12:42 PM Chino Angel DO Physician Sign     ZA1.1 9/18/2017  8:42 AM Chino Angel,  Physician             Progress Notes by Amalia Hilario RD, LD at 9/18/2017  8:44 AM     Author:  Amalia Hilario RD, LD Service:  Nutrition Author Type:  Registered Dietitian    Filed:  9/18/2017  8:54 AM Date of Service:  9/18/2017  8:44 AM Creation Time:  9/18/2017  8:44 AM    Status:  Signed :  Amalia Hilario RD, LD (Registered Dietitian)         CLINICAL NUTRITION SERVICES - REASSESSMENT NOTE      RECOMMENDATIONS FOR MD/PROVIDER TO ORDER:   Recommend d/c noc TF, intake is improving daily.       EVALUATION OF PROGRESS TOWARD GOALS   Diet:  Regular. Sending Plus2 shakes with meals.  Nutrition Support: Noc feeding - Isosource 1.5 at 65 mL/hr x 12 hr = 1170 kcals (13 kcal/kg), 53 gm pro (0.6 gm/kg), 600 mL H20, 11 gm fiber    Oral intake has improved daily. Per calorie count for 9/17, pt ate 2800 tahmina, 117 gm pro.  Average daily oral intake past 3 days: 1400 tahmina, 61 gm pro.      ASSESSED NUTRITION NEEDS Dosing Weight:  87.7 kg (9/4 wt):    Estimated Energy Needs:  0201-3083 kcals (25-30 Kcal/Kg) - Maintenance  Estimated Protein Needs:  105-132 grams protein (1.2-1.5 g pro/Kg) - hypercatabolism with critical illness and CKD      Previous Goals:   Isosource 1.5 @ goal of 65 mL/hr will continue to meet assessed needs  Evaluation: Changed to noc feeding on 9/15    Previous Nutrition Diagnosis:   None identified      CURRENT NUTRITION DIAGNOSIS  No nutrition diagnosis identified at this time    INTERVENTIONS  Recommendations / Nutrition Prescription  Continue regular diet and supplements, recommend d/c TF.    Implementation  Collaboration and Referral of Nutrition care - discussed my recommendations with JIMENEZ Benton.    Goals  Pt will continue to meet at  least 2/3 needs orally      MONITORING AND EVALUATION:  Progress towards goals will be monitored and evaluated per protocol and Practice Guidelines    Amalia Hilario RD  Pager [phone] (M-F)            [phone] (W/E & Hol)[CM1.1]             Revision History        User Key Date/Time User Provider Type Action    > CM1.1 9/18/2017  8:54 AM Amalia Hilario, PATTIE, LD Registered Dietitian Sign            Progress Notes by Harmony Joya LICSW at 9/17/2017 12:30 PM     Author:  Harmony Joya LICSW Service:  (none) Author Type:      Filed:  9/17/2017  4:03 PM Date of Service:  9/17/2017 12:30 PM Creation Time:  9/17/2017 12:30 PM    Status:  Addendum :  Harmony Joya LICSW ()         SW  D:  Per care coordinator, if patient eats adequately at lunch, MD will discharge patient to TCU.  I:  Writer updated [facility] and spoke with Lou.    Their DON would like Lou to complete an on site assessment tomorrow morning before accepting patient.  This is because of the episode which occurred on the morning of 9/16.    Discussed with care coordinator. Writer will another facility to assess patient for admission today.  [facility] ([facility]) has a male vacancy today and will assess.  P:;  Will await assessment by [facility][KH1.1]    Upate:  [facility] wants to see how patient does overnight before making a decision.  So on Monday both [facility] and [facility] will re-assess for admission.[KH1.2]     Revision History        User Key Date/Time User Provider Type Action    > KH1.2 9/17/2017  4:03 PM Harmony Joya LICSW  Addend     KH1.1 9/17/2017 12:34 PM Harmony Joya LICSW  Sign            Progress Notes by Stuart Ramon MD at 9/17/2017  2:37 PM     Author:  Stuart Ramon MD Service:  Hospitalist Author Type:  Physician    Filed:  9/17/2017  2:46 PM Date of Service:  9/17/2017  2:37 PM  Creation Time:  9/17/2017  2:37 PM    Status:  Signed :  Stuart Ramon MD (Physician)         [facility]  Hospitalist Progress Note  Date of service (when I saw the patient) 09/15/2017:         Assessment and Plan:    Mr Niko Mireles is a 52 year old male with a past medical history of hypertension, depression, obsessive-compulsive disorder and diverticulitis.  He presented with left lower chest pain and shortness of breath.  He was found to have a loculated pleural effusion with multifocal pneumonia, developed hypoxic respiratory distress and eventually developed acute respiratory distress syndrome, required intubation and pronging on 08/23, status post thoracentesis on 08/23 and bronchoscopy on 08/24.  He was extubated on 09/02, weaned from BiPAP now stable on RA.  He has completed a course of vancomycin, Zosyn and azithromycin for his infection.  He is still suffering from numerous metabolic issues, encephalopathy and new fever but has been transferred to the hospitalist service for ongoing management on 9/5.       S/p Acute respiratory distress syndrome and hypoxic respiratory failure due to  Loculated left-sided pleural effusion with multifocal pneumonia with subsequent:  -This patient was initially seen and thought to have either PE versus pneumonia and was started on heparin drip and ceftriaxone with azithromycin. CT chest w/c showed loculated pleural effusion with multifocal pneumonia. His respiratory status deteriorated rapidly and he developed hypoxic respiratory failure and then ARDS. Transferred to ICU & intubated on 08/23, spent a significant time on vent, up until 09/02, spent 2 days on BiPAP,  weaned down to 4 liters nasal cannula, now stable on RA . Completed a course of vancomycin, Zosyn and azithromycin 9/1.   Transferred out of ICU on 9/5.  -stable since.      Suspect new RLL HCAP vs aspiration - 9/6   CT abdomen completed 9/5 for ongoing fevers and elevated lipase shows  a new RLL infiltrate compared to previous exam 8/22 with resolution of the previously seen LLL infiltrate. No abd source for fever noted..- wbc remains nl and pt afebrile  .Completed Vaco (9/6-11)and Zosyn (9/6 -13)       Dysphagia/ increased risk for aspiration:   SLP following,no aspiration, recommended reg diet 9/14   Pt tolerating regular diet. Ate small amount of meals TID yesterday. On nocturnal TF now.  -d/c nocturnal TF in a day or so. Nutrition /dietary following pt.      Prolonged encephalopathy/catatonia:  Hx obsessive- compulsive disorder:   Improved   -initially related to his prolonged ICU stay/ delirium , as he was requiring paralytic agents to remain on vent and he was also critically ill. Later  due to catatonia    - CT head 9/5 showed ml atrophy,nl ammonia level 9/7, unable to do MRI may not stay still   Has been stable last several days. Back on all psych meds as at home.   -continue current meds.         Elevated LFTs & Lipase  - etiology likely non GI infection or possibly related to sepsis  - Abd US on 8/29 showed GB sludge with no stones or evidence of cholecystitis. CT abd 9/5 shows a normal GB and no inflamation around the pancrease.     - iresolved      Hypernatremia  - resolved        Acute renal insufficiency  - duet to sepsis, cr on 2.31 on 8/22  - resolved     Acute right Gastrocnemius thrombus - 9/2  Acute DVT right peroneal vein - 9/5  Found on RLE LE US completed for edema and fever 9/2. Suspect this is a result  from prolonged ICU stay, but had not required anticoagulation to this point.  The intensivist put in orders for [place] to assess for any change in the clot 9/5 and noted with a new occlusive DVT in the right peroneal vein.  - Initiated on therapeutic Lovenox 1.5 mg/kg daily 9/5.   - Continue Lovenox --if po intake improves further, TF done,  would change to NOAC- - Will need at least 3 months of therapy on discharge.        GI prophylaxis:   "protonix  Restraints: none   DVT Prophylaxis: Enoxaparin (Lovenox) SQ  Code Status: Full Code       Disposition: waiting for placement toTCU                 Interval History:   Doing ok. Eating better. No new complaints              Medications:       OLANZapine  2.5 mg Oral At Bedtime     FLUoxetine  20 mg Oral Daily     pantoprazole  40 mg Oral QAM     busPIRone  30 mg Per NG tube BID     mirtazapine  15 mg Per NG tube At Bedtime     clonazePAM  0.5 mg Per NG tube BID     metoprolol  50 mg Per Feeding Tube BID     enoxaparin  1.5 mg/kg Subcutaneous Q24H     amLODIPine  10 mg Per Feeding Tube Daily     sodium chloride (PF)  10 mL Intracatheter Q7 Days     QUEtiapine, ipratropium - albuterol 0.5 mg/2.5 mg/3 mL, HOLD MEDICATION, lidocaine (buffered or not buffered), sodium chloride (PF), sodium chloride (PF), acetaminophen, labetalol, potassium chloride, potassium chloride, potassium chloride, potassium chloride with lidocaine, potassium chloride, miconazole, naloxone, hypromellose-dextran, lidocaine (buffered or not buffered), lidocaine 4%, - MEDICATION INSTRUCTIONS -, acetaminophen, senna-docusate, bisacodyl, [DISCONTINUED] ondansetron **OR** ondansetron               Physical Exam:   Blood pressure 113/77, pulse 96, temperature 98.4  F (36.9  C), temperature source Oral, resp. rate 18, height 1.854 m (6' 0.99\"), weight 91.2 kg (201 lb 1 oz), SpO2 95 %.  Wt Readings from Last 4 Encounters:   17 91.2 kg (201 lb 1 oz)         Vital Sign Ranges  Temperature Temp  Av.7  F (37.1  C)  Min: 98.1  F (36.7  C)  Max: 99.3  F (37.4  C)   Blood pressure Systolic (24hrs), Av , Min:104 , Max:129        Diastolic (24hrs), Av, Min:62, Max:77      Pulse Pulse  Av  Min: 78  Max: 78   Respirations Resp  Av  Min: 16  Max: 18   Pulse oximetry SpO2  Av.3 %  Min: 94 %  Max: 97 %         Intake/Output Summary (Last 24 hours) at 09/15/17 1255  Last data filed at 09/15/17 1246   Gross per 24 hour   Intake  "             880 ml   Output              150 ml   Net              730 ml     Nasal feeding tube in place with Tube feeds running  Constitutional: Sleeping in the middle of the day, seems sluggish but wakes up when called,  cooperative, no apparent distress   Lungs: Clear to auscultation bilaterally, no crackles or wheezing   Cardiovascular: Regular rate and rhythm, normal S1 and S2, and no murmur noted   Abdomen: Distended, soft,normal bowel sounds,non-tender   Skin: No rashes, no cyanosis, no edema   Neuro:                Data:   All laboratory data reviewed[SK1.1]       Revision History        User Key Date/Time User Provider Type Action    > SK1.1 9/17/2017  2:46 PM Stuart Ramon MD Physician Sign            Progress Notes by Christine Chaves RD, LD at 9/17/2017  9:11 AM     Author:  Christine Chaves RD, LD Service:  Nutrition Author Type:  Registered Dietitian    Filed:  9/17/2017  9:15 AM Date of Service:  9/17/2017  9:11 AM Creation Time:  9/17/2017  9:11 AM    Status:  Signed :  Christine Chaves RD, LD (Registered Dietitian)         CALORIE COUNT      Approximate Oral Intake for:    (9/16)  Calories: 900 cals  Protein: 36 gm pro    Nocturnal TF: (6pm-6am)  Isosource 1.5 at 65 mL/hr x 12 hr = 1170 kcals (13 kcal/kg), 53 gm pro (0.6 gm/kg), 600 mL H20, 11 gm fiber.     TF + po intake = 2070 cals, 89 gm pro        Estimated Needs:    Calories: 6735-6663 cals  Protein: 105-132 cals      Summary:   Po intake continues to improve  Pt sitting up eating breakfast - has eaten almost 100%  (1100 cals, 40 gm pro)  Sending a PLUS2 milkshake with meals for added cals/pro - he really likes these  If pt able to show continued improvement in po intake today, would consider dc TF in next 1-2 days  (would like to see pt taking 75% est needs orally)[SJ1.1]       Revision History        User Key Date/Time User Provider Type Action    > SJ1.1 9/17/2017  9:15 AM Christine Chaves RD, LD Registered Dietitian  Sign            Progress Notes by Harmony Joya LICSW at 9/16/2017 10:56 AM     Author:  Harmony Joya LICSW Service:  (none) Author Type:      Filed:  9/16/2017  2:56 PM Date of Service:  9/16/2017 10:56 AM Creation Time:  9/16/2017 10:56 AM    Status:  Addendum :  Harmony Joya LICSW ()           I:  Writer spoke with Lou @ [facility] and explained it is anticipated patient will be discharge on a nocturnal n/g feeding tube schedule until his oral intake is adequate.    Received a call today stating they can accept patient.  Updated Lou that patient had a period of agitation and wanting to leave last evening/night and based on this episode the care coordinator is requesting psychiatry be re -consulted.    P: To [facility] once behavior improved.  Will update wife regarding the availability of [facility].[KH1.1]    Updated wife regarding availability of [facility] and is is fine with this plan[KH1.2]      Revision History        User Key Date/Time User Provider Type Action    > KH1.2 9/16/2017  2:56 PM Harmony Joya LICSW  Addend     KH1.1 9/16/2017 11:05 AM Harmony Joya LICSW  Sign            Progress Notes by Stuart Ramon MD at 9/16/2017  1:49 PM     Author:  Stuart Ramon MD Service:  Hospitalist Author Type:  Physician    Filed:  9/16/2017  2:00 PM Date of Service:  9/16/2017  1:49 PM Creation Time:  9/16/2017  1:49 PM    Status:  Addendum :  Stuart Ramon MD (Physician)         [facility]  Hospitalist Progress Note  Date of service (when I saw the patient) 09/15/2017:         Assessment and Plan:    Mr Niko Mireles is a 52 year old male with a past medical history of hypertension, depression, obsessive-compulsive disorder and diverticulitis.  He presented with left lower chest pain and shortness of breath.  He was found to have a loculated pleural effusion with  multifocal pneumonia, developed hypoxic respiratory distress and eventually developed acute respiratory distress syndrome, required intubation and pronging on 08/23, status post thoracentesis on 08/23 and bronchoscopy on 08/24.  He was extubated on 09/02, weaned from BiPAP now stable on RA.  He has completed a course of vancomycin, Zosyn and azithromycin for his infection.  He is still suffering from numerous metabolic issues, encephalopathy and new fever but has been transferred to the hospitalist service for ongoing management on 9/5.       S/p Acute respiratory distress syndrome and hypoxic respiratory failure due to  Loculated left-sided pleural effusion with multifocal pneumonia with subsequent:  -This patient was initially seen and thought to have either PE versus pneumonia and was started on heparin drip and ceftriaxone with azithromycin. CT chest w/c showed loculated pleural effusion with multifocal pneumonia. His respiratory status deteriorated rapidly and he developed hypoxic respiratory failure and then ARDS. Transferred to ICU & intubated on 08/23, spent a significant time on vent, up until 09/02, spent 2 days on BiPAP,  weaned down to 4 liters nasal cannula, now stable on RA   - completed a course of vancomycin, Zosyn and azithromycin 9/1.   - Transferred out of ICU on 9/5,  mentation slow to improve, improving with restarting psych meds      Suspect new RLL HCAP vs aspiration - 9/6   CT abdomen completed 9/5 for ongoing fevers and elevated lipase shows a new RLL infiltrate compared to previous exam 8/22 with resolution of the previously seen LLL infiltrate. No abd source for fever noted..- wbc remains nl and pt afebrile    - completed Vaco (9/6-11)and Zosyn (9/6 -13)       Dysphagia/ increased risk for aspiration:  - SLP following,no aspiration, recommended reg diet 9/14   Pt tolerating regular diet  Ate small amount of meals TID yesterday[SK1.1]. On nocturnal TF now.[SK1.2]      Prolonged  encephalopathy/catatonia:  Hx obsessive- compulsive disorder:   Improved   -initially related to his prolonged ICU stay/ delirium , as he was requiring paralytic agents to remain on vent and he was also critically ill.   - now due to catatonia    - CT head 9/5 showed ml atrophy,nl ammonia level 9/7, unable to do MRI may not stay still   - initially significant improvment with restarting psych medications on 9/11 with clonazepam 0.5 mg bid for & PTA Buspar, Remeron & Prozac but changed to 20 mg/day instead of tid, prn seroquel if needed  - now poor appetite ? If pt would benefit from further  Psych medication adjustment, so Psych consult requested for today  - off restraints x >48 hrs   - Hold narcotics and benzo's.        Intermittent Fever:  - resolved, treatment as above       Emesis on 9/11 am:  - resolved  - abd x-ray 9/11 showed non specific gas patern,      Elevated LFTs & Lipase  - etiology likely non GI infection or possibly related to sepsis  - Abd US on 8/29 showed GB sludge with no stones or evidence of cholecystitis. CT abd 9/5 shows a normal GB and no inflamation around the pancrease.     - improved, T bili 0.9, nl Alpo4, ASt & ALT      Hypernatremia  - resolved      Hyperglycemia.    - likely induced by stress, given his critical illness  - - 123's, check A1c  - continue medium sliding scale insulin.      Acute renal insufficiency  - duet to sepsis, cr on 2.31 on 8/22  - resolved     Acute right Gastrocnemius thrombus - 9/2  Acute DVT right peroneal vein - 9/5  Found on RLE LE US completed for edema and fever 9/2. Suspect this is a result  from prolonged ICU stay, but had not required anticoagulation to this point.  The intensivist put in orders for [place] the area to assess for any change in the clot 9/5 and noted with a new occlusive DVT in the right peroneal vein.  - Initiated on therapeutic Lovenox 1.5 mg/kg daily 9/5.   - Continue Lovenox --if po intake improves further, would change to  "NOAC- - Will need at least 3 months of therapy on discharge.        Fluids, electrolytes, nutrition:     PO diet started 9/14 but due to v poor po intake , continued on nasal Tube feeds(consider nocturnal TFs).   - discussed with RN, supervised feeding & psych reconsulted in am (? Psych med adjustment).  GI prophylaxis:  protonix  Restraints: none   DVT Prophylaxis: Enoxaparin (Lovenox) SQ  Code Status: Full Code       Disposition: waiting for placement toTCU/ ARU if he may need continuous TF. PT/OT /SPL involved.                    Interval History:   Doing ok. Eating some now. No new complaints              Medications:       FLUoxetine  20 mg Oral Daily     pantoprazole  40 mg Oral QAM     busPIRone  30 mg Per NG tube BID     mirtazapine  15 mg Per NG tube At Bedtime     clonazePAM  0.5 mg Per NG tube BID     metoprolol  50 mg Per Feeding Tube BID     enoxaparin  1.5 mg/kg Subcutaneous Q24H     amLODIPine  10 mg Per Feeding Tube Daily     sodium chloride (PF)  10 mL Intracatheter Q7 Days     insulin aspart  1-6 Units Subcutaneous Q4H     QUEtiapine, ipratropium - albuterol 0.5 mg/2.5 mg/3 mL, HOLD MEDICATION, lidocaine (buffered or not buffered), sodium chloride (PF), heparin lock flush, sodium chloride (PF), acetaminophen, glucose **OR** dextrose **OR** glucagon, labetalol, potassium chloride, potassium chloride, potassium chloride, potassium chloride with lidocaine, potassium chloride, miconazole, naloxone, hypromellose-dextran, lidocaine (buffered or not buffered), lidocaine 4%, - MEDICATION INSTRUCTIONS -, acetaminophen, senna-docusate, bisacodyl, [DISCONTINUED] ondansetron **OR** ondansetron               Physical Exam:   Blood pressure 117/77, pulse 74, temperature 98  F (36.7  C), temperature source Oral, resp. rate 16, height 1.854 m (6' 0.99\"), weight 91.2 kg (201 lb 1 oz), SpO2 92 %.  Wt Readings from Last 4 Encounters:   09/16/17 91.2 kg (201 lb 1 oz)         Vital Sign Ranges  Temperature Temp  Avg: " 98.7  F (37.1  C)  Min: 98.1  F (36.7  C)  Max: 99.3  F (37.4  C)   Blood pressure Systolic (24hrs), Av , Min:104 , Max:129        Diastolic (24hrs), Av, Min:62, Max:77      Pulse Pulse  Av  Min: 78  Max: 78   Respirations Resp  Av  Min: 16  Max: 18   Pulse oximetry SpO2  Av.3 %  Min: 94 %  Max: 97 %         Intake/Output Summary (Last 24 hours) at 09/15/17 1255  Last data filed at 09/15/17 1246   Gross per 24 hour   Intake              880 ml   Output              150 ml   Net              730 ml     Nasal feeding tube in place with Tube feeds running  Constitutional: Sleeping in the middle of the day, seems sluggish but wakes up when called,  cooperative, no apparent distress   Lungs: Clear to auscultation bilaterally, no crackles or wheezing   Cardiovascular: Regular rate and rhythm, normal S1 and S2, and no murmur noted   Abdomen: Distended, soft,normal bowel sounds,non-tender   Skin: No rashes, no cyanosis, no edema   Neuro:                Data:   All laboratory data reviewed[SK1.1]       Revision History        User Key Date/Time User Provider Type Action    > SK1.2 2017  2:00 PM Stuart Ramon MD Physician Addend     SK1.1 2017  1:59 PM Stuart Ramon MD Physician Sign            Progress Notes by Christine Chaves RD, LD at 2017  9:10 AM     Author:  Christine Chaves RD, LD Service:  Nutrition Author Type:  Registered Dietitian    Filed:  2017  9:14 AM Date of Service:  2017  9:10 AM Creation Time:  2017  9:10 AM    Status:  Signed :  Christine Chaves RD, LD (Registered Dietitian)         CALORIE COUNT      Approximate Oral Intake for:  (9/15)     Calories: 500 cals  Protein: 30 gms      Nocturnal TF: (6pm-6am)  Isosource 1.5 at 65 mL/hr x 12 hr = 1170 kcals (13 kcal/kg), 53 gm pro (0.6 gm/kg), 600 mL H20, 11 gm fiber.    TF + po intake = 1670 cals, 83 gm pro      Estimated Needs:    Calories: 6258-7306 cals  Protein: 105-132  "cals      Summary:   Pt tolerating regular diet  Ate small amount of meals TID yesterday  Will send a high tahmina/pro supplement with meals[SJ1.1]         Revision History        User Key Date/Time User Provider Type Action    > SJ1.1 9/16/2017  9:14 AM Christine Chaves, RD, LD Registered Dietitian Sign            Progress Notes by Rich Padilla RN at 9/16/2017  3:44 AM     Author:  Rich Padilla RN Service:  (none) Author Type:  Registered Nurse    Filed:  9/16/2017  4:56 AM Date of Service:  9/16/2017  3:44 AM Creation Time:  9/16/2017  3:44 AM    Status:  Addendum :  Rich Padilla RN (Registered Nurse)         Pt was agitated and wanting to leave the facility. Pt is confuse to[DT1.1] time, plac[DT1.2]e, and situation. Unable to reorient pt or redirect. Gave PRN Seroquel, ineffective. Wished to call wife Lula. Called wife, pt talked to her. Wife stated \"I can't come to get him\" Was able to calm down around 0245. Pt had sitter at bedside since 0130. Pt was agitated again at 0300. Threatened to leave the facility, walked to the door with unsteady gait. When staff attempted to explain the situation, pt threw fist to the unit charge nurse. Called code 21. One time dose of IM Zyprexa given per order. Pt calmed down at this time. Will continue to monitor.[DT1.1]     Revision History        User Key Date/Time User Provider Type Action    > DT1.2 9/16/2017  4:56 AM Rich Padilla RN Registered Nurse Addend     DT1.1 9/16/2017  3:57 AM Rich Padilla RN Registered Nurse Sign            Progress Notes by Jorge Mejía RN at 9/15/2017  4:21 PM     Author:  Jorge Mejía RN Service:  (none) Author Type:  Registered Nurse    Filed:  9/15/2017  4:22 PM Date of Service:  9/15/2017  4:21 PM Creation Time:  9/15/2017  4:21 PM    Status:  Signed :  Alfonzo, Jorge J, RN (Registered Nurse)         IV access team paged to inform them of discontinue PICC order placed by MD.[DN1.1]       Revision History        User " Key Date/Time User Provider Type Action    > DN1.1 9/15/2017  4:22 PM Jorge Mejía RN Registered Nurse Sign            Progress Notes by Jayna Quiñones RN at 9/15/2017  2:22 PM     Author:  Jayna Quiñones RN Service:  (none) Author Type:  Registered Nurse    Filed:  9/15/2017  2:25 PM Date of Service:  9/15/2017  2:22 PM Creation Time:  9/15/2017  2:22 PM    Status:  Signed :  Jayna Quiñones RN (Registered Nurse)         IV team paged[AS1.1] to assess PICC to R arm[AS1.2]. Grey lumen[AS1.1] is not flushing. Red and white lumens do flush.[AS1.2]      Revision History        User Key Date/Time User Provider Type Action    > AS1.2 9/15/2017  2:25 PM Jayna Quiñones RN Registered Nurse Sign     AS1.1 9/15/2017  2:22 PM Jayna Quiñones RN Registered Nurse             Progress Notes by Jayna Quiñones RN at 9/15/2017  2:20 PM     Author:  Jayna Quiñones RN Service:  (none) Author Type:  Registered Nurse    Filed:  9/15/2017  2:21 PM Date of Service:  9/15/2017  2:20 PM Creation Time:  9/15/2017  2:20 PM    Status:  Signed :  Jayna Quiñones RN (Registered Nurse)         Spoke with dietary. Discussed putting TF on hold for now until 1800 in which it should be restarted.[AS1.1]     Revision History        User Key Date/Time User Provider Type Action    > AS1.1 9/15/2017  2:21 PM Jayna Quiñones RN Registered Nurse Sign            Progress Notes by Amalia Hilario RD, LD at 9/15/2017  1:39 PM     Author:  Amalia Hilario RD, LD Service:  Nutrition Author Type:  Registered Dietitian    Filed:  9/15/2017  1:42 PM Date of Service:  9/15/2017  1:39 PM Creation Time:  9/15/2017  1:39 PM    Status:  Signed :  Amalia Hilario RD, LD (Registered Dietitian)         Discussed changing to noc TF with Tian, care coordinator, to see if pt's appetite will improve. Also discussed with Dr. Devine.  Pt is on a regular diet and so far has had very poor intake.    Will change to noc feeding: Isosource 1.5 at  65 mL/hr x 12 hr = 1170 kcals (13 kcal/kg), 53 gm pro (0.6 gm/kg), 600 mL H20, 11 gm fiber.  Calorie counts in progress, if intake remains poor, will resume continuous feeds.    Amalia Hilario RD  Pager [phone] (M-F)            [phone] (W/E & Hol)[CM1.1]         Revision History        User Key Date/Time User Provider Type Action    > CM1.1 9/15/2017  1:42 PM Amalia Hilario RD, LD Registered Dietitian Sign            Progress Notes by Silvia Devine MD at 9/15/2017 12:55 PM     Author:  Silvia Devine MD Service:  Hospitalist Author Type:  Physician    Filed:  9/15/2017  1:23 PM Date of Service:  9/15/2017 12:55 PM Creation Time:  9/15/2017 12:55 PM    Status:  Signed :  Silvia Devine MD (Physician)         [facility]  Hospitalist Progress Note  Date of service (when I saw the patient) 09/15/2017:         Assessment and Plan:    Mr Niko Mireles is a 52 year old male with a past medical history of hypertension, depression, obsessive-compulsive disorder and diverticulitis.  He presented with left lower chest pain and shortness of breath.  He was found to have a loculated pleural effusion with multifocal pneumonia, developed hypoxic respiratory distress and eventually developed acute respiratory distress syndrome, required intubation and pronging on 08/23, status post thoracentesis on 08/23 and bronchoscopy on 08/24.  He was extubated on 09/02, weaned from BiPAP now down to 4 liters nasal cannula.  He has completed a course of vancomycin, Zosyn and azithromycin for his infection.  He is still suffering from numerous metabolic issues, encephalopathy and new fever but has been transferred to the hospitalist service for ongoing management on 9/5.       S/p Acute respiratory distress syndrome and hypoxic respiratory failure due to  Loculated left-sided pleural effusion with multifocal pneumonia with subsequent:  -This patient was initially seen and  thought to have either PE versus pneumonia and was started on heparin drip and ceftriaxone with azithromycin. CT chest w/c showed loculated pleural effusion with multifocal pneumonia. His respiratory status deteriorated rapidly and he developed hypoxic respiratory failure and then ARDS. Transferred to ICU & intubated on 08/23, spent a significant time on vent, up until 09/02, spent 2 days on BiPAP,  weaned down to 4 liters nasal cannula.   - completed a course of vancomycin, Zosyn and azithromycin 9/1.   - Transferred out of ICU on 9/5  - weaned off o2, mentation slow to improve, improving with restarting psych meds      Suspect new RLL HCAP vs aspiration - 9/6   CT abdomen completed 9/5 for ongoing fevers and elevated lipase shows a new RLL infiltrate compared to previous exam 8/22 with resolution of the previously seen LLL infiltrate. No abd source for fever noted. Continues to spike fever with Tmax 101.5 in the last 24 hours. Still with leukocytosis with left shift.   - Follow repeat pan culture from [facility]  from 9/5, 9/6, 9/7.  - wbc remains nl and pt afebrile    - completed Vaco (9/6-11)and Zosyn (9/6 -13)       Dysphagia/ increased risk for aspiration:  - SLP following,no aspiration, recommended reg diet today 9/14   -[PK1.1] v.poor po intake,[PK1.2]Nurition following[PK1.1]   -[PK1.2] Tube feeds[PK1.1] resumed via Nasal FT  - discussed with RN, supervised feeding & psych reconsulted in am (? Psych med adjustment).  - may need a PEG[PK1.2]    - rif no TF needed remove nasal feeding tube-discussed with RN      Prolonged encephalopathy/catatonia:  Hx obsessive- compulsive disorder:   Improved   -initially related to his prolonged ICU stay/ delirium , as he was requiring paralytic agents to remain on vent and he was also critically ill.   - now due to catatonia    - CT head 9/5 showed ml atrophy,nl ammonia level 9/7, unable to do MRI may not stay still   -[PK1.1] initially significant[PK1.2] improv[PK1.1]ment[PK1.2]  with restarting psych medications on 9/11 with clonazepam 0.5 mg bid for & PTA Buspar, Remeron & Prozac but changed to 20 mg/day instead of tid[PK1.1],[PK1.2] prn seroquel if needed  -[PK1.1] now poor appetite ? If pt would benefit from further  Psych medication adjustment, so[PK1.2] Psych[PK1.1] consult requested for am.[PK1.2]  - off restraints x >48 hrs   - Hold narcotics and benzo's.        Intermittent Fever:  - resolved, treatment as above       Emesis on 9/11 am:  - resolved  - abd x-ray 9/11 showed non specific gas patern,      Elevated LFTs & Lipase  - etiology likely non GI infection or possibly related to sepsis  - Abd US on 8/29 showed GB sludge with no stones or evidence of cholecystitis. CT abd 9/5 shows a normal GB and no inflamation around the pancrease.     - improved, T bili 0.9, nl Alpo4, ASt & ALT      Hypernatremia  - resolved, Na 138 today      Hyperglycemia.    - likely induced by stress, given his critical illness  - - 123's, check A1c  - continue medium sliding scale insulin.      Acute renal insufficiency  - duet to sepsis, cr on 2.31 on 8/22  - resolved, 1.05 today.     Acute right Gastrocnemius thrombus - 9/2  Acute DVT right peroneal vein - 9/5  Found on RLE LE US completed for edema and fever 9/2. Suspect this is a result  from prolonged ICU stay, but had not required anticoagulation to this point.  The intensivist put in orders for Do[place] of the area to assess for any change in the clot 9/5 and noted with a new occlusive DVT in the right peroneal vein.  - Initiated on therapeutic Lovenox 1.5 mg/kg daily 9/5[PK1.1].   - Continue Lovenox for now incase he may need a PEG,[PK1.2]   -[PK1.1] if no further intervention[PK1.2] consider changing to NOAC[PK1.1]  before[PK1.2] d/c  - Will need at least 3 months of therapy on discharge.        Fluids, electrolytes, nutrition:     PO diet started[PK1.1] 9/14 but due to v poor po intake , continued on nasal Tube feeds(consider nocturnal  "TFs).   - discussed with RN, supervised feeding & psych reconsulted in am (? Psych med adjustment).[PK1.2]  GI prophylaxis:  protonix  Restraints:[PK1.1] none[PK1.2]   DVT Prophylaxis: Enoxaparin (Lovenox) SQ  Code Status: Full Code       Disposition:[PK1.1] waiting for placement toTCU/[PK1.2] ARU[PK1.1] if he may need continuous TF.[PK1.2] PT/OT /SPL involved.       Silvia Devine MD.  Hospitalist [phone] (7am -6 pm)             Interval History:[PK1.1]   Poor po intake so nutrition restarted nasal tube feeds.[PK1.2]              Medications:       [START ON 2017] FLUoxetine  20 mg Oral Daily     [START ON 2017] pantoprazole  40 mg Oral QAM     busPIRone  30 mg Per NG tube BID     mirtazapine  15 mg Per NG tube At Bedtime     clonazePAM  0.5 mg Per NG tube BID     metoprolol  50 mg Per Feeding Tube BID     enoxaparin  1.5 mg/kg Subcutaneous Q24H     amLODIPine  10 mg Per Feeding Tube Daily     sodium chloride (PF)  10 mL Intracatheter Q7 Days     insulin aspart  1-6 Units Subcutaneous Q4H     QUEtiapine, ipratropium - albuterol 0.5 mg/2.5 mg/3 mL, HOLD MEDICATION, lidocaine (buffered or not buffered), sodium chloride (PF), heparin lock flush, sodium chloride (PF), acetaminophen, glucose **OR** dextrose **OR** glucagon, labetalol, potassium chloride, potassium chloride, potassium chloride, potassium chloride with lidocaine, potassium chloride, miconazole, naloxone, hypromellose-dextran, lidocaine (buffered or not buffered), lidocaine 4%, - MEDICATION INSTRUCTIONS -, acetaminophen, senna-docusate, bisacodyl, [DISCONTINUED] ondansetron **OR** ondansetron               Physical Exam:   Blood pressure 112/77, pulse 78, temperature 98.4  F (36.9  C), temperature source Oral, resp. rate 18, height 1.854 m (6' 0.99\"), weight 92.2 kg (203 lb 4.2 oz), SpO2 94 %.  Wt Readings from Last 4 Encounters:   09/15/17 92.2 kg (203 lb 4.2 oz)         Vital Sign Ranges  Temperature Temp  Av.7  F (37.1  C)  Min: " 98.1  F (36.7  C)  Max: 99.3  F (37.4  C)   Blood pressure Systolic (24hrs), Av , Min:104 , Max:129        Diastolic (24hrs), Av, Min:62, Max:77      Pulse Pulse  Av  Min: 78  Max: 78   Respirations Resp  Av  Min: 16  Max: 18   Pulse oximetry SpO2  Av.3 %  Min: 94 %  Max: 97 %         Intake/Output Summary (Last 24 hours) at 09/15/17 1255  Last data filed at 09/15/17 1246   Gross per 24 hour   Intake              880 ml   Output              150 ml   Net              730 ml[PK1.1]     Nasal feeding tube in place with Tube feeds running[PK1.2]  Constitutional:[PK1.1] Sleeping in the middle of the day, seems sluggish but[PK1.2] wake[PK1.1]s up when called[PK1.2],  cooperative, no apparent distress   Lungs: Clear to auscultation bilaterally, no crackles or wheezing   Cardiovascular: Regular rate and rhythm, normal S1 and S2, and no murmur noted   Abdomen:[PK1.1] Distended, soft,n[PK1.2]ormal bowel sounds,non-tender   Skin: No rashes, no cyanosis, no edema   Neuro:                Data:[PK1.1]   All laboratory data reviewed[PK1.2]       Revision History        User Key Date/Time User Provider Type Action    > PK1.2 9/15/2017  1:23 PM Silvia Devine MD Physician Sign     PK1.1 9/15/2017 12:55 PM Silvia Devine MD Physician             Progress Notes by Jayna Quiñones RN at 9/15/2017 12:33 PM     Author:  Jayna Quioñnes RN Service:  (none) Author Type:  Registered Nurse    Filed:  9/15/2017 12:33 PM Date of Service:  9/15/2017 12:33 PM Creation Time:  9/15/2017 12:33 PM    Status:  Signed :  Jayna Quiñones RN (Registered Nurse)         Lou at [facility] calls for patient update. Update given. Questions answered to her satisfaction. Nothing further needed at this time.[AS1.1]     Revision History        User Key Date/Time User Provider Type Action    > AS1.1 9/15/2017 12:33 PM Jayna Quiñones, RN Registered Nurse Sign            Progress Notes by Toan,  MIN Trejo at 9/15/2017 11:26 AM     Author:  Harmony Joya LICSW Service:  (none) Author Type:      Filed:  9/15/2017 11:32 AM Date of Service:  9/15/2017 11:26 AM Creation Time:  9/15/2017 11:26 AM    Status:  Signed :  Harmony Joya LICSW ()         SW  D:   Patient was declined by ARU based on input from therapists.  N/G feeding restarted due to poor intake.  [facility] declined taking patient as they do not have a private room available and patient has recent dx of MRSA.  [facility].    Left message with Farhana hammond [facility] to update their assessment that patient will d/c with a n/g feeding tube.[KH1.1]     Revision History        User Key Date/Time User Provider Type Action    > KH1.1 9/15/2017 11:32 AM Harmony Joya LICSW  Sign            Progress Notes by Amalia Hilario RD, LD at 9/15/2017  8:21 AM     Author:  Amalia Hilario RD, LD Service:  Nutrition Author Type:  Registered Dietitian    Filed:  9/15/2017  8:24 AM Date of Service:  9/15/2017  8:21 AM Creation Time:  9/15/2017  8:21 AM    Status:  Signed :  Amalia Hilario RD, LD (Registered Dietitian)         CALORIE COUNT      Approximate Oral Intake for:   9/14  Calories: 55  Protein:   5 gm    Number of Meals Recorded:  2       Estimated Needs:    Calories: 3158-6433  Protein:    105-130 gm    Summary:   TF has been on hold since started diet yesterday afternoon.   Pt only ordered an omelet for lunch and ice cream and fruit for dinner.  He ate 25% of the omelet and only a few bites at dinner.  Discussed with RN, will resume TF since intake so poor.    Amalia Hilario RD  Pager [phone] (M-F)            [phone] (W/E & Hol)[CM1.1]           Revision History        User Key Date/Time User Provider Type Action    > CM1.1 9/15/2017  8:24 AM Amalia Hilario RD, LD Registered Dietitian Sign            Progress Notes by Lidia  RT Flora at 9/15/2017  3:30 AM     Author:  Flora Murillo RT Service:  (none) Author Type:  Respiratory Therapist    Filed:  9/15/2017  3:30 AM Date of Service:  9/15/2017  3:30 AM Creation Time:  9/15/2017  3:30 AM    Status:  Signed :  Flora Murillo RT (Respiratory Therapist)         Patient refused to use cpap  9/15/2017  Flora Murillo[MF1.1]       Revision History        User Key Date/Time User Provider Type Action    > MF1.1 9/15/2017  3:30 AM Flora Murillo RT Respiratory Therapist Sign                     Procedure Notes      Procedures by Emy Rebolledo PA-C at 8/24/2017  1:36 PM     Author:  Emy Rebolledo PA-C Service:  ICU Author Type:  Physician Assistant - C    Filed:  8/24/2017  1:37 PM Date of Service:  8/24/2017  1:36 PM Creation Time:  8/24/2017  1:33 PM    Status:  Attested :  Emy Rebolledo PA-C (Physician Assistant - C)    Cosigner:  Avi Elena MD at 8/27/2017 12:19 PM         Procedure Orders:    1. Central line [[id number]] ordered by Emy Rebolledo PA-C at 08/24/17 1333            Post-procedure Diagnoses:    1. Community acquired bacterial pneumonia [J15.9]          Attestation signed by Avi Elena MD at 8/27/2017 12:19 PM        Attending Intensivist note    I staffed this procedure at bedside from beginning until completion.    dheeraj renee                                 Procedure/Surgery Information   [facility]    Bedside Procedure Note  Date of Service (when I performed the procedure): 08/24/2017    Niko Mireles is a 52 year old male patient.  1. Community acquired bacterial pneumonia    2. Pneumonia of left lower lobe due to infectious organism (H)    3. Acute chest pain    4. Dehydration      Past Medical History:   Diagnosis Date     Hypertension      OCD (obsessive compulsive disorder)      Temp: 98.9  F (37.2  C) Temp src: Axillary BP: 115/64   Heart Rate: 98 Resp: 21 SpO2: 95 % O2 Device: Mechanical Ventilator Oxygen  "Delivery: Other (Comments) (50L)    Central line  Date/Time: 8/24/2017 11:33 AM  Performed by: MARTÍNEZ GAMEZ  Authorized by: MARTÍNEZ GAMEZ   Consent: Written consent obtained.  Consent given by: spouse  Patient identity confirmed: arm band  Time out: Immediately prior to procedure a \"time out\" was called to verify the correct patient, procedure, equipment, support staff and site/side marked as required.  Indications: vascular access  Anesthesia: local infiltration    Anesthesia:  Local Anesthetic: lidocaine 1% without epinephrine    Sedation:  Patient sedated: yes  Sedatives: see MAR for details  Analgesia: see MAR for details  Preparation: skin prepped with chlorhexidine  Location details: right internal jugular  Patient position: flat  Catheter type: triple lumen  Ultrasound guidance: yes  Number of attempts: 2  Successful placement: yes  Post-procedure: line sutured and dressing applied  Assessment: blood return through all parts and free fluid flow  Patient tolerance: Patient tolerated the procedure well with no immediate complications  Comments: Procedure supervised by Dr. Ulices Gamez[AS1.1]     Revision History        User Key Date/Time User Provider Type Action    > AS1.1 8/24/2017  1:37 PM Martínez Gamez PA-C Physician Assistant - C Sign            Procedures by Martínez Gamez PA-C at 8/24/2017  1:40 PM     Author:  Matrínez Gamez PA-C Service:  ICU Author Type:  Physician Assistant - C    Filed:  8/24/2017  1:40 PM Date of Service:  8/24/2017  1:40 PM Creation Time:  8/24/2017  1:37 PM    Status:  Attested :  Martínez Gamez PA-C (Physician Assistant - C)    Cosigner:  Avi Elena MD at 8/27/2017 12:18 PM         Procedure Orders:    1. Insert arterial line [[id number]] ordered by Martínez Gmaez PA-C at 08/24/17 1337            Post-procedure Diagnoses:    1. Community acquired bacterial pneumonia [J15.9]          Attestation signed by Avi Elena MD at " "8/27/2017 12:18 PM        Attending Intensivist note    I staffed this procedure at bedside from beginning until completed    MD Blanca                                 Procedure/Surgery Information   [facility]    Bedside Procedure Note  Date of Service (when I performed the procedure): 08/24/2017    Niko Mireles is a 52 year old male patient.  1. Community acquired bacterial pneumonia    2. Pneumonia of left lower lobe due to infectious organism (H)    3. Acute chest pain    4. Dehydration      Past Medical History:   Diagnosis Date     Hypertension      OCD (obsessive compulsive disorder)      Temp: 98.9  F (37.2  C) Temp src: Axillary BP: 115/64   Heart Rate: 98 Resp: 21 SpO2: 95 % O2 Device: Mechanical Ventilator Oxygen Delivery: Other (Comments) (50L)    Insert arterial line  Date/Time: 8/24/2017 12:37 PM  Performed by: MARTÍNEZ GAMEZ  Authorized by: MARTÍNEZ GAMEZ   Consent: Written consent obtained.  Consent given by: spouse  Patient identity confirmed: arm band  Time out: Immediately prior to procedure a \"time out\" was called to verify the correct patient, procedure, equipment, support staff and site/side marked as required.  Preparation: Patient was prepped and draped in the usual sterile fashion.  Indications: multiple ABGs, respiratory failure and hemodynamic monitoring  Location: right radial  Anesthesia: local infiltration    Anesthesia:  Local Anesthetic: lidocaine 1% without epinephrine    Sedation:  Patient sedated: yes  Sedatives: see MAR for details  Analgesia: see MAR for details  Seldinger technique: Seldinger technique used  Number of attempts: 2  Post-procedure: line sutured and dressing applied  Post-procedure CMS: normal  Comments: Procedure supervised by Dr. Elena.            Martínez Gamez[AS1.1]     Revision History        User Key Date/Time User Provider Type Action    > AS1.1 8/24/2017  1:40 PM Martínez Gamez PA-C Physician Assistant - C Sign            Procedures by " Humberto Wilkins MD at 8/24/2017  9:41 AM     Author:  Humberto Wilkins MD Service:  ICU Author Type:  Physician    Filed:  8/24/2017  9:43 AM Date of Service:  8/24/2017  9:41 AM Creation Time:  8/24/2017  9:41 AM    Status:  Signed :  Humberto Wilkins MD (Physician)         Procedure/Surgery Information   [facility]    Bedside Procedure Note  Date of Service (when I performed the procedure): 08/24/2017    Niko Mireles is a 52 year old male patient.  1. Community acquired bacterial pneumonia    2. Pneumonia of left lower lobe due to infectious organism (H)    3. Acute chest pain    4. Dehydration      Past Medical History:   Diagnosis Date     Hypertension      OCD (obsessive compulsive disorder)      Temp: 98.9  F (37.2  C) Temp src: Axillary BP: 109/65   Heart Rate: 98 Resp: 13 SpO2: 92 % O2 Device: BiPAP/CPAP Oxygen Delivery: Other (Comments) (50L)    Procedures         Procedure:   Bronchoscopy with BAL        Indication:   Bilateral pneumonia ARDS      Consent:   Obtained from the patient/family.        Pre-medication:   Versed 4mg, Fentanly 100mcg, Lidocain 1% 12cc. endotracheally        Procedure Summary:   Time out was performed.   The scope inserted thru the ETT.  Exam of trachea and bronchus of the right and left bronchial tree to the sub-segmental level revealed no endobronchial lesion. BAL performed in the RML lobe as well as lingula. A total of 150cc saline instilled in 3 aliquotes and 50cc dark keara colored fluid recovered.  The patient tolerated the procedure well without undue discomfort, hypotension or arrhythmia. The procedure was performed in the ICU--and vital sign parameters were monitored.        Complications:   No immediate complications.  Sample sent for cell count, cytology and microbiology.    This procedure is performed by Humberto Wilkins[RR1.1]     Revision History        User Key Date/Time User Provider Type Action     > RR1.1 8/24/2017  9:43 AM Fabiano Hughes MD Physician Sign            Procedures by Fabiano Hughes MD at 8/23/2017  3:47 PM     Author:  Fabiano Hughes MD Service:  ICU Author Type:  Physician    Filed:  8/23/2017  3:48 PM Date of Service:  8/23/2017  3:47 PM Creation Time:  8/23/2017  3:44 PM    Status:  Signed :  Fabiano Hughes MD (Physician)     Procedure Orders:    1. Chest tube insertion [[id number]] ordered by Fabiano Hughes MD at 08/23/17 1544            Post-procedure Diagnoses:    1. Pneumonia of left lower lobe due to infectious organism (H) [J18.1]    2. Community acquired bacterial pneumonia [J15.9]               Procedure/Surgery Information   [facility]    Bedside Procedure Note  Date of Service (when I performed the procedure): 08/23/2017    Niko Mireles is a 52 year old male patient.  1. Pneumonia of left lower lobe due to infectious organism (H)    2. Acute chest pain    3. Dehydration      Past Medical History:   Diagnosis Date     Hypertension      OCD (obsessive compulsive disorder)      Temp: 97.9  F (36.6  C) Temp src: Axillary BP: 115/59   Heart Rate: 99 Resp: 29 SpO2: 96 % O2 Device: High Flow Nasal Cannula (HFNC) Oxygen Delivery: Other (Comments) (50L)    Chest tube insertion  Date/Time: 8/23/2017 3:44 PM  Performed by: FABIANO HUGHES  Authorized by: FABIANO HUGHES   Consent: Verbal consent obtained. Written consent obtained.  Consent given by: patient and spouse  Patient identity confirmed: verbally with patient  Indications: pleural effusion    Sedation:  Patient sedated: no  Anesthesia: local infiltration    Anesthesia:  Local Anesthetic: lidocaine 1% without epinephrine  Preparation: skin prepped with ChloraPrep  Placement location: left lateral  Scalpel size: 10  Tube size: 8 Setswana  Ultrasound guidance: yes  Drainage characteristics: yellow and  serosanguinous  Drainage amount: 900 ml  Dressing: 4x4 sterile gauze  Comments: Thoracentesis performed in lieu of chest tube             Procedure:   Thoracentesis          Indication:   Pleural effusion, diagnostic and therapeutic.        Consent:   Consent was obtained from the patient prior to the procedure. Indications, risks, benefits were explained at length.          Procedure Summary:   US guidance: YES  Protection/Precaution measures: Mask with eye protection, cap, gown, gloves were used: Yes  A time out was performed.   The patient was prepped and draped in a conventional sterile manner using chlorhexidine scrub after the appropriate level was percussed and confirmed by ultrasound. 1% lidocaine was used to numb the region. A finder needle was then used to attempt to locate fluid. The thoracentesis catheter was advanced into the pleural space (between 8th and 9th rib) just above the 9th rib  Flor colored fluid was drained manually without any difficulty. Total ~900 cc.          Complications:   No immediate complications.  CXR is ordered/pending.  Specimen was sent for chemistry (with simultaneous serum for LDH and protein), cell count, cytology and culture.    This procedure is performed by[RR1.1] Humberto Wilkins[RR1.2]           Humberto Wilkins[RR1.1]     Revision History        User Key Date/Time User Provider Type Action    > RR1.2 8/23/2017  3:48 PM Humberto Wilkins MD Physician Sign     RR1.1 8/23/2017  3:44 PM Humberto Wilkins MD Physician                      Progress Notes - Therapies (Notes from 09/15/17 through 09/18/17)      Progress Notes by Flora Murillo RT at 9/15/2017  3:30 AM     Author:  Flora Murillo RT Service:  (none) Author Type:  Respiratory Therapist    Filed:  9/15/2017  3:30 AM Date of Service:  9/15/2017  3:30 AM Creation Time:  9/15/2017  3:30 AM    Status:  Signed :  Flora Murillo RT (Respiratory Therapist)         Patient  refused to use cpap  9/15/2017  Flora Murillo[MF1.1]       Revision History        User Key Date/Time User Provider Type Action    > MF1.1 9/15/2017  3:30 AM Flora Murillo, RT Respiratory Therapist Sign

## 2017-08-22 NOTE — PLAN OF CARE
Problem: Goal Outcome Summary  Goal: Goal Outcome Summary  Outcome: No Change  as had increasing oxygen needs today to maintain sat at 91% (currently at 4L).  Complains of left sided chest pain with inspiration. Unable to take deep breath and can only get incentive spirometer to 400ml.  Heparin gtt held for 1 hour and restarted at 1430 at 1400U per hour.  US of LE negative and NM CT shows intermediate probability of PE. Mild dyspnea at rest. Infrequent cough, no sputum.  Continue to monitor

## 2017-08-22 NOTE — IP AVS SNAPSHOT
"[place] [address] [facility]: [phone]                                              INTERAGENCY TRANSFER FORM - PHYSICIAN ORDERS   2017                    Hospital Admission Date: 2017  PATRICK AHUJA   : 1965  Sex: Male        Attending Provider: Humberto Wilkins MD     Allergies:  Compazine [Prochlorperazine]    Infection:  MRSA-Contact Isolation   Service:  HOSPITALIST    Ht:  1.854 m (6' 0.99\")   Wt:  91.1 kg (200 lb 13.4 oz)   Admission Wt:  113.4 kg (250 lb)    BMI:  26.5 kg/m 2   BSA:  2.17 m 2            Patient PCP Information     Provider PCP Type    No Ref-Primary Verified General      ED Clinical Impression     Diagnosis Description Comment Added By Time Added    Pneumonia of left lower lobe due to infectious organism (H) [J18.1] Pneumonia of left lower lobe due to infectious organism (H) [J18.1]  Jovany Kline MD 2017  2:56 AM    Acute chest pain [R07.9] Acute chest pain [R07.9]  Jovany Kline MD 2017  2:56 AM    Dehydration [E86.0] Dehydration [E86.0]  Jovany Kline MD 2017  2:56 AM      Hospital Problems as of 2017              Priority Class Noted POA    Community acquired bacterial pneumonia Medium  2017 Yes    ARDS (adult respiratory distress syndrome) (H) Medium  2017 Yes    Parapneumonic effusion Medium  2017 Yes    Encephalopathy Medium  2017 Yes    Non morbid obesity due to excess calories Medium  2017 Yes      Non-Hospital Problems as of 2017     None      Code Status History     Date Active Date Inactive Code Status Order ID Comments User Context    2017  2:22 PM  Full Code [id number]  Chino Angel DO Outpatient    2017  2:44 PM 2017  2:22 PM Full Code [id number]  Ricco Lopez MD Inpatient    2017  4:20 AM 2017  2:44 PM Full Code [id number]  Marcos Esquivel MD Inpatient         Medication Review      START taking        Dose / Directions " Comments    amLODIPine 10 MG tablet   Commonly known as:  NORVASC   Used for:  Pneumonia of left lower lobe due to infectious organism (H)        Dose:  10 mg   Start taking on:  9/19/2017   Take 1 tablet (10 mg) by mouth daily   Quantity:  30 tablet   Refills:  0        * apixaban ANTICOAGULANT 5 MG tablet   Commonly known as:  ELIQUIS   Used for:  Pneumonia of left lower lobe due to infectious organism (H)        Dose:  10 mg   Take 2 tablets (10 mg) by mouth 2 times daily for 7 days   Quantity:  28 tablet   Refills:  0        * apixaban ANTICOAGULANT 5 MG tablet   Commonly known as:  ELIQUIS   Used for:  Pneumonia of left lower lobe due to infectious organism (H)        Dose:  5 mg   Start taking on:  9/25/2017   Take 1 tablet (5 mg) by mouth 2 times daily   Quantity:  30 tablet   Refills:  0        mirtazapine 15 MG ODT tab   Commonly known as:  REMERON SOL-TAB   Used for:  Pneumonia of left lower lobe due to infectious organism (H)   Replaces:  REMERON PO        Dose:  15 mg   Take 1 tablet (15 mg) by mouth At Bedtime   Quantity:  30 tablet   Refills:  0        OLANZapine 2.5 MG tablet   Commonly known as:  zyPREXA   Used for:  Pneumonia of left lower lobe due to infectious organism (H)        Dose:  2.5 mg   Take 1 tablet (2.5 mg) by mouth At Bedtime   Quantity:  60 tablet   Refills:  0        pantoprazole 40 MG EC tablet   Commonly known as:  PROTONIX   Used for:  Pneumonia of left lower lobe due to infectious organism (H)        Dose:  40 mg   Take 1 tablet (40 mg) by mouth every morning   Quantity:  30 tablet   Refills:  0        QUEtiapine 25 MG tablet   Commonly known as:  SEROquel   Used for:  Pneumonia of left lower lobe due to infectious organism (H)        Dose:  25 mg   Take 1 tablet (25 mg) by mouth every 6 hours as needed (agitation)   Quantity:  60 tablet   Refills:  0        * Notice:  This list has 2 medication(s) that are the same as other medications prescribed for you. Read the directions  carefully, and ask your doctor or other care provider to review them with you.      CONTINUE these medications which may have CHANGED, or have new prescriptions. If we are uncertain of the size of tablets/capsules you have at home, strength may be listed as something that might have changed.        Dose / Directions Comments    BUSPAR PO   This may have changed:  Another medication with the same name was removed. Continue taking this medication, and follow the directions you see here.        Dose:  30 mg   Take 30 mg by mouth 2 times daily Also see dinner dose   Refills:  0        clonazePAM 0.5 MG tablet   Commonly known as:  klonoPIN   This may have changed:    - medication strength  - how much to take  - when to take this   Used for:  Pneumonia of left lower lobe due to infectious organism (H)        Dose:  0.5 mg   Take 1 tablet (0.5 mg) by mouth 2 times daily   Quantity:  30 tablet   Refills:  0        FLUoxetine 20 MG capsule   Commonly known as:  PROzac   This may have changed:    - medication strength  - when to take this  - additional instructions   Used for:  Pneumonia of left lower lobe due to infectious organism (H)        Dose:  20 mg   Take 1 capsule (20 mg) by mouth daily   Quantity:  30 capsule   Refills:  0          CONTINUE these medications which have NOT CHANGED        Dose / Directions Comments    VITAMIN D (CHOLECALCIFEROL) PO        Dose:  1000 Units   Take 1,000 Units by mouth daily   Refills:  0          STOP taking     DETROL LA PO           ELAVIL PO           fluticasone 50 MCG/ACT spray   Commonly known as:  FLONASE           LISINOPRIL PO           NAPROXEN PO           NEURONTIN PO           OMEGA 3 PO           OMEPRAZOLE PO           REMERON PO   Replaced by:  mirtazapine 15 MG ODT tab           SILDENAFIL CITRATE PO                     Further instructions from your care team       Discharge to [facility], phone [phone]    Summary of Visit     Reason for your hospital  stay       Pneumonia             After Care     Activity - Up with nursing assistance           Advance Diet as Tolerated       Follow this diet upon discharge: Orders Placed This Encounter  Regular Diet Adult       Fall precautions           General info for SNF       Length of Stay Estimate: Short Term Care: Estimated # of Days <30  Condition at Discharge: Improving  Level of care:skilled   Rehabilitation Potential: Good  Admission H&P remains valid and up-to-date: Yes  Recent Chemotherapy: N/A  Use Nursing Home Standing Orders: Yes       Mantoux instructions       Give two-step Mantoux (PPD) Per Facility Policy Yes             Referrals     Occupational Therapy Adult Consult       Evaluate and treat as clinically indicated.    Reason:  Physical deconditioning.       Physical Therapy Adult Consult       Evaluate and treat as clinically indicated.    Reason:  Physical deconditioning.       Speech Language Path Adult Consult       Evaluate and treat as clinically indicated.    Reason:  Dysphagia.             Follow-Up Appointment Instructions     Future Labs/Procedures    Follow Up and recommended labs and tests     Comments:    Follow up with longterm physician.  The following labs/tests are recommended: cbc/bmp.  Follow up with primary care provider.   Follow up with psychiatry.    Follow Up     Comments:    Please follow up with Dr. Monroy on Wednesday 10/18/17 at 1:30pm for a routine post-hospital check and follow-up of hematuria    [facility]  [address] [place] [address], [address]  [place]. [address]    You may call [phone] with any questions or concerns or if you need to cancel/reschedule appointment.      Follow-Up Appointment Instructions     Follow Up       Please follow up with Dr. Monroy on Wednesday 10/18/17 at 1:30pm for a routine post-hospital check and follow-up of hematuria    [facility]  [address] [place] [address], [address]  [place]. [address]    You may call  [phone] with any questions or concerns or if you need to cancel/reschedule appointment.       Follow Up and recommended labs and tests       Follow up with custodial physician.  The following labs/tests are recommended: cbc/bmp.  Follow up with primary care provider.   Follow up with psychiatry.             Statement of Approval     Ordered          09/18/17 1423  I have reviewed and agree with all the recommendations and orders detailed in this document.  EFFECTIVE NOW     Approved and electronically signed by:  Chino Angel DO

## 2017-08-22 NOTE — ED NOTES
"[facility]  ED Nurse Handoff Report    ED Chief complaint: Chest Pain (Sharp left CP for one week, worse with deep insp. Concurrent SOB feeling. )      ED Diagnosis:   Final diagnoses:   Pneumonia of left lower lobe due to infectious organism (H)   Acute chest pain   Dehydration       Code Status: Full Code    Allergies:   Allergies   Allergen Reactions     Compazine [Prochlorperazine]        Activity level - Baseline/Home:  Independent    Activity Level - Current:   Independent     Needed?: No    Isolation: No  Infection: Not Applicable    Bariatric?: No    Vital Signs:   Vitals:    08/22/17 0114 08/22/17 0245 08/22/17 0300 08/22/17 0330   BP:   103/58 96/73   Pulse:       Resp: 18 22 23 16   Temp:   97.7  F (36.5  C)    TempSrc:   Oral    SpO2: 94% 93%  93%   Weight:       Height:           Cardiac Rhythm: ,        Pain level:      Is this patient confused?: No    Patient Report: Initial Complaint: Pt is a 52 year old male who presents to ED today for evaluation of sharp left-sided chest pain that is made worse with deep inspiration. The patient notes that he began having left lower quadrant abdominal pain starting about 10 days ago, but states that the pain moved up into his left chest with some shortness of breath. He notes an \"orange-red urine\" and that his urine output is less. Pt with hx of diverticulitis and kidney stones.   Focused Assessment: Pt c/o chest and abd pain, deneis n/v/d. Denies SOB but appears lethargic with SPO2 88-89% with snoring respirations. Pt diaphoretic.   Tests Performed: blood, abd/pelvic CT, CXR, EKG  Abnormal Results: WBC 15.8, sodium 132, cre 2.31, d-dimer 0.7, cxr question of pnuemonia. Pt unable to have CT chest with contrast for PE study r/t to high cre.   Treatments provided: IV bolus, IV abx, IV heparin     Family Comments: No family present     OBS brochure/video discussed/provided to patient: N/A    ED Medications:   Medications   0.9% sodium " chloride BOLUS (0 mLs Intravenous Stopped 8/22/17 0112)     Followed by   0.9% sodium chloride BOLUS (0 mLs Intravenous Stopped 8/22/17 0314)     Followed by   0.9% sodium chloride infusion (not administered)   cefTRIAXone (ROCEPHIN) 2 g vial to attach to  ml bag for ADULTS or NS 50 ml bag for PEDS (2 g Intravenous New Bag 8/22/17 0324)   azithromycin (ZITHROMAX) 500 mg in NaCl 0.9 % 250 mL intermittent infusion (not administered)   heparin infusion 25,000 units in 0.45% NaCl 250 mL (1,700 Units/hr Intravenous New Bag 8/22/17 0324)   heparin Loading Dose bolus dose from infusion pump 7,000 Units (7,000 Units Intravenous Given 8/22/17 0324)       Drips infusing?:  No      ED NURSE PHONE NUMBER: *[phone]

## 2017-08-22 NOTE — IP AVS SNAPSHOT
"          [place] [address] [facility]: [phone]                                              INTERAGENCY TRANSFER FORM - LAB / IMAGING / EKG / EMG RESULTS   2017                    Hospital Admission Date: 2017  PATRICK AHUJA   : 1965  Sex: Male        Attending Provider: Humberto Wilkins MD     Allergies:  Compazine [Prochlorperazine]    Infection:  MRSA-Contact Isolation   Service:  HOSPITALIST    Ht:  1.854 m (6' 0.99\")   Wt:  91.1 kg (200 lb 13.4 oz)   Admission Wt:  113.4 kg (250 lb)    BMI:  26.5 kg/m 2   BSA:  2.17 m 2            Patient PCP Information     Provider PCP Type    No Ref-Primary Verified General         Lab Results - 3 Days      Nocardia culture - BAL Site 1 [[id number]]  Resulted: 17 142, Result status: Preliminary result    Ordering provider: Humberto Wilkins MD  17 Resulting lab: [facility]    Specimen Information    Type Source Collected On   Bronchial lavage  17   Comment:  Lingula          Components       Value Reference Range Flag Lab   Specimen Description Bronchial lavage Lingula      Culture Micro No growth after 25 days   75            Nocardia culture - BAL Site 2 [[id number]]  Resulted: 17 1427, Result status: Preliminary result    Ordering provider: Humberto Wilkins MD  1746 Resulting lab: [facility]    Specimen Information    Type Source Collected On   Bronchial lavage  17   Comment:  Right upper lobe          Components       Value Reference Range Flag Lab   Specimen Description Bronchial lavage Right upper lobe      Culture Micro No growth after 25 days   75            Procalcitonin [[id number]]  Resulted: 17 1249, Result status: Final result    Ordering provider: Stuart Ramon MD  17 0911 Resulting lab: [facility]    Specimen Information    Type Source Collected " On     09/18/17 0911          Components       Value Reference Range Flag Lab   Procalcitonin <0.05 ng/ml  FrStHsLb   Comment:         <0.05 ng/ml  Normal  Recommendation: Very low risk of bacterial infection.   Discourage antibiotics unless strong clinical suspicion for serious infection.              Basic metabolic panel [[id number]] (Abnormal)  Resulted: 09/18/17 0937, Result status: Final result    Ordering provider: Stuart Ramon MD  09/18/17 0000 Resulting lab: [facility]    Specimen Information    Type Source Collected On   Blood  09/18/17 0911          Components       Value Reference Range Flag Lab   Sodium 137 133 - 144 mmol/L  FrStHsLb   Potassium 4.0 3.4 - 5.3 mmol/L  FrStHsLb   Chloride 106 94 - 109 mmol/L  FrStHsLb   Carbon Dioxide 22 20 - 32 mmol/L  FrStHsLb   Anion Gap 9 3 - 14 mmol/L  FrStHsLb   Glucose 133 70 - 99 mg/dL H FrStHsLb   Urea Nitrogen 20 7 - 30 mg/dL  FrStHsLb   Creatinine 1.05 0.66 - 1.25 mg/dL  FrStHsLb   GFR Estimate 74 >60 mL/min/1.7m2  FrStHsLb   Comment:  Non  GFR Calc   GFR Estimate If Black 89 >60 mL/min/1.7m2  FrStHsLb   Comment:  African American GFR Calc   Calcium 9.0 8.5 - 10.1 mg/dL  FrStHsLb            Glucose by meter [[id number]] (Abnormal)  Resulted: 09/17/17 0841, Result status: Final result    Ordering provider: Marcos Esquivel MD  09/17/17 0830 Resulting lab: PO[facility] OF CARE TEST, GLUCOSE    Specimen Information    Type Source Collected On     09/17/17 0830          Components       Value Reference Range Flag Lab   Glucose 110 70 - 99 mg/dL H 170            Basic metabolic panel [[id number]] (Abnormal)  Resulted: 09/17/17 0838, Result status: Final result    Ordering provider: Stuart Ramon MD  09/17/17 0000 Resulting lab: [facility]    Specimen Information    Type Source Collected On   Blood  09/17/17 0805          Components       Value Reference Range Flag Lab   Sodium 138 133 - 144 mmol/L  FrStHsLb   Potassium 3.9  3.4 - 5.3 mmol/L  FrStHsLb   Chloride 105 94 - 109 mmol/L  FrStHsLb   Carbon Dioxide 22 20 - 32 mmol/L  FrStHsLb   Anion Gap 11 3 - 14 mmol/L  FrStHsLb   Glucose 112 70 - 99 mg/dL H FrStHsLb   Urea Nitrogen 20 7 - 30 mg/dL  FrStHsLb   Creatinine 1.06 0.66 - 1.25 mg/dL  FrStHsLb   GFR Estimate 73 >60 mL/min/1.7m2  FrStHsLb   Comment:  Non  GFR Calc   GFR Estimate If Black 88 >60 mL/min/1.7m2  FrStHsLb   Comment:  African American GFR Calc   Calcium 9.6 8.5 - 10.1 mg/dL  FrStHsLb            Platelet count [[id number]]  Resulted: 09/17/17 0831, Result status: Final result    Ordering provider: Stuart Ramon MD  09/17/17 0725 Resulting lab: [facility]    Specimen Information    Type Source Collected On   Blood  09/17/17 0805          Components       Value Reference Range Flag Lab   Platelet Count 374 150 - 450 10e9/L  FrStHsLb            Glucose by meter [[id number]] (Abnormal)  Resulted: 09/16/17 1241, Result status: Final result    Ordering provider: Marcos Esquivel MD  09/16/17 1230 Resulting lab: POINT OF CARE TEST, GLUCOSE    Specimen Information    Type Source Collected On     09/16/17 1230          Components       Value Reference Range Flag Lab   Glucose 103 70 - 99 mg/dL H 170            Glucose by meter [[id number]] (Abnormal)  Resulted: 09/16/17 0915, Result status: Final result    Ordering provider: Marcos Esquivel MD  09/16/17 0850 Resulting lab: POINT OF CARE TEST, GLUCOSE    Specimen Information    Type Source Collected On     09/16/17 0850          Components       Value Reference Range Flag Lab   Glucose 124 70 - 99 mg/dL H 170            Basic metabolic panel [[id number]] (Abnormal)  Resulted: 09/16/17 0853, Result status: Final result    Ordering provider: Stuart Ramon MD  09/16/17 0713 Resulting lab: [facility]    Specimen Information    Type Source Collected On   Blood  09/16/17 0740          Components       Value Reference Range Flag Lab    Sodium 136 133 - 144 mmol/L  FrStHsLb   Potassium 3.6 3.4 - 5.3 mmol/L  FrStHsLb   Chloride 103 94 - 109 mmol/L  FrStHsLb   Carbon Dioxide 23 20 - 32 mmol/L  FrStHsLb   Anion Gap 10 3 - 14 mmol/L  FrStHsLb   Glucose 120 70 - 99 mg/dL H FrStHsLb   Urea Nitrogen 23 7 - 30 mg/dL  FrStHsLb   Creatinine 1.06 0.66 - 1.25 mg/dL  FrStHsLb   GFR Estimate 73 >60 mL/min/1.7m2  FrStHsLb   Comment:  Non  GFR Calc   GFR Estimate If Black 88 >60 mL/min/1.7m2  FrStHsLb   Comment:  African American GFR Calc   Calcium 9.4 8.5 - 10.1 mg/dL  FrStHsLb            Glucose by meter [[id number]] (Abnormal)  Resulted: 09/16/17 0425, Result status: Final result    Ordering provider: Marcos Esquivel MD  09/16/17 0421 Resulting lab: POINT OF CARE TEST, GLUCOSE    Specimen Information    Type Source Collected On     09/16/17 0421          Components       Value Reference Range Flag Lab   Glucose 124 70 - 99 mg/dL H 170            Glucose by meter [[id number]]  Resulted: 09/16/17 0121, Result status: Final result    Ordering provider: Marcos Esquivel MD  09/16/17 0114 Resulting lab: POINT OF CARE TEST, GLUCOSE    Specimen Information    Type Source Collected On     09/16/17 0114          Components       Value Reference Range Flag Lab   Glucose 94 70 - 99 mg/dL  170            Glucose by meter [[id number]] (Abnormal)  Resulted: 09/15/17 2111, Result status: Final result    Ordering provider: Marcos Esquivel MD  09/15/17 2105 Resulting lab: POINT OF CARE TEST, GLUCOSE    Specimen Information    Type Source Collected On     09/15/17 2105          Components       Value Reference Range Flag Lab   Glucose 121 70 - 99 mg/dL H 170            Glucose by meter [[id number]]  Resulted: 09/15/17 1706, Result status: Final result    Ordering provider: Marcos Esquivel MD  09/15/17 1701 Resulting lab: POINT OF CARE TEST, GLUCOSE    Specimen Information    Type Source Collected On     09/15/17 1701          Components        Value Reference Range Flag Lab   Glucose 92 70 - 99 mg/dL  170            Glucose by meter [[id number]] (Abnormal)  Resulted: 09/15/17 1326, Result status: Final result    Ordering provider: Marcos Esquivel MD  09/15/17 1317 Resulting lab: POINT OF CARE TEST, GLUCOSE    Specimen Information    Type Source Collected On     09/15/17 1317          Components       Value Reference Range Flag Lab   Glucose 126 70 - 99 mg/dL H 170            Glucose by meter [[id number]]  Resulted: 09/15/17 0930, Result status: Final result    Ordering provider: Marcos Esquivel MD  09/15/17 0904 Resulting lab: POINT OF CARE TEST, GLUCOSE    Specimen Information    Type Source Collected On     09/15/17 0904          Components       Value Reference Range Flag Lab   Glucose 94 70 - 99 mg/dL  170            Basic metabolic panel [[id number]]  Resulted: 09/15/17 0742, Result status: Final result    Ordering provider: Silvia Devine MD  09/15/17 0000 Resulting lab: [facility]    Specimen Information    Type Source Collected On   Blood  09/15/17 0645          Components       Value Reference Range Flag Lab   Sodium 141 133 - 144 mmol/L  FrStHsLb   Potassium 4.2 3.4 - 5.3 mmol/L  FrStHsLb   Chloride 107 94 - 109 mmol/L  FrStHsLb   Carbon Dioxide 24 20 - 32 mmol/L  FrStHsLb   Anion Gap 10 3 - 14 mmol/L  FrStHsLb   Glucose 94 70 - 99 mg/dL  FrStHsLb   Urea Nitrogen 20 7 - 30 mg/dL  FrStHsLb   Creatinine 1.04 0.66 - 1.25 mg/dL  FrStHsLb   GFR Estimate 75 >60 mL/min/1.7m2  FrStHsLb   Comment:  Non  GFR Calc   GFR Estimate If Black >90 >60 mL/min/1.7m2  FrStHsLb   Comment:  African American GFR Calc   Calcium 9.0 8.5 - 10.1 mg/dL  FrStHsLb            Glucose by meter [[id number]]  Resulted: 09/15/17 0505, Result status: Final result    Ordering provider: Marcos Esquivel MD  09/15/17 0459 Resulting lab: POINT OF CARE TEST, GLUCOSE    Specimen Information    Type Source Collected On     09/15/17  0455          Components       Value Reference Range Flag Lab   Glucose 87 70 - 99 mg/dL  170            Testing Performed By     Lab - Abbreviation Name Director Address Valid Date Range    14 - FrStHsLb [facility] Unknown [address] [place] [address] 05/08/15 1057 - Present    75 - Unknown [facility] Unknown [address] [facility] [address] 01/15/15 1019 - Present    170 - Unknown POINT OF CARE TEST, GLUCOSE Unknown Unknown 10/31/11 1114 - Present            Unresulted Labs (24h ago through future)    Start       Ordered    09/19/17 0600  CBC with platelets  AM DRAW,   Routine     Comments:  Last Lab Result: Hemoglobin (g/dL)       Date                     Value                 09/11/2017               11.8 (L)         ----------    09/18/17 1243    09/19/17 0600  Basic metabolic panel  AM DRAW,   Routine      09/18/17 1243    09/19/17 0600  Creatinine  EVERY THREE DAYS,   Routine      09/18/17 1248    09/18/17 1230  UA with Microscopic reflex to Culture  (LAB Urine Testing ADULT PANEL - UU,UR,RH,SH,PH,WY,HI)  ROUTINE,   Routine      09/18/17 1224    09/18/17 0600  Prealbumin  EVERY MONDAY,   Routine     Comments:  Every Monday while on enteral tube feeding.    09/11/17 0633    09/17/17 0600  Basic metabolic panel  DAILY,   Routine      09/16/17 0712    09/11/17 0645  Magnesium  EVERY MONDAY,   Routine     Comments:  Every Monday while on enteral tube feeding.    09/11/17 0641    09/11/17 0645  Phosphorus  EVERY MONDAY,   Routine     Comments:  Every Monday while on enteral tube feeding.    09/11/17 0641    Unscheduled  Blood gas arterial and oxyhgb  CONDITIONAL (SPECIFY),   Routine     Comments:  STAT PRN at RT/RN discretion.    08/23/17 1124    Unscheduled  Blood gas venous and oxyhgb  CONDITIONAL (SPECIFY),   Routine     Comments:  STAT PRN as needed at  RT/RN discretion.  PICC line Draw preferred    08/23/17 1124    Unscheduled  Blood gas blood with oxy   "CONDITIONAL X 3,   Routine     Comments:  Release order if patient in respiratory distress and Notify Provider.    08/23/17 1125    Unscheduled  Glucose  CONDITIONAL X 3,   Routine     Comments:  Release order if patient experiencing hyperglycemia or hypoglycemia symptoms and Notify Provider.    08/23/17 1125    Unscheduled  Hemoglobin  CONDITIONAL X 3,   Routine     Comments:  Release order if patient experiencing active bleeding and/or hypotension and Notify Provider.    08/23/17 1125    Unscheduled  Potassium  (Potassium Replacement - \"Standard\" - For K levels less than 3.4 mmol/L - UU,UR,UA,RH,SH,PH,WY )  CONDITIONAL (SPECIFY),   Routine     Comments:  Obtain Potassium Level for these conditions:  *IF no potassium result within 24 hours before initiation of order set, draw potassium level with next lab collect.    *2 HOURS AFTER last IV potassium replacement dose and 4 hours after an oral replacement dose.  *Next morning after potassium dose.     Repeat Potassium Replacement if necessary.    08/26/17 2350         Imaging Results - 3 Days      XR Chest Port 1 View [[id number]]  Resulted: 09/18/17 1314, Result status: Final result    Ordering provider: Chino Angel DO  09/18/17 1119 Resulted by: Bill Hernández MD    Performed: 09/18/17 1153 - 09/18/17 1205 Resulting lab: RADIOLOGY RESULTS    Narrative:       XR CHEST PORT 1 VW 9/18/2017 12:05 PM    COMPARISON: 9/4/2017    HISTORY: Pneumonia.      Impression:       IMPRESSION: Mildly enlarged cardiac silhouette is again seen and  unchanged. Scarring in the left midlung is again seen. Mild mixed  interstitial and airspace opacities over both lungs appear slightly  decreased since comparison study. No significant pleural effusion. No  pneumothorax.    BILL HERNÁNDEZ MD      Testing Performed By     Lab - Abbreviation Name Director Address Valid Date Range    104 - Rad Rslts RADIOLOGY RESULTS Unknown Unknown 02/16/05 1553 - Present             "   ECG/EMG Results      Echo Complete with Lumason [[id number]]  Resulted: 17 1414, Result status: Edited Result - FINAL    Ordering provider: Fabiano Hughes MD  17 1700 Resulted by: Yordy Barriga MD    Performed: 17 1447 - 17 1448 Resulting lab: RADIOLOGY RESULTS    Narrative:       [id number]  [facility]91  [id number]  [id number]^SAUL^FABIANO^ROCIO           [facility]  Echocardiography Laboratory  [address] [place] [address]        Name: PATRICK AHUJA  MRN: [id number]  : 1965  Study Date: 2017 02:14 PM  Age: 52 yrs  Gender: Male  Patient Location: [facility]  Reason For Study: LBBB  Ordering Physician: FABIANO HUGHES  Referring Physician: FABIANO HUGHES  Performed By: Zayda Osborne RDCS     BSA: 2.4 m2  Height: 73 in  Weight: 254 lb  HR: 95  BP: 103/52 mmHg  _____________________________________________________________________________  __        Procedure  Complete Echo Adult. Contrast Lumason.  _____________________________________________________________________________  __        Interpretation Summary     The rhythm was sinus with wide QRS/LBBB.  The left ventricle is normal in size.  The left ventricular ejection fraction is normal.  The visual ejection fraction is estimated at 55-60%.  Septal motion is consistent with conduction abnormality.  Grade I or early diastolic dysfunction.  The right ventricular systolic function is normal.  Imaging of all four valves was difficult but they appear grossly normal in  structure and function.     There are sclerotic lesions in the liver the seem to be intra vascular. There  is blood flow in and around these lesions, significance unclear. Reported to  the ICU.  _____________________________________________________________________________  __        Left Ventricle  The left ventricle is normal in size. There is normal left ventricular wall  thickness. The left ventricular ejection fraction  is normal. The visual  ejection fraction is estimated at 55-60%. Grade I or early diastolic  dysfunction. E by E prime ratio is less than 8, that likely suggests normal  left ventricular filling pressures. Septal motion is consistent with  conduction abnormality. There is no thrombus seen in the left ventricle.     Right Ventricle  Normal right ventricle structure and size. The right ventricular systolic  function is normal. Hyperdynamic right ventricular function.     Atria  Normal left atrial size. Right atrial size is normal. Intact atrial septum.     Mitral Valve  The mitral valve is normal in structure and function. There is no evidence of  mitral valve prolapse. There is no mitral regurgitation noted. There is no  mitral valve stenosis.        Tricuspid Valve  The tricuspid valve is normal in structure and function. There is physiologic  tricuspid regurgitation. Right ventricular systolic pressure could not be  approximated due to inadequate tricuspid regurgitation.     Aortic Valve  The aortic valve is normal in structure and function. No aortic regurgitation  is present. No aortic stenosis is present.     Pulmonic Valve  The pulmonic valve is not well seen, but is grossly normal. There is trace  pulmonic valvular regurgitation.     Vessels  Normal size aorta. The inferior vena cava was dilated at 2.7 cm without  respiratory variability, consistent with increased right atrial pressure.     Pericardium  The pericardium appears normal. There is no pleural effusion.        Rhythm  The rhythm was sinus with wide QRS.  _____________________________________________________________________________  __  MMode/2D Measurements & Calculations  IVSd: 1.1 cm     LVIDd: 4.8 cm  LVIDs: 3.5 cm  LVPWd: 1.1 cm  FS: 27.7 %  EDV(Teich): 109.7 ml  ESV(Teich): 50.9 ml  LV mass(C)d: 201.3 grams  LV mass(C)dI: 84.5 grams/m2  Ao root diam: 4.1 cm  LA dimension: 3.6 cm  asc Aorta Diam: 3.1 cm  LA/Ao: 0.88  LVOT diam: 2.2 cm  LVOT  area: 3.7 cm2  LA Volume (BP): 58.4 ml  LA Volume Index (BP): 24.5 ml/m2           Doppler Measurements & Calculations  MV E max tom: 69.5 cm/sec  MV A max tom: 82.6 cm/sec  MV E/A: 0.84  MV max PG: 3.3 mmHg  MV mean P.0 mmHg  MV V2 VTI: 21.3 cm  MV dec time: 0.34 sec  Lateral E/e': 6.9  Medial E/e': 7.3           _____________________________________________________________________________  __           Report approved by: Clyde Donovan 2017 03:31 PM       1    Type Source Collected On     17 1414          View Image (below)        Echocardiogram Complete [[id number]]  Resulted: 17 1447, Result status: In process    Ordering provider: Humberto Wilkins MD  17 1700 Performed: 17 1447 - 17 144    Resulting lab: [facility]                   Encounter-Level Documents:     There are no encounter-level documents.      Order-Level Documents:     There are no order-level documents.

## 2017-08-22 NOTE — PROGRESS NOTES
HOSPITALIST NOTE    Patient was admitted earlier today by Dr. Esquivel.    I met with Mr. Mireles and his RN this PM and reviewed his imaging studies and labs.    He remains hypoxic and his exam suggests dimished breath sounds at the left base.     His VQ scan suggests intermediate probabilty for PE.     His procalcitonin is also elevated suggesting possible infection.    He appears to be in acute versus chronic renal failure      We will hold all nephrotoxic agents and nephro-active agents. Hydrate with IVF.     Continue heparin gtts and present abx.    If kidney function significantly improves, a CT scan of the chest could be performed with contrast to help discern PE versus pneumonia.     We will also ask pulmonary to evaluate him.    WERNER COMBS MD

## 2017-08-22 NOTE — IP AVS SNAPSHOT
[place] [address] [facility]    [address] [place] [address]    Phone:  [phone]                                       After Visit Summary   8/22/2017    Niko Mireles    MRN: [id number]           After Visit Summary Signature Page     I have received my discharge instructions, and my questions have been answered. I have discussed any challenges I see with this plan with the nurse or doctor.    ..........................................................................................................................................  Patient/Patient Representative Signature      ..........................................................................................................................................  Patient Representative Print Name and Relationship to Patient    ..................................................               ................................................  Date                                            Time    ..........................................................................................................................................  Reviewed by Signature/Title    ...................................................              ..............................................  Date                                                            Time

## 2017-08-22 NOTE — IP AVS SNAPSHOT
` ` Patient Information     Patient Name Sex     Niko Mireles ([id number]) Male 1965       Room Bed    6629 6629-01      Patient Demographics     Address Phone    [address]  [facility] [address] [phone] (Home)  [phone] (Work)  [phone] (Mobile)      Patient Ethnicity & Race     Ethnic Group Patient Race     White      Emergency Contact(s)     Name Relation Home Work Mobile    Lual Mireles Spouse [phone]     Tressa Garg Sister   [phone]    Cheri Irwin Sister   [phone]    diego Sister   [phone]      Documents on File        Status Date Received Description       Documents for the Patient    Affiliate Privacy placeholder   phase3    Consent for EHR Access Received 17     Insurance Card Received 17     External Medication Information Consent       Patient ID Received 17     [facility] Specified Other       Consent for Services/Privacy Notice - Hospital/Clinic Received 17     Privacy Notice - [place] Received 17        Documents for the Encounter    CMS IM for Patient Signature         Admission Information     Attending Provider Admitting Provider Admission Type Admission Date/Time    Humberto Wilkins MD Esquivel, Marcos Pedro MD Emergency 17  0001    Discharge Date Hospital Service [facility]Cert [facility]    [facility] Room/Bed Admission Status       [address] UN[address] 6629/6629-01 Admission (Confirmed)       Admission     Complaint    Community acquired bacterial pneumonia, resp distress      Hospital Account     Name Acct ID Class Status Primary Coverage    LuzmaritoNiko [id number] Inpatient Open [facility] OPEN ACCESS FULLY INSURED            Guarantor Account (for Hospital Account #[id number])     Name Relation to Pt Service Area Active? Acct Type    Niko Mireles Self FCS Yes Personal/Family    Address Phone           [address] [place] [id number] [phone](H)  [phone](O)              Coverage Information (for Hospital Account #[id number])     F/O Payor/Plan Precert #    [employer] OPEN ACCESS FULLY INSURED     Subscriber Subscriber #    Niko Mireles [id number]    Address Phone    [address]  [place] [address] [phone]

## 2017-08-22 NOTE — IP AVS SNAPSHOT
MRN:[id number]                      After Visit Summary   8/22/2017    Niko Mireles    MRN: [id number]           Thank you!     Thank you for choosing [facility] for your care. Our goal is always to provide you with excellent care. Hearing back from our patients is one way we can continue to improve our services. Please take a few minutes to complete the written survey that you may receive in the mail after you visit with us. Thank you!        Patient Information     Date Of Birth          1965        Designated Caregiver       Most Recent Value    Caregiver    Will someone help with your care after discharge? yes    Name of designated caregiver Lula    Phone number of caregiver [phone]    Caregiver address same as patient       About your hospital stay     You were admitted on:  August 22, 2017 You last received care in the:  [facility] [address] [facility]    You were discharged on:  September 18, 2017        Reason for your hospital stay       Pneumonia                  Who to Call     For medical emergencies, please call 911.  For non-urgent questions about your medical care, please call your primary care provider or clinic, None  For questions related to your surgery, please call your surgery clinic        Attending Provider     Provider Specialty    Jovany Kline MD Emergency Medicine    Alvin, Marcos Pedro MD Internal Medicine    Garrett Beltrán MD Internal Medicine    Claudette, Humberto Sampson MD Critical Care Medicine       Primary Care Provider    No Ref-Primary Verified      After Care Instructions     Activity - Up with nursing assistance           Advance Diet as Tolerated       Follow this diet upon discharge: Orders Placed This Encounter  Regular Diet Adult            Fall precautions           General info for SNF       Length of Stay Estimate: Short Term Care: Estimated # of Days <30  Condition at Discharge: Improving  Level of care:skilled    Rehabilitation Potential: Good  Admission H&P remains valid and up-to-date: Yes  Recent Chemotherapy: N/A  Use Nursing Home Standing Orders: Yes            Mantoux instructions       Give two-step Mantoux (PPD) Per Facility Policy Yes                  Follow-up Appointments     Follow Up       Please follow up with Dr. Monroy on Wednesday 10/18/17 at 1:30pm for a routine post-hospital check and follow-up of hematuria    [facility]  [address] [place] [address], [address]  [place]. [address]    You may call [phone] with any questions or concerns or if you need to cancel/reschedule appointment.            Follow Up and recommended labs and tests       Follow up with [facility] physician.  The following labs/tests are recommended: cbc/bmp.  Follow up with primary care provider.   Follow up with psychiatry.                  Additional Services     Occupational Therapy Adult Consult       Evaluate and treat as clinically indicated.    Reason:  Physical deconditioning.            Physical Therapy Adult Consult       Evaluate and treat as clinically indicated.    Reason:  Physical deconditioning.            Speech Language Path Adult Consult       Evaluate and treat as clinically indicated.    Reason:  Dysphagia.                  Further instructions from your care team       Discharge to [facility], phone [phone]    Pending Results     Date and Time Order Name Status Description    9/11/2017 0628 CBC with platelets In process     9/11/2017 0628 Prealbumin In process     9/11/2017 0628 Phosphorus In process     9/11/2017 0628 Magnesium In process     9/11/2017 0628 Comprehensive metabolic panel In process     8/24/2017 0946 Nocardia culture - BAL Site 2 Preliminary     8/24/2017 0946 Fungus Culture, non-blood - BAL Site 2 Preliminary     8/24/2017 0946 Nocardia culture - BAL Site 1 Preliminary     8/24/2017 0946 Fungus Culture, non-blood - BAL Site 1 Preliminary     8/24/2017 0946 AFB  "Culture Non Blood -- BAL Site 1 Preliminary     2017 1548 Fungus Culture, non-blood Preliminary             Statement of Approval     Ordered          17 1423  I have reviewed and agree with all the recommendations and orders detailed in this document.  EFFECTIVE NOW     Approved and electronically signed by:  Chino Angel DO             Admission Information     Date & Time Provider Department Dept. Phone    2017 Humberto Wilkins MD [facility] [address] [facility] [phone]      Your Vitals Were     Blood Pressure Pulse Temperature Respirations Height Weight    112/68 (BP Location: Left arm) 96 98.6  F (37  C) (Oral) 18 1.854 m (6' 0.99\") 91.1 kg (200 lb 13.4 oz)    Pulse Oximetry BMI (Body Mass Index)                95% 26.5 kg/m2          [employer] Information     [employer] lets you send messages to your doctor, view your test results, renew your prescriptions, schedule appointments and more. To sign up, go to [email or web address] . Click on \"Log in\" on the left side of the screen, which will take you to the Welcome page. Then click on \"Sign up Now\" on the right side of the page.     You will be asked to enter the access code listed below, as well as some personal information. Please follow the directions to create your username and password.     Your access code is: [email or web address]  Expires: 2017  3:39 PM     Your access code will  in 90 days. If you need help or a new code, please call your [facility] clinic or [phone].        Care EveryWhere ID     This is your Care EveryWhere ID. This could be used by other organizations to access your [facility] medical records  [id number]        Equal Access to Services     [facility] CIERA : Jose High, chuy benitez, feliberto kaalmada betty, nii stone. So [facility] [phone].    ATENCIÓN: Si habla español, tiene a qiu disposición servicios gratuitos de " asistencia lingüística. Alise al [phone].    We comply with applicable federal civil rights laws and [place] laws. We do not discriminate on the basis of race, color, national origin, age, disability sex, sexual orientation or gender identity.               Review of your medicines      START taking        Dose / Directions    amLODIPine 10 MG tablet   Commonly known as:  NORVASC   Used for:  Pneumonia of left lower lobe due to infectious organism (H)        Dose:  10 mg   Start taking on:  9/19/2017   Take 1 tablet (10 mg) by mouth daily   Quantity:  30 tablet   Refills:  0       * apixaban ANTICOAGULANT 5 MG tablet   Commonly known as:  ELIQUIS   Used for:  Pneumonia of left lower lobe due to infectious organism (H)        Dose:  10 mg   Take 2 tablets (10 mg) by mouth 2 times daily for 7 days   Quantity:  28 tablet   Refills:  0       * apixaban ANTICOAGULANT 5 MG tablet   Commonly known as:  ELIQUIS   Used for:  Pneumonia of left lower lobe due to infectious organism (H)        Dose:  5 mg   Start taking on:  9/25/2017   Take 1 tablet (5 mg) by mouth 2 times daily   Quantity:  30 tablet   Refills:  0       mirtazapine 15 MG ODT tab   Commonly known as:  REMERON SOL-TAB   Used for:  Pneumonia of left lower lobe due to infectious organism (H)   Replaces:  REMERON PO        Dose:  15 mg   Take 1 tablet (15 mg) by mouth At Bedtime   Quantity:  30 tablet   Refills:  0       OLANZapine 2.5 MG tablet   Commonly known as:  zyPREXA   Used for:  Pneumonia of left lower lobe due to infectious organism (H)        Dose:  2.5 mg   Take 1 tablet (2.5 mg) by mouth At Bedtime   Quantity:  60 tablet   Refills:  0       pantoprazole 40 MG EC tablet   Commonly known as:  PROTONIX   Used for:  Pneumonia of left lower lobe due to infectious organism (H)        Dose:  40 mg   Take 1 tablet (40 mg) by mouth every morning   Quantity:  30 tablet   Refills:  0       QUEtiapine 25 MG tablet   Commonly known as:  SEROquel   Used for:   Pneumonia of left lower lobe due to infectious organism (H)        Dose:  25 mg   Take 1 tablet (25 mg) by mouth every 6 hours as needed (agitation)   Quantity:  60 tablet   Refills:  0       * Notice:  This list has 2 medication(s) that are the same as other medications prescribed for you. Read the directions carefully, and ask your doctor or other care provider to review them with you.      CONTINUE these medicines which may have CHANGED, or have new prescriptions. If we are uncertain of the size of tablets/capsules you have at home, strength may be listed as something that might have changed.        Dose / Directions    BUSPAR PO   This may have changed:  Another medication with the same name was removed. Continue taking this medication, and follow the directions you see here.        Dose:  30 mg   Take 30 mg by mouth 2 times daily Also see dinner dose   Refills:  0       clonazePAM 0.5 MG tablet   Commonly known as:  klonoPIN   This may have changed:    - medication strength  - how much to take  - when to take this   Used for:  Pneumonia of left lower lobe due to infectious organism (H)        Dose:  0.5 mg   Take 1 tablet (0.5 mg) by mouth 2 times daily   Quantity:  30 tablet   Refills:  0       FLUoxetine 20 MG capsule   Commonly known as:  PROzac   This may have changed:    - medication strength  - when to take this  - additional instructions   Used for:  Pneumonia of left lower lobe due to infectious organism (H)        Dose:  20 mg   Take 1 capsule (20 mg) by mouth daily   Quantity:  30 capsule   Refills:  0         CONTINUE these medicines which have NOT CHANGED        Dose / Directions    VITAMIN D (CHOLECALCIFEROL) PO        Dose:  1000 Units   Take 1,000 Units by mouth daily   Refills:  0         STOP taking     DETROL LA PO           ELAVIL PO           fluticasone 50 MCG/ACT spray   Commonly known as:  FLONASE           LISINOPRIL PO           NAPROXEN PO           NEURONTIN PO           OMEGA 3 PO            OMEPRAZOLE PO           REMERON PO   Replaced by:  mirtazapine 15 MG ODT tab           SILDENAFIL CITRATE PO                Where to get your medicines      These medications were sent to [facility] [place], [place] - [address]  [address] [place] [address]     Phone:  [phone]     amLODIPine 10 MG tablet    apixaban ANTICOAGULANT 5 MG tablet    apixaban ANTICOAGULANT 5 MG tablet    FLUoxetine 20 MG capsule    mirtazapine 15 MG ODT tab    OLANZapine 2.5 MG tablet    pantoprazole 40 MG EC tablet    QUEtiapine 25 MG tablet         Some of these will need a paper prescription and others can be bought over the counter. Ask your nurse if you have questions.     Bring a paper prescription for each of these medications     clonazePAM 0.5 MG tablet                Protect others around you: Learn how to safely use, store and throw away your medicines at www.disposemymeds.org.             Medication List: This is a list of all your medications and when to take them. Check marks below indicate your daily home schedule. Keep this list as a reference.      Medications           Morning Afternoon Evening Bedtime As Needed    amLODIPine 10 MG tablet   Commonly known as:  NORVASC   Take 1 tablet (10 mg) by mouth daily   Start taking on:  9/19/2017   Last time this was given:  10 mg on 9/18/2017  9:40 AM                                * apixaban ANTICOAGULANT 5 MG tablet   Commonly known as:  ELIQUIS   Take 2 tablets (10 mg) by mouth 2 times daily for 7 days   Last time this was given:  10 mg on 9/18/2017  1:58 PM                                * apixaban ANTICOAGULANT 5 MG tablet   Commonly known as:  ELIQUIS   Take 1 tablet (5 mg) by mouth 2 times daily   Start taking on:  9/25/2017   Last time this was given:  10 mg on 9/18/2017  1:58 PM                                BUSPAR PO   Take 30 mg by mouth 2 times daily Also see dinner dose   Last time this was given:  30 mg on 9/18/2017   9:39 AM                                clonazePAM 0.5 MG tablet   Commonly known as:  klonoPIN   Take 1 tablet (0.5 mg) by mouth 2 times daily   Last time this was given:  0.5 mg on 9/18/2017  9:39 AM                                FLUoxetine 20 MG capsule   Commonly known as:  PROzac   Take 1 capsule (20 mg) by mouth daily   Last time this was given:  20 mg on 9/18/2017  9:40 AM                                mirtazapine 15 MG ODT tab   Commonly known as:  REMERON SOL-TAB   Take 1 tablet (15 mg) by mouth At Bedtime   Last time this was given:  15 mg on 9/17/2017 10:17 PM                                OLANZapine 2.5 MG tablet   Commonly known as:  zyPREXA   Take 1 tablet (2.5 mg) by mouth At Bedtime   Last time this was given:  2.5 mg on 9/17/2017 10:17 PM                                pantoprazole 40 MG EC tablet   Commonly known as:  PROTONIX   Take 1 tablet (40 mg) by mouth every morning   Last time this was given:  40 mg on 9/18/2017  9:40 AM                                QUEtiapine 25 MG tablet   Commonly known as:  SEROquel   Take 1 tablet (25 mg) by mouth every 6 hours as needed (agitation)   Last time this was given:  25 mg on 9/16/2017  2:06 AM                                VITAMIN D (CHOLECALCIFEROL) PO   Take 1,000 Units by mouth daily                                * Notice:  This list has 2 medication(s) that are the same as other medications prescribed for you. Read the directions carefully, and ask your doctor or other care provider to review them with you.

## 2017-08-23 NOTE — PHARMACY-VANCOMYCIN DOSING SERVICE
Pharmacy Vancomycin Initial Note  Date of Service 2017  Patient's  1965  52 year old, male    Indication: Community Acquired Pneumonia    Current estimated CrCl = Estimated Creatinine Clearance: 75.2 mL/min (based on Cr of 1.51).    Creatinine for last 3 days  2017: 12:25 AM Creatinine 2.31 mg/dL  2017:  5:55 AM Creatinine 1.51 mg/dL    Recent Vancomycin Level(s) for last 3 days  No results found for requested labs within last 72 hours.      Vancomycin IV Administrations (past 72 hours)      No vancomycin orders with administrations in past 72 hours.                Nephrotoxins and other renal medications (Future)    Start     Dose/Rate Route Frequency Ordered Stop    17 1500  vancomycin (VANCOCIN) 1500 mg in 0.9% NaCl 250 mL PREMIX      1,500 mg Intravenous EVERY 8 HOURS 17 1453      17 1445  piperacillin-tazobactam (ZOSYN) 3.375 g vial to attach to  mL bag      3.375 g  over 30 Minutes Intravenous EVERY 8 HOURS SCHEDULED 17 1444            Contrast Orders - past 72 hours (72h ago through future)    Start     Dose/Rate Route Frequency Ordered Stop    17 1200  iopamidol (ISOVUE-370) solution 80 mL      80 mL Intravenous ONCE 17 1154 17 1214    17 0930  technetium pertechnetate with albumin (Tc99m MAA) radioisotope injection 3 millicurie      3 millicurie Intravenous ONCE 17 0924 17 0927    17 0930  technetium pentetate Tc99m (DTPA) inhaled radioisotope 65 millicurie      65 millicurie Inhalation ONCE 17 0927 17 0928                Plan:  1.  Start vancomycin  1500 mg IV q8h.   2.  Goal Trough Level: 15-20 mg/L   3.  Pharmacy will check trough levels as appropriate in 1-3 Days.    4. Serum creatinine levels will be ordered a minimum of twice weekly.    5. [place] method utilized to dose vancomycin therapy: Method 1    Kanika Ayala

## 2017-08-23 NOTE — PROGRESS NOTES
ICU Update    Patient s/p thorac with 900ml dark keara fluid returned.  O2 requirement on BIPAP post down from 100% to 55% with sats in low 90's.  CXR uncheanged to mildly worse (was off positive pressure briefly for procedure).     Will continue to follow O2 requirement - Repeat ABG pending.    Humberto Wilkins

## 2017-08-23 NOTE — PLAN OF CARE
Problem: Goal Outcome Summary  Goal: Goal Outcome Summary  Outcome: Declining  A&Ox4. Tachycardic, O2 sat 90-91% on oxymask. C/o L sided abdominal/chest pain, rating pain 7/10. Given PRN Norco x 1. Dyspneic with accessory/abdominal muscle use. LS diminished to L side, anteriorly and posteriorly. Patient was put on Bipap prior to CT scan. Upon returning to floor from CT, patient removed Bipap as he felt claustrophobic. Oxymask put back on at 15L. Sepsis alert fired; lactic- 0.7. Abdomen distended, firm. +BS. Voiding via urinal at beside. Heparin gtt stopped at 1345. Regular diet. IV x 2. IVF running. Patient transferred to ICU at about 1350.

## 2017-08-23 NOTE — PLAN OF CARE
Problem: Goal Outcome Summary  Goal: Goal Outcome Summary  Outcome: No Change  A/ox4. Soft BP (98/46) at times, currently on 4.5L O2 NC in order to keep stats in mid 90's. LS diminished, GUERRERO and at rest. C/o of LUQ abdominal pain during inspiration, PRN Norco given x1, effective. Abdomen distended and BS hypoactive. Tele SR w/ 1st degree AVB and BBB. LPIV infusing Heparin at 14 units/hr, RPIV infusing NS at 100mL/hr. Sputum culture still needed. Possible CT scan with contrast if kidney function labs improve. Discharge pending. Continue to monitor.

## 2017-08-23 NOTE — PROVIDER NOTIFICATION
MD Notification    Notified Person:  MD    Notified Persons Name:  John    Notification Date/Time: 8/23/2017 at 1022    Notification Interaction:  Talked with Physician    Purpose of Notification: Writer was notified by KALEN that O2 sat dropped to 86% on 12L via oxymask. Increased O2 to 14L, O2 is 91% currently. Received PRN Corona at 0923. Patient states he felt like he was confused as to what he needed to do when continuous pulse ox was beeping. States this has happened before on opiates where he becomes confused. Patient alert. He inquires about scheduled clonazepam as he currently feels anxious. Per patient, he takes clonazepam 0.5mg in AM and at noon, then 1mg at bedtime. Do you want to adjust doses/times for clonazepam? Do you want to change method of O2 delivery to keep O2 above 92%?     Orders Received: Blood gases STAT, CT chest ASAP, and Ativan.    Comments:

## 2017-08-23 NOTE — PROVIDER NOTIFICATION
MD Notification    Notified Person:  MD    Notified Persons Name: Dr. Lopez    Notification Date/Time: 8/23/2017 at 1123    Notification Interaction:  Talked with Physician    Purpose of Notification: FYI, arterial blood gas results are finalized.    Orders Received: Patient to transfer to [facility] following CT scan.    Comments:

## 2017-08-23 NOTE — PLAN OF CARE
Problem: Goal Outcome Summary  Goal: Goal Outcome Summary  Outcome: Declining  A/O.  Tachycardic and dyspneic with accessory muscle use, RR mid 30's at times.  12L oxymask to keep sats >92%, MD paged (see note).  Tele ST with 1st degree AVB and BBB.  Pt c/o pain with inspiration, declined intervention.  Hep gtt at 1600 units/hr, 10A recheck in AM.  Possible CT with contrast today if Cr improves.  D/C pending clinical improvement.  Continue to monitor.

## 2017-08-23 NOTE — CONSULTS
Pulmonary Medicine Consultation        Date of Admission: 8/22/2017  Primary Attending:  Marcos Esquivel MD  Consulting Physician: Андрей Amado MD      History:      Niko is a pleasant 52-year-old male patient with a medical history of diverticulitis, high blood pressure, obesity and nephrolithiasis.  This patient has had pain for about the past week.  He says it started in his left lower quadrant, felt like his prior bouts of diverticulitis.  He did not seek treatment for this.  Over the past few days, it has changed location from his left lower quadrant to his left lower chest.  He says that the pain increases significantly when he tries to take deep breaths.    He has had no productive cough with this.  He has had increased shortness of breath for some time.  He knows that his urine looks darker in color.  He is denying any diarrhea, nausea or vomiting.  He has not noted any fever.    Chest x-ray which showed left lower lobe opacity.  D-dimer was drawn and this was also positive at about 0.7.  The patient denies any sick contacts.  He denies any long periods of travel or inactivity.   V/Q scan revealed large matched defect in the region of the posterior segment left upper lobe and superior segment left lower lobe is noted and corresponds to an infiltrate on chest x-ray performed  earlier today. Findings would indicate a triple match and is consistent with an intermediate probability for pulmonary embolism.  Was also found to be hypoxic    Review of system:   ROS is negative except for items mentioned above and in HPI.       Prior medical history:  Past Medical History:   Diagnosis Date     Hypertension      OCD (obsessive compulsive disorder)        Past Surgical History:   Procedure Laterality Date     ORTHOPEDIC SURGERY         Patient Active Problem List   Diagnosis     Community acquired bacterial pneumonia       Social History     Social History     Social History     Marital status:       Spouse name: N/A     Number of children: N/A     Years of education: N/A     Occupational History     Not on file.     Social History Main Topics     Smoking status: Never Smoker     Smokeless tobacco: Never Used     Alcohol use No     Drug use: No     Sexual activity: Not on file     Other Topics Concern     Not on file     Social History Narrative     No narrative on file         Family History  No family history on file.        Medications  No current outpatient prescriptions on file.     Current Facility-Administered Medications Ordered in Epic   Medication Dose Route Frequency Last Rate Last Dose     albuterol neb solution 2.5 mg  2.5 mg Nebulization Q6H PRN         ipratropium - albuterol 0.5 mg/2.5 mg/3 mL (DUONEB) neb solution 3 mL  3 mL Nebulization 4x daily         LORazepam (ATIVAN) injection 0.5-1 mg  0.5-1 mg Intravenous Q4H PRN         heparin infusion 25,000 units in 0.45% NaCl 250 mL  1,700 Units/hr Intravenous Continuous 17 mL/hr at 08/23/17 0645 1,700 Units/hr at 08/23/17 0645     naloxone (NARCAN) injection 0.1-0.4 mg  0.1-0.4 mg Intravenous Q2 Min PRN         0.9% sodium chloride infusion   Intravenous Continuous 100 mL/hr at 08/23/17 0819       cefTRIAXone (ROCEPHIN) 2 g vial to attach to  ml bag for ADULTS or NS 50 ml bag for PEDS  2 g Intravenous Q24H 200 mL/hr at 08/23/17 0409 2 g at 08/23/17 0409     azithromycin (ZITHROMAX) tablet 250 mg  250 mg Oral Q24H   250 mg at 08/23/17 0909     Patient is already receiving anticoagulation with heparin, enoxaparin (LOVENOX), warfarin (COUMADIN)  or other anticoagulant medication   Does not apply Continuous PRN         acetaminophen (TYLENOL) tablet 650 mg  650 mg Oral Q4H PRN         acetaminophen (TYLENOL) Suppository 650 mg  650 mg Rectal Q4H PRN         HYDROcodone-acetaminophen (NORCO) 5-325 MG per tablet 1-2 tablet  1-2 tablet Oral Q4H PRN   2 tablet at 08/23/17 0923     HYDROmorphone (PF) (DILAUDID) injection 0.2 mg  0.2 mg Intravenous  Q2H PRN         senna-docusate (SENOKOT-S;PERICOLACE) 8.6-50 MG per tablet 1-2 tablet  1-2 tablet Oral BID PRN         bisacodyl (DULCOLAX) Suppository 10 mg  10 mg Rectal Daily PRN         ondansetron (ZOFRAN-ODT) ODT tab 4 mg  4 mg Oral Q6H PRN        Or     ondansetron (ZOFRAN) injection 4 mg  4 mg Intravenous Q6H PRN         busPIRone (BUSPAR) tablet 30 mg  30 mg Oral BID   30 mg at 17 0909     busPIRone (BUSPAR) tablet 15 mg  15 mg Oral Daily with supper   15 mg at 17     clonazePAM (klonoPIN) tablet 1.5 mg  1.5 mg Oral At Bedtime   1.5 mg at 17     fluticasone (FLONASE) 50 MCG/ACT spray 2 spray  2 spray Both Nostrils Daily   2 spray at 17     mirtazapine (REMERON) tablet TABS 15 mg  15 mg Oral At Bedtime   15 mg at 17     No current Epic-ordered outpatient prescriptions on file.       Allergies   Allergen Reactions     Compazine [Prochlorperazine]                Physical Examination:   Vitals:    17 0215 17 0654 17 0722 17 1015   BP:   127/76    BP Location:   Right arm    Pulse:       Resp: 26  28 (!) 36   Temp:   98.2  F (36.8  C)    TempSrc:   Oral    SpO2: 92%  92%    Weight:  115.2 kg (254 lb)     Height:         Body mass index is 33.51 kg/(m^2).  Temp (24hrs), Av.1  F (36.7  C), Min:97.2  F (36.2  C), Max:99  F (37.2  C)        Constitutional:  Wearing mask mild respiratory distress.  HENT:  mucous membranes moist.  Eyes: PERRLA, no icterus, no pallor.   Neck: No lymphadenopathy or thyromegaly, trachea midline, no carotid bruits.  Cardiovascular: tachycardic, no murmurs, rubs or gallops, no peripheral edema.  Respiratory/Chest:Bronchyal breath sounds on left base diffuse wheezing, shallow respirations no crackles.  Gastrointestinal: Abdomen was soft, non-tender, non-distended, no masses felt, no hepatosplenomegaly.  Musculoskeletal: No clubbing or cyanosis, full range of motion in all extremities.  Neurological: No focal motor  or sensory deficits. DTR's are 2+ and symmetric.  Skin: No skin rash, hives, petechiae, or breakdown.        CMP  Recent Labs  Lab 08/23/17  0555 08/22/17  0025    132*   POTASSIUM 4.6 3.7   CHLORIDE 108 100   CO2 19* 21   ANIONGAP 9 11   * 117*   BUN 27 40*   CR 1.51* 2.31*   GFRESTIMATED 49* 30*   GFRESTBLACK 59* 36*   BENOIT 8.2* 8.5     CBC  Recent Labs  Lab 08/23/17  0555 08/22/17  0450 08/22/17  0025   WBC 15.9* 14.8* 15.8*   RBC 3.87* 3.61* 3.92*   HGB 11.5* 10.9* 12.0*   HCT 35.3* 32.6* 35.1*   MCV 91 90 90   MCH 29.7 30.2 30.6   MCHC 32.6 33.4 34.2   RDW 14.1 13.7 13.7    242 287     INRNo lab results found in last 7 days.  Arterial Blood GasNo lab results found in last 7 days.    Recent Labs  Lab 08/22/17  0308 08/22/17  0305   CULT No growth after 19 hours No growth after 19 hours       Diagnostic Studies:  Radiology:    CT ABDOMEN PELVIS W/O CONTRAST  8/22/2017 1:49 AM      INDICATION: Ten days history of left lower quadrant abdominal pain,  now pleuritic chest pain and abdominal pain, renal insufficiency,  evaluate diverticulitis with perforation.       TECHNIQUE: Thin axial images through the abdomen and pelvis without  contrast. Coronal reformatted images. Radiation dose for this scan was  reduced using automated exposure control, adjustment of the mA and/or  kV according to patient size, or iterative reconstruction technique.     COMPARISON: None.     FINDINGS: No urinary stones or hydronephrosis. Liver, gallbladder,  spleen, pancreas, adrenal glands and kidneys demonstrate no worrisome  findings. Small cyst upper pole right kidney.     Pelvic structures within normal limits. No bowel obstruction or  ascites. Moderate stool in the colon. No diverticulitis or free  intraperitoneal air. Small left pleural effusion is partially  loculated with patchy associated infiltrate or atelectasis at the  right base.         IMPRESSION:  1. No urinary stones or hydronephrosis. No other acute  findings within  the abdomen or pelvis.  2. Small partially loculated left pleural effusion with patchy  air-space disease at the left base. Findings could be due to  pneumonia. Other etiologies such as pulmonary embolism should be  considered. Correlate clinically.         XR CHEST 2 VW  8/22/2017 2:09 AM      INDICATION: Chest pain.     COMPARISON: CT 8/22/2017.         IMPRESSION: Shallow inspiration. Moderate patchy air-space disease in the left lower lung and a small amount of pleural fluid at the left base. Findings could be due to pneumonia. Correlate clinically. Heart  size exaggerated by shallow inspiration is likely normal.         Assessment:     52-year-old male patient with a medical history of diverticulitis, high blood pressure, obesity and nephrolithiasis.  This patient has had pain for about the past week.  He says it started in his left lower quadrant, felt like his prior bouts of diverticulitis.  He did not seek treatment for this.  Over the past few days, it has changed location from his left lower quadrant to his left lower chest.  He says that the pain increases significantly when he tries to take deep breaths.    He has had no productive cough with this.  He has had increased shortness of breath for some time.  He knows that his urine looks darker in color.  He is denying any diarrhea, nausea or vomiting.  He has not noted any fever.    Chest x-ray which showed left lower lobe opacity.  D-dimer was drawn and this was also positive at about 0.7.  The patient denies any sick contacts.  He denies any long periods of travel or inactivity.   V/Q scan revealed large matched defect in the region of the posterior segment left upper lobe and superior segment left lower lobe is noted and corresponds to an infiltrate on chest x-ray performed  earlier today. Findings would indicate a triple match and is consistent with an intermediate probability for pulmonary embolism.  Was also found to be  hypoxic        Pulmonary Diagnoses: Abnl CT/CXR R91.8, GUERRERO R06.09, Hpoxemia R09.02, Pnemonia unspec J18.9 and Pulm embolism I26.99    Recommendations        Started Duonebs every four hours as needed   Supplemental oxygen target pulse oxymetry >89%, wean as able  Continue Abx complete 7-10 days , if able obtain sputum culture  Continue IV heparin with coumadin bridge if PE confirmed on CT   May transition to oral on discharge  Encourage incentive spirometer Q4H  Will order 2D echo to assess for evidence of RV strain  The natural history, consequences, physiology, and treatment options for Pulmonary  discussed in detail.  Extensively counseled on smoking cessation  Physical activity as tolerated  Will need outpatient PFTs  Arrange pulmonary follow up [phone]  Please call if any questions            Андрей Rosado M.D.  Pulmonary, Critical Care and Sleep Medicine  [facility]   Pager: [phone]  Office:[phone]

## 2017-08-23 NOTE — PROCEDURES
Procedure/Surgery Information   [facility]    Bedside Procedure Note  Date of Service (when I performed the procedure): 08/23/2017    Niko Mireles is a 52 year old male patient.  1. Pneumonia of left lower lobe due to infectious organism (H)    2. Acute chest pain    3. Dehydration      Past Medical History:   Diagnosis Date     Hypertension      OCD (obsessive compulsive disorder)      Temp: 97.9  F (36.6  C) Temp src: Axillary BP: 115/59   Heart Rate: 99 Resp: 29 SpO2: 96 % O2 Device: High Flow Nasal Cannula (HFNC) Oxygen Delivery: Other (Comments) (50L)    Chest tube insertion  Date/Time: 8/23/2017 3:44 PM  Performed by: FABIANO HUGHES  Authorized by: FABIANO HUGHES   Consent: Verbal consent obtained. Written consent obtained.  Consent given by: patient and spouse  Patient identity confirmed: verbally with patient  Indications: pleural effusion    Sedation:  Patient sedated: no  Anesthesia: local infiltration    Anesthesia:  Local Anesthetic: lidocaine 1% without epinephrine  Preparation: skin prepped with ChloraPrep  Placement location: left lateral  Scalpel size: 10  Tube size: 8 Indonesian  Ultrasound guidance: yes  Drainage characteristics: yellow and serosanguinous  Drainage amount: 900 ml  Dressing: 4x4 sterile gauze  Comments: Thoracentesis performed in lieu of chest tube             Procedure:   Thoracentesis          Indication:   Pleural effusion, diagnostic and therapeutic.        Consent:   Consent was obtained from the patient prior to the procedure. Indications, risks, benefits were explained at length.          Procedure Summary:   US guidance: YES  Protection/Precaution measures: Mask with eye protection, cap, gown, gloves were used: Yes  A time out was performed.   The patient was prepped and draped in a conventional sterile manner using chlorhexidine scrub after the appropriate level was percussed and confirmed by ultrasound. 1% lidocaine was used to  numb the region. A finder needle was then used to attempt to locate fluid. The thoracentesis catheter was advanced into the pleural space (between 8th and 9th rib) just above the 9th rib  Flor colored fluid was drained manually without any difficulty. Total ~900 cc.          Complications:   No immediate complications.  CXR is ordered/pending.  Specimen was sent for chemistry (with simultaneous serum for LDH and protein), cell count, cytology and culture.    This procedure is performed by Humberto Wilkins

## 2017-08-23 NOTE — PHARMACY
Eliquis #60 copay = $25   We can offer patient a free trial voucher for the first fill. This pt is also eligible for the  copay assistance card for refills (available to patients with commercial insurance).    Pradaxa #60 copay =$25    Savaysa is non-formulary and would require a prior authorization.    Xarelto #30 copay = $25  We can offer patient a free trial voucher for the first fill. This pt is also eligible for the  copay assistance card for refills (available to patients with commercial insurance).    Thank you,  Steven Correa [facility] Discharge Pharmacy Liaison  [phone]

## 2017-08-23 NOTE — PROVIDER NOTIFICATION
MD Notification    Notified Person:  MD    Notified Persons Name: Dr. Power    Notification Date/Time: 08/23/17 12:44 AM    Notification Interaction:  Talked with Physician    Purpose of Notification: Increasing O2 needs, currently on 10L to keep sats > 92%, RR34.    Orders Received: Continue to monitor at this point.  Will order blood gases or put on BiPAP overnight if needed.    Comments:

## 2017-08-23 NOTE — CONSULTS
[facility]    History and Physical/ Consult  Critical Care Service     Date of Admission:  8/22/2017  Date of Service (when I saw the patient): 08/23/17    Assessment & Plan   Niko Mireles is a 52 year old male with PMHx of HTN who presents on 8/22 with shortness or breath, concern for PE vs CAP. Respiratory status continued to decline on the floor, CT chest with evidence of large L sided complex pleural effusion and bilateral infiltrates - combination pneumonia and effusion prompting admission to the ICU.    Neuro  1. Acute pain  2. Depression/Anxiety/ OCD  Plan:  -- Norco Q4hr prn - watch for sedation    CV  1. Tachycardia - LBBB wide complex on tele - reactive, initial concern for PE ruled out with CT, was on heparin empirically   2. Hx of HTN  Plan:  -- repeat EKG, serial Troponin  -- ECHO in AM  -- lisinopril on hold given DINORA    Resp:  1. Acute hypoxic respiratory failure: currently on bipap with with large O2 requirement.  High risk for intubation given severity of pneumonia and pleural effusion.  ARDS physiology though significant fluid/edema noted.     2. BIlateral infiltrates - ?pneumonia vs pneumonitis vs edema.   3. Large left sided complex pleural effusion with compressive atelecatsis  Plan:  -- Thoracentesis after heparin drip has been off for approximately >1 hours  -- Duonebs Q4hr  -- BIPAP with intermittent hiflo- pt noted more of mouth breather may limit hiflo - hopefully will be able to decrease O2 requirement post procedure --  -- low threshold to intubate if no improvement with thoracentesis       GI/Nutrition  1. Left lower quadrant pain on admission, unclear etiology: likely referred pain due to pneumonia  2. Hx of diverticulitis -- CT abdomen on admission was unremarkable   Plan:  - continue to monitor  - NPO for now     Renal  1. Acute on chronic kidney disease, CKD stage III. Baseline appears to be 1.53, elevated at 2.31 on admission, now improving. Likely due to  decreased PO intake prior to admission in the setting of 10 days of abdominal pain. Reports decreased UOP prior to admission and ED provider reports dehydration.  2. Non anion gap metabolic acidosis : mild. Lactic acid 0.7, no obvious cause at this time. Recent  AB.28/43/88/20 on 100% - mixed metabolic and resp acidosis   3. Mild hyponatremia, resolved   Plan:  -- maintain hemodynamics,   -- avoid nephrotoxic medications, renally dose  -- Monitor electrolytes and replace per ICU protocols  - serial ABGS    ID  1. Community acquired pneumonia: white count elevated at 16, procalcitonin 0.87. Has been afebrile. CT chest shows airspace disease bilaterally likely representing multifocal pneumonia as well parapneumonic effusion.    Plan:  -- started on ceftriaxone and azithromycin on admission, now changed to vancomycin and zosyn  -- blood, sputum sent   -- plan for urgent thorac when off heparin for >1hr - see if can expand improve additional   - serial procalcitonins    Endocrine  1. Stress Hyperglycemia  Plan:  --  Insulin gtt per protocol if indicated  -- Keep BG  <180 for optimal healing    Heme:  1. Acute blood loss anemia: 12.0 on admission, decreased to 10.9. Likely dilutional  2. Coagulopathy (heparin induced), reversed: stopped  at approximately 1345   Plan:  -- Monitor hemoglobin.  -- Transfuse to keep > 7.0      General cares:  DVT Prophylaxis: Heparin SQ  GI Prophylaxis: H2 Blocker  Restraints: Restraints for medical healing needed:No  Family update by me today: Yes   Current lines are required for patient management  Access:    Humberto Wilkins      Time Spent on this Encounter   Billing:  I spent 65 minutes bedside, excluding procedures  and on the inpatient unit today managing the critical care of Niko Mireles in relation to the issues listed in this note.     Code Status   Full Code      Chief Complaint   SOB, respiratory failure     History is obtained from the patient and  electronic health record    History of Present Illness   Niko Mireles is a 52 year old male with PMHx of HTN, obesity who presents on 8/22 with shortness or breath, concern for PE vs CAP. Per chart review, the patient has had a gradual increase in SOB, pleuritic chest pain and left lower quadrant abdominal pain which he initially attributed to his diverticulitis however this moved more to his chest which prompted him to seek evaluation. On admission, CXR showed diffuse infiltrates L >R. CT abdomen was unremarkable. Labs remarkable for DINORA and leukocytosis. CT scan was not ordered given the patients renal impairment. V/Q flores was indeterminate. Patient treated with abx and heparin gtt empirically.       His respiratory status continued to decline on the floor, ABG with significant severe P/F ratio prompting initiation of BiPAP. Pulmonary was consulted on the floor, however CT chest with evidence of large L sided complex pleural effusion, bilateral infiltrates  prompting admission to the ICU.      On arrival to ICU patient in mild distress but speaking in moderate to full sentences, browsing his phone. Bedside eval with US demonstrated limited windows of fluid on the left but pocket available for tap in posterior field.       Past Medical History    I have reviewed this patient's medical history and updated it with pertinent information if needed.   Past Medical History:   Diagnosis Date     Hypertension      OCD (obsessive compulsive disorder)        Past Surgical History   I have reviewed this patient's surgical history and updated it with pertinent information if needed.  Past Surgical History:   Procedure Laterality Date     ORTHOPEDIC SURGERY         Prior to Admission Medications   Prior to Admission Medications   Prescriptions Last Dose Informant Patient Reported? Taking?   Amitriptyline HCl (ELAVIL PO)  Self Yes Yes   Sig: Take 100 mg by mouth At Bedtime   BusPIRone HCl (BUSPAR PO)  Self Yes Yes   Sig:  Take 30 mg by mouth 2 times daily Also see dinner dose   BusPIRone HCl (BUSPAR PO)  Self Yes Yes   Sig: Take 15 mg by mouth daily (with dinner)   ClonazePAM (KLONOPIN PO)  Self Yes Yes   Sig: Take 1.5 mg by mouth At Bedtime   FLUoxetine HCl (PROZAC PO)  Self Yes Yes   Sig: Take 20 mg by mouth 3 times daily Am, noon, hs   Gabapentin (NEURONTIN PO)  Self Yes Yes   Sig: Take 400 mg by mouth At Bedtime   LISINOPRIL PO  Self Yes Yes   Sig: Take 20 mg by mouth At Bedtime   Mirtazapine (REMERON PO)  Self Yes Yes   Sig: Take 15 mg by mouth At Bedtime   NAPROXEN PO  Self Yes Yes   Sig: Take 500 mg by mouth 2 times daily (with meals)   OMEPRAZOLE PO  Self Yes Yes   Sig: Take 20 mg by mouth every morning   Omega-3 Fatty Acids (OMEGA 3 PO)  Self Yes Yes   Sig: Take 1,000 mg by mouth 2 times daily   SILDENAFIL CITRATE PO 8/12/2017 Self Yes Yes   Sig: Take 20 mg by mouth once as needed   Tolterodine Tartrate (DETROL LA PO)  Self Yes Yes   Sig: Take 4 mg by mouth daily   VITAMIN D, CHOLECALCIFEROL, PO  Self Yes Yes   Sig: Take 1,000 Units by mouth daily   fluticasone (FLONASE) 50 MCG/ACT spray  Self Yes Yes   Sig: Spray 2 sprays into both nostrils daily      Facility-Administered Medications: None     Allergies   Allergies   Allergen Reactions     Compazine [Prochlorperazine]        Social History   I have reviewed this patient's social history and updated it with pertinent information if needed. Niko Mireles  reports that he has never smoked. He has never used smokeless tobacco. He reports that he does not drink alcohol or use illicit drugs.    Family History   I have reviewed this patient's family history and updated it with pertinent information if needed.   No family history on file.      Physical Exam   Temp: 97.9  F (36.6  C) Temp src: Oral Temp  Min: 97.9  F (36.6  C)  Max: 99  F (37.2  C) BP: 141/75   Heart Rate: 98 Resp: 22 SpO2: 97 % O2 Device: BiPAP/CPAP Oxygen Delivery: 15 LPM  Vital Signs with Ranges  Temp:   [97.9  F (36.6  C)-99  F (37.2  C)] 97.9  F (36.6  C)  Heart Rate:  [] 98  Resp:  [19-36] 22  BP: ()/(46-76) 141/75  FiO2 (%):  [100 %] 100 %  SpO2:  [86 %-97 %] 97 %  254 lbs 0 oz    Constitutional: awake, alert, cooperative, mild resp distress, and appears stated age  Eyes: Lids and lashes normal, pupils equal, round and reactive to light, extra ocular muscles intact, sclera clear, conjunctiva normal  ENT: Normocephalic, without obvious abnormality, atraumatic, sinuses nontender on palpation, external ears without lesions, oral pharynx with moist mucous membranes, tonsils without erythema or exudates, gums normal and good dentition.  Hematologic / Lymphatic: no cervical lymphadenopathy  Respiratory: tachypneic, Mild respiratory distress, decreased air exchange and diminished breath sounds throughout lungs predominant based some coarse BS  Cardiovascular: Normal apical impulse, regular rate and rhythm, normal S1 and S2, no S3 or S4, and no murmur noted  GI: No scars, normal bowel sounds, soft, non-distended, non-tender, no masses palpated, no hepatosplenomegally  Skin: no bruising or bleeding  Musculoskeletal: There is no redness, warmth, or swelling of the joints.  Full range of motion noted.  Motor strength is 5 out of 5 all extremities bilaterally.  Tone is normal.  Neurologic: Awake, alert, oriented to name, place and time.  Cranial nerves II-XII are grossly intact.  Motor is 5 out of 5 bilaterally.  Cerebellar finger to nose, heel to shin intact.  Sensory is intact.  Babinski down going, Romberg negative, and gait is normal.  Neuropsychiatric: General: fidgeting and normal eye contact    Data   ROUTINE ICU LABS (Last four results)  CMP  Recent Labs  Lab 08/23/17  1609 08/23/17  0555 08/22/17  0025    136 132*   POTASSIUM 4.7 4.6 3.7   CHLORIDE 107 108 100   CO2 22 19* 21   ANIONGAP 9 9 11   * 114* 117*   BUN 23 27 40*   CR 1.42* 1.51* 2.31*   GFRESTIMATED 52* 49* 30*   GFRESTBLACK 63  59* 36*   BENOIT 8.5 8.2* 8.5   PROTTOTAL 6.8  --   --    ALBUMIN 2.3*  --   --    BILITOTAL 1.5*  --   --    ALKPHOS 104  --   --    AST 56*  --   --    ALT 54  --   --      CBC  Recent Labs  Lab 08/23/17  0555 08/22/17  0450 08/22/17  0025   WBC 15.9* 14.8* 15.8*   RBC 3.87* 3.61* 3.92*   HGB 11.5* 10.9* 12.0*   HCT 35.3* 32.6* 35.1*   MCV 91 90 90   MCH 29.7 30.2 30.6   MCHC 32.6 33.4 34.2   RDW 14.1 13.7 13.7    242 287     INRNo lab results found in last 7 days.  Arterial Blood Gas  Recent Labs  Lab 08/23/17  1244 08/23/17  1050   PH 7.28* 7.27*   PCO2 43 43   PO2 88 61*   HCO3 20* 20*   O2PER 95  --      Recent Results (from the past 24 hour(s))   CT Chest Pulmonary Embolism w Contrast    Narrative    CT CHEST PULMONARY EMBOLISM WITH CONTRAST   8/23/2017 12:22 PM     HISTORY: Hypoxic respiratory failure with elevated D-dimer    TECHNIQUE: CT of the chest was performed following the administration  of 80 mL Isovue-370. Radiation dose for this scan was reduced using  automated exposure control, adjustment of the mA and/or kV according  to patient size, or iterative reconstruction technique.    COMPARISON: None.    FINDINGS: No evidence for a pulmonary embolism.    Large multilobulated left pleural effusion. This may be partially  loculated. Associated adjacent compressive atelectasis/consolidation  of the left lower lobe.    Small right pleural effusion. Diffuse patchy and confluent airspace  opacities in the right upper lobe, right middle lobe, and to a lesser  extent in the right lower lobe.    Multiple lymph nodes measuring 1 cm or less in the mediastinum. Fluid  density within the superior anterior mediastinum.      Impression    IMPRESSION:  1. Airspace disease in the lungs could represent multifocal pneumonia.  2. Large partially loculated left pleural effusion with adjacent  compressive atelectasis/consolidation of the left lower lobe.  3. Nonspecific fluid density within the anterior superior  mediastinum.  4. No pulmonary embolism.    JOHN BRISENO MD   XR Chest Port 1 View    Narrative    XR CHEST PORT 1 VW  8/23/2017 4:08 PM     HISTORY:  recent thoracentesis, concern for pneumo    COMPARISON: CT dated 8/23    FINDINGS:  Left pleural effusion is seen. No pneumothorax is  identified. The left pleural effusion has increased since the film  from 8/22. Increase infiltrate is seen in the left lung. There is also  been increased infiltrate in the right lung. The infiltrates could be  due to pulmonary edema. Pneumonitis could also have this appearance.      Impression    IMPRESSION:  1. No pneumothorax identified.  2. Increasing bilateral infiltrates. This could be due to pulmonary  edema. It could also be due to pneumonitis.  3. Left pleural effusion also appears to have increased.       EKG LBBB

## 2017-08-23 NOTE — PROVIDER NOTIFICATION
MD Notification    Notified Person:  MD    Notified Persons Name: Dr. Lopez    Notification Date/Time: 8/23/2017 at 1118    Notification Interaction:  Talked with Physician    Purpose of Notification: Per RT, patient will need to be on Bipap to transfer off of the floor. Please place order.    Orders Received: Awaiting MD order for Bipap.    Comments:

## 2017-08-23 NOTE — PROGRESS NOTES
HOSPITALIST CROSS COVER      CT chest personally reviewed: negative for PE but shows large partiallly loculated left pleural effusion and multifocal pneumonia      Blood gas suggests poor oxygenation despite 100% FiO2     Ref. Range 8/23/2017 10:50 8/23/2017 12:44   pH Arterial Latest Ref Range: 7.35 - 7.45 pH 7.27 (L) 7.28 (L)   pCO2 Arterial Latest Ref Range: 35 - 45 mm Hg 43 43   PO2 Arterial Latest Ref Range: 80 - 105 mm Hg 61 (L) 88   Bicarbonate Arterial Latest Ref Range: 21 - 28 mmol/L 20 (L) 20 (L)   Base Deficit Art Latest Units: mmol/L 6.8 5.8   FIO2 Unknown  95   Oxyhemoglobin Arterial Latest Ref Range: 92 - 100 % 89 (L) 95       Discussed with intensivist on call. Will transfer to ICU and formally consult intensivist and thoracic surgery.     Change antibiotics to vancomycin and zoysn. Stop heparin drip.     WERNER COMBS MD

## 2017-08-23 NOTE — PROGRESS NOTES
[facility]    Hospitalist Progress Note    Date of Service (when I saw the patient): 08/23/2017    Assessment & Plan   Niko Mireles is a 52 year old male who was admitted on 8/22/2017 with hypoxia      1. Chest pain with dyspnea, acute hypoxic respiratory failure: O2 requirements have been increasing. Patient now on 12 L NRB    VQ scan intermediate probability for PE. Procalcitonin elevated.    ?PE versus pneumonia? Creatinine improving, so will obtain chest CT with contrast today. Pulmonary consult. Continue IV heparin, azithromycin, ceftriaxone today. Add albuterol and duonebs today.     2. HTN: holding lisinopril due to ARF. BPs presently controlled.     3. ARF: improving with hydration. Continue IVF. Continue to hold lisinopril,neurontin, naproxen, detrol.      Ref. Range 8/22/2017 00:25 8/23/2017 05:55   Creatinine Latest Ref Range: 0.66 - 1.25 mg/dL 2.31 (H) 1.51 (H)     3.OCD: continue mirtazipine, klonopin, buspar. Hold fluoxetine, amitriptyline.     DVT Prophylaxis: heparin gtts  Code Status: Full Code    Disposition: Expected discharge in 2-3 days    Time 40 minutes    Ricco Lopez MD  [phone] (P)  Text Page (7 am to 6 pm)    Interval History     O2 requirements increasing overnight. Seen with JIMENEZ Dorantes.     Patient feels a little better, but still having chest pain and dyspnea. Creatinine improving. Discussed plan to obtain CT of chest to today.     -Data reviewed today: I reviewed all new labs and imaging results over the last 24 hours. I personally reviewed no images or EKG's today.    Physical Exam   Temp: 98.2  F (36.8  C) Temp src: Oral BP: 127/76   Heart Rate: 101 Resp: 28 SpO2: 92 % O2 Device: Oxymask Oxygen Delivery: 12 LPM  Vitals:    08/21/17 2346 08/22/17 0424 08/23/17 0654   Weight: 113.4 kg (250 lb) 114.9 kg (253 lb 3.2 oz) 115.2 kg (254 lb)     Vital Signs with Ranges  Temp:  [97.2  F (36.2  C)-99  F (37.2  C)] 98.2  F (36.8  C)  Heart Rate:  []  101  Resp:  [19-34] 28  BP: ()/(46-76) 127/76  SpO2:  [86 %-94 %] 92 %  I/O last 3 completed shifts:  In: 2684 [I.V.:2684]  Out: 2835 [Urine:2835]    Constitutional: fatgiued appearance  Respiratory: Poor air movment. Crackles at left bases. Occasional wheeze  Cardiovascular: tachy Reg Rhythm, S1, S2, no murmurs  GI: Abdomen soft, slightly tender on deep palpation nondistended, bowel sounds are heard  Skin/Integumen: No jaundice, no suspicious rash  NEURO: no focal deficits    Medications     HEParin 1,700 Units/hr (08/23/17 0645)     NaCl 100 mL/hr at 08/23/17 0819     - MEDICATION INSTRUCTIONS -         ipratropium - albuterol 0.5 mg/2.5 mg/3 mL  3 mL Nebulization 4x daily     cefTRIAXone  2 g Intravenous Q24H     azithromycin  250 mg Oral Q24H     busPIRone (BUSPAR) tablet 30 mg  30 mg Oral BID     busPIRone (BUSPAR) tablet 15 mg  15 mg Oral Daily with supper     clonazePAM (klonoPIN) tablet 1.5 mg  1.5 mg Oral At Bedtime     fluticasone  2 spray Both Nostrils Daily     mirtazapine (REMERON) tablet TABS 15 mg  15 mg Oral At Bedtime       Data     Recent Labs  Lab 08/23/17  0555 08/22/17  0450 08/22/17  0025   WBC 15.9* 14.8* 15.8*   HGB 11.5* 10.9* 12.0*   MCV 91 90 90    242 287     --  132*   POTASSIUM 4.6  --  3.7   CHLORIDE 108  --  100   CO2 19*  --  21   BUN 27  --  40*   CR 1.51*  --  2.31*   ANIONGAP 9  --  11   BENOIT 8.2*  --  8.5   *  --  117*   TROPI  --  <0.015 <0.015       Recent Results (from the past 24 hour(s))   NM Lung Scan Ventilation and Perfusion    Narrative    NUCLEAR MEDICINE LUNG SCAN VENTILATION AND PERFUSION August 22, 2017  9:38 AM     HISTORY: Suspect pulmonary embolus in patient with GFR of 30.     COMPARISON: Chest x-ray 8/22/2017.    TECHNIQUE: Patient was administered 3.3 mCi Tc 99m MAA intravenously  prior to performing the perfusion images. Patient was then given  aerosolized 72 mCi Tc 99m DTPA for the ventilation images.    FINDINGS: No mismatched  peripheral defects are present to indicate  pulmonary emboli. A large matched defect in the region of the  posterior segment left upper lobe and superior segment left lower lobe  is noted and corresponds to an infiltrate on chest x-ray performed  earlier today. Findings would indicate a triple match and is  consistent with an intermediate probability for pulmonary embolism.      Impression    IMPRESSION: Intermediate probability for pulmonary embolism.         UNIQUE MOTA MD

## 2017-08-23 NOTE — PROGRESS NOTES
Writer attempted to placed 18G IV x 2 without success. Flying squad informed as they are currently on the floor. Needing IV access for IV contrast/IV fluids.

## 2017-08-24 PROBLEM — J80 ARDS (ADULT RESPIRATORY DISTRESS SYNDROME) (H): Status: ACTIVE | Noted: 2017-01-01

## 2017-08-24 PROBLEM — J91.8 PARAPNEUMONIC EFFUSION: Status: ACTIVE | Noted: 2017-01-01

## 2017-08-24 PROBLEM — J18.9 PARAPNEUMONIC EFFUSION: Status: ACTIVE | Noted: 2017-01-01

## 2017-08-24 PROBLEM — E66.09 NON MORBID OBESITY DUE TO EXCESS CALORIES: Status: ACTIVE | Noted: 2017-01-01

## 2017-08-24 PROBLEM — G93.40 ENCEPHALOPATHY: Status: ACTIVE | Noted: 2017-01-01

## 2017-08-24 NOTE — PROGRESS NOTES
Pt is on BIPAP 15/7 70% throughout the night, mepilex in place under the mask. Pt refused gel pad. Neb tx given as scheduled, SPO2 in the low 90's. Will continue to follow.  8/24/2017  Jenn Lopez

## 2017-08-24 NOTE — PROGRESS NOTES
Received call from micro lab and pt positive for MRSA in the nares. Placed in contact isolation per protocol.     Kristen Cedeño RN

## 2017-08-24 NOTE — PROCEDURES
"Procedure/Surgery Information   [facility]    Bedside Procedure Note  Date of Service (when I performed the procedure): 08/24/2017    Niko Mireles is a 52 year old male patient.  1. Community acquired bacterial pneumonia    2. Pneumonia of left lower lobe due to infectious organism (H)    3. Acute chest pain    4. Dehydration      Past Medical History:   Diagnosis Date     Hypertension      OCD (obsessive compulsive disorder)      Temp: 98.9  F (37.2  C) Temp src: Axillary BP: 115/64   Heart Rate: 98 Resp: 21 SpO2: 95 % O2 Device: Mechanical Ventilator Oxygen Delivery: Other (Comments) (50L)    Central line  Date/Time: 8/24/2017 11:33 AM  Performed by: MARTÍNEZ GAMEZ  Authorized by: MARTÍNEZ GAMEZ   Consent: Written consent obtained.  Consent given by: spouse  Patient identity confirmed: arm band  Time out: Immediately prior to procedure a \"time out\" was called to verify the correct patient, procedure, equipment, support staff and site/side marked as required.  Indications: vascular access  Anesthesia: local infiltration    Anesthesia:  Local Anesthetic: lidocaine 1% without epinephrine    Sedation:  Patient sedated: yes  Sedatives: see MAR for details  Analgesia: see MAR for details  Preparation: skin prepped with chlorhexidine  Location details: right internal jugular  Patient position: flat  Catheter type: triple lumen  Ultrasound guidance: yes  Number of attempts: 2  Successful placement: yes  Post-procedure: line sutured and dressing applied  Assessment: blood return through all parts and free fluid flow  Patient tolerance: Patient tolerated the procedure well with no immediate complications  Comments: Procedure supervised by Dr. Ulices Gamez  "

## 2017-08-24 NOTE — PROGRESS NOTES
Brief Intensivist Note  He is currently on FIO2 at 70% and oxygenation satisfactory. However, most recent abg shows pH 7.14, down from 7.18 on most recent abg. His RR was increased from 24 to 26, and tidal volume from 550 to 580, and ABG scheduled at 2000, 1 hour after changes.     dheeraj renee  August 24, 2017

## 2017-08-24 NOTE — OR NURSING
Bedside bronch completed. Sedation per ICU RN Sonido. RT at bedside with assist. Specimens collected labeled and left with bedside RN to send to Lab

## 2017-08-24 NOTE — PLAN OF CARE
Problem: Goal Outcome Summary  Goal: Goal Outcome Summary  Outcome: No Change  Pt AOx4, forgetful, anxious.  Lung sounds on L diminished, clear on R.  Dyspneic.  O2 sats in 90s on BIPAP.  Good UOP.  Up with SBA at bedside.  Denies pain.  Tele ST with 1st degree AVB and BBB.  Thoracentesis site CDI.  Continue to monitor.

## 2017-08-24 NOTE — PLAN OF CARE
Problem: Goal Outcome Summary  Goal: Goal Outcome Summary  Outcome: Declining  Alert and answers appropriately however disoriented to situation and is redirectable. LS diminished w/ crackles on left side>right side. Remains on BiPAP overnight, increased to 80% FiO2 w/ sats low to mid 90's. Tachypnic RR 24-45. Abdominal muscle use. Denies SOB. Pleuritic left sided chest pain, improvement w/ dilaudid. Diaphoretic and low grade temps. Thoracic surgery consult today.

## 2017-08-24 NOTE — PROGRESS NOTES
HOSPITALIST CHART CHECK    Mr. Mireles was moved to the ICU yesterday due to worsening resp failure. Underwent thoracentesis. Now intubated, had bronch with BAL this AM. Discsussed with Emy Rebolledo, ICU PA.     We will follow peripherally while he is intubated and defer to ICU team for management of his care during that time.    WERNER COMBS MD

## 2017-08-24 NOTE — PROCEDURES
"Procedure/Surgery Information   [facility]    Bedside Procedure Note  Date of Service (when I performed the procedure): 08/24/2017    Niko Mireles is a 52 year old male patient.  1. Community acquired bacterial pneumonia    2. Pneumonia of left lower lobe due to infectious organism (H)    3. Acute chest pain    4. Dehydration      Past Medical History:   Diagnosis Date     Hypertension      OCD (obsessive compulsive disorder)      Temp: 98.9  F (37.2  C) Temp src: Axillary BP: 115/64   Heart Rate: 98 Resp: 21 SpO2: 95 % O2 Device: Mechanical Ventilator Oxygen Delivery: Other (Comments) (50L)    Insert arterial line  Date/Time: 8/24/2017 12:37 PM  Performed by: MARTÍNEZ GAMEZ  Authorized by: MARTÍNEZ GAMEZ   Consent: Written consent obtained.  Consent given by: spouse  Patient identity confirmed: arm band  Time out: Immediately prior to procedure a \"time out\" was called to verify the correct patient, procedure, equipment, support staff and site/side marked as required.  Preparation: Patient was prepped and draped in the usual sterile fashion.  Indications: multiple ABGs, respiratory failure and hemodynamic monitoring  Location: right radial  Anesthesia: local infiltration    Anesthesia:  Local Anesthetic: lidocaine 1% without epinephrine    Sedation:  Patient sedated: yes  Sedatives: see MAR for details  Analgesia: see MAR for details  Seldinger technique: Seldinger technique used  Number of attempts: 2  Post-procedure: line sutured and dressing applied  Post-procedure CMS: normal  Comments: Procedure supervised by Dr. Elena.            Martínez Gamez  "

## 2017-08-24 NOTE — PROCEDURES
Procedure/Surgery Information   [facility]    Bedside Procedure Note  Date of Service (when I performed the procedure): 08/24/2017    Niko Mireles is a 52 year old male patient.  1. Community acquired bacterial pneumonia    2. Pneumonia of left lower lobe due to infectious organism (H)    3. Acute chest pain    4. Dehydration      Past Medical History:   Diagnosis Date     Hypertension      OCD (obsessive compulsive disorder)      Temp: 98.9  F (37.2  C) Temp src: Axillary BP: 109/65   Heart Rate: 98 Resp: 13 SpO2: 92 % O2 Device: BiPAP/CPAP Oxygen Delivery: Other (Comments) (50L)    Procedures         Procedure:   Bronchoscopy with BAL        Indication:   Bilateral pneumonia ARDS      Consent:   Obtained from the patient/family.        Pre-medication:   Versed 4mg, Fentanly 100mcg, Lidocain 1% 12cc. endotracheally        Procedure Summary:   Time out was performed.   The scope inserted thru the ETT.  Exam of trachea and bronchus of the right and left bronchial tree to the sub-segmental level revealed no endobronchial lesion. BAL performed in the RML lobe as well as lingula. A total of 150cc saline instilled in 3 aliquotes and 50cc dark keara colored fluid recovered.  The patient tolerated the procedure well without undue discomfort, hypotension or arrhythmia. The procedure was performed in the ICU--and vital sign parameters were monitored.        Complications:   No immediate complications.  Sample sent for cell count, cytology and microbiology.    This procedure is performed by Humberto Wilkins

## 2017-08-24 NOTE — PROGRESS NOTES
Thoracic Surgery:    Briefly reviewed and discussed with Dr. Higgins and Dr. Ramirez- 52 year old male with LLL pneumonia, s/p left thoracentesis yesterday for 900 ml dark keara fluid, exudate, CT chest shows worsening infiltrates throughout right lung also.     Presently, MDA and team in room to intubate patient-- Dr. Ramirez plans to have IR place a Left pleural drain and we will hold off on any surgical intervention on the left for now and monitor.     Мария Russell PA-C with Dr. Catracho Higgins  [facility]  Cell [phone]

## 2017-08-24 NOTE — PROGRESS NOTES
Critical Care Progress Note      08/24/2017    Name: Niko Mireles MRN#: [id number]   Age: 52 year old YOB: 1965     Hsptl Day# 2  ICU DAY #1    MV DAY #1             Problem List:   Active Problems:    Community acquired bacterial pneumonia    ARDS (adult respiratory distress syndrome) (H)    Parapneumonic effusion    Encephalopathy    Non morbid obesity due to excess calories           Summary/Hospital Course:     Niko Mireles is a 52 year old male with PMHx of HTN, obesity who presents on 8/22 with shortness or breath, concern for PE vs CAP. Per chart review, the patient has had a gradual increase in SOB, pleuritic chest pain and left lower quadrant abdominal pain which he initially attributed to his diverticulitis however this moved more to his chest which prompted him to seek evaluation. On admission, CXR showed diffuse infiltrates L >R. CT abdomen was unremarkable. Labs remarkable for DINORA and leukocytosis. CT scan was not ordered given the patients renal impairment. V/Q flores was indeterminate. Patient treated with abx and heparin gtt empirically.        His respiratory status continued to decline on the floor, ABG with significant severe P/F ratio prompting initiation of BiPAP. Pulmonary was consulted on the floor, however CT chest with evidence of large L sided complex pleural effusion, bilateral infiltrates  prompting admission to the ICU.       On arrival to ICU patient in mild distress but speaking in moderate to full sentences, browsing his phone. Bedside eval with US demonstrated limited windows of fluid on the left but pocket available for tap in posterior field.        Assessment and plan :     Niko Mireles is a 52 year old male with PMHx of HTN who presents on 8/22 with shortness or breath, concern for PE vs CAP. Respiratory status continued to decline on the floor, CT chest with evidence of large L sided complex pleural effusion and bilateral infiltrates - combination  pneumonia and effusion prompting admission to the ICU.  I have personally reviewed the daily labs, imaging studies, cultures and discussed the case with referring physician and consulting physicians.     My assessment and plan by system for this patient is as follows:    Neurology/Psychiatry:   1. Encephalopathy - 2/2 acute illness  2. Analgesia/Anxiety  Plan  - propofol gtt fentanyl gtt for sedation analgesia  - goal RASS -4 to 5 given need for paralysis  - paralysis with vec to allow for synchrony and proning     Cardiovascular:   1.Tachyarrthymia - noted LBBB Morphology - ECHO with no RWMA and preserved EF   2. Currently hemodynamically appropriate    Plan  - follow hemodynamics, goal MAP >65, follow UOP LA     Pulmonary/Ventilator Management:   1. Acute hypoxemic Respiratory failure with ARDS  2/2 pneumonia  2. Complicated para pneumonic effusion - s/p thoracentesis   Plan  - LTV ventilatory strategy - 6ml /kg ~ 480 goal    - serial ABG and CXR as indicated  - re-evaluate for chest tube drainage   - FUP thoracentesis results and bronch  - broad spectrum abx   - full strength flolan  - pan to prone minimal 18 hr     GI and Nutrition :   1. Per echo report - questionable flow around liver - unclear , no obvious correlate on recent CT scan  Plan  - Hepatic US    Renal/Fluids/Electrolytes:   1. Oliguria   2. Electrolyte abnormality  -2/2 critical illness  3. Acid/base status - resp and met acidosis   4. Volume status - difficult to assess   Plan  - maintain hemodynamics   - monitor function and electrolytes as needed with replacement per ICU protocols   - generally avoid nephrotoxic agents such as NSAID, IV contrast unless specifically required  - adjust medications as needed for renal clearance  - follow I/O's as appropriate.    Infectious Disease:   1. Bilateral pneumonia with parapneumonic effusion  Plan  - fup bronch and pleural cultures  - broad spectrum abx with vanz/zosyn and azithro for  atypicals    Endocrine:   1. Stress hyperglycemia   Plan  - ICU insulin protocol, goal sugar <180    Hematology/Oncology:   1. Leukocytosis - reactive   2. Anemia, no signs, symptoms of active blood loss  Plan  - serial CBC, coags     IV/Access:   1. Venous access - right IJ  2. Arterial access -  Radial   Plan  - central access required and necessary      ICU Prophylaxis:   1. DVT: Hep Subq/mechanical  2. VAP: HOB 30 degrees, chlorhexidine rinse  3. Stress Ulcer: PPI  4. Restraints: Nonviolent soft two point restraints required and necessary for patient safety and continued cares and good effect as patient continues to pull at necessary lines, tubes despite education and distraction. Will readdress daily.   5. IV Access - central access required and necessary for continued patient cares  6. Feeding - NPO  7. Family Update: pending  8. Disposition - ICU        Key goals for next 24 hours:   1. Prone   2. Eval for chest tube on left  3. Sedation and paralysis to ensure synchrony  4. Vasopressors for hemodynamic support as required         Interim History:   - BIPAP overnight ut worsening resp distress thus intubated early AM quickly requiring flolan and increased PEEP   - TLC and art line placed   - requiring increasing sedation -   - Echo -   -bronched with BAL   - esophageal probe place as elevated airway pressures - TPeep -2 to -3 on 15 PEEP thus increased to 17 with Ptpeep 2 Ptplat 19-20 on ~6 ml/kg delta pressure  Is 3  -                Key Medications:       ipratropium - albuterol 0.5 mg/2.5 mg/3 mL  3 mL Nebulization Q4H While awake     sodium chloride (PF)  3 mL Intracatheter Q8H     piperacillin-tazobactam  3.375 g Intravenous Q8H RAMILA     lidocaine  10 mL Subcutaneous Once     vancomycin (VANCOCIN) IV  1,500 mg Intravenous Q8H     azithromycin  250 mg Oral Q24H     busPIRone (BUSPAR) tablet 30 mg  30 mg Oral BID     busPIRone (BUSPAR) tablet 15 mg  15 mg Oral Daily with supper     clonazePAM (klonoPIN)  tablet 1.5 mg  1.5 mg Oral At Bedtime     fluticasone  2 spray Both Nostrils Daily     mirtazapine (REMERON) tablet TABS 15 mg  15 mg Oral At Bedtime       - MEDICATION INSTRUCTIONS -       NaCl 100 mL/hr at 08/23/17 2145     - MEDICATION INSTRUCTIONS -                 Physical Examination:   Temp:  [97.9  F (36.6  C)-99.9  F (37.7  C)] 98.9  F (37.2  C)  Heart Rate:  [] 111  Resp:  [15-48] 17  BP: (106-182)/() 125/70  FiO2 (%):  [55 %-100 %] 80 %  SpO2:  [88 %-100 %] 89 %    Intake/Output Summary (Last 24 hours) at 08/24/17 0753  Last data filed at 08/24/17 0600   Gross per 24 hour   Intake          2796.67 ml   Output             1325 ml   Net          1471.67 ml     Wt Readings from Last 4 Encounters:   08/23/17 115.2 kg (254 lb)     BP - Mean:  [] 89  FiO2 (%): 80 %  Resp: 17    Recent Labs  Lab 08/23/17  2230 08/23/17  1715 08/23/17  1244 08/23/17  1050   PH 7.32* 7.30* 7.28* 7.27*   PCO2 38 40 43 43   PO2 65* 59* 88 61*   HCO3 19* 20* 20* 20*   O2PER  --  BIPAP 95  --        GEN: in moderate distress intubated sedate  HEENT: head ncat, sclera anicteric, OP patent, trachea midline ett in place   PULM: synchronous with vent  clear anteriorly  diminished at bases L>R   CV/COR: tachycardic regular S1S2 no gallop,  No rub, no murmur  ABD: protuberant, obese soft nontender, hypoactive bowel sounds, no mass  EXT:  trace edema, warm  NEURO: moving all 4 extremities   SKIN: no obvious rash  LINES: clean, dry intact         Data:   All data and imaging reviewed     ROUTINE ICU LABS (Last four results)  CMP  Recent Labs  Lab 08/24/17  0520 08/23/17  1609 08/23/17  0555 08/22/17  0025    138 136 132*   POTASSIUM 4.7 4.7 4.6 3.7   CHLORIDE 110* 107 108 100   CO2 20 22 19* 21   ANIONGAP 10 9 9 11   * 108* 114* 117*   BUN 25 23 27 40*   CR 1.32* 1.42* 1.51* 2.31*   GFRESTIMATED 57* 52* 49* 30*   GFRESTBLACK 69 63 59* 36*   BENOIT 8.2* 8.5 8.2* 8.5   PROTTOTAL  --  6.8  --   --    ALBUMIN  --  2.3*   --   --    BILITOTAL  --  1.5*  --   --    ALKPHOS  --  104  --   --    AST  --  56*  --   --    ALT  --  54  --   --      CBC  Recent Labs  Lab 08/24/17  0520 08/23/17  0555 08/22/17  0450 08/22/17  0025   WBC 12.5* 15.9* 14.8* 15.8*   RBC 3.72* 3.87* 3.61* 3.92*   HGB 11.4* 11.5* 10.9* 12.0*   HCT 34.2* 35.3* 32.6* 35.1*   MCV 92 91 90 90   MCH 30.6 29.7 30.2 30.6   MCHC 33.3 32.6 33.4 34.2   RDW 14.2 14.1 13.7 13.7    261 242 287     INR  Recent Labs  Lab 08/24/17  0520   INR 1.23*     Arterial Blood Gas  Recent Labs  Lab 08/23/17  2230 08/23/17  1715 08/23/17  1244 08/23/17  1050   PH 7.32* 7.30* 7.28* 7.27*   PCO2 38 40 43 43   PO2 65* 59* 88 61*   HCO3 19* 20* 20* 20*   O2PER  --  BIPAP 95  --        All cultures:    Recent Labs  Lab 08/23/17  1542 08/23/17  0730 08/22/17  0308 08/22/17  0305   CULT PENDING Canceled, Test credited>10 Squamous epithelial cells/low power field indicates oral contamination. Please recollect.* No growth after 2 days No growth after 2 days     Recent Results (from the past 24 hour(s))   XR Chest Port 1 View    Narrative    XR CHEST PORT 1 VW  8/23/2017 4:08 PM     HISTORY:  recent thoracentesis, concern for pneumo    COMPARISON: CT dated 8/23    FINDINGS:  Left pleural effusion is seen. No pneumothorax is  identified. The left pleural effusion has increased since the film  from 8/22. Increase infiltrate is seen in the left lung. There is also  been increased infiltrate in the right lung. The infiltrates could be  due to pulmonary edema. Pneumonitis could also have this appearance.      Impression    IMPRESSION:  1. No pneumothorax identified.  2. Increasing bilateral infiltrates. This could be due to pulmonary  edema. It could also be due to pneumonitis.  3. Left pleural effusion also appears to have increased.    NICCI GORDON MD   XR Chest Port 1 View    Narrative    XR CHEST PORT 1 VW 8/24/2017 10:00 AM    HISTORY: ET tube placement.    COMPARISON: 8/23/2017    FINDINGS:  The endotracheal tube tip is 2 cm above the porsha. There is  diffuse bilateral airspace opacity. Enteric tube tip is beyond the  lower margin of the film.      Impression    IMPRESSION: Endotracheal tube tip is 2 cm above the porsha.    JERAD YING MD   XR Abdomen Port 1 View    Narrative    XR ABDOMEN PORT F1 VW 8/24/2017 10:20 AM    HISTORY: OG placement verification.    COMPARISON: None.    FINDINGS: Feeding tube tip is in the projection of the stomach.  Bilateral basilar airspace opacity in the lungs.      Impression    IMPRESSION: Feeding tube tip appears to be in the stomach.    JERAD YING MD   XR Chest Port 1 View    Narrative    XR CHEST PORT 1 VW 8/24/2017 1:40 PM    HISTORY: Confirm line placement.    COMPARISON: 8/24/2017 at 10:10    FINDINGS: Right central line tip is in the projection of the SVC/RA  junction. Endotracheal tube and enteric tube appear stable. Diffuse  bilateral airspace opacity is redemonstrated.      Impression    IMPRESSION: Right central line appears appropriately positioned.    JERAD YING MD           Billing: This patient is critically ill: Yes. Total critical care time today 98 min excluding procedures       P

## 2017-08-25 NOTE — PLAN OF CARE
Problem: Goal Outcome Summary  Goal: Goal Outcome Summary  Outcome: No Change  Pt sedated, vented.  Higgins patent, low UOP, MD aware.  Lung sounds diminished on L.  Proned.  Skin care protocol in place.  ART line and CVC patent.   VSS.  Tele SR with 1st degree AVB and BBB.  Family at bedside.  Continue to monitor.

## 2017-08-25 NOTE — PROGRESS NOTES
Critical Care Progress Note      08/25/2017    Name: Niko Mireles MRN#: [id number]   Age: 52 year old YOB: 1965     Hsptl Day# 3  ICU DAY #2    MV DAY #2             Problem List:   Active Problems:    Community acquired bacterial pneumonia    ARDS (adult respiratory distress syndrome) (H)    Parapneumonic effusion    Encephalopathy    Non morbid obesity due to excess calories           Summary/Hospital Course:     Niko Mireles is a 52 year old male with PMHx of HTN, obesity who presents on 8/22 with shortness or breath, concern for PE vs CAP. Per chart review, the patient has had a gradual increase in SOB, pleuritic chest pain and left lower quadrant abdominal pain which he initially attributed to his diverticulitis however this moved more to his chest which prompted him to seek evaluation. On admission, CXR showed diffuse infiltrates L >R. CT abdomen was unremarkable. Labs remarkable for DINORA and leukocytosis. CT scan was not ordered given the patients renal impairment. V/Q flores was indeterminate. Patient treated with abx and heparin gtt empirically.        His respiratory status continued to decline on the floor, ABG with significant severe P/F ratio prompting initiation of BiPAP. Pulmonary was consulted on the floor, however CT chest with evidence of large L sided complex pleural effusion, bilateral infiltrates  prompting admission to the ICU.       On arrival to ICU patient in mild distress but speaking in moderate to full sentences, browsing his phone. Bedside eval with US demonstrated limited windows of fluid on the left but pocket available for tap in posterior field.      8/24  - worsening resp status thus intubated , bronch, lined PES placed and titrated PEEP adjusted  Still desat thus paralyzed and proned         Assessment and plan :     Niko Mireles is a 52 year old male with PMHx of HTN who presents on 8/22 with shortness or breath, concern for PE vs CAP. Respiratory status  continued to decline on the floor, CT chest with evidence of large L sided complex pleural effusion and bilateral infiltrates - combination pneumonia and effusion prompting admission to the ICU.  I have personally reviewed the daily labs, imaging studies, cultures and discussed the case with referring physician and consulting physicians.     My assessment and plan by system for this patient is as follows:    Neurology/Psychiatry:   1. Encephalopathy - 2/2 acute illness  2. Analgesia/Anxiety  Plan  - propofol gtt fentanyl gtt for sedation analgesia  - goal RASS -4 to 5 given need for paralysis  - paralysis with vec to allow for synchrony and proning     Cardiovascular:   1.Tachyarrthymia - noted LBBB Morphology - ECHO with no RWMA and preserved EF Grad 1 DD   2. Currently hemodynamically appropriate    Plan  - follow hemodynamics, goal MAP >65, follow UOP LA     Pulmonary/Ventilator Management:   1. Acute hypoxemic Respiratory failure with ARDS  2/2 pneumonia requiring paralysis and proning   2. Complicated para pneumonic effusion - s/p thoracentesis   Plan  - LTV ventilatory strategy - 6ml /kg ~ 480 goal  PEEP 15 , goal PaO2 >65, sats 88%, accept mild hypercarbia ph ~>7.2  - serial ABG and CXR as indicated  - FUP thoracentesis results and bronch  - broad spectrum abx   - full strength flolan  - supine this AM for cares re prone afterwards    GI and Nutrition :   1. Per echo report - questionable flow around liver - unclear , no obvious correlate on recent CT scan  Plan  - Hepatic US this AM while supine  - OG tube leaking  - continue NPO until OG replaced    Renal/Fluids/Electrolytes:   1. Oliguria/ALI - 2/2 ATN - improved after bolus but still labile   2. Electrolyte abnormality  -2/2 critical illness  3. Acid/base status - resp and met acidosis - improved   4. Volume status - difficult to assess   Plan  - maintain hemodynamics   - monitor function and electrolytes as needed with replacement per ICU protocols   -  generally avoid nephrotoxic agents such as NSAID, IV contrast unless specifically required  - adjust medications as needed for renal clearance  - follow I/O's as appropriate.    Infectious Disease:   1. Bilateral pneumonia with parapneumonic effusion   Plan  - fup bronch and pleural cultures  - broad spectrum abx with vanz/zosyn and azithro for atypicals (change IV azithro)  - repeat US of chest -re eval pocket for effusion and tap     Endocrine:   1. Stress hyperglycemia   Plan  - ICU insulin protocol, goal sugar <180    Hematology/Oncology:   1. Leukocytosis - reactive   2. Anemia, no signs, symptoms of active blood loss  Plan  - serial CBC, coags     IV/Access:   1. Venous access - right IJ  2. Arterial access -  Radial   Plan  - central access required and necessary      ICU Prophylaxis:   1. DVT: Hep Subq/mechanical  2. VAP: HOB 30 degrees, chlorhexidine rinse  3. Stress Ulcer: PPI  4. Restraints: Nonviolent soft two point restraints required and necessary for patient safety and continued cares and good effect as patient continues to pull at necessary lines, tubes despite education and distraction. Will readdress daily.   5. IV Access - central access required and necessary for continued patient cares  6. Feeding - NPO -current OG leaking - will need to replace    7. Family Update: at bedside   8. Disposition - ICU        Key goals for next 24 hours:   1. Supine for cares and imaging - re prone  2. ABD US eval  - hepatic lesion  3. Repeat US chest  4. Need OG tube replaced             Interim History:   - intubated - bronched  - proned in afternoon, some acidosis improved with vent adjustment   - TLC and art line placed  - ECHO obtained - images demonstrated abnormal blood flow around liver -              Key Medications:       piperacillin-tazobactam  3.375 g Intravenous Q6H     epoprostenol (FLOLAN) syringe change   Inhalation Q8H     heparin  5,000 Units Subcutaneous Q8H     pantoprazole  40 mg Intravenous  QAM AC     chlorhexidine  15 mL Mouth/Throat Q12H     vancomycin place jose - receiving intermittent dosing  1 each Does not apply See Admin Instructions     ipratropium - albuterol 0.5 mg/2.5 mg/3 mL  3 mL Nebulization Q4H While awake     sodium chloride (PF)  3 mL Intracatheter Q8H     lidocaine  10 mL Subcutaneous Once     azithromycin  250 mg Oral Q24H     fluticasone  2 spray Both Nostrils Daily     mirtazapine (REMERON) tablet TABS 15 mg  15 mg Oral At Bedtime       epoprostenol (FLOLAN) 20 mcg/mL in glycine diluent inhalation solution 20 ng/kg/min (08/25/17 0521)     propofol (DIPRIVAN) infusion 75 mcg/kg/min (08/25/17 0756)     fentaNYL 50 mcg/hr (08/25/17 0804)     vecuronium (NORCURON) infusion ADULT 0.7 mcg/kg/min (08/25/17 0832)     - MEDICATION INSTRUCTIONS -       NaCl 100 mL/hr at 08/25/17 0533     - MEDICATION INSTRUCTIONS -                 Physical Examination:   Temp:  [98.4  F (36.9  C)-99.5  F (37.5  C)] 98.8  F (37.1  C)  Heart Rate:  [] 85  Resp:  [8-33] 26  BP: ()/(59-78) 135/61  MAP:  [55 mmHg-77 mmHg] 64 mmHg  Arterial Line BP: ()/(43-70) 93/50  FiO2 (%):  [70 %-100 %] 70 %  SpO2:  [86 %-98 %] 97 %      Intake/Output Summary (Last 24 hours) at 08/25/17 1247  Last data filed at 08/25/17 1200   Gross per 24 hour   Intake           4619.6 ml   Output             1360 ml   Net           3259.6 ml       Wt Readings from Last 4 Encounters:   08/25/17 114.2 kg (251 lb 12.3 oz)     Arterial Line BP: ()/(43-70) 93/50  MAP:  [55 mmHg-77 mmHg] 64 mmHg  BP - Mean:  [66-93] 83  CVP:  [4 mmHg-290 mmHg] 263 mmHg  Ventilation Mode: CMV/AC  FiO2 (%): 70 %  Rate Set (breaths/minute): 26 breaths/min  Tidal Volume Set (mL): 480 mL  PEEP (cm H2O): 14 cmH2O  Oxygen Concentration (%): 70 %  Resp: 26    Recent Labs  Lab 08/25/17  0823 08/25/17  0540 08/25/17  0001 08/24/17  2115  08/24/17  0752  08/23/17  1715 08/23/17  1244   PH 7.22* 7.21* 7.20* 7.19*  < > 7.24*  < > 7.30* 7.28*   PCO2  50* 53* 54* 56*  < > 50*  < > 40 43   PO2 159* 84 79* 75*  < > 61*  < > 59* 88   HCO3 21 21 21 21  < > 21  < > 20* 20*   O2PER  --   --   --   --   --  100%  --  BIPAP 95   < > = values in this interval not displayed.    GEN:  intubated sedate  HEENT: head ncat, sclera anicteric, OP patent, trachea midline ett in place   PULM: synchronous with vent  clear anteriorly  diminished at bases L>R   CV/COR: tachycardic but regular S1S2 no gallop,  No rub, no murmur  ABD: protuberant, obese soft nontender, hypoactive bowel sounds, no mass  EXT:  trace edema, warm  NEURO: limited by sedation paralysis GCS 3 RASS -5    SKIN: no obvious rash  LINES: clean, dry intact         Data:   All data and imaging reviewed     ROUTINE ICU LABS (Last four results)  CMP    Recent Labs  Lab 08/25/17  0540 08/24/17  1605 08/24/17  0520 08/23/17  1609    141 140 138   POTASSIUM 4.6 5.0 4.7 4.7   CHLORIDE 111* 112* 110* 107   CO2 22 23 20 22   ANIONGAP 6 6 10 9   * 127* 122* 108*   BUN 29 28 25 23   CR 1.50* 1.54* 1.32* 1.42*   GFRESTIMATED 49* 47* 57* 52*   GFRESTBLACK 59* 57* 69 63   BENOIT 8.2* 8.3* 8.2* 8.5   PROTTOTAL 6.1* 6.4*  --  6.8   ALBUMIN 1.7* 1.9*  --  2.3*   BILITOTAL 1.0 1.0  --  1.5*   ALKPHOS 83 87  --  104   AST 33 34  --  56*   ALT 34 39  --  54     CBC    Recent Labs  Lab 08/25/17  0540 08/25/17  0430 08/24/17  1605 08/24/17  0520   WBC 9.4 10.7 11.8* 12.5*   RBC 3.27* 3.35* 3.53* 3.72*   HGB 9.8* 10.2* 10.7* 11.4*   HCT 30.8* 31.6* 33.0* 34.2*   MCV 94 94 94 92   MCH 30.0 30.4 30.3 30.6   MCHC 31.8 32.3 32.4 33.3   RDW 14.8 14.8 14.6 14.2    299 283 283     INR    Recent Labs  Lab 08/25/17  0540 08/24/17  1605 08/24/17  0520   INR 1.12 1.32* 1.23*     Arterial Blood Gas  Recent Labs  Lab 08/25/17  0823 08/25/17  0540 08/25/17  0001 08/24/17  2115  08/24/17  0752  08/23/17  1715 08/23/17  1244   PH 7.22* 7.21* 7.20* 7.19*  < > 7.24*  < > 7.30* 7.28*   PCO2 50* 53* 54* 56*  < > 50*  < > 40 43   PO2 159* 84  79* 75*  < > 61*  < > 59* 88   HCO3 21 21 21 21  < > 21  < > 20* 20*   O2PER  --   --   --   --   --  100%  --  BIPAP 95   < > = values in this interval not displayed.    All cultures:    Recent Labs  Lab 08/24/17  0930 08/23/17  1542 08/23/17  0730 08/22/17  0308 08/22/17  0305   CULT Culture negative monitoring continues  PENDING  PENDING  PENDING  PENDING  Canceled, Test creditedInappropriate specimen typeRESPIRATORY SOURCES ARE NOT APPROPRIATE FOR ANAEROBIC CULTURENotification of test cancellation was given toDr Humberto Wilkins Culture negative monitoring continues  Culture negative monitoring continues  Culture negative after 13 hours Canceled, Test credited>10 Squamous epithelial cells/low power field indicates oral contamination. Please recollect.* No growth after 3 days No growth after 3 days     Recent Results (from the past 24 hour(s))   XR Chest Port 1 View    Narrative    XR CHEST PORT 1 VW 8/24/2017 1:40 PM    HISTORY: Confirm line placement.    COMPARISON: 8/24/2017 at 10:10    FINDINGS: Right central line tip is in the projection of the SVC/RA  junction. Endotracheal tube and enteric tube appear stable. Diffuse  bilateral airspace opacity is redemonstrated.      Impression    IMPRESSION: Right central line appears appropriately positioned.    JERAD YING MD     ECHO Interpretation Summary     The rhythm was sinus with wide QRS/LBBB.  The left ventricle is normal in size.  The left ventricular ejection fraction is normal.  The visual ejection fraction is estimated at 55-60%.  Septal motion is consistent with conduction abnormality.  Grade I or early diastolic dysfunction.  The right ventricular systolic function is normal.  Imaging of all four valves was difficult but they appear grossly normal in  structure and function.     There are sclerotic lesions in the liver the seem to be intra vascular. There  is blood flow in and around these lesions, significance unclear. Reported to  the  ICU.      Billing: This patient is critically ill: Yes. Total critical care time today 45 min excluding procedures

## 2017-08-25 NOTE — PROVIDER NOTIFICATION
Pt's vent alarming circuit disconnect and pt's O2 sats dropping after turning.  ETT not obstructed, no obvious tubing kinks, vent troubleshooting done by RT.  Vent exchanged and pt bagged with intensivist at bedside.  After vent exchange RT and RN were going to recalibrate esophageal monitoring however decision made by intensivist to forgo monitoring at this time and continue to allow pt to recover overnight on current vent settings.

## 2017-08-25 NOTE — PROGRESS NOTES
Critical access hospital ICU RESPIRATORY NOTE  Date of Admission: 8/22/17  Date of Intubation (most recent): 8/24/17  Reason for Mechanical Ventilation: Respiratory failure  Number of Days on Mechanical Ventilation: 2  Met Criteria for Pressure Support Trial: no  Length of Pressure Support Trial:  Reason for Stopping Pressure Support Trial:  Reason for No Pressure Support Trial: pt is on full strength flolan and in prone position.  Ventilation Mode: CMV/AC  FiO2 (%): 70 %  Rate Set (breaths/minute): 26 breaths/min  Tidal Volume Set (mL): 480 mL  PEEP (cm H2O): 15 cmH2O  Oxygen Concentration (%): 60 %  Resp: 26      Significant Events Today: pt head turn Q2. During turning pt disat, pt bagged for about 7min and placed on ventilator.    ABG Results:Results for LEIGHA PATRICK GOMEZ (MRN [id number]) as of 8/25/2017 05:35   Ref. Range 8/24/2017 13:10 8/24/2017 14:15 8/24/2017 18:00 8/24/2017 21:15 8/25/2017 00:01   pH Arterial Latest Ref Range: 7.35 - 7.45 pH 7.25 (L) 7.18 (LL) 7.14 (LL) 7.19 (LL) 7.20 (L)   pCO2 Arterial Latest Ref Range: 35 - 45 mm Hg 50 (H) 59 (H) 62 (H) 56 (H) 54 (H)   PO2 Arterial Latest Ref Range: 80 - 105 mm Hg 50 (L) 143 (H) 82 75 (L) 79 (L)   Bicarbonate Arterial Latest Ref Range: 21 - 28 mmol/L 22 22 21 21 21   Base Deficit Art Latest Units: mmol/L 5.6 6.9 8.6 7.2 7.3   Oxyhemoglobin Arterial Latest Ref Range: 92 - 100 % 82 (L) 98 93 93 94       ETT appearance on chest x-ray: same position    Plan:  Pt remains on full vent support. RT will continue to follow.  8/25/2017  Dwight Dai

## 2017-08-25 NOTE — PLAN OF CARE
Problem: Goal Outcome Summary  Goal: Goal Outcome Summary  Outcome: No Change  Pt remains intubated, prone, medically sedated, and medically paralyzed for vent compliance.  Lung sound have coarse crackles to the uppers and diminished bases, suctioning minimal secretions, no vent changes made this shift.  Bowel sounds are hypoactive, OG to LIS.  Urine output adequate this shift.  Family updated on phone.

## 2017-08-25 NOTE — PLAN OF CARE
Problem: Goal Outcome Summary  Goal: Goal Outcome Summary  Outcome: No Change  Pt stable. Tolerating prone positioning. Placed supine for approx 1 hour. Sats initially decreased, but improved with titration of fIo2. Pt remains paralyzed and sedated on vec, fent and propofol. Gtts titrated for effect, remains comfortable-tolerating positioning and vent. FiO2 at 60% Fio2 at this time with sats remaining >95%. LS improved, continues to have some rhonchi but increased air movement. Suctioned for small amount of thin clear secretions.  Urine output adequate, dark keara. Family at bedside and updated on POC, all questions answered.

## 2017-08-25 NOTE — PROGRESS NOTES
HOSPITALIST CHART CHECK    Mr. Mireles remains intubated in the ICU. We will continue to follow his case peripherally while intubated.    WERNER COMBS MD

## 2017-08-25 NOTE — PROVIDER NOTIFICATION
Dr. Mittal notified of 0 twitches with TOF, on lowest dose of vec within range and pt is compliant with the vent, plan to continue with current rate of vec.

## 2017-08-25 NOTE — PROGRESS NOTES
Patient placed supine at 1100.  SpO2 dropped to 79-80% on 50% FiO2.  FiO2 increased to 100%, SpO2 increased to 95%.  FiO2 Titrated to 90%, SpO2 94%.  Will continue to monitor.

## 2017-08-25 NOTE — PHARMACY-VANCOMYCIN DOSING SERVICE
Pharmacy Vancomycin Note  Date of Service 2017  Patient's  1965   52 year old, male    Indication: Community Acquired Pneumonia  Goal Trough Level: 15-20 mg/L  Day of Therapy: 2  Current Vancomycin regimen:  1500 mg IV q8h    Current estimated CrCl = Estimated Creatinine Clearance: 73.8 mL/min (based on Cr of 1.54).    Creatinine for last 3 days  2017: 12:25 AM Creatinine 2.31 mg/dL  2017:  5:55 AM Creatinine 1.51 mg/dL;  4:09 PM Creatinine 1.42 mg/dL  2017:  5:20 AM Creatinine 1.32 mg/dL;  4:05 PM Creatinine 1.54 mg/dL    Recent Vancomycin Levels (past 3 days)  2017:  4:05 PM Vancomycin Level 22.4 mg/L    Vancomycin IV Administrations (past 72 hours)                   vancomycin (VANCOCIN) 1500 mg in 0.9% NaCl 250 mL PREMIX (mg) 1,500 mg New Bag 17 1610     1,500 mg New Bag  1023     1,500 mg New Bag  0100     1,500 mg New Bag 17 1740                Nephrotoxins and other renal medications (Future)    Start     Dose/Rate Route Frequency Ordered Stop    17  vancomycin place jose - receiving intermittent dosing      1 each Does not apply SEE ADMIN INSTRUCTIONS 17 1200  piperacillin-tazobactam (ZOSYN) 3.375 g vial to attach to  mL bag      3.375 g  over 30 Minutes Intravenous EVERY 6 HOURS 17 0825               Contrast Orders - past 72 hours (72h ago through future)    Start     Dose/Rate Route Frequency Ordered Stop    17 1500  sulfur hexafluoride microsphere (LUMASON) 60.7-25 MG injection 2 mL      2 mL Intravenous ONCE 17 1448 17 1448    17 1200  iopamidol (ISOVUE-370) solution 80 mL      80 mL Intravenous ONCE 17 1154 17 1214    17 0930  technetium pertechnetate with albumin (Tc99m MAA) radioisotope injection 3 millicurie      3 millicurie Intravenous ONCE 17 0924 17 0927    17 0930  technetium pentetate Tc99m (DTPA) inhaled radioisotope 65 millicurie       65 millicurie Inhalation ONCE 08/22/17 0927 08/22/17 0928          Interpretation of levels and current regimen:  Trough level is  Supratherapeutic    Has serum creatinine changed > 50% in last 72 hours: No - but has increased since this AM  Urine output:  diminished urine output    Renal Function: Worsening    Plan:  1.  Change to intermittent dosing for now  2.  Pharmacy will check trough levels as appropriate in Level ordered for AM.    3. Serum creatinine levels will be ordered daily for the first week of therapy and at least twice weekly for subsequent weeks.      Rachael Celestin        .

## 2017-08-25 NOTE — PROGRESS NOTES
[facility] ICU RESPIRATORY NOTE  Date of Admission: 8/22/17  Date of Intubation (most recent): 8/24/17  Reason for Mechanical Ventilation: Respiratory failure  Number of Days on Mechanical Ventilation: 2  Met Criteria for Pressure Support Trial: no  Length of Pressure Support Trial:  Reason for Stopping Pressure Support Trial:  Reason for No Pressure Support Trial: pt is on full strength flolan and in prone position.  Ventilation Mode: CMV/AC  FiO2 (%): 70 %  Rate Set (breaths/minute): 26 breaths/min  Tidal Volume Set (mL): 480 mL  PEEP (cm H2O): 14 cmH2O  Oxygen Concentration (%): 70 %  Resp: 25      Significant Events Today: pt head turn Q2. During turning pt disat, pt bagged for about 7min and placed on ventilator.    ABG Results:Results for LEIGHA PATRICK GOMEZ (MRN [id number]) as of 8/25/2017 05:35   Ref. Range 8/24/2017 13:10 8/24/2017 14:15 8/24/2017 18:00 8/24/2017 21:15 8/25/2017 00:01   pH Arterial Latest Ref Range: 7.35 - 7.45 pH 7.25 (L) 7.18 (LL) 7.14 (LL) 7.19 (LL) 7.20 (L)   pCO2 Arterial Latest Ref Range: 35 - 45 mm Hg 50 (H) 59 (H) 62 (H) 56 (H) 54 (H)   PO2 Arterial Latest Ref Range: 80 - 105 mm Hg 50 (L) 143 (H) 82 75 (L) 79 (L)   Bicarbonate Arterial Latest Ref Range: 21 - 28 mmol/L 22 22 21 21 21   Base Deficit Art Latest Units: mmol/L 5.6 6.9 8.6 7.2 7.3   Oxyhemoglobin Arterial Latest Ref Range: 92 - 100 % 82 (L) 98 93 93 94       ETT appearance on chest x-ray: same position    Plan:  Pt remains on full vent support. RT will continue to follow.  8/25/2017  Benedict Low

## 2017-08-26 NOTE — PHARMACY-VANCOMYCIN DOSING SERVICE
Pharmacy Vancomycin Note  Date of Service 2017  Patient's  1965   52 year old, male    Indication: Multfocal PNA  Goal Trough Level: 15-20 mg/L  Day of Therapy: 3  Current Vancomycin regimen:  1500mg intermittent dosing  Current estimated CrCl = Estimated Creatinine Clearance: 75.4 mL/min (based on Cr of 1.5).    Creatinine for last 3 days  2017:  5:55 AM Creatinine 1.51 mg/dL;  4:09 PM Creatinine 1.42 mg/dL  2017:  5:20 AM Creatinine 1.32 mg/dL;  4:05 PM Creatinine 1.54 mg/dL  2017:  5:40 AM Creatinine 1.50 mg/dL    Recent Vancomycin Levels (past 3 days)  2017:  4:05 PM Vancomycin Level 22.4 mg/L  2017:  5:40 AM Vancomycin Level 17.4 mg/L;  8:10 PM Vancomycin Level 18.7 mg/L    Vancomycin IV Administrations (past 72 hours)                   vancomycin (VANCOCIN) 1500 mg in 0.9% NaCl 250 mL PREMIX (mg) 1,500 mg New Bag 17 0834    vancomycin (VANCOCIN) 1500 mg in 0.9% NaCl 250 mL PREMIX (mg) 1,500 mg New Bag 17 1610     1,500 mg New Bag  1023     1,500 mg New Bag  0100     1,500 mg New Bag 17 1740                Nephrotoxins and other renal medications (Future)    Start     Dose/Rate Route Frequency Ordered Stop    17 2100  vancomycin (VANCOCIN) 1500 mg in 0.9% NaCl 250 mL PREMIX      1,500 mg Intravenous EVERY 12 HOURS 17 20517 1200  piperacillin-tazobactam (ZOSYN) 3.375 g vial to attach to  mL bag      3.375 g  over 30 Minutes Intravenous EVERY 6 HOURS 17 0825               Contrast Orders - past 72 hours (72h ago through future)    Start     Dose/Rate Route Frequency Ordered Stop    17 1500  sulfur hexafluoride microsphere (LUMASON) 60.7-25 MG injection 2 mL      2 mL Intravenous ONCE 17 1448 17 1448    17 1200  iopamidol (ISOVUE-370) solution 80 mL      80 mL Intravenous ONCE 17 1154 17 1214          Interpretation of levels and current regimen:  Trough level is  Therapeutic    Has  serum creatinine changed > 50% in last 72 hours: No    Urine output:  stable    Renal Function: Stable    Plan:  1.  change dose to 1500mg q 12 hours  2.  Pharmacy will check trough levels as appropriate in 1-3 Days.    3. Serum creatinine levels will be ordered daily for the first week of therapy and at least twice weekly for subsequent weeks.      Sagar Colon        .

## 2017-08-26 NOTE — PLAN OF CARE
Problem: Goal Outcome Summary  Goal: Goal Outcome Summary  Outcome: No Change  Neuro: Pt is medically sedated and paralyzed with Vec, propofol, and fentanyl.  Train of 4 remains 2-3:4/10 mA with no changes to the Vec gtt.  Propofol has been titrated down and pt has remained synchronous with vent.  Versed bolus's given in this effort.  Cardiac:  SR with LBBB.  MAP has remained > 65.  Resp:  Remained prone today.  Per MD no supine today.  Peep changed from 15 to 13 this am.  10 am ABG's stable, slightly improved.  Peep further decreased to 12.  Full strength Flolan.  LS are coarse/rhonchi throughout.    GI:  Will hold off on nutrition/OG/NG placement today as pt will remain prone.  Hypoactive, faint BS.  Last BM was 8/22.   :  Adequate UOP in daniel.  Urine is dark greenish in color.  MD aware.  Labs: elevated CK and Triglycerides.  Per MD will continue with prop gtt for sedation with attempts made to decrease rate.  Will follow daily CK, Triglycerides in addition to other labs ordered.    Plan: Attempt to supine tomorrow.

## 2017-08-26 NOTE — PLAN OF CARE
Problem: Goal Outcome Summary  Goal: Goal Outcome Summary  Outcome: No Change  Neuro:  Vecuronium gtt titrated to invoke 3:4 twitches/10mA at 0600.   Continue propofol at this time note labs.  Plan for sedation change when able today.    Pulm:  Vented, Full strength Flolan continues.    Cardiac: NSR BP elevated at times   GI: NPO  : Higgins, adequate UOP       See MD notes for plan

## 2017-08-26 NOTE — PROGRESS NOTES
[facility] ICU RESPIRATORY NOTE  Date of Admission: 8/22/17  Date of Intubation (most recent): 8/24/17  Reason for Mechanical Ventilation: Respiratory failure  Number of Days on Mechanical Ventilation: 2  Met Criteria for Pressure Support Trial: no  Length of Pressure Support Trial:  Reason for Stopping Pressure Support Trial:  Reason for No Pressure Support Trial: pt is on full strength flolan and in prone position.  Ventilation Mode: CMV/AC  FiO2 (%): 40 %  Rate Set (breaths/minute): 26 breaths/min  Tidal Volume Set (mL): 480 mL  PEEP (cm H2O): 15 cmH2O  Oxygen Concentration (%): 40 %  Resp: 26    Recent Labs  Lab 08/25/17 2010 08/25/17  1436 08/25/17  0823 08/25/17  0540  08/24/17  0752  08/23/17  1715 08/23/17  1244   PH 7.22* 7.22* 7.22* 7.21*  < > 7.24*  < > 7.30* 7.28*   PCO2 50* 49* 50* 53*  < > 50*  < > 40 43   PO2 111* 113* 159* 84  < > 61*  < > 59* 88   HCO3 20* 20* 21 21  < > 21  < > 20* 20*   O2PER Ventilator  --   --   --   --  100%  --  BIPAP 95   < > = values in this interval not displayed.    Significant Events Today:  Ventilator malfunction and changed to another ventilator.  Plan: pt remains on full vent support. RT will continue to follow  8/26/2017  Benedict Low

## 2017-08-26 NOTE — PROGRESS NOTES
[facility] ICU RESPIRATORY NOTE  Date of Admission:8/22/17  Date of Intubation (most recent):8/24/17  Reason for Mechanical Ventilation:Respiratory failure  Number of Days on Mechanical Ventilation:3  Met Criteria for Pressure Support Trial:No  Reason for No Pressure Support Trial:Full strength flolan running and pt is proned.    Ventilation Mode: CMV/AC  FiO2 (%): 40 %  Rate Set (breaths/minute): 26 breaths/min  Tidal Volume Set (mL): 480 mL  PEEP (cm H2O): 13 cmH2O  Oxygen Concentration (%): 40 %  Resp: 26      Significant Events Today:PEEP decreased from 15 to 13.    ABG Results:    Recent Labs  Lab 08/26/17  1004 08/25/17 2010 08/25/17  1436 08/25/17  0823  08/24/17  0752  08/23/17  1715   PH 7.29* 7.22* 7.22* 7.22*  < > 7.24*  < > 7.30*   PCO2 41 50* 49* 50*  < > 50*  < > 40   PO2 110* 111* 113* 159*  < > 61*  < > 59*   HCO3 20* 20* 20* 21  < > 21  < > 20*   O2PER 40 Ventilator  --   --   --  100%  --  BIPAP   < > = values in this interval not displayed.      ETT appearance on chest x-ray: In good position    Plan:  Will cont full vent support for now and assess for weaning readiness daily.  8/26/2017  Lydia Dalton RRT

## 2017-08-26 NOTE — PROVIDER NOTIFICATION
RN lab results for 2000 CK tl =454, ABG see EPIC.  Vecuronium dose decreased as pt has no twitches out of 10 mAmps.  Plan passed on per prior RN to attempt to decrease propofol dose and use PRN Versed doses as able for sedation.  Above lab values passed on to [facility] RN who spoke with Dr. Martin.  No new orders at this time.  Will continue to monitor pt closely.    RN Addendum.  Dr. Martin at bedside.  Discussed sedatives/sedation levels along with Vecuronium drip.  Plan to attempt to wean vecuronium down as able to achieve 2 out of 4 twitches, keep propofol running at current rate along with Fentanyl drip.  Plan as able to wean and possibly change sedative once TOF goal achieved.  Also plan to recheck triglyceride and CK tl in a.m. Per Dr. Martin.

## 2017-08-26 NOTE — PLAN OF CARE
Problem: Individualization  Goal: Patient Preferences  Outcome: No Change  Pt remains on Vecuronium but weaning as unable to achieve TOF(2-3 twitches out of 4).  Earlier triglyceride level elevated and so a CK tl was ordered which was also elevated.  Unable to safely transition pt from current sedatives to another while Vecuronium in system.  Pt remains proned.  Tolerating repositioning well currently.  Family updated at bedside.  Fio2 decreased to 40% Flolan full strength.  Plan for decrease in Vec to achieve 2 out of 4 twitches and tomorrow possibly ascertain ability to switch propofol for another sedative.

## 2017-08-26 NOTE — PROGRESS NOTES
TELE:  ABG reviewed.  pao2 now 111.  Weaned fio2 to 40%.  Ck of 400s noted, and triglycerides of 360.  Turning fio2 down to 40%. Pt currently proned/paralyzed on vec with 0/4 twitches.  Hesitant to change sedative while patient is deeply paralyzed (no way to assess efficacy of new agent in paralyzed pt).  Will wean vec tonight to get to 2/4 twitches.  Recheck ck and trigylcerides in AM.  Plan to wean paralytic when supined tomorrow if resp status will tolerate to exchange sedative agents.

## 2017-08-27 NOTE — PROGRESS NOTES
Critical Care Progress Note      08/27/2017    Name: iNko Mireles MRN#: [id number]   Age: 52 year old YOB: 1965     Hsptl Day# 5  ICU DAY #3    MV DAY #3             Problem List:   Active Problems:    Community acquired bacterial pneumonia    ARDS (adult respiratory distress syndrome) (H)    Parapneumonic effusion    Encephalopathy    Non morbid obesity due to excess calories           Summary/Hospital Course:     Niko Mireles is a 52 year old male with PMHx of HTN, obesity who presents on 8/22 with shortness or breath, concern for PE vs CAP. Per chart review, the patient has had a gradual increase in SOB, pleuritic chest pain and left lower quadrant abdominal pain which he initially attributed to his diverticulitis however this moved more to his chest which prompted him to seek evaluation. On admission, CXR showed diffuse infiltrates L >R. CT abdomen was unremarkable. Labs remarkable for DINORA and leukocytosis. CT scan was not ordered given the patients renal impairment. V/Q flores was indeterminate. Patient treated with abx and heparin gtt empirically.        His respiratory status continued to decline on the floor, ABG with significant severe P/F ratio prompting initiation of BiPAP. Pulmonary was consulted on the floor, however CT chest with evidence of large L sided complex pleural effusion, bilateral infiltrates  prompting admission to the ICU.       On arrival to ICU patient in mild distress but speaking in moderate to full sentences, browsing his phone. Bedside eval with US demonstrated limited windows of fluid on the left but pocket available for tap in posterior field.      8/24  - worsening resp status thus intubated , bronch, lined PES placed and titrated PEEP adjusted  Still desat thus paralyzed and proned     8/25 - stable resp status, FiO2 40% while proned, mild elevation in CK and TG        Assessment and plan :     Niko Mireles is a 52 year old male with PMHx of HTN who  presents on 8/22 with shortness or breath, concern for PE vs CAP. Respiratory status continued to decline on the floor, CT chest with evidence of large L sided complex pleural effusion and bilateral infiltrates - combination pneumonia and effusion prompting admission to the ICU.  I have personally reviewed the daily labs, imaging studies, cultures and discussed the case with referring physician and consulting physicians.     My assessment and plan by system for this patient is as follows:    Neurology/Psychiatry:   1. Encephalopathy - 2/2 acute illness  2. Analgesia/Anxiety  3. Elevated CK and TG  Plan  - propofol gtt fentanyl gtt for sedation analgesia  - goal RASS -4 to 5 given need for paralysis  - paralysis with vec to allow for synchrony and proning   - Continue propofol for now, monitor CK and TG, if continuing to elevate may need to change sedation, will try to wean paralytic first.    Cardiovascular:   1.Tachyarrthymia - noted LBBB Morphology - ECHO with no RWMA and preserved EF Grad 1 DD   2. Currently hemodynamically appropriate    Plan  - follow hemodynamics, goal MAP >65, follow UOP LA     Pulmonary/Ventilator Management:   1. Acute hypoxemic Respiratory failure with ARDS  2/2 pneumonia requiring paralysis and proning, PEEP weaned to 10 overnight, CXR slightly improved but still with extensive bilateral infiltrates.  2. Complicated para pneumonic effusion - s/p thoracentesis   Plan  - LTV ventilatory strategy - 6ml /kg ~ 480 goal  , goal PaO2 >65, sats 88%, accept mild hypercarbia ph ~>7.2  - Supine this AM  - serial ABG and CXR as indicated  - FUP thoracentesis results and bronch  - broad spectrum abx   - full strength flolan      GI and Nutrition :   1. Per echo report - questionable flow around liver - unclear , no obvious correlate on recent CT scan  2. Abd Ultrasound showed no focal lesion, GB sludge, R kidney cyst  Plan  - continue NPO while prone until NJ tube can be placed, hopefully  tomorrow    Renal/Fluids/Electrolytes:   1. DINORA, mild cr 1.4-1.5, good urine output overnight   2. Electrolyte abnormality  -2/2 critical illness  3. Acid/base status - metabolic acidosis improved, permissive hypercapnia  4. Volume status - difficult to assess   Plan  - maintain hemodynamics   - monitor function and electrolytes as needed with replacement per ICU protocols  - generally avoid nephrotoxic agents such as NSAID, IV contrast unless specifically required  - adjust medications as needed for renal clearance  - follow I/O's as appropriate.    Infectious Disease:   1. Bilateral pneumonia with parapneumonic effusion   Plan  - fup bronch and pleural cultures, no + cs as of AM 8/25  - broad spectrum abx with vanz/zosyn and azithro for atypicals  - repeat US of chest -re eval pocket for effusion and tap     Endocrine:   1. Stress hyperglycemia   Plan  - ICU insulin protocol, goal sugar <180    Hematology/Oncology:   1. WBC normal  2. Anemia, no signs, symptoms of active blood loss  Plan  - serial CBC, coags     IV/Access:   1. Venous access - right IJ  2. Arterial access -  Radial   Plan  - central access required and necessary      ICU Prophylaxis:   1. DVT: Hep Subq/mechanical  2. VAP: HOB 30 degrees, chlorhexidine rinse  3. Stress Ulcer: PPI  4. Restraints: Nonviolent soft two point restraints required and necessary for patient safety and continued cares and good effect as patient continues to pull at necessary lines, tubes despite education and distraction. Will readdress daily.   5. IV Access - central access required and necessary for continued patient cares  6. Feeding - NPO -current OG leaking - will need to replace    7. Family Update: at bedside   8. Disposition - ICU        Key goals for next 24 hours:   1. Supine this AM  2. Follow pO2  3. Follow CK and TG  4. Wean off Vec if tolerates supine position.             Interim History:   - intubated - bronched  - proned in afternoon, some acidosis improved  with vent adjustment   - TLC and art line placed  - ECHO obtained - images demonstrated abnormal blood flow around liver             Key Medications:       azithromycin  250 mg Intravenous Q24H     vancomycin (VANCOCIN) IV  1,500 mg Intravenous Q12H     piperacillin-tazobactam  3.375 g Intravenous Q6H     epoprostenol (FLOLAN) syringe change   Inhalation Q8H     heparin  5,000 Units Subcutaneous Q8H     pantoprazole  40 mg Intravenous QAM AC     chlorhexidine  15 mL Mouth/Throat Q12H     ipratropium - albuterol 0.5 mg/2.5 mg/3 mL  3 mL Nebulization Q4H While awake     sodium chloride (PF)  3 mL Intracatheter Q8H     lidocaine  10 mL Subcutaneous Once     fluticasone  2 spray Both Nostrils Daily     mirtazapine (REMERON) tablet TABS 15 mg  15 mg Oral At Bedtime       epoprostenol (FLOLAN) 20 mcg/mL in glycine diluent inhalation solution 20 ng/kg/min (08/27/17 0801)     propofol (DIPRIVAN) infusion 40 mcg/kg/min (08/27/17 0957)     fentaNYL 75 mcg/hr (08/27/17 1118)     vecuronium (NORCURON) infusion ADULT 0.4 mcg/kg/min (08/27/17 0800)     - MEDICATION INSTRUCTIONS -       NaCl 20 mL/hr at 08/26/17 2128     - MEDICATION INSTRUCTIONS -                 Physical Examination:   Temp:  [99  F (37.2  C)-99.9  F (37.7  C)] 99.5  F (37.5  C)  Heart Rate:  [81-96] 89  Resp:  [8-26] 26  MAP:  [55 mmHg-93 mmHg] 72 mmHg  Arterial Line BP: ()/(12-67) 118/53  FiO2 (%):  [40 %-80 %] 80 %  SpO2:  [90 %-96 %] 96 %      Intake/Output Summary (Last 24 hours) at 08/26/17 0951  Last data filed at 08/26/17 0800   Gross per 24 hour   Intake          2798.11 ml   Output             1660 ml   Net          1138.11 ml           Wt Readings from Last 4 Encounters:   08/27/17 115.6 kg (254 lb 13.6 oz)     Arterial Line BP: ()/(12-67) 118/53  MAP:  [55 mmHg-93 mmHg] 72 mmHg  Ventilation Mode: CMV/AC  FiO2 (%): 80 %  Rate Set (breaths/minute): 26 breaths/min  Tidal Volume Set (mL): 480 mL  PEEP (cm H2O): 10 cmH2O  Oxygen Concentration  (%): 80 %  Resp: 26    Recent Labs  Lab 08/27/17  0650 08/27/17  0552 08/27/17  0510 08/26/17  1004   PH 7.27* 7.27* 7.27* 7.29*   PCO2 42 41 41 41   PO2 82 71* 257* 110*   HCO3 19* 19* 19* 20*   O2PER 50% 40% 40 40       GEN:  intubated sedate  HEENT: head ncat, sclera anicteric, OP patent, trachea midline ett in place   PULM: synchronous with vent  clear anteriorly  diminished at bases L>R   CV/COR:  regular S1S2 no gallop,  No rub, no murmur  ABD: pt proned  EXT:  trace edema, warm  NEURO: limited by sedation paralysis GCS 3 RASS -5    SKIN: no obvious rash  LINES: clean, dry intact         Data:   All data and imaging reviewed     ROUTINE ICU LABS (Last four results)  CMP    Recent Labs  Lab 08/27/17  1020 08/27/17  0510 08/26/17  2326 08/26/17  0400 08/25/17  0540 08/24/17  1605  08/23/17  1609   NA  --  146*  --  143 139 141  < > 138   POTASSIUM 3.7 3.1* 2.7* 3.7 4.6 5.0  < > 4.7   CHLORIDE  --  117*  --  115* 111* 112*  < > 107   CO2  --  21  --  21 22 23  < > 22   ANIONGAP  --  8  --  7 6 6  < > 9   GLC  --  97  --  109* 140* 127*  < > 108*   BUN  --  30  --  30 29 28  < > 23   CR  --  1.40*  --  1.44* 1.50* 1.54*  < > 1.42*   GFRESTIMATED  --  53*  --  51* 49* 47*  < > 52*   GFRESTBLACK  --  64  --  62 59* 57*  < > 63   BENOIT  --  9.0  --  8.6 8.2* 8.3*  < > 8.5   MAG  --   --  2.1  --   --   --   --   --    PHOS  --   --  2.5  --   --   --   --   --    PROTTOTAL  --  6.1*  --   --  6.1* 6.4*  --  6.8   ALBUMIN  --  1.4*  --   --  1.7* 1.9*  --  2.3*   BILITOTAL  --  2.7*  --   --  1.0 1.0  --  1.5*   ALKPHOS  --  120  --   --  83 87  --  104   AST  --  93*  --   --  33 34  --  56*   ALT  --  42  --   --  34 39  --  54   < > = values in this interval not displayed.  CBC    Recent Labs  Lab 08/27/17  0510 08/26/17  0400 08/25/17  0540 08/25/17  0430   WBC 7.6 7.8 9.4 10.7   RBC 3.22* 3.10* 3.27* 3.35*   HGB 9.6* 9.3* 9.8* 10.2*   HCT 29.2* 28.7* 30.8* 31.6*   MCV 91 93 94 94   MCH 29.8 30.0 30.0 30.4   MCHC  32.9 32.4 31.8 32.3   RDW 14.9 15.0 14.8 14.8    334 274 299     INR    Recent Labs  Lab 08/27/17  0510 08/26/17  0400 08/25/17  0540 08/24/17  1605   INR 1.21* 1.24* 1.12 1.32*     Arterial Blood Gas    Recent Labs  Lab 08/27/17  0650 08/27/17  0552 08/27/17  0510 08/26/17  1004   PH 7.27* 7.27* 7.27* 7.29*   PCO2 42 41 41 41   PO2 82 71* 257* 110*   HCO3 19* 19* 19* 20*   O2PER 50% 40% 40 40       All cultures:    Recent Labs  Lab 08/24/17  0930 08/23/17  1542 08/23/17  0730 08/22/17  0308 08/22/17  0305   CULT No growth  No growth  Culture negative monitoring continues  Culture negative monitoring continues  No growth after 18 hours  No growth after 18 hours  Culture negative after 18 hours  Culture negative after 18 hours  Culture negative monitoring continues  Canceled, Test creditedInappropriate specimen typeRESPIRATORY SOURCES ARE NOT APPROPRIATE FOR ANAEROBIC CULTURENotification of test cancellation was given toDr Humberto Wilkins Culture negative after 2 days  Culture negative monitoring continues  Culture negative monitoring continues Canceled, Test credited>10 Squamous epithelial cells/low power field indicates oral contamination. Please recollect.* No growth after 5 days No growth after 5 days     Recent Results (from the past 24 hour(s))   XR Chest Port 1 View    Narrative    CHEST PORTABLE ONE VIEW 8/27/2017 11:28 AM     COMPARISON: Frontal chest x-ray 8/25/2017.    HISTORY: Intubated.      Impression    IMPRESSION: Tip of the endotracheal tube remains approximately 3 cm  above the porsha. Nasogastric tube has been withdrawn. Right internal  jugular central venous catheter again noted.    Tiny bilateral pleural effusions again noted. Patchy airspace  opacities in both lungs again noted possibly representing pneumonia or  pulmonary edema. There is no pneumothorax. Heart size remains within  normal limits.     ECHO Interpretation Summary     The rhythm was sinus with wide QRS/LBBB.  The  left ventricle is normal in size.  The left ventricular ejection fraction is normal.  The visual ejection fraction is estimated at 55-60%.  Septal motion is consistent with conduction abnormality.  Grade I or early diastolic dysfunction.  The right ventricular systolic function is normal.  Imaging of all four valves was difficult but they appear grossly normal in  structure and function.     There are sclerotic lesions in the liver the seem to be intra vascular. There  is blood flow in and around these lesions, significance unclear. Reported to  the ICU.      Billing: This patient is critically ill: Yes. Total critical care time today 40 min excluding procedures

## 2017-08-27 NOTE — PROGRESS NOTES
[facility] ICU RESPIRATORY NOTE  Date of Admission: 8/22/17  Date of Intubation (most recent): 8/24/17  Reason for Mechanical Ventilation: Respiratory failure  Number of Days on Mechanical Ventilation: 4  Met Criteria for Pressure Support Trial: no  Length of Pressure Support Trial:  Reason for Stopping Pressure Support Trial:  Reason for No Pressure Support Trial: pt is on full strength flolan and in prone position.    Ventilation Mode: CMV/AC  FiO2 (%): 40 %  Rate Set (breaths/minute): 26 breaths/min  Tidal Volume Set (mL): 480 mL  PEEP (cm H2O): 13 cmH2O  Oxygen Concentration (%): 40 %  Resp: 26         Recent Labs  Lab 08/26/17  1004 08/25/17 2010 08/25/17  1436 08/25/17  0823  08/24/17  0752  08/23/17  1715   PH 7.29* 7.22* 7.22* 7.22*  < > 7.24*  < > 7.30*   PCO2 41 50* 49* 50*  < > 50*  < > 40   PO2 110* 111* 113* 159*  < > 61*  < > 59*   HCO3 20* 20* 20* 21  < > 21  < > 20*   O2PER 40 Ventilator  --   --   --  100%  --  BIPAP   < > = values in this interval not displayed.       Significant Events Today:  None  Plan: pt remains on full vent support. RT will continue to follow  8/27/2017  Flora Murillo

## 2017-08-27 NOTE — PROGRESS NOTES
Please call the Hospitalist service when the patient transfers out of the ICU and we can be of further assistance.    Lesvia Angel PA-C

## 2017-08-27 NOTE — PROGRESS NOTES
[facility] ICU RESPIRATORY NOTE  Date of Admission:8/22/17  Date of Intubation (most recent):8/24/17  Reason for Mechanical Ventilation:Respiratory failure  Number of Days on Mechanical Ventilation:4  Met Criteria for Pressure Support Trial:No  Reason for No Pressure Support Trial:Full strength flolan running    Ventilation Mode: CMV/AC  FiO2 (%): 70 %  Rate Set (breaths/minute): 28 breaths/min  Tidal Volume Set (mL): 500 mL  PEEP (cm H2O): 12 cmH2O  Oxygen Concentration (%): 70 %  Resp: 27      Significant Events Today:Pt was supined at 1025. Vent changes made throughout the day by Dr. Perlman. FiO2 titrated up and down throughout the day between 40-70% to keep SpO2 in the low to mid 90's.    ABG Results:    Recent Labs  Lab 08/27/17  1600 08/27/17  1212 08/27/17  0650 08/27/17  0552 08/27/17  0510   PH 7.20* 7.21* 7.27* 7.27* 7.27*   PCO2 47* 46* 42 41 41   PO2 85 118* 82 71* 257*   HCO3 18* 19* 19* 19* 19*   O2PER 50  --  50% 40% 40         ETT appearance on chest x-ray: In good position.    Plan:  Will cont full vent support for now and assess for weaning readiness daily.  8/27/2017  Lydia Dalton RRT

## 2017-08-27 NOTE — PLAN OF CARE
Problem: Individualization  Goal: Patient Preferences  Neuro: PERRL. Remains sedated and paralyzed- vecuronium, propofol, fentanyl gtts. TOF 1-4 out of 4 on 10mA.    Cardiac: SR w/ LBBB. Around 0000 pt having multiple PVCs, PACs. Check electrolytes- K 2.7- replaced. +3 edema.   Resp: Remains on full vent support, synchronized, flolan full strength. Lungs coarse throughout.   GI: Hypoactive BS. No BM during shift.   : Higgins draining adequate amounts of urine. Brown/green urine. MDs aware.      Proned. Pt diligently turned Q2H. Aggressive oral cares.

## 2017-08-27 NOTE — PROGRESS NOTES
Notified Dr. Perlman Internal Jugular CVC migrated into right atrium.  Recommend pulling back 3 cm.    Dr. Perlman retracted 3 cm.  Will order x-ray to confirm tip.

## 2017-08-28 NOTE — PROVIDER NOTIFICATION
Notified MD of AM Labs. Continue with vent settings for abg.   Sodium remains elevated- increase h20 flushes from 60cc to 120cc q4h.   CK and Triglycerides are trending down- continue to monitor.

## 2017-08-28 NOTE — PLAN OF CARE
Problem: Goal Outcome Summary  Goal: Goal Outcome Summary  Outcome: Improving  Pt supined at 1035 today and has remained supine since.  Vec off.  Transitioning sedation from propofol to Versed gtt as pt's CK and Triglycerides are elevated.  Fentanyl increased to 100 mcgs to aid in this process.  Pt uses daily narcotics per family.  This is not included in med list.  Has remained synchronous with the vent.  Several ABG's checked today.  Remains on full vent support, CMV, Tvol 500,  rate 28, FiO2 60%, PEEP 12.  LS coarse throughout.  OG placed today.  Free water flushes but no nutrition to be started today in case needs proning during the night.  Dulcolax suppository given for constipation.  Adequate UOP.

## 2017-08-28 NOTE — CONSULTS
"CLINICAL NUTRITION SERVICES  -  ASSESSMENT NOTE      Recommendations Ordered by Registered Dietitian (RD):   Begin TF Promote with Fiber at 15 mL/hr;  Increase by 20 mL every 12 hrs to goal 75 mL/hr = 1800 kcals (16 kcal/kg), 113 gm pro (1.4 gm/kg), 1494 mL H20, 25 gm fiber, 248 gm CHO  Free H20 60 mL every 4 hrs   Malnutrition:  Non-Severe malnutrition  In Context of:  Acute illness or injury        REASON FOR ASSESSMENT  Niko Mireles is a 52 year old male seen by Registered Dietitian for Provider Order - Registered Dietitian to Assess and Order TF per Medical Nutrition protocol      NUTRITION HISTORY  - Unable to obtain nutrition history as pt intubated and no family available.  Per EPIC records, pt had abdominal pain 1 week PTA.  No nausea or vomiting was reported.  No food allergies/intolerances.    CURRENT NUTRITION ORDERS  Diet Order:     NPO   Was asked to initiate TF for pt as RN will place a nasointestinal FT at the bedside today.    Current Intake/Tolerance:  NPO x 7 days.  Pt received a large amount of Propofol for sedation from 8/24-8/27 (Lipid).    PHYSICAL FINDINGS  Observed  No nutrition-related physical findings observed  Obtained from Chart/Interdisciplinary Team  Edema - 2+ mild in UE and LE  Proned for 2 sessions but now supine since 8/27.    ANTHROPOMETRICS  Height: 6' 1\"  Admit Weight: 114.9 kg  Current Wt:  114.6 kg (~ same)  Body mass index is 33.33 kg/(m^2).  Weight Status:  Obesity Grade I BMI 30-34.9  IBW:  83.6 kg  % IBW:  137%  Weight History:  Unknown PTA    LABS  Labs reviewed  Na 147 (H)  BUN 30 (NL)  Cr 1.45 (H) - Mild DINORA   (H, improved from 496) - Pt with possible PATSY    MEDICATIONS  Medications reviewed  Vecuronium and Propofol now off    Dosing Weight:  114.6 kg (energy), 83.6 kg (protein)    ASSESSED NUTRITION NEEDS PER APPROVED PRACTICE GUIDELINES:  Estimated Energy Needs:  5918-2887 kcals (14-17 Kcal/Kg)  Justification: obese  Estimated Protein Needs:  100-125 grams " protein (1.2-1.5 g pro/Kg)  Justification: hypercatabolism with critical illness, obesity guidelines  and CKD  Estimated Fluid Needs:  7145-7945 mL (1 mL/Kcal)  Justification: maintenance    MALNUTRITION:  % Weight Loss:  Unable to determine without nutrition history  % Intake:  </= 50% for >/= 5 days (severe malnutrition)  Subcutaneous Fat Loss:  None observed  Muscle Loss:  None observed  Fluid Retention:  Mild     Malnutrition Diagnosis: Non-Severe malnutrition  In Context of:  Acute illness or injury    NUTRITION DIAGNOSIS:  Inadequate protein-energy intake related to intubation as evidenced by NPO x 7 days, currently meeting 0% estimated needs      NUTRITION INTERVENTIONS  Recommendations / Nutrition Prescription  Begin TF Promote with Fiber at 15 mL/hr;  Increase by 20 mL every 12 hrs to goal 75 mL/hr = 1800 kcals (16 kcal/kg), 113 gm pro (1.4 gm/kg), 1494 mL H20, 25 gm fiber, 248 gm CHO  Free H20 60 mL every 4 hrs    Implementation  Nutrition education: Not appropriate at this time due to patient condition  Collaboration and Referral of Nutrition care - Discussed pt during ICU interdisciplinary rounds this am.  EN Composition, EN Schedule - Entered order in EPIC as above  Feeding Tube Flush - Ordered std H20 flushes in EPIC as above    Nutrition Goals  TF goal Promote with Fiber at 75 mL/hr will meet % estimated needs    MONITORING AND EVALUATION:  Progress towards goals will be monitored and evaluated per protocol and Practice Guidelines    Mayi Sun, RD, LD, CNSC

## 2017-08-28 NOTE — PROGRESS NOTES
Critical Care Progress Note      08/28/2017    Name: Niko Mireles MRN#: [id number]   Age: 52 year old YOB: 1965                    Problem List:   Active Problems:    Community acquired bacterial pneumonia    ARDS (adult respiratory distress syndrome) (H)    Parapneumonic effusion    Encephalopathy    Non morbid obesity due to excess calories           Summary/Hospital Course:     Niko Mireles is a 52 year old male with PMHx of HTN, obesity who presents on 8/22 with shortness or breath, concern for PE vs CAP. Patient treated with abx and heparin gtt empirically.        His respiratory status continued to decline on the floor and in  ICU patient in mild distress  But 8/24  - worsening resp status thus intubated , bronch, and titrated PEEP adjusted  Still desat thus paralyzed and proned     8/25 - stable resp status, FiO2 40% while proned, mild elevation in CK and TG    8/26 propofol stopped for possible PATSY.         Assessment and plan :     Niko Mireles is a 52 year old male with ARDS     Neurology/Psychiatry:   1. Encephalopathy - 2/2 acute illness and sedation .  Possible PATSY so changed to versed 8/27.    P: Versed RASS goal -2  -3      Cardiovascular:   1.Tachyarrthymia - noted LBBB Morphology - ECHO with no RWMA and preserved EF Grad 1 DD   Not in shock.  Hypertensive   Plan  - add metoprolol 25 BID     Pulmonary/Ventilator Management:   1. ARDS seconday to CAP with uncomplicated parapneumonic  effusion. Proned for two sessions. Positive yeast probably candida , plateau is 31.  Ve is 15 LPM.   Plan  - LTV ventilatory strategy - 6ml /kg ~ 460 goal  , goal PaO2 >65, sats 88%, accept mild hypercarbia ph ~>7.3  - continue Supine   - full strength flolan  Off vecuronium   Lasix 40 mg BID      GI and Nutrition :   Mild hyperbilirubinemia probably seconday to sepsis  2. Abd Ultrasound showed no focal lesion, GB sludge, R kidney cyst  Plan  - start  NJ tube feeds      Renal/Fluids/Electrolytes:   1. DINORA, mild cr 1.4-1.5, good urine output overnight   + 7 net although wts up only 1 kg from admit    Bicarb decrease to 19 with chloride up to 118.   Mild hypernatremia   Plan  Lasix 40 BID  tube feeds free water       Infectious Disease:   1. Bilateral pneumonia with parapneumonic effusion , decreased WBC but still febrile   Plan  Stop azithro, continue zosyn, off vancomycin        Endocrine:   1. Stress hyperglycemia   Plan  - ICU insulin protocol, goal sugar <180    Hematology/Oncology:   1. WBC normal  2. Anemia, no signs, symptoms of active blood loss  Plan     IV/Access:   1. Venous access - right IJ  2. Arterial access -  Radial   Plan  - central access required and necessary      ICU Prophylaxis:   1. DVT: Hep Subq/mechanical  2. VAP: HOB 30 degrees, chlorhexidine rinse  3. Stress Ulcer: PPI  4. Restraints: Nonviolent soft two point restraints required and necessary for patient safety and continued cares and good effect as patient continues to pull at necessary lines, tubes despite education and distraction. Will readdress daily.   5. IV Access - central access required and necessary for continued patient cares  6. Feeding --place NJ  7. Family Update:  8. Disposition - ICU      Key goals for next 24 hours:   Lasix  Metoprolol  NJ start  D/c azithro  Decrease fentanyl to 50/hr  Decrease TV and RR on ventilator              Interim History:   Remained supine  Stopped propofol seconday to possible PATSY         Key Medications:       furosemide  40 mg Intravenous BID     metoprolol  25 mg Per Feeding Tube BID     piperacillin-tazobactam  3.375 g Intravenous Q6H     epoprostenol (FLOLAN) syringe change   Inhalation Q8H     heparin  5,000 Units Subcutaneous Q8H     pantoprazole  40 mg Intravenous QAM AC     chlorhexidine  15 mL Mouth/Throat Q12H     sodium chloride (PF)  3 mL Intracatheter Q8H     lidocaine  10 mL Subcutaneous Once     mirtazapine (REMERON) tablet TABS 15 mg  15  mg Oral At Bedtime       midazolam 3 mg/hr (08/28/17 1209)     epoprostenol (FLOLAN) 20 mcg/mL in glycine diluent inhalation solution 20 ng/kg/min (08/28/17 0846)     fentaNYL 100 mcg/hr (08/28/17 0248)     NaCl 20 mL (08/28/17 1020)     - MEDICATION INSTRUCTIONS -                 Physical Examination:   Temp:  [99.5  F (37.5  C)-101.5  F (38.6  C)] 100.8  F (38.2  C)  Heart Rate:  [] 98  Resp:  [0-28] 28  BP: (132-175)/(67-85) 175/85  MAP:  [67 mmHg-145 mmHg] 80 mmHg  Arterial Line BP: (114-168)/() 129/60  FiO2 (%):  [50 %-70 %] 60 %  SpO2:  [88 %-97 %] 94 %      +2500 with 2400 urinary output     Wt Readings from Last 4 Encounters:   08/28/17 114.6 kg (252 lb 10.4 oz)     Arterial Line BP: (114-168)/() 129/60  MAP:  [67 mmHg-145 mmHg] 80 mmHg  BP - Mean:  [] 105  Ventilation Mode: CMV/AC  FiO2 (%): 60 %  Rate Set (breaths/minute): 28 breaths/min  Tidal Volume Set (mL): 500 mL  PEEP (cm H2O): 12 cmH2O  Oxygen Concentration (%): 60 %  Resp: 28    Recent Labs  Lab 08/28/17  0440 08/27/17  1816 08/27/17  1600 08/27/17  1212 08/27/17  0650 08/27/17  0552   PH 7.31* 7.26* 7.20* 7.21* 7.27* 7.27*   PCO2 38 41 47* 46* 42 41   PO2 77* 93 85 118* 82 71*   HCO3 19* 18* 18* 19* 19* 19*   O2PER 60%  --  50  --  50% 40%       GEN:  Intubated, barely opens eyes to stimulation.  Mild scleral icterus    PULM: synchronous with vent  clear anteriorly  diminished at bases L>R   CV/COR:  regular S1S2 no gallop,  No rub, no murmur  ABD: slightly distended, soft non tender   EXT:  trace edema, warm  NEURO: RASS -4  SKIN: no rash  LINES: clean, dry intact         Data:   All data and imaging reviewed     ROUTINE ICU LABS (Last four results)  CMP    Recent Labs  Lab 08/28/17  0440 08/27/17  1600 08/27/17  1020 08/27/17  0510 08/26/17  2326 08/26/17  0400 08/25/17  0540 08/24/17  1605  08/23/17  1609   *  --   --  146*  --  143 139 141  < > 138   POTASSIUM 4.6 3.6 3.7 3.1* 2.7* 3.7 4.6 5.0  < > 4.7   CHLORIDE  118*  --   --  117*  --  115* 111* 112*  < > 107   CO2 19*  --   --  21  --  21 22 23  < > 22   ANIONGAP 10  --   --  8  --  7 6 6  < > 9   GLC 95  --   --  97  --  109* 140* 127*  < > 108*   BUN 30  --   --  30  --  30 29 28  < > 23   CR 1.45*  --   --  1.40*  --  1.44* 1.50* 1.54*  < > 1.42*   GFRESTIMATED 51*  --   --  53*  --  51* 49* 47*  < > 52*   GFRESTBLACK 62  --   --  64  --  62 59* 57*  < > 63   BENOIT 8.9  --   --  9.0  --  8.6 8.2* 8.3*  < > 8.5   MAG  --   --   --   --  2.1  --   --   --   --   --    PHOS  --   --   --   --  2.5  --   --   --   --   --    PROTTOTAL  --   --   --  6.1*  --   --  6.1* 6.4*  --  6.8   ALBUMIN  --   --   --  1.4*  --   --  1.7* 1.9*  --  2.3*   BILITOTAL  --   --   --  2.7*  --   --  1.0 1.0  --  1.5*   ALKPHOS  --   --   --  120  --   --  83 87  --  104   AST  --   --   --  93*  --   --  33 34  --  56*   ALT  --   --   --  42  --   --  34 39  --  54   < > = values in this interval not displayed.  CBC    Recent Labs  Lab 08/28/17  0440 08/27/17  0510 08/26/17  0400 08/25/17  0540   WBC 9.2 7.6 7.8 9.4   RBC 3.29* 3.22* 3.10* 3.27*   HGB 9.8* 9.6* 9.3* 9.8*   HCT 29.7* 29.2* 28.7* 30.8*   MCV 90 91 93 94   MCH 29.8 29.8 30.0 30.0   MCHC 33.0 32.9 32.4 31.8   RDW 15.0 14.9 15.0 14.8    402 334 274     INR    Recent Labs  Lab 08/28/17  0440 08/27/17  0510 08/26/17  0400 08/25/17  0540   INR 1.19* 1.21* 1.24* 1.12     Arterial Blood Gas    Recent Labs  Lab 08/28/17  0440 08/27/17  1816 08/27/17  1600 08/27/17  1212 08/27/17  0650 08/27/17  0552   PH 7.31* 7.26* 7.20* 7.21* 7.27* 7.27*   PCO2 38 41 47* 46* 42 41   PO2 77* 93 85 118* 82 71*   HCO3 19* 18* 18* 19* 19* 19*   O2PER 60%  --  50  --  50% 40%       All cultures:    Recent Labs  Lab 08/24/17  0930 08/23/17  1542 08/23/17  0730 08/22/17  0308 08/22/17  0305   CULT Yeast isolated*  Culture received and in progress.  Positive AFB results are called as soon as detected.  Final report to follow in 7 to 8 weeks.  No  growth  No growth  Culture negative monitoring continues  Culture negative monitoring continues  No growth after 18 hours  No growth after 18 hours  Culture negative after 18 hours  Culture negative monitoring continues  Canceled, Test creditedInappropriate specimen typeRESPIRATORY SOURCES ARE NOT APPROPRIATE FOR ANAEROBIC CULTURENotification of test cancellation was given toDr Humberto Wilkins No growth  Culture negative after 2 days  Culture negative monitoring continues Canceled, Test credited>10 Squamous epithelial cells/low power field indicates oral contamination. Please recollect.* No growth No growth     Recent Results (from the past 24 hour(s))   XR Chest Port 1 View    Narrative    XR CHEST PORT 1 VW 8/27/2017 2:34 PM    COMPARISON: Chest x-ray earlier on the same day.    HISTORY: Central venous catheter.      Impression    IMPRESSION: Right internal jugular central venous catheter tip in the  mid right atrium. Recommend retracting approximately 3 cm.  Endotracheal tube tip approximately 3 cm above the porsha. Enteric  tube courses below the diaphragm, tip not included in this image. No  pneumothorax on either side. Mixed opacities over both lungs are not  significantly changed.    CHRISTINE HERNÁNDEZ MD   XR Abdomen Port 1 View    Narrative    XR ABDOMEN PORT F1 VW 8/27/2017 2:35 PM    COMPARISON: None.    HISTORY: Orogastric tube placement.      Impression    IMPRESSION: Enteric tube tip and sidehole project over the stomach.  Nonobstructed bowel gas pattern.    CHRISTINE HERNÁNDEZ MD   XR Chest Port 1 View    Narrative    CHEST PORTABLE ONE VIEW 8/27/2017 4:18 PM     HISTORY: CVC tip verification.    COMPARISON: 8/27/2017      Impression    IMPRESSION: Right jugular central line has been retracted slightly and  now lies at the inferior aspect of the superior vena cava. Remainder  of tubes and lines are unchanged. Again seen is a pulmonary edema  pattern with bilateral patchy perihilar infiltrates and  additional  left basilar opacity consistent with left pleural  effusion/atelectasis/infiltrate. Left basilar opacity has slightly  increased since the prior exam. Otherwise no change.    MAHI TRONCOSO MD     ECHO Interpretation Summary     The rhythm was sinus with wide QRS/LBBB.  The left ventricle is normal in size.  The left ventricular ejection fraction is normal.  The visual ejection fraction is estimated at 55-60%.  Septal motion is consistent with conduction abnormality.  Grade I or early diastolic dysfunction.  The right ventricular systolic function is normal.  Imaging of all four valves was difficult but they appear grossly normal in  structure and function.     There are sclerotic lesions in the liver the seem to be intra vascular. There  is blood flow in and around these lesions, significance unclear. Reported to  the ICU.      Billing: This patient is critically ill: Yes. Total critical care time today 40 min excluding procedures

## 2017-08-28 NOTE — PROGRESS NOTES
[facility] ICU RESPIRATORY NOTE  Date of Admission: 8/22/17  Date of Intubation (most recent): 8/24/17  Reason for Mechanical Ventilation: Acute respiratory failure 2/2 Pneumonia   Number of Days on Mechanical Ventilation: 5  Met Criteria for Pressure Support Trial: No  Length of Pressure Support Trial:  Reason for Stopping Pressure Support Trial:  Reason for No Pressure Support Trial: Due high PEEP 12 and full strength flolan     Significant Events Today:  None     Ventilation Mode: CMV/AC  FiO2 (%): 60 %  Rate Set (breaths/minute): 28 breaths/min  Tidal Volume Set (mL): 500 mL  PEEP (cm H2O): 12 cmH2O  Oxygen Concentration (%): 60 %  Resp: 28     ABG Results:      Recent Labs  Lab 08/28/17  0440 08/27/17  1816 08/27/17  1600 08/27/17  1212 08/27/17  0650 08/27/17  0552   PH 7.31* 7.26* 7.20* 7.21* 7.27* 7.27*   PCO2 38 41 47* 46* 42 41   PO2 77* 93 85 118* 82 71*   HCO3 19* 18* 18* 19* 19* 19*   O2PER 60%  --  50  --  50% 40%     ETT appearance on chest x-ray: In good position     Plan:  Patient remains full ventilatory support and will continue to follow assess.    Stanley Vela RT  8/28/2017

## 2017-08-28 NOTE — PLAN OF CARE
Problem: Individualization  Goal: Patient Preferences  Outcome: Improving  Neuro: PERRL. Unresponsive to painful stimuli in extremities. Cough present when inline suctioning.   Cardiac: SR-ST w/ LBBB. Edema improving- +2.   Resp: Remains on full vent support. Flolan full strength. Versed gtt and Fentanyl gtt for vent synchrony. Lungs coarse; rhonchi at times.   GI: Remains NPO. OG- 67cm, clamped. Water flushes as scheduled for elevated Na. BS WNL. Hypoactive BS.    : Higgins draining adequate keara/tea colored urine.

## 2017-08-28 NOTE — PROGRESS NOTES
[facility] ICU RESPIRATORY NOTE  Date of Admission:  8/22/17  Date of Intubation (most recent):  8/24/17  Reason for Mechanical Ventilation:  Resp failure  Number of Days on Mechanical Ventilation:  5  Met Criteria for Pressure Support Trial:  No  Length of Pressure Support Trial:  Reason for Stopping Pressure Support Trial:  Reason for No Pressure Support Trial:  Per MD    Significant Events Today:  None    ABG Results:    ETT appearance on chest x-ray:    Plan:  Pt to remain on full vent support

## 2017-08-29 NOTE — PROGRESS NOTES
Critical Care Progress Note      08/29/2017    Name: Niko Mireles MRN#: [id number]   Age: 52 year old YOB: 1965                  Problem List:   Active Problems:    Community acquired bacterial pneumonia    ARDS (adult respiratory distress syndrome) (H)    Parapneumonic effusion  Hyperbilirubinemia            Summary/Hospital Course:     Niko Mireles is a 52 year old male  presents on 8/22 with hypoxemia, PE ruleed-out but extensive infiltrates on CT. But 8/24  - worsening resp status thus intubated , bronch, and titrated PEEP adjusted. Paralyzed and proned     8/25 - stable resp status, FiO2 40% while proned, mild elevation in CK and TG    8/26 propofol stopped for possible PATSY.     8/27 --28 supined.  Increasing bilirubin         Assessment and plan :       Neurology/Psychiatry:   1. Encephalopathy - 2/2 acute illness and sedation .  Possible PATSY so changed to versed 8/27.    P: Versed RASS goal -2  -3    Daily stop     Cardiovascular:   1.Hypertensive.  ECHO with no RWMA and preserved EF Grad 1 DD   Not in shock.   Plan  - increase metoprolol 50 BID     Pulmonary/Ventilator Management:   1. ARDS seconday to CAP with uncomplicated parapneumonic  effusion. Proned for two sessions. Positive yeast probably candida , plateau is 26 .  Ve is 13 LPM but not breathing above set rate of 26.    Plan  - LTV ventilatory strategy - 6ml /kg ~ 460 goal  , goal PaO2 >65, sats 88%, accept mild hypercarbia ph ~>7.3  - continue Supine   - full strength flolan  Off vecuronium x 2 d   Lasix 20 mg for net negative of 2-3 L       GI and Nutrition :   Mild hyperbilirubinemia probably seconday to sepsis--higher today with minimal increase in transaminases. Initial  Abd Ultrasound showed no focal lesion, GB sludge, R kidney cystUnclear cause.  Doubt acalculous cholecystitis but will repeat US.      Plan  - increase NJ tube feeds     Renal/Fluids/Electrolytes:   1. Mild DINORA.  Very high  urine output overnight with  40 mg lasix once   Bicarb now normal   Improved hypernatremia   Plan  Lasix 20   tube feeds free water       Infectious Disease:   1. Bilateral pneumonia with parapneumonic effusion , decreased WBC and less  febrile . But possible zosyn drug rash so will change to iv levofloxacin   Plan  Stop zosyn, levo 750        Endocrine:   1. Stress hyperglycemia   Plan  - ICU insulin protocol, goal sugar <180    Hematology/Oncology:   1. WBC normal  2. Anemia, no signs, symptoms of active blood loss  Plan     IV/Access:   1. Venous access - right IJ  2. Arterial access -  Radial   Plan  - central access required and necessary      ICU Prophylaxis:   1. DVT: Hep Subq/mechanical  2. VAP: HOB 30 degrees, chlorhexidine rinse  3. Stress Ulcer: PPI  4. Restraints: Nonviolent soft two point restraints required and necessary for patient safety and continued cares and good effect as patient continues to pull at necessary lines, tubes despite education and distraction. Will readdress daily.   5. IV Access - central access required and necessary for continued patient cares  6. Feeding --placed NJ  7. Family Update:sister   8. Disposition - ICU      Key goals for next 24 hours:   Decrease RR  Stop zosyn, add levo for drug rash  SSI  Lasix 20 mg daily  US of liver   Increase metoprolol            Interim History:   Still on 60-70%  Skin rash  Increasing bilirubin          Key Medications:       [START ON 8/30/2017] pantoprazole  40 mg Per Feeding Tube Daily     levofloxacin  750 mg Intravenous Q24H     metoprolol  50 mg Per Feeding Tube BID     epoprostenol (FLOLAN) syringe change   Inhalation Q8H     heparin  5,000 Units Subcutaneous Q8H     chlorhexidine  15 mL Mouth/Throat Q12H     sodium chloride (PF)  3 mL Intracatheter Q8H     lidocaine  10 mL Subcutaneous Once     mirtazapine (REMERON) tablet TABS 15 mg  15 mg Oral At Bedtime       midazolam 4 mg/hr (08/28/17 1700)     epoprostenol (FLOLAN) 20 mcg/mL in glycine diluent inhalation  solution 20 ng/kg/min (08/29/17 0512)     fentaNYL 50 mcg/hr (08/28/17 2005)     NaCl 20 mL (08/28/17 1020)     - MEDICATION INSTRUCTIONS -                 Physical Examination:   Temp:  [99.9  F (37.7  C)-101.8  F (38.8  C)] 100.6  F (38.1  C)  Heart Rate:  [] 93  Resp:  [9-32] 26  MAP:  [76 mmHg-119 mmHg] 82 mmHg  Arterial Line BP: (114-181)/(58-98) 144/58  FiO2 (%):  [60 %-70 %] 70 %  SpO2:  [90 %-98 %] 95 %    -6300 net with 8800 urinary output      Wt Readings from Last 4 Encounters:   08/29/17 108 kg (238 lb 1.6 oz)     Arterial Line BP: (114-181)/(58-98) 144/58  MAP:  [76 mmHg-119 mmHg] 82 mmHg  Ventilation Mode: CMV/AC  FiO2 (%): 70 %  Rate Set (breaths/minute): 26 breaths/min  Tidal Volume Set (mL): 460 mL  PEEP (cm H2O): 12 cmH2O  Oxygen Concentration (%): 70 %  Resp: 26    Recent Labs  Lab 08/29/17  0400 08/28/17  1540 08/28/17  0440 08/27/17  1816 08/27/17  1600  08/27/17  0650 08/27/17  0552   PH 7.42 7.32* 7.31* 7.26* 7.20*  < > 7.27* 7.27*   PCO2 42 42 38 41 47*  < > 42 41   PO2 90 76* 77* 93 85  < > 82 71*   HCO3 27 21 19* 18* 18*  < > 19* 19*   O2PER  --   --  60%  --  50  --  50% 40%   < > = values in this interval not displayed.    GEN:  Intubated, barely opens eyes to stimulation.  Mild scleral icterus    PULM: synchronous with vent  clear anteriorly  diminished at bases L>R   CV/COR:  regular S1S2 no gallop,  No rub, no murmur  ABD: slightly distended, soft non tender   EXT:  trace edema, feet are cooler   NEURO: RASS -4  SKIN: fine red rash on abdomen  LINES: clean, dry intact         Data:   All data and imaging reviewed     ROUTINE ICU LABS (Last four results)  CMP    Recent Labs  Lab 08/29/17  0400 08/28/17  1725 08/28/17  0440 08/27/17  1600  08/27/17  0510 08/26/17  2326  08/25/17  0540 08/24/17  1605   * 146* 147*  --   --  146*  --   < > 139 141   POTASSIUM 4.4 4.8 4.6 3.6  < > 3.1* 2.7*  < > 4.6 5.0   CHLORIDE 112* 114* 118*  --   --  117*  --   < > 111* 112*   CO2 26 23 19*   --   --  21  --   < > 22 23   ANIONGAP 7 9 10  --   --  8  --   < > 6 6   * 121* 95  --   --  97  --   < > 140* 127*   BUN 32* 30 30  --   --  30  --   < > 29 28   CR 1.35* 1.47* 1.45*  --   --  1.40*  --   < > 1.50* 1.54*   GFRESTIMATED 55* 50* 51*  --   --  53*  --   < > 49* 47*   GFRESTBLACK 67 61 62  --   --  64  --   < > 59* 57*   BENOIT 8.9 9.4 8.9  --   --  9.0  --   < > 8.2* 8.3*   MAG  --   --  2.4*  --   --   --  2.1  --   --   --    PHOS  --   --  4.4  --   --   --  2.5  --   --   --    PROTTOTAL 6.7*  --   --   --   --  6.1*  --   --  6.1* 6.4*   ALBUMIN 1.5*  --   --   --   --  1.4*  --   --  1.7* 1.9*   BILITOTAL 6.3*  --   --   --   --  2.7*  --   --  1.0 1.0   ALKPHOS 146  --   --   --   --  120  --   --  83 87   *  --   --   --   --  93*  --   --  33 34   ALT 66  --   --   --   --  42  --   --  34 39   < > = values in this interval not displayed.  CBC    Recent Labs  Lab 08/29/17  0400 08/28/17  0440 08/27/17  0510 08/26/17  0400   WBC 10.3 9.2 7.6 7.8   RBC 3.53* 3.29* 3.22* 3.10*   HGB 10.5* 9.8* 9.6* 9.3*   HCT 31.2* 29.7* 29.2* 28.7*   MCV 88 90 91 93   MCH 29.7 29.8 29.8 30.0   MCHC 33.7 33.0 32.9 32.4   RDW 14.7 15.0 14.9 15.0    417 402 334     INR    Recent Labs  Lab 08/28/17  0440 08/27/17  0510 08/26/17  0400 08/25/17  0540   INR 1.19* 1.21* 1.24* 1.12     Arterial Blood Gas    Recent Labs  Lab 08/29/17  0400 08/28/17  1540 08/28/17  0440 08/27/17  1816 08/27/17  1600  08/27/17  0650 08/27/17  0552   PH 7.42 7.32* 7.31* 7.26* 7.20*  < > 7.27* 7.27*   PCO2 42 42 38 41 47*  < > 42 41   PO2 90 76* 77* 93 85  < > 82 71*   HCO3 27 21 19* 18* 18*  < > 19* 19*   O2PER  --   --  60%  --  50  --  50% 40%   < > = values in this interval not displayed.    All cultures:    Recent Labs  Lab 08/24/17  0930 08/23/17  1542 08/23/17  0730   CULT No growth after 4 days  No growth after 4 days  Culture negative after 4 days  Yeast isolated*  Culture received and in progress.  Positive  AFB results are called as soon as detected.  Final report to follow in 7 to 8 weeks.  No growth  No growth  Culture negative monitoring continues  Culture negative monitoring continues  Culture negative monitoring continues  Canceled, Test creditedInappropriate specimen typeRESPIRATORY SOURCES ARE NOT APPROPRIATE FOR ANAEROBIC CULTURENotification of test cancellation was given toDr Humberto Wilkins Culture negative after 5 days  No growth  Culture negative monitoring continues Canceled, Test credited>10 Squamous epithelial cells/low power field indicates oral contamination. Please recollect.*     Recent Results (from the past 24 hour(s))   XR Abdomen Port 1 View    Narrative    ABDOMEN ONE VIEW   8/28/2017 12:59 PM     HISTORY: Feeding tube placement.    COMPARISON: 8/27/2017.      Impression    IMPRESSION: Enteric tube is in place, with tip near the ligament of  Treitz. Visualized bowel gas pattern is within normal limits.    MARTINE YBARRA MD   XR Chest Port 1 View    Narrative    XR CHEST PORT 1 VW  8/29/2017 5:12 AM     HISTORY:  ards    COMPARISON: Film performed on 8/27    FINDINGS:  ET tube and feeding tubes are in place. Right jugular  venous catheter is again noted. Extensive interstitial and alveolar  infiltrates are again noted. Small pleural effusions.      Impression    IMPRESSION: Stable, no significant change in the bilateral infiltrate.    NICCI GORDON MD         Billing: This patient is critically ill: Yes. Total critical care time today 40 min excluding procedures

## 2017-08-29 NOTE — PLAN OF CARE
Problem: Goal Outcome Summary  Goal: Goal Outcome Summary  Outcome: No Change  Pt remains vented on full support; versed & fent gtt for sedation. Neuros unchanged. Pt & spouse updated on POC. Will cont to kathrine pt.

## 2017-08-29 NOTE — PROGRESS NOTES
[facility] ICU RESPIRATORY NOTE  Date of Admission:8/22/2017  Date of Intubation (most recent):8/24/2017  Reason for Mechanical Ventilation:Respiratory failure  Number of Days on Mechanical Ventilation:6  Met Criteria for Pressure Support Trial:No  Length of Pressure Support Trial:None  Reason for Stopping Pressure Support Trial:  Reason for No Pressure Support Trial: Full strength flolan running       Significant Events Today: None    ABG Results:   Recent Labs  Lab 08/28/17  1540 08/28/17  0440 08/27/17  1816 08/27/17  1600  08/27/17  0650 08/27/17  0552   PH 7.32* 7.31* 7.26* 7.20*  < > 7.27* 7.27*   PCO2 42 38 41 47*  < > 42 41   PO2 76* 77* 93 85  < > 82 71*   HCO3 21 19* 18* 18*  < > 19* 19*   O2PER  --  60%  --  50  --  50% 40%   < > = values in this interval not displayed.  Ventilation Mode: CMV/AC  FiO2 (%): 70 %  Rate Set (breaths/minute): 26 breaths/min  Tidal Volume Set (mL): 460 mL  PEEP (cm H2O): 12 cmH2O  Oxygen Concentration (%): 70 %  Resp: 26      ETT appearance on chest x-ray: ETT in good position.    Plan:  Continue full vent support and assess for daily weaning trials.  8/29/2017  Jenn Lopez

## 2017-08-29 NOTE — PROGRESS NOTES
[facility] ICU RESPIRATORY NOTE  Date of Admission:  8/22/17  Date of Intubation (most recent):  8/24/17  Reason for Mechanical Ventilation:  Resp failure  Number of Days on Mechanical Ventilation:  6  Met Criteria for Pressure Support Trial:  No  Length of Pressure Support Trial:  Reason for Stopping Pressure Support Trial:  Reason for No Pressure Support Trial:  Per MD     Significant Events Today:  None     ABG Results:     ETT appearance on chest x-ray:     Plan:  Pt to remain on full vent support

## 2017-08-29 NOTE — PROGRESS NOTES
[facility] ICU RESPIRATORY NOTE  Date of Admission:8/22/17  Date of Intubation (most recent):8/24/17  Reason for Mechanical Ventilation:Respiratory failure  Number of Days on Mechanical Ventilation:5  Met Criteria for Pressure Support Trial:No  Reason for No Pressure Support Trial: Full strength flolan running    Ventilation Mode: CMV/AC  FiO2 (%): 70 %  Rate Set (breaths/minute): 26 breaths/min  Tidal Volume Set (mL): 460 mL  PEEP (cm H2O): 12 cmH2O  Oxygen Concentration (%): 70 %  Resp: 27      Significant Events Today:None    ABG Results:    Recent Labs  Lab 08/28/17  1540 08/28/17  0440 08/27/17  1816 08/27/17  1600  08/27/17  0650 08/27/17  0552   PH 7.32* 7.31* 7.26* 7.20*  < > 7.27* 7.27*   PCO2 42 38 41 47*  < > 42 41   PO2 76* 77* 93 85  < > 82 71*   HCO3 21 19* 18* 18*  < > 19* 19*   O2PER  --  60%  --  50  --  50% 40%   < > = values in this interval not displayed.      Plan:  Will cont full vent support for now and assess for weaning readiness daily.  8/28/2017  Lydia Dalton RRT

## 2017-08-29 NOTE — PLAN OF CARE
Problem: Goal Outcome Summary  Goal: Goal Outcome Summary  Outcome: No Change  Pt remains intubated and was able to tolerate being supine all day. Titration of fentanyl and versed gtt for a RASS goal of -3 - -4. Pt will only grimace to pain no other response. Pupils are 3-4 and sluggish. NJ placed and bedside and bridled. TF started with a goal of 75ml/hr H20 flushes 150ml/hr Q3 for elevated sodiums. Pt was given lasix 40 mg X1 and had a response of -4L. MD notified. Labs drawn. Cont to monitor. Pt has been hypertensive throughout day scheduled metoprolol added and prn labetalol ordered see MAR. Pts sister Tressa at bedside and updated. Pts sister Kimberly updated via phone.

## 2017-08-29 NOTE — PLAN OF CARE
Problem: Goal Outcome Summary  Goal: Goal Outcome Summary  Physical Therapy: Order received and chart reviewed. Per discussion with RN, PT ordered for foot drop as patient is vented and sedated. RN put in order for Rooke boots. Per observations, Rooke boots are donned appropriately and patients hips are in neutral. No other PT needs noted at this time, will complete PT orders.

## 2017-08-30 NOTE — PROGRESS NOTES
Patient was placed in restraints after attempting to disrupt lines, tubes or dressings.  Less restrictive measures were ineffective at preventing such disruption.  See flowsheet for details about provider(s) involved, less restrictive measures attempted, patient and family education, discontinuation criteria and restraint type.  Brigette Roland RN

## 2017-08-30 NOTE — PLAN OF CARE
Problem: Goal Outcome Summary  Goal: Goal Outcome Summary  Outcome: No Change  Patient continues vent: CMV rate 24, FiO2 60%,  with Peep 12. Versed decreased to 2mg/hr. RAAS remains -3. Continues on Fentanyl @ 50 mcg/hr. Rooke boots ordered and placed on this morning.  Patient TF held from [id number] for ultrasound. Restarted at 35 ml/hr at 1500. TF rate increased to 55 ml/hr at 1900. Continue free water flushes 150 ml q 3 hours. Multiple family members present throughout the day. Updated on continued POCs and questions answered.

## 2017-08-30 NOTE — PROGRESS NOTES
[facility] ICU RESPIRATORY NOTE  Date of Admission:8/22/2017  Date of Intubation (most recent): 8/24/2017  Reason for Mechanical Ventilation:Resp Failure  Number of Days on Mechanical Ventilation:7  Met Criteria for Pressure Support Trial: No  Length of Pressure Support Trial: None  Reason for Stopping Pressure Support Trial:  Reason for No Pressure Support Trial: Per MD    Significant Events Today: None     ABG Results:  Recent Labs  Lab 08/30/17  0415 08/29/17  0400 08/28/17  1540 08/28/17  0440  08/27/17  1600  08/27/17  0650 08/27/17  0552   PH 7.42 7.42 7.32* 7.31*  < > 7.20*  < > 7.27* 7.27*   PCO2 47* 42 42 38  < > 47*  < > 42 41   PO2 63* 90 76* 77*  < > 85  < > 82 71*   HCO3 31* 27 21 19*  < > 18*  < > 19* 19*   O2PER  --   --   --  60%  --  50  --  50% 40%   < > = values in this interval not displayed.  Ventilation Mode: CMV/AC  FiO2 (%): 60 %  Rate Set (breaths/minute): 24 breaths/min  Tidal Volume Set (mL): 460 mL  PEEP (cm H2O): 12 cmH2O  Oxygen Concentration (%): 60 %  Resp: 26      ETT appearance on chest x-ray: ETT in good place    Plan: continue full vent support.  8/30/2017  Jenn Lopez

## 2017-08-30 NOTE — PROGRESS NOTES
Critical Care Progress Note      08/30/2017    Name: Niko Mireles MRN#: [id number]   Age: 52 year old YOB: 1965                  Problem List:   Active Problems:    Community acquired bacterial pneumonia    ARDS (adult respiratory distress syndrome) (H)    Parapneumonic effusion  Hyperbilirubinemia --improved           Summary/Hospital Course:     Niko Mireles is a 52 year old male  presents on 8/22 with hypoxemia, PE ruled-out but extensive infiltrates on CT. But 8/24  - worsening resp status thus intubated , bronch, and titrated PEEP adjusted. Paralyzed and proned for two days    8/25 - stable resp status, FiO2 40% while proned, mild elevation in CK and TG    8/26 propofol stopped for possible PATSY.     8/27 --28 supined.  Increasing bilirubin     8/28 started lasix   8/29 possible zosyn rash--changed to levo        Assessment and plan :   Neurology/Psychiatry:   1. More awake on versed 2 mg/hr.Possible PATSY so changed to versed 8/27.    P: Versed RASS goal -2    Daily stop of versed, decrease fentanyl to 25.     Cardiovascular:   1.Hypertensive.  ECHO normal on admission   Not in shock.   Plan  - continue metoprolol 50 BID   Add amlodipine 5 mg     Pulmonary/Ventilator Management:   1. ARDS seconday to CAP with uncomplicated parapneumonic  effusion. Proned for two sessions. Positive yeast probably candida , plateau is 26 .  Ve is 13 LPM but now breathing above set rate of 24.     Plan  - LTV ventilatory strategy  460 x 20  PEEP 12    - full strength flolan  Lasix 20 mg for net negative of -1 L daily       GI and Nutrition :   Mild hyperbilirubinemia probably seconday to sepsis--peaked on Tuesday at 6 now 3, repeat US with no change in sludge in GB only. .      Plan  - at goal NJ tube feeds     Renal/Fluids/Electrolytes:   1. Mild DINORA.  Decrease Cr and increased BUN seconday to significant diuresis.    Bicarb increasing   Improved hypernatremia   Plan  Lasix 20 daily   tube feeds free water        Infectious Disease:   1. Bilateral pneumonia with parapneumonic effusion , decreased WBC but temp still in low 100s.  zosyn drug rash so changed to iv levofloxacin on Tuesday   Plan   levo 750        Endocrine:   1. Stress hyperglycemia   Plan  - ICU insulin protocol, goal sugar <180--not needing drip.     Hematology/Oncology:   1. WBC normal  2. Anemia, no signs, symptoms of active blood loss  Plan     IV/Access:   1. Venous access - right IJ  2. Arterial access -  Radial   Plan  - central access required and necessary      ICU Prophylaxis:   1. DVT: Hep Subq/mechanical  2. VAP: HOB 30 degrees, chlorhexidine rinse  3. Stress Ulcer: PPI  4. Restraints: Nonviolent soft two point restraints required and necessary for patient safety and continued cares and good effect as patient continues to pull at necessary lines, tubes despite education and distraction. Will readdress daily.   5. IV Access - central access required and necessary for continued patient cares  6. Feeding --placed NJ  7. Family Update:  8. Disposition - ICU    Key goals for next 24 hours:   Decrease RR  Lasix 20 mg daily        Interim History:   More awake  Good diuresis  Remains on 60%.           Key Medications:       pantoprazole  40 mg Per Feeding Tube Daily     levofloxacin  750 mg Intravenous Q24H     metoprolol  50 mg Per Feeding Tube BID     insulin aspart  1-6 Units Subcutaneous Q4H     epoprostenol (FLOLAN) syringe change   Inhalation Q8H     heparin  5,000 Units Subcutaneous Q8H     chlorhexidine  15 mL Mouth/Throat Q12H     sodium chloride (PF)  3 mL Intracatheter Q8H     lidocaine  10 mL Subcutaneous Once       midazolam Stopped (08/30/17 0824)     epoprostenol (FLOLAN) 20 mcg/mL in glycine diluent inhalation solution 20 ng/kg/min (08/30/17 0607)     fentaNYL 50 mcg/hr (08/30/17 0814)     NaCl 20 mL/hr at 08/29/17 2014     - MEDICATION INSTRUCTIONS -                 Physical Examination:   Temp:  [99.9  F (37.7  C)-100.8  F (38.2   C)] 100.6  F (38.1  C)  Heart Rate:  [75-99] 92  Resp:  [17-31] 24  BP: (116-149)/(73-92) 135/82  MAP:  [68 mmHg-109 mmHg] 78 mmHg  Arterial Line BP: ()/(49-84) 112/61  FiO2 (%):  [60 %] 60 %  SpO2:  [91 %-98 %] 91 %    -1600 net with 4000 urinary output      Wt Readings from Last 4 Encounters:   08/30/17 104.5 kg (230 lb 6.1 oz)     Arterial Line BP: ()/(49-84) 112/61  MAP:  [68 mmHg-109 mmHg] 78 mmHg  BP - Mean:  [] 96  Ventilation Mode: CMV/AC  FiO2 (%): 60 %  Rate Set (breaths/minute): 24 breaths/min  Tidal Volume Set (mL): 460 mL  PEEP (cm H2O): 12 cmH2O  Oxygen Concentration (%): 60 %  Resp: 24    Recent Labs  Lab 08/30/17 0415 08/29/17  0400 08/28/17  1540 08/28/17  0440  08/27/17  1600  08/27/17  0650 08/27/17  0552   PH 7.42 7.42 7.32* 7.31*  < > 7.20*  < > 7.27* 7.27*   PCO2 47* 42 42 38  < > 47*  < > 42 41   PO2 63* 90 76* 77*  < > 85  < > 82 71*   HCO3 31* 27 21 19*  < > 18*  < > 19* 19*   O2PER  --   --   --  60%  --  50  --  50% 40%   < > = values in this interval not displayed.    GEN:  Intubated, opens eyes to stimulation and intermittently squeezes hands.     PULM: synchronous with vent  clear anteriorly   CV/COR:  regular S1S2 no gallop,  No rub, no murmur  ABD: slightly distended, soft non tender   EXT:  trace edema, feet are warm, in rooke boots.   NEURO: RASS -2  SKIN: fine red rash on abdomen--slightly less   LINES: clean, dry intact         Data:   All data and imaging reviewed     ROUTINE ICU LABS (Last four results)  CMP    Recent Labs  Lab 08/30/17  0415 08/29/17  0400 08/28/17  1725 08/28/17  0440  08/27/17  0510 08/26/17  2326  08/25/17  0540   * 145* 146* 147*  --  146*  --   < > 139   POTASSIUM 4.4 4.4 4.8 4.6  < > 3.1* 2.7*  < > 4.6   CHLORIDE 110* 112* 114* 118*  --  117*  --   < > 111*   CO2 29 26 23 19*  --  21  --   < > 22   ANIONGAP 6 7 9 10  --  8  --   < > 6   * 118* 121* 95  --  97  --   < > 140*   BUN 43* 32* 30 30  --  30  --   < > 29   CR  1.28* 1.35* 1.47* 1.45*  --  1.40*  --   < > 1.50*   GFRESTIMATED 59* 55* 50* 51*  --  53*  --   < > 49*   GFRESTBLACK 71 67 61 62  --  64  --   < > 59*   BENOIT 8.9 8.9 9.4 8.9  --  9.0  --   < > 8.2*   MAG  --   --   --  2.4*  --   --  2.1  --   --    PHOS  --   --   --  4.4  --   --  2.5  --   --    PROTTOTAL 6.8 6.7*  --   --   --  6.1*  --   --  6.1*   ALBUMIN 1.5* 1.5*  --   --   --  1.4*  --   --  1.7*   BILITOTAL 3.2* 6.3*  --   --   --  2.7*  --   --  1.0   ALKPHOS 158* 146  --   --   --  120  --   --  83   * 126*  --   --   --  93*  --   --  33   ALT 70 66  --   --   --  42  --   --  34   < > = values in this interval not displayed.  CBC    Recent Labs  Lab 08/30/17 0415 08/29/17 0400 08/28/17  0440 08/27/17  0510   WBC 10.4 10.3 9.2 7.6   RBC 3.77* 3.53* 3.29* 3.22*   HGB 11.3* 10.5* 9.8* 9.6*   HCT 34.0* 31.2* 29.7* 29.2*   MCV 90 88 90 91   MCH 30.0 29.7 29.8 29.8   MCHC 33.2 33.7 33.0 32.9   RDW 15.0 14.7 15.0 14.9    438 417 402     INR    Recent Labs  Lab 08/28/17  0440 08/27/17  0510 08/26/17  0400 08/25/17  0540   INR 1.19* 1.21* 1.24* 1.12     Arterial Blood Gas    Recent Labs  Lab 08/30/17 0415 08/29/17 0400 08/28/17  1540 08/28/17  0440  08/27/17  1600  08/27/17  0650 08/27/17  0552   PH 7.42 7.42 7.32* 7.31*  < > 7.20*  < > 7.27* 7.27*   PCO2 47* 42 42 38  < > 47*  < > 42 41   PO2 63* 90 76* 77*  < > 85  < > 82 71*   HCO3 31* 27 21 19*  < > 18*  < > 19* 19*   O2PER  --   --   --  60%  --  50  --  50% 40%   < > = values in this interval not displayed.    All cultures:    Recent Labs  Lab 08/24/17  0930 08/23/17  1542   CULT No growth after 5 days  No growth after 5 days  Culture negative after 4 days  Yeast isolated*  Culture received and in progress.  Positive AFB results are called as soon as detected.  Final report to follow in 7 to 8 weeks.  No growth  No growth  Culture negative monitoring continues  Culture negative monitoring continues  Culture negative monitoring  continues  Canceled, Test creditedInappropriate specimen typeRESPIRATORY SOURCES ARE NOT APPROPRIATE FOR ANAEROBIC CULTURENotification of test cancellation was given toDr Humberto Wilkins Culture negative after 5 days  No growth  Culture negative monitoring continues     Recent Results (from the past 24 hour(s))   US Abdomen Limited Portable    Narrative    US ABDOMEN LIMITED PORTABLE   8/29/2017 2:40 PM     HISTORY: Increasing bilirubin, evaluate for gallbladder disease.    COMPARISON: CTA 2/22/2017    FINDINGS: Liver size is upper normal at 18.4 cm. No liver mass or  intrahepatic biliary ductal dilatation. The gallbladder contains  sludge. No shadowing gallstones. No gallbladder wall thickening or  pericholecystic fluid. No tenderness with direct transducer pressure  over the gallbladder. No gallstones identified. Common bile duct  measures 2 mm. Pancreas is obscured by intestinal gas. The right  kidney measures 13.0 cm in length and contains a single simple cyst.      Impression    IMPRESSION: Gallbladder sludge without evidence of acute  cholecystitis.    EZIO SEPULVEDA MD         Billing: This patient is critically ill: Yes. Total critical care time today 40 min excluding procedures

## 2017-08-30 NOTE — PLAN OF CARE
Problem: Goal Outcome Summary  Goal: Goal Outcome Summary  Outcome: No Change  Pt remains intubated, sedated w/ Versed and Fentanyl, Versed off for sedation vacation for 3hrs this morning, Fent decreased to 25mcg/hr, Versed decreased to 1mg/hr.  Neuros unchanged, opens eyes slightly to voice, unable to follow commands or track, localizing/widthdraws from pain, pupils sluggish, equal/reactive.  BP per cuff, A-line for blood draws.  Lungs coarse to diminished.  Good UOP via daniel.  Turned/repositioned q2hr.  Continue plan of care.

## 2017-08-30 NOTE — PLAN OF CARE
Problem: Goal Outcome Summary  Goal: Goal Outcome Summary  Outcome: No Change  Pt remains vented on full support; versed & fent gtt for sedation. Pt opens eyes to voice, all other neuros unchanged. Will cont to kathrine pt.

## 2017-08-30 NOTE — PROVIDER NOTIFICATION
Notified Dr. Cho- tmax per bladder 101.8. Cultures ordered. Continue to monitor.   Brigette Roland RN

## 2017-08-30 NOTE — PROGRESS NOTES
Patient remains on full vent support.    Ventilation Mode: CMV/AC  FiO2 (%): 60 %  Rate Set (breaths/minute): 20 breaths/min  Tidal Volume Set (mL): 460 mL  PEEP (cm H2O): 12 cmH2O  Oxygen Concentration (%): 60 %  Resp: 28     Remains on full strength flolan.        Recent Labs  Lab 08/30/17  0415 08/29/17  0400 08/28/17  1540 08/28/17  0440  08/27/17  1600  08/27/17  0650 08/27/17  0552   PH 7.42 7.42 7.32* 7.31*  < > 7.20*  < > 7.27* 7.27*   PCO2 47* 42 42 38  < > 47*  < > 42 41   PO2 63* 90 76* 77*  < > 85  < > 82 71*   HCO3 31* 27 21 19*  < > 18*  < > 19* 19*   O2PER  --   --   --  60%  --  50  --  50% 40%   < > = values in this interval not displayed.     Will continue to follow.

## 2017-08-31 NOTE — PROGRESS NOTES
Date of Admission:8/22/2017  Date of Intubation (most recent): 8/24/2017  Reason for Mechanical Ventilation:Resp Failure  Number of Days on Mechanical Ventilation:8  Met Criteria for Pressure Support Trial: No  Length of Pressure Support Trial: None  Reason for Stopping Pressure Support Trial:  Reason for No Pressure Support Trial: Per MD      Significant Events Today: None      Recent Labs  Lab 08/30/17  0415 08/29/17  0400 08/28/17  1540 08/28/17  0440  08/27/17  1600  08/27/17  0650 08/27/17  0552   PH 7.42 7.42 7.32* 7.31*  < > 7.20*  < > 7.27* 7.27*   PCO2 47* 42 42 38  < > 47*  < > 42 41   PO2 63* 90 76* 77*  < > 85  < > 82 71*   HCO3 31* 27 21 19*  < > 18*  < > 19* 19*   O2PER  --   --   --  60%  --  50  --  50% 40%   < > = values in this interval not displayed.    Ventilation Mode: CMV/AC  FiO2 (%): 60 %  Rate Set (breaths/minute): 20 breaths/min  Tidal Volume Set (mL): 460 mL  PEEP (cm H2O): 12 cmH2O  Oxygen Concentration (%): 60 %  Resp: 22    Will continue to follow.     Nuno OVIEDO

## 2017-08-31 NOTE — PROGRESS NOTES
Sedation vacation note; paused the versed drip and left the fent running.  Pt became agitated, red in face after half hour or so; Versed resumed.

## 2017-08-31 NOTE — PLAN OF CARE
Problem: Goal Outcome Summary  Goal: Goal Outcome Summary  Outcome: No Change  Pt remains vented on full support. Opens eyes to voice; does not follow commands. VSS. Will cont to kathrine pt.

## 2017-08-31 NOTE — PROGRESS NOTES
Date of Admission:8/22/2017  Date of Intubation (most recent): 8/24/2017  Reason for Mechanical Ventilation:Resp Failure  Number of Days on Mechanical Ventilation:7  Met Criteria for Pressure Support Trial: No  Length of Pressure Support Trial: None  Reason for Stopping Pressure Support Trial:  Reason for No Pressure Support Trial:      Significant Events Today: None   .Trina Weinstein

## 2017-08-31 NOTE — PROGRESS NOTES
Critical Care Progress Note      08/31/2017    Name: Niko Mireles MRN#: [id number]   Age: 52 year old YOB: 1965                  Problem List:   Active Problems:    Community acquired bacterial pneumonia    ARDS (adult respiratory distress syndrome) (H)    Parapneumonic effusion  Hyperbilirubinemia --improved           Summary/Hospital Course:     Niko Mireles is a 52 year old male  presents on 8/22 with hypoxemia, PE ruled-out but extensive infiltrates on CT. But 8/24  - worsening resp status thus intubated , bronch, and titrated PEEP adjusted. Paralyzed and proned for two days    8/25 - stable resp status, FiO2 40% while proned, mild elevation in CK and TG    8/26 propofol stopped for possible PATSY.     8/27 --28 supined.  Increasing bilirubin     8/28 started lasix   8/29 possible zosyn rash--changed to levo    8/30  Febrile         Assessment and plan :   Neurology/Psychiatry:   1. More awake on versed 2 mg/hr.Possible PATSY so changed to versed 8/27.    P: Versed RASS goal -2    Daily stop of versed, stop fentanyl, drip, use prn      Cardiovascular:   1.Hypertensive, BP better   ECHO normal on admission   Not in shock.   Plan  - continue metoprolol 50 BID   Continue amlodipine 5 mg     Pulmonary/Ventilator Management:   1. ARDS seconday to CAP with uncomplicated parapneumonic  effusion. Proned for two sessions. Positive yeast probably candida , plateau is 22 .  Ve is 13 LPM but now breathing above set rate of 20.   Overall slow improvement , CXR today about the same bilateral infiltrates.   Plan  -  460 x 20  PEEP 12    - full strength flolan  Lasix 20 mg for net negative of -1 L daily       GI and Nutrition :   Mild hyperbilirubinemia probably seconday to sepsis--peaked on Tuesday at 6 yesterday 3, repeat US with no change in sludge in GB only. .      Plan  - at goal [place] tube feeds     Renal/Fluids/Electrolytes:   1. Mild DINORA.  Decrease Cr and stable BUN  Improved hypernatremia    Plan  Lasix 20 daily   tube feeds free water       Infectious Disease:   1. Bilateral pneumonia with parapneumonic effusion , decreased WBC and procalcitonin but temp up to 102. No new positive cultures. Zosyn drug rash on Tuesday so changed to iv levofloxacin on Tuesday   Plan   levo 750        Endocrine:   1. Stress hyperglycemia   Plan  - ICU insulin protocol, goal sugar <180--not needing drip.     Hematology/Oncology:   1. WBC normal  2. Anemia, no signs, symptoms of active blood loss  Plan     IV/Access:   1. Venous access - right IJ  Plan  - central access required and necessary      ICU Prophylaxis:   1. DVT: Hep Subq/mechanical  2. VAP: HOB 30 degrees, chlorhexidine rinse  3. Stress Ulcer: PPI  4. Restraints: Nonviolent soft two point restraints required and necessary for patient safety and continued cares and good effect as patient continues to pull at necessary lines, tubes despite education and distraction. Will readdress daily.   5. IV Access - central access required and necessary for continued patient cares  6. Feeding --placed NJ  7. Family Update:  8. Disposition - ICU    Key goals for next 24 hours:   Stop fentanyl drip  Use prn fentanyl        Interim History:   More awake  Febrile to 102  Good diuresis  Remains on 60%.           Key Medications:       amLODIPine  5 mg Per Feeding Tube Daily     pantoprazole  40 mg Per Feeding Tube Daily     levofloxacin  750 mg Intravenous Q24H     metoprolol  50 mg Per Feeding Tube BID     insulin aspart  1-6 Units Subcutaneous Q4H     epoprostenol (FLOLAN) syringe change   Inhalation Q8H     heparin  5,000 Units Subcutaneous Q8H     chlorhexidine  15 mL Mouth/Throat Q12H     sodium chloride (PF)  3 mL Intracatheter Q8H     lidocaine  10 mL Subcutaneous Once       midazolam 2 mg/hr (08/31/17 0800)     epoprostenol (FLOLAN) 20 mcg/mL in glycine diluent inhalation solution 20 ng/kg/min (08/31/17 0828)     fentaNYL 25 mcg/hr (08/31/17 0800)     NaCl 20 mL/hr at  08/30/17 2018     - MEDICATION INSTRUCTIONS -              Physical Examination:   Temp:  [100.4  F (38  C)-102.7  F (39.3  C)] 101.1  F (38.4  C)  Pulse:  [98] 98  Heart Rate:  [] 83  Resp:  [19-38] 23  BP: (101-168)/() 117/71  FiO2 (%):  [40 %-60 %] 60 %  SpO2:  [93 %-97 %] 97 %    -740 net with 3800 urinary output      Wt Readings from Last 4 Encounters:   08/31/17 102.2 kg (225 lb 5 oz)     BP - Mean:  [] 82  Ventilation Mode: CMV/AC  FiO2 (%): 60 %  Rate Set (breaths/minute): 20 breaths/min  Tidal Volume Set (mL): 460 mL  PEEP (cm H2O): 12 cmH2O  Oxygen Concentration (%): 60 %  Resp: 23    Recent Labs  Lab 08/31/17  0540 08/30/17  0415 08/29/17  0400 08/28/17  1540 08/28/17  0440  08/27/17  1600  08/27/17  0650   PH 7.45 7.42 7.42 7.32* 7.31*  < > 7.20*  < > 7.27*   PCO2 47* 47* 42 42 38  < > 47*  < > 42   PO2 77* 63* 90 76* 77*  < > 85  < > 82   HCO3 32* 31* 27 21 19*  < > 18*  < > 19*   O2PER 60  --   --   --  60%  --  50  --  50%   < > = values in this interval not displayed.    GEN:  Intubated, opens eyes to stimulation but does not keep open and intermittently squeezes hands.     PULM: synchronous with vent  clear anteriorly   CV/COR:  regular S1S2 no gallop,  No rub, no murmur  ABD: non distended, soft non tender   EXT:  trace edema, feet are cooler than yesterday , in rooke boots.   NEURO: RASS -2  SKIN: fine red rash on abdomen--resolved    LINES: clean, dry intact         Data:   All data and imaging reviewed     ROUTINE ICU LABS (Last four results)  CMP    Recent Labs  Lab 08/31/17  0437 08/30/17  0415 08/29/17  0400 08/28/17  1725 08/28/17  0440  08/27/17  0510 08/26/17  2326  08/25/17  0540   * 145* 145* 146* 147*  --  146*  --   < > 139   POTASSIUM 4.1 4.4 4.4 4.8 4.6  < > 3.1* 2.7*  < > 4.6   CHLORIDE 108 110* 112* 114* 118*  --  117*  --   < > 111*   CO2 32 29 26 23 19*  --  21  --   < > 22   ANIONGAP 5 6 7 9 10  --  8  --   < > 6   * 138* 118* 121* 95  --  97  --    < > 140*   BUN 43* 43* 32* 30 30  --  30  --   < > 29   CR 1.33* 1.28* 1.35* 1.47* 1.45*  --  1.40*  --   < > 1.50*   GFRESTIMATED 56* 59* 55* 50* 51*  --  53*  --   < > 49*   GFRESTBLACK 68 71 67 61 62  --  64  --   < > 59*   BENOIT 8.9 8.9 8.9 9.4 8.9  --  9.0  --   < > 8.2*   MAG  --   --   --   --  2.4*  --   --  2.1  --   --    PHOS  --   --   --   --  4.4  --   --  2.5  --   --    PROTTOTAL  --  6.8 6.7*  --   --   --  6.1*  --   --  6.1*   ALBUMIN  --  1.5* 1.5*  --   --   --  1.4*  --   --  1.7*   BILITOTAL  --  3.2* 6.3*  --   --   --  2.7*  --   --  1.0   ALKPHOS  --  158* 146  --   --   --  120  --   --  83   AST  --  112* 126*  --   --   --  93*  --   --  33   ALT  --  70 66  --   --   --  42  --   --  34   < > = values in this interval not displayed.  CBC    Recent Labs  Lab 08/30/17 0415 08/29/17 0400 08/28/17 0440 08/27/17  0510   WBC 10.4 10.3 9.2 7.6   RBC 3.77* 3.53* 3.29* 3.22*   HGB 11.3* 10.5* 9.8* 9.6*   HCT 34.0* 31.2* 29.7* 29.2*   MCV 90 88 90 91   MCH 30.0 29.7 29.8 29.8   MCHC 33.2 33.7 33.0 32.9   RDW 15.0 14.7 15.0 14.9    438 417 402     INR    Recent Labs  Lab 08/28/17 0440 08/27/17  0510 08/26/17  0400 08/25/17  0540   INR 1.19* 1.21* 1.24* 1.12     Arterial Blood Gas    Recent Labs  Lab 08/31/17  0540 08/30/17  0415 08/29/17  0400 08/28/17  1540 08/28/17  0440  08/27/17  1600  08/27/17  0650   PH 7.45 7.42 7.42 7.32* 7.31*  < > 7.20*  < > 7.27*   PCO2 47* 47* 42 42 38  < > 47*  < > 42   PO2 77* 63* 90 76* 77*  < > 85  < > 82   HCO3 32* 31* 27 21 19*  < > 18*  < > 19*   O2PER 60  --   --   --  60%  --  50  --  50%   < > = values in this interval not displayed.    All cultures:    Recent Labs  Lab 08/30/17  1730 08/30/17  1646 08/30/17  1644   CULT No growth after 9 hours No growth after 9 hours PENDING     Recent Results (from the past 24 hour(s))   XR Chest Port 1 View    Narrative    CHEST ONE VIEW PORTABLE   8/31/2017 6:05 AM     HISTORY: ARDS.    COMPARISON:  8/29/2017.    FINDINGS: ET tube with tip well above the porsha. Feeding tube  directed into the stomach. Right jugular central line with tip in the  SVC. Moderate opacities in both lung bases, likely infiltrate and  unchanged in the interval.      Impression    IMPRESSION: Stable chest support hardware in good condition and  unchanged bilateral pulmonary infiltrates.    BRUCE RIDER MD         Billing: This patient is critically ill: Yes. Total critical care time today 40 min excluding procedures

## 2017-08-31 NOTE — PROGRESS NOTES
Date of Admission:8/22/2017  Date of Intubation (most recent): 8/24/2017  Reason for Mechanical Ventilation:Resp Failure  Number of Days on Mechanical Ventilation:8  Met Criteria for Pressure Support Trial: No  Reason for No Pressure Support Trial: Per MD  Significant Events Today: Pt still on full flolan 20ng/kg/min and PEEP of 12  Plan: pt remains intubated on full ventilator support.     Alessio Simms RT.

## 2017-09-01 NOTE — PLAN OF CARE
Problem: Goal Outcome Summary  Goal: Goal Outcome Summary  Mr. Mireles remained stable overnight, with heart rate controlled by oral metoprolol. Ventilated with limited secretions and no signs of difficulty breathing. With encouragement, Mr. Mireles began following commands to all four extremities during the night. Lasix administered as ordered at 2000 hours last night, with continued adequate urine output throughout the night. He is currently resting comfortably. Will continue to monitor.

## 2017-09-01 NOTE — PROGRESS NOTES
Critical Care Progress Note      09/01/2017    Name: Niko Mireles MRN#: [id number]   Age: 52 year old YOB: 1965                  Problem List:   Active Problems:    Community acquired bacterial pneumonia    ARDS (adult respiratory distress syndrome) (H)    Parapneumonic effusion  Hyperbilirubinemia --improved           Summary/Hospital Course:     Niko Mireles is a 52 year old male  presents on 8/22 with hypoxemia, PE ruled-out but extensive infiltrates on CT. But 8/24  - worsening resp status thus intubated , bronch, and titrated PEEP adjusted. Paralyzed and proned for two days    8/25 - stable resp status, FiO2 40% while proned, mild elevation in CK and TG    8/26 propofol stopped for possible PATSY.     8/27 --28 supined.  Increasing bilirubin     8/28 started lasix   8/29 possible zosyn rash--changed to levo    8/30  Febrile         Assessment and plan :   Neurology/Psychiatry:   1. More awake on versed 1-3 mg/hr.  P: Versed RASS goal -1    Daily stop of versed,       Cardiovascular:   1.Hypertensive, BP better   ECHO normal on admission   Plan  - continue metoprolol 50 BID   Continue amlodipine 5 mg     Pulmonary/Ventilator Management:   1. ARDS seconday to CAP with uncomplicated parapneumonic  effusion. Proned for two sessions.  Overall slow improvement and today on PS 8/8 has RSBI 45 on 40%.    Plan  -  Change to 500 x 14  PEEP 8   - 1/2 strength flolan  Lasix 20 mg for net negative of -1 L daily       GI and Nutrition :   Mild hyperbilirubinemia probably seconday to sepsis--peaked on Tuesday at 6 today 1 with slight increase in transaminases. repeat US with no change in sludge in GB only. .      Plan  - at goal [place] tube feeds     Renal/Fluids/Electrolytes:   1. Mild DINORA.  Stable Cr and stable BUN  Resolved hypernatremia   Plan  Lasix 20 daily   tube feeds free water       Infectious Disease:   1. Bilateral pneumonia with parapneumonic effusion , decreasing procalcitonin but temp up to  102 x 2 days. . No new positive cultures. Zosyn drug rash on Tuesday so changed to iv levofloxacin on Tuesday   Plan   stop levo  Urine cultures   Remove right IJ TLC and place picc line        Endocrine:   1. Stress hyperglycemia   Plan  - ICU insulin protocol, goal sugar <180--not needing drip.     Hematology/Oncology:   1. WBC  Slightly up    2. Anemia, no signs, symptoms of active blood loss  Plan     IV/Access:   1. Venous access - right IJ--change to picc  Plan  - central access required and necessary      ICU Prophylaxis:   1. DVT: Hep Subq/mechanical--change to enoxaparin   2. VAP: HOB 30 degrees, chlorhexidine rinse  3. Stress Ulcer: PPI  4. Restraints: Nonviolent soft two point restraints required and necessary for patient safety and continued cares and good effect as patient continues to pull at necessary lines, tubes despite education and distraction. Will readdress daily.   5. IV Access - central access required and necessary for continued patient cares  6. Feeding --placed [place]  7. Family Update: wife   8. Disposition - ICU    Key goals for next 24 hours:   Remove TLC  Place picc line  Start weaning trials  Change ventilator   Lasix once   Stop levo  Decrease flolan   Decrease free water flushes   Change to enoxaparin   completed FMLA from for patient         Interim History:   More awake  Febrile to 102  O2 decreased to 50%.            Key Medications:       furosemide  20 mg Intravenous Once     amLODIPine  5 mg Per Feeding Tube Daily     pantoprazole  40 mg Per Feeding Tube Daily     metoprolol  50 mg Per Feeding Tube BID     insulin aspart  1-6 Units Subcutaneous Q4H     epoprostenol (FLOLAN) syringe change   Inhalation Q8H     heparin  5,000 Units Subcutaneous Q8H     chlorhexidine  15 mL Mouth/Throat Q12H     sodium chloride (PF)  3 mL Intracatheter Q8H     lidocaine  10 mL Subcutaneous Once       midazolam 3 mg/hr (09/01/17 0009)     epoprostenol (FLOLAN) 20 mcg/mL in glycine diluent inhalation  solution 20 ng/kg/min (09/01/17 0850)     NaCl 20 mL/hr at 08/30/17 2018     - MEDICATION INSTRUCTIONS -              Physical Examination:   Temp:  [99.9  F (37.7  C)-102.6  F (39.2  C)] 99.9  F (37.7  C)  Heart Rate:  [] 75  Resp:  [16-32] 21  BP: ()/(59-93) 111/72  FiO2 (%):  [50 %-60 %] 50 %  SpO2:  [94 %-99 %] 99 %    + 600 net with 3000     Wt Readings from Last 4 Encounters:   09/01/17 102.5 kg (225 lb 15.5 oz)     BP - Mean:  [] 81  Ventilation Mode: CMV/AC  FiO2 (%): 50 %  Rate Set (breaths/minute): 20 breaths/min  Tidal Volume Set (mL): 460 mL  PEEP (cm H2O): 12 cmH2O  Oxygen Concentration (%): 50 %  Resp: 21    Recent Labs  Lab 08/31/17  0540 08/30/17  0415 08/29/17  0400 08/28/17  1540 08/28/17  0440  08/27/17  1600  08/27/17  0650   PH 7.45 7.42 7.42 7.32* 7.31*  < > 7.20*  < > 7.27*   PCO2 47* 47* 42 42 38  < > 47*  < > 42   PO2 77* 63* 90 76* 77*  < > 85  < > 82   HCO3 32* 31* 27 21 19*  < > 18*  < > 19*   O2PER 60  --   --   --  60%  --  50  --  50%   < > = values in this interval not displayed.    GEN:  Intubated, opens eyes to voice and looks around, intermittently squeezes hands.     PULM: synchronous with vent  clear anteriorly   CV/COR:  regular S1S2 no gallop,  No rub, no murmur  ABD: non distended, soft non tender   EXT:  trace edema, feet are cooler than yesterday, slight mottling on soles .   NEURO: RASS -2  SKIN: fine red rash on abdomen--resolved    LINES: clean, dry intact         Data:   All data and imaging reviewed     ROUTINE ICU LABS (Last four results)  CMP    Recent Labs  Lab 09/01/17  0607 08/31/17  0437 08/30/17  0415 08/29/17  0400  08/28/17  0440  08/27/17  0510 08/26/17  2326    145* 145* 145*  < > 147*  --  146*  --    POTASSIUM 4.2 4.1 4.4 4.4  < > 4.6  < > 3.1* 2.7*   CHLORIDE 106 108 110* 112*  < > 118*  --  117*  --    CO2 32 32 29 26  < > 19*  --  21  --    ANIONGAP 6 5 6 7  < > 10  --  8  --    * 155* 138* 118*  < > 95  --  97  --    BUN  45* 43* 43* 32*  < > 30  --  30  --    CR 1.22 1.33* 1.28* 1.35*  < > 1.45*  --  1.40*  --    GFRESTIMATED 62 56* 59* 55*  < > 51*  --  53*  --    GFRESTBLACK 75 68 71 67  < > 62  --  64  --    BENOIT 8.9 8.9 8.9 8.9  < > 8.9  --  9.0  --    MAG  --   --   --   --   --  2.4*  --   --  2.1   PHOS  --   --   --   --   --  4.4  --   --  2.5   PROTTOTAL 7.5  --  6.8 6.7*  --   --   --  6.1*  --    ALBUMIN 1.8*  --  1.5* 1.5*  --   --   --  1.4*  --    BILITOTAL 1.4*  --  3.2* 6.3*  --   --   --  2.7*  --    ALKPHOS 183*  --  158* 146  --   --   --  120  --    *  --  112* 126*  --   --   --  93*  --    *  --  70 66  --   --   --  42  --    < > = values in this interval not displayed.  CBC    Recent Labs  Lab 09/01/17  0607 08/30/17 0415 08/29/17  0400 08/28/17  0440   WBC 12.2* 10.4 10.3 9.2   RBC 4.06* 3.77* 3.53* 3.29*   HGB 12.1* 11.3* 10.5* 9.8*   HCT 38.4* 34.0* 31.2* 29.7*   MCV 95 90 88 90   MCH 29.8 30.0 29.7 29.8   MCHC 31.5 33.2 33.7 33.0   RDW 15.1* 15.0 14.7 15.0    407 438 417     INR    Recent Labs  Lab 08/28/17  0440 08/27/17  0510 08/26/17  0400   INR 1.19* 1.21* 1.24*     Arterial Blood Gas    Recent Labs  Lab 08/31/17  0540 08/30/17  0415 08/29/17  0400 08/28/17  1540 08/28/17  0440  08/27/17  1600  08/27/17  0650   PH 7.45 7.42 7.42 7.32* 7.31*  < > 7.20*  < > 7.27*   PCO2 47* 47* 42 42 38  < > 47*  < > 42   PO2 77* 63* 90 76* 77*  < > 85  < > 82   HCO3 32* 31* 27 21 19*  < > 18*  < > 19*   O2PER 60  --   --   --  60%  --  50  --  50%   < > = values in this interval not displayed.    All cultures:    Recent Labs  Lab 08/30/17  1730 08/30/17  1646 08/30/17  1644   CULT No growth after 2 days No growth after 2 days Culture negative monitoring continues     No results found for this or any previous visit (from the past 24 hour(s)).      Billing: This patient is critically ill: Yes. Total critical care time today 40 min excluding procedures

## 2017-09-01 NOTE — PROGRESS NOTES
Date of Admission:8/22/2017  Date of Intubation (most recent): 8/24/2017  Reason for Mechanical Ventilation:Resp Failure  Number of Days on Mechanical Ventilation:9  Met Criteria for Pressure Support Trial: No  Reason for No Pressure Support Trial: Per MD  Significant Events Today: Pt still on full flolan 20ng/kg/min and PEEP of 12  Plan: pt remains intubated on full ventilator support.       Recent Labs  Lab 08/31/17  0540 08/30/17  0415 08/29/17  0400 08/28/17  1540 08/28/17  0440  08/27/17  1600  08/27/17  0650   PH 7.45 7.42 7.42 7.32* 7.31*  < > 7.20*  < > 7.27*   PCO2 47* 47* 42 42 38  < > 47*  < > 42   PO2 77* 63* 90 76* 77*  < > 85  < > 82   HCO3 32* 31* 27 21 19*  < > 18*  < > 19*   O2PER 60  --   --   --  60%  --  50  --  50%   < > = values in this interval not displayed.    Ventilation Mode: CMV/AC  FiO2 (%): 50 %  Rate Set (breaths/minute): 20 breaths/min  Tidal Volume Set (mL): 460 mL  PEEP (cm H2O): 12 cmH2O  Oxygen Concentration (%): 50 %  Resp: 24    Nuno Hansen RT

## 2017-09-01 NOTE — PLAN OF CARE
Problem: Individualization  Goal: Patient Preferences  Outcome: Improving  Fent drip was stopped today, remains on versed drip; is more alert, opens eyes to voice and noises in the room; will now look at the source of the noise/voice.    Does not follow any motor commands except to open eyes and then close eyes tightly.  Is restrained.  CMV support, no weaning but FiO2 was decreased from 60 to 50 today, SpO2 remains in mid/high 90's.  Is on FS Flolan.  Resists oral cares, chews strongly on tube/bite-block.  VSS, SR with BBB.  TF is at goal; passing flatus, had a BM today.  Adequate UOP.  Abd/thigh rash lightening.  Multiple family members updated at bedside.

## 2017-09-01 NOTE — PROGRESS NOTES
CLINICAL NUTRITION SERVICES - REASSESSMENT NOTE      Recommendations Ordered by Registered Dietitian (RD):   Change TF now to Isosource 1.5 at 65 mL/hr = 2340 kcals (27 kcal/kg), 106 gm pro (1.2 gm/kg), 1185 mL H20, 23 gm fiber, 275 gm CHO       EVALUATION OF PROGRESS TOWARD GOALS   Diet:  NPO on vent    Nutrition Support:  The TF was started on 8/28 at 15 mL/hr;  Increased by 20 mL every 12 hrs to goal 75 mL/hr, which was achieved on 8/30 (0600).    Nutrition Support Enteral:  Type of Feeding Tube:  Nasoduodenal  Enteral Frequency:  Continuous  Enteral Regimen:  Promote with Fiber at 75 mL/hr  Total Enteral Provisions:  1800 kcals (20 kcal/kg and 82% energy needs), 113 gm pro (1.3 gm/kg), 1494 mL H20, 25 gm fiber, 248 gm CHO  Free Water Flush:  75 mL every 3 hrs (per MD)    Intake/Tolerance:    Stool:  x2 yesterday.  BGM:  129, 152, 135, 133, 166, 131 - acceptable (pt on Med Res SSI)  Na 144 (NL)  BUN 45 (H)  Cr 1.22 (NL) - Pt with CKD stage 3  Wt:  102.5 kg (down 12.1 kg since admit).  BMI:  29.8 (123% IBW).    Dosing Weight:  88.3 kg (adjusted for overwt)     ASSESSED NUTRITION NEEDS PER APPROVED PRACTICE GUIDELINES:  (Modified using new dosing wt and ranges)  Estimated Energy Needs:  7317-4623 kcals (25-30 Kcal/Kg)  Justification: overweight  Estimated Protein Needs:  106-132 grams protein (1.2-1.5 g pro/Kg)  Justification: hypercatabolism with critical illness, overweight, and CKD    NEW FINDINGS:   Plan to begin weaning trials.  Lasix x1 today.  Examined pt today - No fat or muscle loss noted.    Previous Goals (8/28):   TF goal Promote with Fiber at 75 mL/hr will meet % estimated needs  Evaluation: Not met    Previous Nutrition Diagnosis (8/28):   Inadequate protein-energy intake related to intubation as evidenced by NPO x 7 days, currently meeting 0% estimated needs  Evaluation: Improving      CURRENT NUTRITION DIAGNOSIS  Inadequate energy intake related to hypocaloric TF as evidenced by TF meeting 82%  re-estimated energy needs.    INTERVENTIONS  Recommendations / Nutrition Prescription  Change TF now to Isosource 1.5 at 65 mL/hr = 2340 kcals (27 kcal/kg), 106 gm pro (1.2 gm/kg), 1185 mL H20, 23 gm fiber, 275 gm CHO    Implementation  Collaboration and Referral of Nutrition care - Pt was discussed during ICU interdisciplinary rounds this am.  EN Composition, EN Schedule - Entered order in [facility] for above TF change.    Goals  TF Isosource 1.5 at 65 mL/hr will meet % estimated needs    MONITORING AND EVALUATION:  Progress towards goals will be monitored and evaluated per protocol and Practice Guidelines    Mayi Sun, RD, LD, CNSC

## 2017-09-02 NOTE — PLAN OF CARE
Problem: Individualization  Goal: Patient Preferences  Outcome: Improving  Flolan to half-strength, PEEP down to 8, FiO2 to 40%.  Tolerated a brief PSV trial today.  IJ DC'd, PICC placed.    Opens eyes to repeated command, weakly wiggles toes to command.  TF at goal; + flatus, -BM.  UC sent.  Rash on abd/thighs lightening.  Wife updated at bedside.

## 2017-09-02 NOTE — PROGRESS NOTES
Date of Admission:8/22/2017  Date of Intubation (most recent): 8/24/2017  Reason for Mechanical Ventilation:Resp Failure  Number of Days on Mechanical Ventilation:10  Met Criteria for Pressure Support Trial: No  Reason for No Pressure Support Trial: Per MD  Significant Events Today: Pt still on full flolan 20ng/kg/min and PEEP of 8  Ventilation Mode: CMV/AC  FiO2 (%): 40 %  Rate Set (breaths/minute): 16 breaths/min  Tidal Volume Set (mL): 500 mL  PEEP (cm H2O): 8 cmH2O  Oxygen Concentration (%): 40 %  Resp: 17    Recent Labs  Lab 08/31/17  0540 08/30/17  0415 08/29/17  0400 08/28/17  1540 08/28/17  0440  08/27/17  1600  08/27/17  0650   PH 7.45 7.42 7.42 7.32* 7.31*  < > 7.20*  < > 7.27*   PCO2 47* 47* 42 42 38  < > 47*  < > 42   PO2 77* 63* 90 76* 77*  < > 85  < > 82   HCO3 32* 31* 27 21 19*  < > 18*  < > 19*   O2PER 60  --   --   --  60%  --  50  --  50%   < > = values in this interval not displayed.  Plan: pt remains intubated on full ventilator support.

## 2017-09-02 NOTE — PLAN OF CARE
Problem: Infection, Risk/Actual (Adult)  Goal: Identify Related Risk Factors and Signs and Symptoms  Related risk factors and signs and symptoms are identified upon initiation of Human Response Clinical Practice Guideline (CPG)   Outcome: Improving  Pt is able to open eyes and and track more this shift.  VS stable, fevers continue.  PRN versed given for light sedation, and fentanyal given for signs of pain.  flolan no changes made am labs pending.

## 2017-09-02 NOTE — PROGRESS NOTES
Patient extubated to BiPAP 13/7, 50% without any complications. Suctioned pre and post extubation. No stridor. SpO2 96%. Will continue to follow.     9/2/2017  Lee Ann Luu, RT

## 2017-09-02 NOTE — PROGRESS NOTES
Critical Care Progress Note      09/02/2017    Name: Niko Mireles MRN#: [id number]   Age: 52 year old YOB: 1965                  Problem List:   Active Problems:    Community acquired bacterial pneumonia    ARDS (adult respiratory distress syndrome) (H)    Parapneumonic effusion  Hyperbilirubinemia --improved           Summary/Hospital Course:     Niko Mireles is a 52 year old male  presents on 8/22 with hypoxemia, PE ruled-out but extensive infiltrates on CT. But 8/24  - worsening resp status thus intubated , bronch, and titrated PEEP adjusted. Paralyzed and proned for two days    8/25 - stable resp status, FiO2 40% while proned, mild elevation in CK and TG    8/26 propofol stopped for possible PATSY.     8/27 --28 supined.  Increasing bilirubin     8/28 started lasix   8/29 possible zosyn rash--changed to levo    8/30  Febrile     9/1- line exchanged, wean FIO2         Assessment and plan :   Neurology/Psychiatry:   1. Encephalopathy - improved - weaned off versed gtt   - continue off continuous infusion  - prn versed     Cardiovascular:   1.Hypertensive, BP better  ECHO normal on admission   Plan  - continue metoprolol 50 BID   - continue amlodipine 5 mg   - lasix prn     Pulmonary/Ventilator Management:   1. ARDS seconday to CAP with uncomplicated parapneumonic  effusion. Proned for two sessions.  Overall slow improvement and today on PS 8/8 has RSBI 45 on 40% (noted on flolan still)   Plan  - continue AC  - stop flolan  - up in chair   - lasix 20 mg for net negative of -1 L daily       GI and Nutrition :   1. Mild hyperbilirubinemia probably seconday to sepsis- peaked on 8/29 repeat US with no change in sludge in GB only. .    Plan  - at goal [place] tube feeds     Renal/Fluids/Electrolytes:   1. Mild DINORA.  Stable Cr and stable BUN  2. Resolved hypernatremia   Plan  - continue Lasix 20 daily   - tube feeds w/ free water       Infectious Disease:   1. Bilateral pneumonia with parapneumonic  effusion , decreasing procalcitonin but temp up to 102 x 2 days (8/31-9/1 . No new positive cultures.  7 days Zosyn with drug rash on Tuesday so changed to iv levofloxacin on Tuesday - now s/p 4 days of levaquin (stopped 9/1) - lines exchanged 9/1   Plan  - fup recent cultures   - recheck procal   - ongoing eval for other potential source - ICU fever w/u     Endocrine:   1. Stress hyperglycemia   Plan  - ICU insulin protocol, goal sugar <180--not needing drip.     Hematology/Oncology:   1. WBC  Slightly up  -   2. Anemia, no signs, symptoms of active blood loss  Plan  - trend CBC  - check LE dopplers     IV/Access:   1. Venous access - right IJ--dc 9/1 - PICC 9/1   Plan  - central access required and necessary      ICU Prophylaxis:   1. DVT: lovenox/mechanical  2. VAP: HOB 30 degrees, chlorhexidine rinse  3. Stress Ulcer: PPI  4. Restraints: Nonviolent soft two point restraints required and necessary for patient safety and continued cares and good effect as patient continues to pull at necessary lines, tubes despite education and distraction. Will readdress daily.   5. IV Access - central access required and necessary for continued patient cares  6. Feeding --NJ, tube feeds  7. Family Update: pending    8. Disposition - ICU    Key goals for next 24 hours:   1. Stop folan  2. Up in to chair  3. DC versed drip - prn   4. Repeat CBC and procal  5. LE dopplers          Interim History:   - recent fever, lines exchanged, abx changed  - versed drip stopped overnight  - FIO2 weaned, small PS trial performed              Key Medications:       enoxaparin  40 mg Subcutaneous Q24H     sodium chloride (PF)  10 mL Intracatheter Q7 Days     amLODIPine  5 mg Per Feeding Tube Daily     pantoprazole  40 mg Per Feeding Tube Daily     metoprolol  50 mg Per Feeding Tube BID     insulin aspart  1-6 Units Subcutaneous Q4H     epoprostenol (FLOLAN) syringe change   Inhalation Q8H     chlorhexidine  15 mL Mouth/Throat Q12H     sodium  chloride (PF)  3 mL Intracatheter Q8H     lidocaine  10 mL Subcutaneous Once       midazolam Stopped (09/02/17 0745)     epoprostenol (FLOLAN) 20 mcg/mL in glycine diluent inhalation solution 10 ng/kg/min (09/02/17 0719)     NaCl 20 mL/hr at 09/02/17 0720     - MEDICATION INSTRUCTIONS -              Physical Examination:   Temp:  [99.3  F (37.4  C)-100.9  F (38.3  C)] 99.7  F (37.6  C)  Heart Rate:  [] 82  Resp:  [12-38] 20  BP: ()/(59-95) 135/66  FiO2 (%):  [40 %-50 %] 40 %  SpO2:  [93 %-100 %] 96 %      Intake/Output Summary (Last 24 hours) at 09/02/17 0817  Last data filed at 09/02/17 0600   Gross per 24 hour   Intake          1781.98 ml   Output             2625 ml   Net          -843.02 ml         Wt Readings from Last 4 Encounters:   09/02/17 107.1 kg (236 lb 1.8 oz)     BP - Mean:  [] 76  Ventilation Mode: CMV/AC  FiO2 (%): 40 %  Rate Set (breaths/minute): 16 breaths/min  Tidal Volume Set (mL): 500 mL  PEEP (cm H2O): 8 cmH2O  Oxygen Concentration (%): 40 %  Resp: 20    Recent Labs  Lab 08/31/17  0540 08/30/17  0415 08/29/17  0400 08/28/17  1540 08/28/17  0440  08/27/17  1600  08/27/17  0650   PH 7.45 7.42 7.42 7.32* 7.31*  < > 7.20*  < > 7.27*   PCO2 47* 47* 42 42 38  < > 47*  < > 42   PO2 77* 63* 90 76* 77*  < > 85  < > 82   HCO3 32* 31* 27 21 19*  < > 18*  < > 19*   O2PER 60  --   --   --  60%  --  50  --  50%   < > = values in this interval not displayed.    GEN:  Intubated, opens eyes to voice and looks around, intermittently squeezes hands.     PULM: synchronous with vent  clear anteriorly   CV/COR:  regular S1S2 no gallop,  No rub, no murmur  ABD: non distended, soft non tender   EXT:  trace edema, legs in rooke boots cool , dusky heels .   NEURO: RASS -1 , squeezes on right, decreased on left    SKIN: no overt rash noted   LINES: clean, dry intact         Data:   All data and imaging reviewed     ROUTINE ICU LABS (Last four results)  CMP    Recent Labs  Lab 09/02/17  5032  09/01/17  0607 08/31/17 0437 08/30/17 0415 08/29/17 0400 08/28/17 0440 08/26/17  2326    144 145* 145* 145*  < > 147*  < >  --    POTASSIUM 4.3 4.2 4.1 4.4 4.4  < > 4.6  < > 2.7*   CHLORIDE 105 106 108 110* 112*  < > 118*  < >  --    CO2 31 32 32 29 26  < > 19*  < >  --    ANIONGAP 7 6 5 6 7  < > 10  < >  --    * 133* 155* 138* 118*  < > 95  < >  --    BUN 52* 45* 43* 43* 32*  < > 30  < >  --    CR 1.25 1.22 1.33* 1.28* 1.35*  < > 1.45*  < >  --    GFRESTIMATED 60* 62 56* 59* 55*  < > 51*  < >  --    GFRESTBLACK 73 75 68 71 67  < > 62  < >  --    BENOIT 8.7 8.9 8.9 8.9 8.9  < > 8.9  < >  --    MAG  --   --   --   --   --   --  2.4*  --  2.1   PHOS  --   --   --   --   --   --  4.4  --  2.5   PROTTOTAL 7.0 7.5  --  6.8 6.7*  --   --   < >  --    ALBUMIN 1.8* 1.8*  --  1.5* 1.5*  --   --   < >  --    BILITOTAL 1.0 1.4*  --  3.2* 6.3*  --   --   < >  --    ALKPHOS 156* 183*  --  158* 146  --   --   < >  --    * 130*  --  112* 126*  --   --   < >  --    ALT 91* 104*  --  70 66  --   --   < >  --    < > = values in this interval not displayed.  CBC    Recent Labs  Lab 09/01/17 0607 08/30/17 0415 08/29/17 0400 08/28/17 0440   WBC 12.2* 10.4 10.3 9.2   RBC 4.06* 3.77* 3.53* 3.29*   HGB 12.1* 11.3* 10.5* 9.8*   HCT 38.4* 34.0* 31.2* 29.7*   MCV 95 90 88 90   MCH 29.8 30.0 29.7 29.8   MCHC 31.5 33.2 33.7 33.0   RDW 15.1* 15.0 14.7 15.0    407 438 417     INR    Recent Labs  Lab 08/28/17  0440 08/27/17  0510   INR 1.19* 1.21*     Arterial Blood Gas    Recent Labs  Lab 08/31/17  0540 08/30/17  0415 08/29/17  0400 08/28/17  1540 08/28/17  0440  08/27/17  1600  08/27/17  0650   PH 7.45 7.42 7.42 7.32* 7.31*  < > 7.20*  < > 7.27*   PCO2 47* 47* 42 42 38  < > 47*  < > 42   PO2 77* 63* 90 76* 77*  < > 85  < > 82   HCO3 32* 31* 27 21 19*  < > 18*  < > 19*   O2PER 60  --   --   --  60%  --  50  --  50%   < > = values in this interval not displayed.    All cultures:    Recent Labs  Lab 09/01/17  9102  08/30/17  1730 08/30/17  1646 08/30/17  1644   CULT PENDING No growth after 3 days No growth after 3 days Light growthCandida albicans / dubliniensisCandida albicans and Arlet dubliniensis are not routinely speciatedSusceptibility testing not routinely done*     Recent Results (from the past 24 hour(s))   XR Chest Port 1 View    Narrative    CHEST ONE VIEW PORTABLE  9/2/2017 6:52 AM     HISTORY: ARDS.    COMPARISON: 8/31/2017      Impression    IMPRESSION: Endotracheal tube with the tip projecting over the mid  trachea and enteric tube coursing below the level of the diaphragm are  unchanged. Interval removal of the right IJ central venous catheter.  Interval placement of a right upper extremity PICC with the tip  projecting over the right atrium; this could be pulled back 4 cm.  Moderate bilateral opacities are not changed.         Billing: This patient is critically ill: Yes. Total critical care time today 40 min excluding procedures

## 2017-09-03 NOTE — PLAN OF CARE
Problem: Goal Outcome Summary  Goal: Goal Outcome Summary  Patient intermittently follows commands and seems intermittently alert. Stronger on the right side. Left sided movement hypoactive. Pupils ERRL. Mild fever of 38.2  Crackles in bilateral lung bases and diminished BELL. BiPAP on 50% fi02.   Bowel sounds hypoactive. No BM.  Tube feeding held overnight.  PICC line had minimal blood return from all three lumens. Labs clotted twice from PICC. RT called to draw ABG and arterial line clotted for them as well. Lab was called and orders were put in for AM labs to be collected per lab.   Report given to oncoming nurse. Will continue to monitor.

## 2017-09-03 NOTE — PROGRESS NOTES
Patient pulling at his lines, tubes  Restraint orders written  GB      0530, 9/3  Nurse concerned about potential decreased strength on R side  I examined patient: moves both sides: upper and lower extremities  Pupils 3 mm, equal bilat, reactive  Pt looks to his L and right to command    GB  [phone]

## 2017-09-03 NOTE — PROGRESS NOTES
Critical Care Progress Note      09/03/2017    Name: Niko Mireles MRN#: [id number]   Age: 52 year old YOB: 1965                  Problem List:   Active Problems:    Community acquired bacterial pneumonia    ARDS (adult respiratory distress syndrome) (H)    Parapneumonic effusion  Hyperbilirubinemia --improved           Summary/Hospital Course:     Niko Mireles is a 52 year old male  presents on 8/22 with hypoxemia, PE ruled-out but extensive infiltrates on CT. But 8/24  - worsening resp status thus intubated , bronch, and titrated PEEP adjusted. Paralyzed and proned for two days    8/25 - stable resp status, FiO2 40% while proned, mild elevation in CK and TG    8/26 propofol stopped for possible PATSY.     8/27 --28 supined.  Increasing bilirubin     8/28 started lasix   8/29 possible zosyn rash--changed to levo    8/30  Febrile     9/1- line exchanged, wean FIO2     9/2 - extubated to bipap        Assessment and plan :   Neurology/Psychiatry:   1. Encephalopathy - improved - weaned off versed gtt, now delirious though  2. Delerium  - multifactorial   Plan  - focus on mobility  - +/- seroquel  - avoid over sedation    Cardiovascular:   1.Hypertensive, BP labile ECHO normal on admission   Plan  - continue metoprolol 50 BID   - increase amlodipine 10 mg   - lasix prn     Pulmonary/Ventilator Management:   1. ARDS seconday to CAP with uncomplicated parapneumonic  effusion. Proned for two sessions.  Overall slow improvement and today on PS 8/8 has RSBI 45 on 40% (noted on flolan still)  - Extubated 9/2 to noninvasive   Plan  - switch to hi celso  - pulmonary toilet as needed  - aim negative fluid status    GI and Nutrition :   1. Pancreatitis -with hyperbilirubinemia probably seconday to sepsis, ? meds- peaked on 8/29 repeat US with no change in sludge in GB only. .  TF yesterday 2/2 extubation - clinically stable + bowel sound, stooling  Plan  - restart NJ tube feeds - trickle, advance as  tolerated    Renal/Fluids/Electrolytes:   1. Mild DINORA. Resolved  Stable Cr and stable BUN  2. Resolved hypernatremia   Plan  - continue Lasix , change prn (as net negative since admission ~ 4.5L)  - tube feeds w/ free water       Infectious Disease:   1. Bilateral pneumonia with parapneumonic effusion , decreasing procalcitonin but temp up to 102 x 2 days (8/31-9/1 . No new positive cultures.  7 days Zosyn with drug rash on Tuesday so changed to iv levofloxacin on Tuesday - now s/p 4 days of levaquin (stopped 9/1) - lines exchanged 9/1 - procal flat, fever curve down  Plan  - fup recent cultures off abx    Endocrine:   1. Stress hyperglycemia   Plan  - ICU insulin protocol, goal sugar <180--not needing drip.     Hematology/Oncology:   1. WBC  Stable   2. Anemia, no signs, symptoms of active blood loss  3. Gastrocnemius superficial VT - on LE dopplers  Plan  - SCDS lovenox prophylaxis   - trend CBC  - repeat LE dopplers in ~ 1 week follow for propagatoin     IV/Access:   1. Venous access - right IJ--dc 9/1 - PICC 9/1   Plan  - central access required and necessary      ICU Prophylaxis:   1. DVT: lovenox/mechanical  2. Asp : HOB 30 degrees,   3. Stress Ulcer: PPI  4. Restraints: Nonviolent soft two point restraints required and necessary for patient safety and continued cares and good effect as patient continues to pull at necessary lines, tubes despite education and distraction. Will readdress daily.   5. IV Access - central access required and necessary for continued patient cares  6. Feeding --NJ, tube feeds  7. Family Update: pending    8. Disposition - ICU    Key goals for next 24 hours:   1. Wean BIPAP - hi flow  2. Increased norvasc   3. PRN lasix          Interim History:   - diuresed  - extubated to BIPAP -   - hypoactive delirium noted   - LE dopplers with occlusive clot in gastrocnemius (not a dvt) -            Key Medications:       enoxaparin  40 mg Subcutaneous Q24H     sodium chloride (PF)  10 mL  Intracatheter Q7 Days     amLODIPine  5 mg Per Feeding Tube Daily     pantoprazole  40 mg Per Feeding Tube Daily     metoprolol  50 mg Per Feeding Tube BID     insulin aspart  1-6 Units Subcutaneous Q4H     sodium chloride (PF)  3 mL Intracatheter Q8H       NaCl 20 mL/hr at 09/03/17 0721     - MEDICATION INSTRUCTIONS -              Physical Examination:   Temp:  [100.1  F (37.8  C)-101.1  F (38.4  C)] 100.8  F (38.2  C)  Heart Rate:  [] 100  Resp:  [16-36] 18  BP: (126-180)/() 139/96  FiO2 (%):  [40 %-50 %] 50 %  SpO2:  [93 %-99 %] 97 %        Intake/Output Summary (Last 24 hours) at 09/03/17 0825  Last data filed at 09/03/17 0600   Gross per 24 hour   Intake              260 ml   Output             3335 ml   Net            -3075 ml       Wt Readings from Last 4 Encounters:   09/02/17 101.2 kg (223 lb 1.7 oz)     BP - Mean:  [] 115  Ventilation Mode: CPAP/PS  FiO2 (%): 50 %  Rate Set (breaths/minute): 16 breaths/min  Tidal Volume Set (mL): 500 mL  PEEP (cm H2O): 0 cmH2O  Pressure Support (cm H2O): 0 cmH2O  Oxygen Concentration (%): 40 %  Resp: 18    Recent Labs  Lab 09/03/17  0713 09/02/17  1400 08/31/17  0540 08/30/17  0415  08/28/17  0440   PH 7.46* 7.48* 7.45 7.42  < > 7.31*   PCO2 43 45 47* 47*  < > 38   PO2 101 61* 77* 63*  < > 77*   HCO3 30* 33* 32* 31*  < > 19*   O2PER 50% 40% 60  --   --  60%   < > = values in this interval not displayed.    GEN:  opens eyes to voice and looks around, intermittently squeezes hands.  PULM: unlabored resp  clear anteriorly diminished at bases  CV/COR:  regular S1S2 no gallop,  No rub, no murmur  ABD: non distended, soft non tender   EXT:  trace edema, legs in rooke boots cool , dusky heels .   NEURO: intermittently following commands, moving bilateral extremities- symmetric   SKIN: no overt rash noted   LINES: clean, dry intact         Data:   All data and imaging reviewed     ROUTINE ICU LABS (Last four results)  CMP    Recent Labs  Lab 09/03/17  6472  09/03/17  0405 09/02/17  0412 09/01/17  0607  08/28/17  0440    Canceled, Test credited 143 144  < > 147*   POTASSIUM 4.5 Canceled, Test credited 4.3 4.2  < > 4.6   CHLORIDE 108 Canceled, Test credited 105 106  < > 118*   CO2 29 Canceled, Test credited 31 32  < > 19*   ANIONGAP 7 Canceled, Test credited 7 6  < > 10   * Canceled, Test credited 141* 133*  < > 95   BUN 47* Canceled, Test credited 52* 45*  < > 30   CR 1.23 Canceled, Test credited 1.25 1.22  < > 1.45*   GFRESTIMATED 62 Canceled, Test credited 60* 62  < > 51*   GFRESTBLACK 74 Canceled, Test credited 73 75  < > 62   BENOIT 8.8 Canceled, Test credited 8.7 8.9  < > 8.9   MAG  --   --   --   --   --  2.4*   PHOS  --   --   --   --   --  4.4   PROTTOTAL 7.3 Canceled, Test credited 7.0 7.5  < >  --    ALBUMIN 2.1* Canceled, Test credited 1.8* 1.8*  < >  --    BILITOTAL 1.2 Canceled, Test credited 1.0 1.4*  < >  --    ALKPHOS 152* Canceled, Test credited 156* 183*  < >  --    * Canceled, Test credited 102* 130*  < >  --    ALT 96* Canceled, Test credited 91* 104*  < >  --    < > = values in this interval not displayed.  CBC    Recent Labs  Lab 09/03/17  0740 09/03/17  0530 09/03/17  0405 09/02/17  0930   WBC 12.6* Canceled, Test credited Canceled, Test credited 12.0*   RBC 4.01* Canceled, Test credited Canceled, Test credited 3.98*   HGB 12.1* Canceled, Test credited Canceled, Test credited 11.9*   HCT 37.9* Canceled, Test credited Canceled, Test credited 38.0*   MCV 95 Canceled, Test credited Canceled, Test credited 96   MCH 30.2 Canceled, Test credited Canceled, Test credited 29.9   MCHC 31.9 Canceled, Test credited Canceled, Test credited 31.3*   RDW 14.9 Canceled, Test credited Canceled, Test credited 15.0    Canceled, Test credited Canceled, Test credited 407     INR    Recent Labs  Lab 08/28/17  0440   INR 1.19*     Arterial Blood Gas    Recent Labs  Lab 09/03/17  0713 09/02/17  1400 08/31/17  0540 08/30/17  0415  08/28/17  0440    PH 7.46* 7.48* 7.45 7.42  < > 7.31*   PCO2 43 45 47* 47*  < > 38   PO2 101 61* 77* 63*  < > 77*   HCO3 30* 33* 32* 31*  < > 19*   O2PER 50% 40% 60  --   --  60%   < > = values in this interval not displayed.    All cultures:    Recent Labs  Lab 09/01/17  1335 08/30/17  1730 08/30/17  1646 08/30/17  1644   CULT No growth No growth after 4 days No growth after 4 days Light growthCandida albicans / dubliniensisCandida albicans and Arlet dubliniensis are not routinely speciatedSusceptibility testing not routinely done*     Recent Results (from the past 24 hour(s))   US Lower Extremity Venous Duplex Bilateral    Narrative    VENOUS ULTRASOUND BILATERAL LOWER EXTREMITY  9/2/2017 12:36 PM     HISTORY: Evaluate source of fever, lower extremity edema.    COMPARISON: 8/22/2017    FINDINGS:  Examination of the deep veins with graded compression and  color flow Doppler with spectral wave form analysis shows some venous  thrombosis isolated to the right gastrocnemius veins in the mid calf.  The common femoral veins, femoral veins, popliteal veins, posterior  tibial veins, and greater saphenous veins show normal compressibility,  augmentation, and flow characteristics.      Impression    IMPRESSION:  1. Occlusive thrombus isolated to the right gastrocnemius veins in the  mid calf.  2. No evidence for any other thrombus within either lower extremity.    SERENA VAN MD         Billing: This patient is critically ill: Yes. Total critical care time today 40 min excluding procedures

## 2017-09-03 NOTE — PLAN OF CARE
Problem: Infection, Risk/Actual (Adult)  Goal: Identify Related Risk Factors and Signs and Symptoms  Related risk factors and signs and symptoms are identified upon initiation of Human Response Clinical Practice Guideline (CPG)   Outcome: Improving  Pt remained and tolerated Bipap this NOC sats 95-97,  Pt is alert to self,  VS unchanged, No insulin needed to cover BS.

## 2017-09-03 NOTE — PROGRESS NOTES
Several attempts made to free patient from restraints with repositioning and ROM but consistently demonstrates a desire to remove feeding tube, O2 etc so restraints maintained.

## 2017-09-03 NOTE — PLAN OF CARE
Problem: Goal Outcome Summary  Goal: Goal Outcome Summary  Outcome: Improving  Successfully extubated to Bipap, hypoactive delirium, fairly quiet, responds to some questions but not all, overall affect is flat.  Maintaining sats upper 90's, good response to Lasix.  Sisters updated during their visit today.

## 2017-09-03 NOTE — PLAN OF CARE
Problem: Goal Outcome Summary  Goal: Goal Outcome Summary  Outcome: Improving  Weaned from Bipap to High Flow N/C, and maintaining sats.  Remains fairly non-interactive, frequently moving legs against side rails/off bed or chair.  Up to converter chair twice today, attempting to maintain sleep/wake cycle.

## 2017-09-04 NOTE — PROGRESS NOTES
Critical Care Progress Note      09/04/2017    Name: Niko Mireles MRN#: [id number]   Age: 52 year old YOB: 1965                  Problem List:   Active Problems:    Community acquired bacterial pneumonia    ARDS (adult respiratory distress syndrome) (H)    Parapneumonic effusion  Hyperbilirubinemia --improved           Summary/Hospital Course:     Niko Mireles is a 52 year old male  presents on 8/22 with hypoxemia, PE ruled-out but extensive infiltrates on CT. But 8/24  - worsening resp status thus intubated , bronch, and titrated PEEP adjusted. Paralyzed and proned for two days    8/25 - stable resp status, FiO2 40% while proned, mild elevation in CK and TG    8/26 propofol stopped for possible PATSY.     8/27 --28 supined.  Increasing bilirubin     8/28 started lasix   8/29 possible zosyn rash--changed to levo    8/30  Febrile     9/1- line exchanged, wean FIO2     9/2 - extubated to bipap    9/3 -weaned FIo2 , improving neuro status        Assessment and plan :   Neurology/Psychiatry:   1. Encephalopathy - improved - weaned off versed gtt, now delirious though  2. Delerium  - multifactorial 2/2 critical illness  Plan  - focus on mobility  PT/OT   - avoid over sedation    Cardiovascular:   1.Hypertensive, BP labile ECHO normal on admission   Plan  - continue metoprolol 50 BID   - increase amlodipine 10 mg   - lasix prn     Pulmonary/Ventilator Management:   1. ARDS seconday to CAP with uncomplicated parapneumonic  effusion. Proned for two sessions.  Overall slow improvement and today on PS 8/8 has RSBI 45 on 40% (noted on flolan still)  - Extubated 9/2 to noninvasive > weaned to hiflo  Plan  - pulmonary toilet as needed  - aim negative fluid status  - incentive spirometry    GI and Nutrition :   1. Elevated Lipase -with hyperbilirubinemia probably seconday to sepsis, ? meds- peaked on 8/29 repeat US with no change in sludge in GB only. .  TF yesterday 2/2 extubation - clinically stable +  bowel sound, stooling  2. Elevate LFTs - stable  Plan  - NJ tube feeds - trickle, advance as tolerated    Renal/Fluids/Electrolytes:   1. Mild DINORA. Resolved  Stable Cr and stable BUN  2. Resolved hypernatremia   Plan  - continue Lasix , change prn (as net negative since admission ~ 4.5L)  - tube feeds w/ free water       Infectious Disease:   1. Bilateral pneumonia with parapneumonic effusion , decreasing procalcitonin but temp up to 102 x 2 days (8/31-9/1 . No new positive cultures.  7 days Zosyn with drug rash on Tuesday so changed to iv levofloxacin on Tuesday - now s/p 4 days of levaquin (stopped 9/1) - lines exchanged 9/1 - procal flat, fever curve down  Plan  - fup recent cultures off abx    Endocrine:   1. Stress hyperglycemia   Plan  - ICU insulin protocol, goal sugar <180--not needing drip.     Hematology/Oncology:   1. Leukocytosis - rising   2. Anemia, no signs, symptoms of active blood loss  3. Gastrocnemius venous thrombosis on LE dopplers -   Plan  - SCD's lovenox prophylaxis   - trend CBC  - repeat LE dopplers  9/5 to evaluate for propagatoin     IV/Access:   1. Venous access - right IJ--dc 9/1 - PICC 9/1   Plan  - central access required and necessary      ICU Prophylaxis:   1. DVT: lovenox/mechanical  2. Asp : HOB 30 degrees,   3. Stress Ulcer: PPI  4. Restraints: Nonviolent soft two point restraints required and necessary for patient safety and continued cares and good effect as patient continues to pull at necessary lines, tubes despite education and distraction. Will readdress daily.   5. IV Access - central access required and necessary for continued patient cares  6. Feeding --NJ, tube feeds  7. Family Update: pending    8. Disposition - ICU    Key goals for next 24 hours:   1. Wean O2  2. Incentive spirometry  3. PT/OT  4. Follow neuro exam  5. 1 x dose lasix        Interim History:   - weaned off BIPAP - hi celso tolerating  - still cloudy form sedative but clearing  - nursing noted some  aphasia - but starting to phonate this AM, alert moving all 4 extremities  - elevated WBC this AM,m CXR some increase atelectasis in setting of withdrawal of positive pressure           Key Medications:       amLODIPine  10 mg Per Feeding Tube Daily     enoxaparin  40 mg Subcutaneous Q24H     sodium chloride (PF)  10 mL Intracatheter Q7 Days     pantoprazole  40 mg Per Feeding Tube Daily     metoprolol  50 mg Per Feeding Tube BID     insulin aspart  1-6 Units Subcutaneous Q4H     sodium chloride (PF)  3 mL Intracatheter Q8H       NaCl 10 mL/hr at 09/04/17 0500     - MEDICATION INSTRUCTIONS -              Physical Examination:   Temp:  [100.8  F (38.2  C)-101.3  F (38.5  C)] 101.1  F (38.4  C)  Heart Rate:  [] 106  Resp:  [16-28] 28  BP: (124-153)/(81-99) 124/84  FiO2 (%):  [35 %-50 %] 35 %  SpO2:  [93 %-98 %] 94 %      Intake/Output Summary (Last 24 hours) at 09/04/17 0934  Last data filed at 09/04/17 0500   Gross per 24 hour   Intake           769.17 ml   Output             1745 ml   Net          -975.83 ml       Wt Readings from Last 4 Encounters:   09/04/17 87.7 kg (193 lb 5.5 oz)     BP - Mean:  [] 99  FiO2 (%): 35 %  Resp: 28    Recent Labs  Lab 09/03/17  0713 09/02/17  1400 08/31/17  0540 08/30/17  0415   PH 7.46* 7.48* 7.45 7.42   PCO2 43 45 47* 47*   PO2 101 61* 77* 63*   HCO3 30* 33* 32* 31*   O2PER 50% 40% 60  --        GEN:  More alert, less disheveled ,no acute distress  PULM: unlabored resp  clear anteriorly diminished at bases  CV/COR:  regular S1S2 no gallop,  No rub, no murmur  ABD: non distended, soft non tender   EXT:  trace edema, legs in rooke boots cool but unchanged, good perfusion  NEURO: oriented to person, follows command, weak but symmetric some word finding difficulties but clearing speech  SKIN: no overt rash noted   LINES: clean, dry intact         Data:   All data and imaging reviewed     ROUTINE ICU LABS (Last four results)  CMP    Recent Labs  Lab 09/04/17  4002  09/03/17  0530 09/03/17  0405 09/02/17  0412   * 144 Canceled, Test credited 143   POTASSIUM 4.8 4.5 Canceled, Test credited 4.3   CHLORIDE 112* 108 Canceled, Test credited 105   CO2 28 29 Canceled, Test credited 31   ANIONGAP 5 7 Canceled, Test credited 7   * 113* Canceled, Test credited 141*   BUN 48* 47* Canceled, Test credited 52*   CR 1.22 1.23 Canceled, Test credited 1.25   GFRESTIMATED 62 62 Canceled, Test credited 60*   GFRESTBLACK 75 74 Canceled, Test credited 73   BENOIT 9.1 8.8 Canceled, Test credited 8.7   MAG 2.7*  --   --   --    PHOS 4.1  --   --   --    PROTTOTAL 8.1 7.3 Canceled, Test credited 7.0   ALBUMIN 2.4* 2.1* Canceled, Test credited 1.8*   BILITOTAL 1.3 1.2 Canceled, Test credited 1.0   ALKPHOS 154* 152* Canceled, Test credited 156*   * 114* Canceled, Test credited 102*   ALT 98* 96* Canceled, Test credited 91*     CBC    Recent Labs  Lab 09/04/17  0520 09/03/17  0740 09/03/17  0530 09/03/17  0405   WBC 18.3* 12.6* Canceled, Test credited Canceled, Test credited   RBC 4.22* 4.01* Canceled, Test credited Canceled, Test credited   HGB 12.7* 12.1* Canceled, Test credited Canceled, Test credited   HCT 40.0 37.9* Canceled, Test credited Canceled, Test credited   MCV 95 95 Canceled, Test credited Canceled, Test credited   MCH 30.1 30.2 Canceled, Test credited Canceled, Test credited   MCHC 31.8 31.9 Canceled, Test credited Canceled, Test credited   RDW 15.0 14.9 Canceled, Test credited Canceled, Test credited   * 432 Canceled, Test credited Canceled, Test credited     INR  No lab results found in last 7 days.  Arterial Blood Gas    Recent Labs  Lab 09/03/17  0713 09/02/17  1400 08/31/17  0540 08/30/17  0415   PH 7.46* 7.48* 7.45 7.42   PCO2 43 45 47* 47*   PO2 101 61* 77* 63*   HCO3 30* 33* 32* 31*   O2PER 50% 40% 60  --        All cultures:    Recent Labs  Lab 09/01/17  1335 08/30/17  1730 08/30/17  1646 08/30/17  1644   CULT No growth No growth after 5 days No growth  after 5 days Light growthCandida albicans / dubliniensisCandida albicans and Arlet dubliniensis are not routinely speciatedSusceptibility testing not routinely done*     Recent Results (from the past 24 hour(s))   XR Chest Port 1 View    Narrative    CHEST ONE VIEW PORTABLE   9/4/2017 6:05 AM     HISTORY: Evaluate infiltrates.    COMPARISON: 9/2/2017      Impression    IMPRESSION: Interval extubation. Feeding tube remains in place as does  a central venous line. There is some bibasilar infiltrates or  atelectasis which are perhaps slightly worse in the right lung base.  Heart remains enlarged. Pulmonary vasculature is minimally prominent.    SERENA VAN MD         Billing: This patient is critically ill: Yes. Total critical care time today 35 min excluding procedures

## 2017-09-04 NOTE — PLAN OF CARE
Problem: Goal Outcome Summary  Goal: Goal Outcome Summary  Outcome: Therapy, progress toward functional goals is gradual  OT: Pt is a 52 year old male admitted with ongoing chest and abdominal pain, possible community acquired bacterial pneumonia.  Pt also developed ARDS and a parapneumonic effusion, hyperbilrubinemia.  PMH includes diverticulits, HTN, nephrolithiasis, obsessive compulsive disorder     Discharge Planner OT   Patient plan for discharge: Unknown at this time  Current status: Initial evaluation complete, limited treatment started.  Assisted nursing with lift transfer from bed to chair.  Pt has been on restraints, not trying to reach out and grab things during session.  Attempted to try and have pt do UE ROM activity.  Pt would grab my hand fleetingly and unexpectedly with either hand but not following specific commands to grab and release.  Did not attempt to grasp the 2 items I showed him and asked him to grab.  Moving right arm spontaneously a couple of times but not following commands.  PROM in both arms is WNL.  Pt appeared to be paying attention to what was going on and tried to speak 1 or 2 times but not being successful.  Chart information about home situation limited, pt is  and lives in a house but no information known otherwise.  Barriers to return to prior living situation: Limited responses to commands so far, weakness and deconditioning.  Recommendations for discharge: TCU or LTACH depending on medical condition and progress  Rationale for recommendations: Pt appears significantly below baseline level with limited ability to respond.       Entered by: Chucky Noonan 09/04/2017 9:08 AM

## 2017-09-04 NOTE — PROGRESS NOTES
09/04/17 0849   Quick Adds   Type of Visit Initial Occupational Therapy Evaluation   Living Environment   Lives With spouse   Living Arrangements house   Home Accessibility (unknown at this time, pt not responding verbally)   Living Environment Comment chart indicated pt was independent with ADLS at baseline   Self-Care   Usual Activity Tolerance good   Current Activity Tolerance poor   Functional Level Prior   Ambulation 0-->independent   Transferring 0-->independent   Toileting 0-->independent   Bathing 0-->independent   Dressing 0-->independent   Eating 0-->independent   Communication 0-->understands/communicates without difficulty   Swallowing 0-->swallows foods/liquids without difficulty   Cognition 0 - no cognition issues reported   Fall history within last six months no   Which of the above functional risks had a recent onset or change? ambulation;transferring;toileting;bathing;dressing;eating   General Information   Onset of Illness/Injury or Date of Surgery - Date 09/03/17   Referring Physician Humberto Wilkins   Patient/Family Goals Statement pt not able to state   Additional Occupational Profile Info/Pertinent History of Current Problem Pt admitted with ongoing chest and abdominal pain, possible communityh acquired bacterial pneumonia.  Pt also developed ARDS and a parapneumonic effusion, hyperbilrubinemia.  PMH includes diverticulits, HTN, nephrolithiasis, obsessive compulsive disorder   Precautions/Limitations other (see comments)  (pt has been in restraints, pulling at tubes)   General Observations pt alert, making efforts to speak at times but not vocalizing anything.,   Cognitive Status Examination   Able to Follow Commands moderate impairment  (very inconsistent with following commands)   Cognitive Comment pt appears to have some understanding receptively but not communicating expressively   Visual Perception   Visual Perception Wears glasses   Range of Motion (ROM)   ROM Quick Adds No deficits  were identified   ROM Comment Pt has full PROM of UEs, not consistently actively trying to move   Strength   Strength Comments unable to test at this time   Hand Strength   Hand Strength Comments pt has some fleeting and inconsistent grasp with both hands, not related to commands and not showing increased effort for grasping hard   Coordination   Coordination Comments Attempted to have pt grasp items, not following up with this   Mobility   Bed Mobility Bed mobility skill: Supine to sit   Bed Mobility Skill: Supine to Sit   Level of [place]: Supine/Sit unable to perform  (universal sling and lift, 2 people)   Bathing   Level of [place] dependent (less than 25% patients effort)   Upper Body Dressing   Level of [place]: Dress Upper Body dependent (less than 25% patients effort)   Lower Body Dressing   Level of [place]: Dress Lower Body dependent (less than 25% patients effort)   Grooming   Level of [place]: Grooming dependent (less than 25% patients effort)   Activities of Daily Living Analysis   Impairments Contributing to Impaired Activities of Daily Living cognition impaired;motor control impaired;strength decreased   General Therapy Interventions   Planned Therapy Interventions ADL retraining;cognition;strengthening   Clinical Impression   Criteria for Skilled Therapeutic Interventions Met yes, treatment indicated   OT Diagnosis decreased independence and safety for ADLS   Influenced by the following impairments Pt very inconsistent in responding to efforts for activity, possble confusion or aphasia limiting his response.   Assessment of Occupational Performance 5 or more Performance Deficits   Identified Performance Deficits decreased independence in grooming, dressing, bathing, work and household related tasks   Clinical Decision Making (Complexity) Low complexity   Therapy Frequency daily   Predicted Duration of Therapy Intervention (days/wks) 7 days   Anticipated Discharge Disposition  "Home with Assist;Transitional Care Facility   Risks and Benefits of Treatment have been explained. Yes   Patient, Family & other staff in agreement with plan of care Yes   [facility] TM \"6 Clicks\"   2016, Trustees of [facility], under license to [employer].  All rights reserved.   6 Clicks Short Forms Daily Activity Inpatient Short Form   [facility] AM-PAC  \"6 Clicks\" Daily Activity Inpatient Short Form   1. Putting on and taking off regular lower body clothing? 1 - Total   2. Bathing (including washing, rinsing, drying)? 1 - Total   3. Toileting, which includes using toilet, bedpan or urinal? 1 - Total   4. Putting on and taking off regular upper body clothing? 1 - Total   5. Taking care of personal grooming such as brushing teeth? 1 - Total   6. Eating meals? 1 - Total   Daily Activity Raw Score (Score out of 24.Lower scores equate to lower levels of function) 6   Total Evaluation Time   Total Evaluation Time (Minutes) 15     "

## 2017-09-04 NOTE — PLAN OF CARE
"Problem: Goal Outcome Summary  Goal: Goal Outcome Summary  Discharge Planner PT    Evaluation completed, treatment initiated. 51 yo male with ARDS secondary to commuity aquired pna with parapneumonic effusion. Was intubated for 10 days secondary to respiratory failure, he was positioned in prone for 2 days as well. Extubated on 9/2/17, on 4L via nc at time of PT evaluation.   Patient plan for discharge: None stated.  Current status: Oriented to self. Pt is minimally verbal at this time, when he does vocalize his voice is soft and speech garbled. Pt is able to track 50% of the time, but otherwise looks L and R sporadically. He participates <25% of the time with LE exercises, able to squeeze therapist fingers on command both L and R. All other command following is quite minimal. Rolling is dependent. Sling placed for EOB dangle, dangled with Max A to Min A x 2 at times. Unable to follow with LE exercise, but does wiggle fingers as if \"waving\" to a nurse passing by. Pt squints and appears uncomfortable in the sitting position, has difficulty keeping his head in neutral. Returned to bed via sling. Note feet are very cool to the touch, socks left on the feet, Rooke boots, soft wrist restraints replaced, pt positioned on the L side per RN request.   Barriers to return to prior living situation: Level of assist, fatigue, poor strength, impaired balance and activity tolerance. Unable to discern PLOF as pt with minimal vocalization throughout, no family present during evaluation..  Recommendations for discharge: Pt may be an acute rehab candidate if mobility and strength progress in the next few days.  Rationale for recommendations: Per chart review pt independent at baseline, has supportive sisters.       Entered by: Lilibeth Mckeon 09/04/2017 2:59 PM         Pt's sister present and offered some subjective info. The pt resides in a house with his wife, there are stairs to enter/exit as well as within. He works at a " "group home, full time-sometimes night/evening hours. He is \"not too active,\" secondary to a history of back pain with multiple surgeries. She states he is a very smart man and enjoys watching sports. There are two sisters, as well as the pt's wife, that have worked out a schedule to allow for someone to be at the hospital for a portion of each day. The pt has a good support system.  "

## 2017-09-04 NOTE — PLAN OF CARE
Problem: Goal Outcome Summary  Goal: Goal Outcome Summary  Dr. Leonardo notified overnight of concern for possible expressive aphagia. Patient seems to understand and follow commands, and seems appropriate to surroundings, but seems to be having trouble vocalizing/verbalizing. Patient has intermittent periods of lethargy, but otherwise can nod and follow commands appropriately. Pupils ERRL.   Mild fever 38.4 unresolved by Tylenol.   Patient on 10L oxyplus mask.   Sinus tachycardia  BP WNL  Vital signs stable.   Tube feed @15 HR  Urine output >50/HR  Report given to oncoming nurse. Will continue with plan of care.

## 2017-09-04 NOTE — PROGRESS NOTES
" 09/04/17 1614   Quick Adds   Type of Visit Initial PT Evaluation   Living Environment   Lives With spouse   Living Arrangements house   Home Accessibility stairs to enter home;stairs within home   Transportation Available car;family or friend will provide   Living Environment Comment Per pt's sister the pt is independent at baseline. He works at a group home, sometimes night/evening shifts. He does not exercise, that she knows of and has had a history of chronic back pain with surgical intevention in the past.    Self-Care   Dominant Hand right   Usual Activity Tolerance good   Current Activity Tolerance fair   Regular Exercise no   Equipment Currently Used at Home none   Functional Level Prior   Ambulation 0-->independent   Transferring 0-->independent   Toileting 0-->independent   Bathing 0-->independent   Dressing 0-->independent   Eating 0-->independent   Communication 0-->understands/communicates without difficulty   Swallowing 0-->swallows foods/liquids without difficulty   Cognition 0 - no cognition issues reported   Fall history within last six months no   Which of the above functional risks had a recent onset or change? ambulation;transferring;toileting;bathing;dressing;eating;swallowing;cognition;communication/speech  (functional activity tolerance)   Prior Functional Level Comment Independent with ADLs, IADLs and mobility at baseline. Per pt's sister \"He is a very smart man.\" Enjoys sports.   General Information   Onset of Illness/Injury or Date of Surgery - Date 08/22/17   Referring Physician Humberto Wilkins MD   Patient/Family Goals Statement None stated.   Pertinent History of Current Problem (include personal factors and/or comorbidities that impact the POC) 51 yo male with ARDS secondary to commuity aquired pna with parapneumonic effusion. Was intubated for 10 days secondary to respiratory failure, he was positioned in prone for 2 days as well. Extubated on 9/2/17, on 4L via nc at time " of PT evaluation   Precautions/Limitations fall precautions;oxygen therapy device and L/min  (4L via NC)   Cognitive Status Examination   Orientation person   Level of Consciousness lethargic/somnolent   Follows Commands and Answers Questions (<25% of the time)   Personal Safety and Judgment impaired   Cognitive Comment Defer to OT once pt able to participate. Unsure if pt is having a hypoactive delerium and is still coming out of all the sedation or if there is an element of [place]? this said as the pt is able to move UEs and LEs at will, but not with command.   Integumentary/Edema   Integumentary/Edema Comments Note posterior neck with scabbing present, per RN likely from previous mask   Posture    Posture Comments Kyphosis, forward head, elevated shoulders slumps R wtih down garcia and R cervical rotation in sitting and in supine. Per RN pt waxes and wanes with L vs R preferences   Range of Motion (ROM)   ROM Comment PROM WFL via assessment of B LEs and UEs, limited active range secondary to weakness    Strength   Strength Comments Core and proximal weaknes,, moderate hand  present B. Unable to assess MMTs as pt can not follow commands to do so at this time.   Bed Mobility   Bed Mobility Comments Rolling L and R, Max A to dependent. Able to initate with looking when rolling R, very difficult to roll L at time of evaluation.   Transfer Skills   Transfer Comments Dependent via lift   Gait   Gait Comments NA at this time   Balance   Balance Comments Sitting balance is poor, requries Mod to Max A at all times to prevent posterior and r lean too far   Sensory Examination   Sensory Perception Comments Unable to assess, per pt's sister pt has had back surgeries and back problems for years.   Coordination   Coordination Comments Unable to asses   Muscle Tone   Muscle Tone Comments Flaccid at times, yet some tone felt in the LEs with heel slides   General Therapy Interventions   Planned Therapy Interventions balance  "training;bed mobility training;gait training;neuromuscular re-education;ROM;strengthening;stretching;transfer training;progressive activity/exercise   Clinical Impression   Criteria for Skilled Therapeutic Intervention yes, treatment indicated   PT Diagnosis Impaired functional activity [place]   Influenced by the following impairments Decreased strength, balance, AROM, fatigue, prolonged bed rest   Functional limitations due to impairments Decreased functioal independence   Clinical Presentation Stable/Uncomplicated   Clinical Presentation Rationale see above   Clinical Decision Making (Complexity) Low complexity   Therapy Frequency` daily   Predicted Duration of Therapy Intervention (days/wks) 1 week   Anticipated Discharge Disposition Acute Rehabilitation Facility   Risk & Benefits of therapy have been explained Yes   Patient, Family & other staff in agreement with plan of care Yes   [facility] AM-PAC  \"6 Clicks\" V.2 Basic Mobility Inpatient Short Form   1. Turning from your back to your side while in a flat bed without using bedrails? 1 - Total   2. Moving from lying on your back to sitting on the side of a flat bed without using bedrails? 1 - Total   3. Moving to and from a bed to a chair (including a wheelchair)? 1 - Total   4. Standing up from a chair using your arms (e.g., wheelchair, or bedside chair)? 1 - Total   5. To walk in hospital room? 1 - Total   6. Climbing 3-5 steps with a railing? 1 - Total   Basic Mobility Raw Score (Score out of 24.Lower scores equate to lower levels of function) 6   Total Evaluation Time   Total Evaluation Time (Minutes) 10     "

## 2017-09-05 NOTE — PROGRESS NOTES
Sign out provided to hospitalist.     Robert Irene MD  Pulmonary and Critical Care  [facility]  Pager:  [phone]

## 2017-09-05 NOTE — PROGRESS NOTES
Critical Care Progress Note      09/05/2017    Name: Niko Mireles MRN#: [id number]   Age: 52 year old YOB: 1965                  Problem List:   --> Pneumonia, bilateral  --> Hyperbilirubinemia  --> ARDS, improving  --> Encephalopathy/altered mental status/delirium  --> Hypertension  --> Acute kidney injury  --> Gastrocnemius thrombus      Assessment and plan :   Neurology/Psychiatry:   1. Encephalopathy / Delerium - weaned off of versed. D/C fentanyl and change to dilaudid.   --> dilaudid, transition to oral as able  --> avoid further benzo  --> CT H today    Cardiovascular:   1.Hypertension - better control on bb, ccb, and prn lasix  --> cont metoprolol, amlodipine, lasix    Pulmonary/Ventilator Management:   1. ARDS seconday to CAP with uncomplicated parapneumonic  effusion. Proned for two sessions.  Extubated on 09/02 to bipap. Now on 4 L NC.   --> prn diuresis  --> early mobilization, ptot  --> IS    GI and Nutrition :   1. Elevated Lipase -with hyperbilirubinemia probably seconday to sepsis. Cont to monitor. No evidence of cholecystitis.   --> advance tube feeds  --> f/u lipase today    Renal/Fluids/Electrolytes:   1.  DINORA - Overall stable. Monitor.     2.  Hypernatremia - Fee water.  Increase to 100 mL every three hours.    Infectious Disease:   1. Bilateral pneumonia with parapneumonic effusion , decreasing procalcitonin but temp up to 102 x 2 days.  Repeat cultures today (urine, blood, sputum). If persistent fevers then evaluate for other etiology of fevers.   --> lipase today, if increasing and with fevers would CT abd  --> pan cultures    Endocrine:   1. Stress hyperglycemia    --> sub Q insulin    Hematology/Oncology:   1. Leukocytosis - slightly up. Cont to monitor.   --> low threshold for abx (no abx since 9/1)  --> if recurrent fever would CT abd, follow pan cultures  --> follow up lipase    2. Gastrocnemius venous thrombosis on LE dopplers - No need for anticoagulation.   --> 9/5  u/s pending to eval for extension, would then repeat in 2 weeks if no extension on today's  --> S/Q lovenox    ICU Prophylaxis:   1. DVT: lovenox/mechanical  2. Asp : HOB 30 degrees,   3. Stress Ulcer: PPI  4. Restraints: Nonviolent soft two point restraints required and necessary for patient safety and continued cares and good effect as patient continues to pull at necessary lines, tubes despite education and distraction.  5. IV Access - central access required and necessary for continued patient cares  6. Feeding --NJ, tube feeds  7. Family Update: Yes  8. Disposition - ICU    Key goals for next 24 hours:   1. Wean O2  2. Incentive spirometry  3. PT/OT  4. Follow neuro exam  5. Transfer out of ICU today    Subjective:    52-year-old male initially admitted on 8/22/2017 with chest pain and shortness of breath.  With profound hypoxemia and bilateral opacities he was diagnosed with pneumonia following with ARDS requiring prone positioning for two days.  Placed supine on 828.  Currently on levofloxacin with improving respiratory status and was subsequently extubated.  Continues to have some delirium and altered mental status.         Key Medications:       enoxaparin (LOVENOX) injection 40 mg     pantoprazole (PROTONIX) suspension 40 mg     metoprolol (LOPRESSOR) tablet 50 mg     insulin aspart (NovoLOG) inj (RAPID ACTING)     labetalol (NORMODYNE/TRANDATE) injection 10-20 mg          Physical Examination:   Temp:  [99.1  F (37.3  C)-101.5  F (38.6  C)] 101.3  F (38.5  C)  Heart Rate:  [] 112  Resp:  [10-36] 31  BP: (110-139)/(79-95) 110/95  SpO2:  [90 %-96 %] 94 %    Intake/Output Summary (Last 24 hours) at 09/04/17 0934  Last data filed at 09/04/17 0500   Gross per 24 hour   Intake           769.17 ml   Output             1745 ml   Net          -975.83 ml       Wt Readings from Last 4 Encounters:   09/04/17 87.7 kg (193 lb 5.5 oz)     BP - Mean:  [] 102  FiO2 (%): 35 %  Resp: 31    Recent Labs  Lab  09/03/17  0713 09/02/17  1400 08/31/17  0540 08/30/17  0415   PH 7.46* 7.48* 7.45 7.42   PCO2 43 45 47* 47*   PO2 101 61* 77* 63*   HCO3 30* 33* 32* 31*   O2PER 50% 40% 60  --        GEN:  Opens eyes to voice, moves all four extremities.   PULM: unlabored resp  clear anteriorly diminished at bases  CV/COR:  regular S1S2 no gallop,  No rub, no murmur  ABD: mildy distended, soft, non tender   EXT:  trace edema, legs in rooke boots cool but unchanged, good perfusion  NEURO: Slow to respond, incoherent speech but did speak earlier to nursing  SKIN: no overt rash noted   LINES: clean, dry intact         Data:   All data and imaging reviewed     ROUTINE ICU LABS (Last four results)  CMP    Recent Labs  Lab 09/05/17  0430 09/04/17  0520 09/03/17  0530 09/03/17  0405   * 145* 144 Canceled, Test credited   POTASSIUM 4.4 4.8 4.5 Canceled, Test credited   CHLORIDE 112* 112* 108 Canceled, Test credited   CO2 29 28 29 Canceled, Test credited   ANIONGAP 6 5 7 Canceled, Test credited   * 131* 113* Canceled, Test credited   BUN 54* 48* 47* Canceled, Test credited   CR 1.26* 1.22 1.23 Canceled, Test credited   GFRESTIMATED 60* 62 62 Canceled, Test credited   GFRESTBLACK 72 75 74 Canceled, Test credited   BENOIT 9.1 9.1 8.8 Canceled, Test credited   MAG  --  2.7*  --   --    PHOS  --  4.1  --   --    PROTTOTAL 8.5 8.1 7.3 Canceled, Test credited   ALBUMIN 2.4* 2.4* 2.1* Canceled, Test credited   BILITOTAL 1.4* 1.3 1.2 Canceled, Test credited   ALKPHOS 138 154* 152* Canceled, Test credited   AST 85* 104* 114* Canceled, Test credited   ALT 89* 98* 96* Canceled, Test credited     CBC    Recent Labs  Lab 09/05/17  0430 09/04/17  0520 09/03/17  0740 09/03/17  0530   WBC 16.3* 18.3* 12.6* Canceled, Test credited   RBC 4.22* 4.22* 4.01* Canceled, Test credited   HGB 12.6* 12.7* 12.1* Canceled, Test credited   HCT 40.0 40.0 37.9* Canceled, Test credited   MCV 95 95 95 Canceled, Test credited   MCH 29.9 30.1 30.2 Canceled, Test  credited   [facility] 31.5 31.8 31.9 Canceled, Test credited   RDW 15.2* 15.0 14.9 Canceled, Test credited   * 468* 432 Canceled, Test credited     Disposition: Transfer out of MICU.     Critical Care Time:  35 min. This excludes any time for procedures performed. Critical Care billing time therefore does not include the time spent during procedure and is independent of these charges.     Robert Irene MD  Pulmonary and Critical Care  [facility]  Pager:  [phone]

## 2017-09-05 NOTE — PLAN OF CARE
Problem: Goal Outcome Summary  Goal: Goal Outcome Summary  Outcome: No Change  VSS. Lethargic, intermittently says yes/no but doesn't speak much. Intermittently follows commands. LINARES. Maintaining sats on 4L NC. Trialed restraints off, pt purposefully moved right to NG, restraints back on. Plan for therapies today, and continued monitoring.

## 2017-09-05 NOTE — INTERIM SUMMARY
INTENSIVE CARE UNIT TRANSFER OF CARE NOTE      CODE STATUS:  Full code.      DATE OF ADMISSION:  08/22/2017.      REASON FOR ADMISSION:  Chest pain and shortness of breath.      HOSPITAL COURSE:  Mr. Niko Mireles was admitted by me on 08/22/2017.  He came in after about a week of increasing shortness of breath and chest pain, said that it started in the left lower quadrant of his abdomen and he thought it felt like when he had diverticulitis in the past.  He decided to manage this at home.  The pain transferred up into his left lower chest and he said that it was exacerbated extremely by deep breaths.  In the Emergency Department, he was noted to have pretty significant renal failure with a creatinine of 2.31 and BUN was elevated as well at that time.  He also had significant leukocytosis at 15.8.  Abdominal film was negative, but there were signs of some inflammation in the left lower lung on chest x-ray.  Because of his renal insufficiency, a CT chest with contrast was not possible.  There were concerns that he might have a PE versus pneumonia.  A V/Q scan was ordered and this showed intermediate probability of PE, so the patient was treated concurrently for pneumonia and PE.  He was started on a heparin drip and on ceftriaxone and azithromycin IV.  He was hydrated and his creatinine was brought down enough to get a CT chest with contrast the next day.  This showed no pulmonary embolism, but it confirmed the presence of a large loculated left-sided pleural effusion with multifocal pneumonia.  The patient developed hypoxic respiratory failure and was transferred to the ICU.  He was initially treated on BiPAP, but his respiratory failure worsened and he developed acute respiratory distress syndrome.  The patient was intubated on 08/23.  They did a thoracentesis on that same day and pulled out about 900 mL of dark keara appearing fluid.  The following day they did a bronchoscopy.  The heparin was stopped on 08/23  once they were able to confirm that there was no pulmonary embolism.  The patient remained intubated until 09/02.  He came off mechanical ventilation and onto BiPAP.  He is slowly weaned down today now to 4 liters nasal cannula.  A couple of issues have remained, however, since extubation.  He is still encephalopathic and so they are sending him for a CT head today.  He also has fevers ranging from 100.8 to 101.3 still; lipase is increasing, as is his total bilirubin once again; and his creatinine which had normalized as of 09/04 is starting to increase and now today is a little high at 1.26.  This patient, however, is deemed medically stable enough for transfer out of ICU by the Intensivist Service and he called me to request transfer of care.      ALLERGIES:  Compazine.      MEDICATIONS:   1.  Norvasc 10 mg per feeding tube daily.   2.  Lovenox 40 mg subcu every 24 hours.   3.  Medium insulin resistance dosing sliding scale insulin.   4.  Lopressor 50 mg per feeding tube b.i.d.   5.  Protonix 40 mg per feeding tube daily.      PAST MEDICAL HISTORY:   1.  Diverticulitis.   2.  Obsessive-compulsive disorder.   3.  Nephrolithiasis.   4.  Hypertension.   5.  Loculated left-sided pleural effusion with multifocal pneumonia.   6.  Encephalopathy.   7.  Gastrocnemius thrombosis.   8.  Stress hyperglycemia.   9.  Hypercholesterolemia.   10.  GERD with esophagitis.   11.  Erectile dysfunction.   12.  Anxiety.   13.  Recurrent major depression.      PAST SURGICAL HISTORY:   1.  Lumbar fusion L4-S1.   2.  Surgical repair of right shoulder.   3.  Left knee arthroscopy.   4.  Right carpal tunnel release.      FAMILY HISTORY:  Mother and father are still alive.  Father suffers has a history of aspiration pneumonia but nothing else significant.  He has 4 brothers and 4 sisters, all in good health.      SOCIAL HISTORY:  He has never used tobacco.  No significant alcohol use.  No illicit drug history.  He is , works for  [employer].  He lives independently with his wife.      REVIEW OF SYSTEMS:  A 10-system review conducted with the patient, other than those systems listed in history present illness, is negative.      PHYSICAL EXAMINATION:   VITAL SIGNS:  Blood pressure 120/84, O2 sats 94% on 4 liters nasal cannula.  Heart rate 106, temperature 100.8 Fahrenheit, respirations are 23.   GENERAL:  Obese, middle-aged male, confused, only able to orient to person.   HEENT:  Feeding tube in place, normocephalic, atraumatic.  No oropharyngeal erythema.   NECK:  No cervical lymphadenopathy, no thyromegaly.   RESPIRATORY:  Diminished at the bases, but I do not appreciate any wheezes, rales or rhonchi.  He has normal work of breathing.   CARDIOVASCULAR:  Normal S1, S2, no S3, S4, regular rate and rhythm.  No murmurs, rubs or gallops.   ABDOMEN:  Slightly distended but soft and nontender to palpation.  No hepatosplenomegaly or mass palpable.   EXTREMITIES:  He has trace bilateral lower extremity edema in his ankles but otherwise normal.   NEUROLOGIC:  Cranial nerves II-XII are intact.  He has garbled speech.  Moves all limbs spontaneously.      LABORATORY EVALUATION:  From today's metabolic panel, pertinent positives:  Sodium elevated at 147, chloride elevated at 112, BUN elevated at 54, creatinine elevated at 1.26.  Albumin is low at 2.4.  Total bilirubin is elevated at 1.4.  ALT is elevated at 89 and AST is elevated at 85.  The ALT and AST are actually trending downward.  Everything else is trending upward.  Lipase is up from 1106 to 1312.  Procalcitonin 0.12 as of 09/03/2017, on day of admission it was 0.87, its highest was 1.05.  CBC:  His white cell count is down to 16.3 from 18.3 yesterday.  Hemoglobin is 12.6, but this has remained stable in the 12s throughout his stay.  Platelets are a little high at 463, but these are only up from 432 two days prior.  His ANC is down to 12.7 from 14.7.      IMAGING STUDIES:  CT head ordered  today shows mild atrophy for his age.  No hemorrhage, mass lesion or focal area of acute infarction identified.  Paranasal sinuses normal.  No bony abnormalities.      PENDING IMAGING STUDIES:  Ultrasound of the lower extremities and CT abdomen and pelvis.      ASSESSMENT:  This is a 52-year-old male with a past medical history of hypertension, depression, obsessive-compulsive disorder and diverticulitis.  He presented with left lower chest pain and shortness of breath.  He was found to have a loculated pleural effusion with multifocal pneumonia, developed hypoxic respiratory distress and eventually developed acute respiratory distress syndrome, required intubation and pronging on 08/23, status post thoracentesis on 08/23 and bronchoscopy on 08/24.  He was extubated on 09/02, weaned from BiPAP now down to 4 liters nasal cannula.  He has completed a course of vancomycin, Zosyn and azithromycin for his infection.  He is still suffering from numerous metabolic issues, encephalopathy and new fever but has been deemed medically stable by intensivist for transfer of care.      PLAN:   1.  Loculated left-sided pleural effusion with multifocal pneumonia, recovering from acute respiratory distress syndrome and hypoxic respiratory failure.  This patient was initially seen and thought to have either PE versus pneumonia and was started on heparin drip and ceftriaxone with azithromycin.  He was eventually able to go for CT chest with contrast and found to have loculated pleural effusion with multifocal pneumonia.  Unfortunately, his respiratory status deteriorated rapidly and he developed hypoxic respiratory failure and then ARDS.  He was intubated on 08/23, spent a significant time on vent, up until 09/02, came off BiPAP, spent 2 days on BiPAP, now weaned down to 4 liters nasal cannula.  He is improving from a respiratory standpoint.  He has completed a course of vancomycin, Zosyn and azithromycin.  At this point in time I do  not have plans to continue antibiotics unless something turns up on CT abdomen and pelvis.  He will continue to work with respiratory therapy on pulmonary hygiene and will continue try to wean O2.  He is overall net -7.8 liters on his fluid status but is receiving tube feeds and free water and would consider p.r.n. diuresis, if necessary.   2.  Persistent encephalopathy.  I am not sure if this is related to prolonged ICU stay, as he was requiring paralytic agents to remain on vent and he was also critically ill.  He is requiring restraints due to pulling at lines and tubes.  He will likely need a sitter when he transfers to the floor.  We will continue to try to work on his metabolic issues that I will describe in detail as follows.   3.  Hypernatremia.  He has not had anything p.o. for over a week.  Intensivist is adjusting his free water.  He has been increased up to 100 mL every 3 hours.  We will monitor the sodium with his metabolic panel every morning.   4.  Hyperglycemia.  This is likely induced by stress, given his critical illness, and his blood glucose remains controlled with medium sliding scale insulin.  He is really only requiring the occasional unit of NovoLog.  Goals are to keep his glucose under 180.   5.  Hyperbilirubinemia and elevated lipase.  Uncertain as to the etiology of this.  These are acutely increasing over the past 2 days.  I do not know if this is related to tube feed administration or not.  Intensivist recommended a CT abdomen and pelvis, as he is also having fevers that are coming back, so I put an order in for that as well.  His liver enzymes are actually coming down and his alkaline phosphatase is normal today.  Procalcitonin remains low, so suspicion for infectious etiology is intermediate.  I think we may need to consider ultrasound of the right upper quadrant to look at hepatic obstructive pathologies as well.   6.  Acute renal insufficiency, much better than when he first came in.   This was significantly elevated to 2.31 on admission but resuscitated with IV fluids.  He has been normal from  up to ; today is the first day in about 4 days that his creatinine has been elevated, but we are going to increase his free water.  He probably needs more p.o. intake.   7.  Gastrocnemius thrombus, likely from prolonged ICU stay, but, as this is a distal peripheral thrombus, he has not required anticoagulation to this point.  The intensivist put in orders for Dopplers of the area to assess for any change in the clot.  Right now, he is on DVT prophylactic dose of subcutaneous Lovenox.   8. Code status:  He is full code as confirmed by family.   9.  Fluids, electrolytes, nutrition:  Tube feeds with 100 mL of free water every 3 hours.  He is otherwise n.p.o.   10.  DVT prophylaxis:  Subcutaneous Lovenox and PCDs.      DISPOSITION:  Likely transfer out of ICU today to general medical floor but numerous persistent medical issues, anticipate a prolonged stay, minimum of 3-5 days.         MARTINE JOSE MD             D: 2017 15:16   T: 2017 16:28   MT: SK      Name:     PATRICK AHUJA   MRN:      [id number]        Account:        [id number]   :      1965           Admit Date:                                       Discharge Date:       Document: [id number]

## 2017-09-05 NOTE — PROGRESS NOTES
Alteplase started on white lumen of triple lumen picc line.  White lumen has taken in about 1/2 the dose.  Discussed with flying squad and they will continue to work on getting the remainder of the dose into the line tonight.

## 2017-09-05 NOTE — PLAN OF CARE
"Problem: Goal Outcome Summary  Goal: Goal Outcome Summary  Discharge Planner PT   Patient plan for discharge: None stated.  Current status: Oriented to self. Contiues to have difficulty following commands, does not want to open eyes today. Squeezes my hand and wiggles toes to command, otherwise minimal ability to follow cues. Dangled at EOB briefly, pt keeps head down and chin to chest; therefore, moved to the converter chair via sling. Mid line orientation achieved, when asked if pt was okay, pt states \"Yes, everything is going to be okay.\"  Barriers to return to prior living situation: Level of assist, cognition, command following, fatigue, decreased balance and impaired functional activity tolerance.  Recommendations for discharge: ARU  Rationale for recommendations: Pt was previously independent, worked full time, has great family support.        Entered by: Lilibeth Mckeon 09/05/2017 10:09 AM             "

## 2017-09-05 NOTE — PLAN OF CARE
Problem: Goal Outcome Summary  Goal: Goal Outcome Summary  OT: Attempted to see patient however he was sound asleep. Did not awaken to verbal or tactile stimulation.

## 2017-09-05 NOTE — PROGRESS NOTES
Hospitalist Heri Cover Note:    I was informed that the Venous LE Doppler came back showing a new occlussive thrombus in RLE, I will start therapuetic lovenox 1.5mg/kg per day.  His daily dose would be 120mg, today he has had 40mg already so I will order one time dose of 80mg now, the 120mg will start tomorrow afternoon.      Marcos Esquivel MD  [facility] Hospitalist  Pager [phone]

## 2017-09-05 NOTE — PROGRESS NOTES
CLINICAL NUTRITION SERVICES - REASSESSMENT NOTE      RECOMMENDATIONS FOR MD/PROVIDER TO ORDER:   Recommend consider bowel program   Recommendations Ordered by Registered Dietitian (RD):   Increase TF Isosource 1.5 to 40 mL/hr now;  After 8 hrs increase to goal 65 mL/hr = 2340 kcals (27 kcal/kg), 106 gm pro (1.2 gm/kg), 1185 mL H20, 23 gm fiber, 275 gm CHO       EVALUATION OF PROGRESS TOWARD GOALS   Diet:  NPO      Nutrition Support:   TF was running at 65 mL/hr up until 9/2 when it was held at noon (likely for extubation).  The TF was then resumed on 9/3 at 1600 at 15 mL/hr and has remained at low drip rate since.    Nutrition Support Enteral:  Type of Feeding Tube:  ND  Enteral Frequency:  Continuous  Enteral Regimen:  Isosource 1.5 at 15 mL/hr  Total Enteral Provisions:  540 kcals (6 kcal/kg), 24 gm pro (0.3 gm/kg), 274 mL H20, 5 gm fiber, 63 gm CHO  Free Water Flush:  75 mL every 3 hrs    Intake/Tolerance:    Last recorded stool 9/2.  Not on a bowel program.  Na 147 (H)  BUN 54 (H)   Cr 1.26 (H) - Pt with CKD stage 3.  BGM:  120, 130, 147, 125, 148, 131 - acceptable.  Pt on Med Res SSI.  Wt:  87.7 kg (down 27.2 kg since admit).  BMI:  25.51 (105% IBW).  Pt with 1+ trace edema in extremities.    Dosing Weight:  87.7 kg (current wt)      ASSESSED NUTRITION NEEDS PER APPROVED PRACTICE GUIDELINES:  (Modified slightly using new dosing wt and ranges)  Estimated Energy Needs:  1894-7737 kcals (25-30 Kcal/Kg)  Justification: Maintenance  Estimated Protein Needs:  105-132 grams protein (1.2-1.5 g pro/Kg)  Justification: hypercatabolism with critical illness and CKD    NEW FINDINGS:   9/2:  Extubated.  Per rounds, pt moves but does not follow.  Plan LTACH at discharge.    Previous Goals (9/1):   TF Isosource 1.5 at 65 mL/hr will meet % estimated needs  Evaluation: Not met    Previous Nutrition Diagnosis (9/1):   Inadequate energy intake related to hypocaloric TF as evidenced by TF meeting 82% re-estimated energy  needs.  Evaluation: Declining      CURRENT NUTRITION DIAGNOSIS  Inadequate enteral nutrition infusion related to low TF rate as evidenced by TF meeting only 23% estimated needs.    INTERVENTIONS  Recommendations / Nutrition Prescription  Increase TF Isosource 1.5 to 40 mL/hr now;  After 8 hrs increase to goal 65 mL/hr = 2340 kcals (27 kcal/kg), 106 gm pro (1.2 gm/kg), 1185 mL H20, 23 gm fiber, 275 gm CHO    Recommend consider bowel program    Implementation  Collaboration and Referral of Nutrition care - Discussed pt during ICU interdisciplinary rounds this am and earlier with bedside RN.  EN Schedule - Entered TF orders as above    Goals  TF Isosource 1.5 at 65 mL/hr will meet % estimated needs  Pt will stool at least once daily    MONITORING AND EVALUATION:  Progress towards goals will be monitored and evaluated per protocol and Practice Guidelines    Mayi Sun, RD, LD, CNSC

## 2017-09-05 NOTE — PLAN OF CARE
Problem: Goal Outcome Summary  Goal: Goal Outcome Summary  Outcome: No Change  Pt lethargic, LINARES's and intermittently opens eyes to command. Head CT and abdominal CT done this afternoon. Temp max today 38.5. Tylenol prn. Blood and urine cultures done.  New DVT noted in RLE (MD notified). Enoxaparin started. Pt up to chair for 3 hours today. Family at bedside - updated on plan of care. Pt transferred to  this evening - wife updated. Cont to monitor.

## 2017-09-05 NOTE — PROVIDER NOTIFICATION
Dr. Esquivel/Hospitalist notified of new R LE DVT. Order received for increased enoxaparin dose. Cont to monitor.

## 2017-09-06 NOTE — PLAN OF CARE
Problem: Goal Outcome Summary  Goal: Goal Outcome Summary  Outcome: No Change  VSS, ex temp elevated 99.7 and tachy at times.  Mostly unresponsive, occas spontaneously opens eyes, unable to follow commands. Appears restless, fidgets in bed, twitches eyelids. Given tylenol x1. Attempts to pull at lines, restraints left in place. TF increased to 65, NPO. Rectal tube in place, scant stool in tubing, some leaking around.  Adequate UOP per daniel. , 134. Turn and Repo q2h. Sepsis protocol fired- LA 0.8. IVF running. Cont to monitor

## 2017-09-06 NOTE — PROGRESS NOTES
DESTINY  D: Therapies are recommending ARU.  Writer made a referral to ARU to assess. Spoke with Silviano, ARU liaison.  At this time Silviano notes patient is lethargic and not appropriate for ARU level of therapy.  Silviano has suggested patient may be an appropriate LTAC referral and could be assessed for ARU while at LTAC pending his improvement and need for ARU level of therapy.  Communicated this to the care coordinator who handles the LTAC referrals.

## 2017-09-06 NOTE — PROGRESS NOTES
[facility]    Hospitalist Progress Note    Date of Service (when I saw the patient): 09/06/2017    Assessment & Plan   Niko Mireles is a 52 year old male with a past medical history of hypertension, depression, obsessive-compulsive disorder and diverticulitis.  He presented with left lower chest pain and shortness of breath.  He was found to have a loculated pleural effusion with multifocal pneumonia, developed hypoxic respiratory distress and eventually developed acute respiratory distress syndrome, required intubation and pronging on 08/23, status post thoracentesis on 08/23 and bronchoscopy on 08/24.  He was extubated on 09/02, weaned from BiPAP now down to 4 liters nasal cannula.  He has completed a course of vancomycin, Zosyn and azithromycin for his infection.  He is still suffering from numerous metabolic issues, encephalopathy and new fever but has been transferred to the hospitalist service for ongoing management on 9/5.       Loculated left-sided pleural effusion with multifocal pneumonia with subsequent acute respiratory distress syndrome and hypoxic respiratory failure.  This patient was initially seen and thought to have either PE versus pneumonia and was started on heparin drip and ceftriaxone with azithromycin.  He was eventually able to go for CT chest with contrast and found to have loculated pleural effusion with multifocal pneumonia.  Unfortunately, his respiratory status deteriorated rapidly and he developed hypoxic respiratory failure and then ARDS.  He was intubated on 08/23, spent a significant time on vent, up until 09/02, came off BiPAP, spent 2 days on BiPAP, now weaned down to 4 liters nasal cannula.  He is improving from a respiratory standpoint overall.  He has completed a course of vancomycin, Zosyn and azithromycin 9/1.      - Presently on 4 L NC with sat's 94%.    - He will continue to work with respiratory therapy on pulmonary hygiene and will continue try to  wean O2.   - Nebs prn.      - Encourage IS and out of bed when possible.  Presently still encephalopathic and bed bound requiring restraints at times making this difficult.     Suspect new RLL HCAP - 9/6   CT abdomen completed 9/5 for ongoing fevers and elevated lipase shows a new RLL infiltrate compared to previous exam 8/22 with resolution of the previously seen LLL infiltrate. No abd source for fever noted. Continues to spike fever with Tmax 101.5 in the last 24 hours. Still with leukocytosis with left shift.    - Follow repeat pan culture from 9/5.   - Start Vaco and Zosyn 9/6.   - Repeat Procal pending.    - Follow wbc and fever curve.       Persistent encephalopathy.   Suspect that this is related to his prolonged ICU stay, as he was requiring paralytic agents to remain on vent and he was also critically ill.  He is requiring restraints at times due to pulling at lines and tubes.    - On exam he is awake but does not open his eyes and actually forcefully keeps them shut. He does not answer questions but overall is not in any acute distress. Moves all extremities. Does withdrawal from pain.    - Hold narcotics and benzo's.    - Restraints prn.  Sitter if needed.     Elevated Lipase  Was noted to be 40 on admit. Has risen to 1,106-->1,312 in the last 3 days. T-bili slightly elevated at 1.4 as well. Alk-P, AST and ALT actually improving.  Abd US on 8/29 showed GB sludge with no stones or evidence of cholecystitis. CT abd 9/5 shows a normal GB and no inflamation around the pancrease.   - Possibly related to tube feeds?    - Does wince on abdominal exam 9/6.  BS + but reduced.    - Will hold his tube feeds today and follow.    - may be the source of his fevers but unclear.    - CMP in am.          Hypernatremia  He has not had anything p.o. for over a week. Had been adjusting his free water while in ICU and was recently increased to 100 mL every 3 hours 9/5.   - Na up 9/6 to 148.   - Stop flushes and start 1/2 NS  at 75 cc/hr.    - BMP in am.    - I's and O's.     Hyperglycemia.    This is likely induced by stress, given his critical illness, and his blood glucose remains controlled with medium sliding scale insulin.  He is really only requiring the occasional unit of NovoLog.    - Goals are to keep his glucose under 180.    Acute renal insufficiency  Much better than when he first came in.  This was significantly elevated to 2.31 on admission but resuscitated with IV fluids.  Cr has been normal since 09/01 and overall stable since.   - IVF restarted as above for ongoing hypernatremia.    - BMP in am.     Acute right Gastrocnemius thrombus - 9/2  Acute DVT right peroneal vein - 9/5  Found on RLE LE US completed for edema and fever 9/2. Suspect this is a result  from prolonged ICU stay, but had not required anticoagulation to this point.  The intensivist put in orders for [place] of the area to assess for any change in the clot 9/5 and noted with a new occlusive DVT in the right peroneal vein.   - Initiated on therapeutic Lovenox 1.5 mg/kg daily 9/5.    - Will need at least 3 months of therapy on discharge.      Fluids, electrolytes, nutrition:    Tube feeds with 100 mL of free water every 3 hours.  He is otherwise n.p.o.       DVT Prophylaxis: Enoxaparin (Lovenox) SQ  Code Status: Full Code       Disposition:  Restart Abx for HCAP and follow pan cultures.  Hold tube feeds and follow clinically. Start IVF and follow electrolytes. Anticipate several more days.       Yariel Frausto       Interval History    Still with low grade temps overnight.  Encephalopathy has not improved today.  Still requiring restraints at times.  New dvt seen on US as well as a new RLL infiltrate. Abx restarted today and tube feeds held for elevated lipase.     -Data reviewed today: I reviewed all new labs and imaging results over the last 24 hours. I personally reviewed no images or EKG's today.    Physical Exam   Temp: 100  F (37.8  C) Temp  src: Axillary BP: 119/79 Pulse: 115 Heart Rate: 56 Resp: (!) 32 SpO2: 94 % O2 Device: Nasal cannula Oxygen Delivery: 4 LPM  Vitals:    09/02/17 0200 09/02/17 2148 09/04/17 0400   Weight: 107.1 kg (236 lb 1.8 oz) 101.2 kg (223 lb 1.7 oz) 87.7 kg (193 lb 5.5 oz)     Vital Signs with Ranges  Temp:  [97.7  F (36.5  C)-101.3  F (38.5  C)] 100  F (37.8  C)  Pulse:  [115] 115  Heart Rate:  [] 56  Resp:  [20-32] 32  BP: ()/(63-95) 119/79  SpO2:  [92 %-96 %] 94 %  I/O last 3 completed shifts:  In: 2332 [I.V.:1177; NG/GT:880]  Out: 1875 [Urine:1875]    Gen: Patient in no acute distress.  Appears comfortable but still encephalopathic and does not answer questions but does respond to pain. Refuses to open his eyes and forcefully keeps them closed.   Neuro: Moves all extremities. No focal deficits noted.   Heart:  S1S2+, regular rate and rhythm, No murmurs.  Lungs:  Clear to auscultation at apices, poor effort, decreased at bases R>L. No clear rales or rhonchi, no wheezing.    Abdomen:  Soft, mildly tender based on wincing on exam as he is not responding verbally with que's for pain, non distended, bowel sounds positive.  Extremities:  No edema.    Medications     NaCl 20 mL/hr at 09/06/17 0012     - MEDICATION INSTRUCTIONS -         enoxaparin  1.5 mg/kg Subcutaneous Q24H     amLODIPine  10 mg Per Feeding Tube Daily     sodium chloride (PF)  10 mL Intracatheter Q7 Days     pantoprazole  40 mg Per Feeding Tube Daily     metoprolol  50 mg Per Feeding Tube BID     insulin aspart  1-6 Units Subcutaneous Q4H       Data     Recent Labs  Lab 09/06/17  0645 09/05/17  1420 09/05/17  0430 09/04/17  0520 09/03/17  0740 09/03/17  0530   WBC  --   --  16.3* 18.3* 12.6* Canceled, Test credited   HGB  --   --  12.6* 12.7* 12.1* Canceled, Test credited   MCV  --   --  95 95 95 Canceled, Test credited   PLT  --   --  463* 468* 432 Canceled, Test credited   *  --  147* 145*  --  144   POTASSIUM 5.4*  --  4.4 4.8  --  4.5    CHLORIDE 116*  --  112* 112*  --  108   CO2 27  --  29 28  --  29   BUN 55*  --  54* 48*  --  47*   CR 1.25  --  1.26* 1.22  --  1.23   ANIONGAP 5  --  6 5  --  7   BENOIT 8.8  --  9.1 9.1  --  8.8   *  --  148* 131*  --  113*   ALBUMIN  --   --  2.4* 2.4*  --  2.1*   PROTTOTAL  --   --  8.5 8.1  --  7.3   BILITOTAL  --   --  1.4* 1.3  --  1.2   ALKPHOS  --   --  138 154*  --  152*   ALT  --   --  89* 98*  --  96*   AST  --   --  85* 104*  --  114*   LIPASE  --  1312*  --   --   --  1106*       Recent Results (from the past 24 hour(s))   CT Head w/o Contrast    Narrative    CT HEAD W/O CONTRAST  9/5/2017 2:45 PM    HISTORY: Rule out stroke.    TECHNIQUE: Scans were obtained through the head without IV contrast.   Radiation dose for this scan was reduced using automated exposure  control, adjustment of the mA and/or kV according to patient size, or  iterative reconstruction technique.    COMPARISON: None.    FINDINGS: Mild atrophy for age.  No hemorrhage, mass lesion, or focal  area of acute infarction identified. Paranasal sinuses are normal. No  bony abnormality.      Impression    IMPRESSION:   1. Mild atrophy.    BRUCE RIDER MD   US Lower Extremity Venous Duplex Bilateral    Narrative    VENOUS ULTRASOUND BILATERAL LOWER EXTREMITIES  9/5/2017 4:05 PM     HISTORY: Evaluate for propagation of below knee thrombosis, new deep  venous thrombosis.    COMPARISON: 9/2/2017    TECHNIQUE: Color Doppler and spectral waveform analysis performed  throughout the deep veins of both lower extremities.    FINDINGS: Both common femoral, proximal greater saphenous, femoral,  and popliteal veins demonstrate normal blood flow, compression, and  augmentation. Deep venous thrombosis in right gastrocnemius veins  again identified and unchanged. Occlusive deep venous thrombosis also  noted in a peroneal vein, new since the previous exam. Posterior  tibial vein is patent. The left calf veins are patent.      Impression     IMPRESSION:  1. New deep venous thrombosis, occlusive, in a right peroneal vein  compared to previous exam.  2. Persistent occlusive thrombus in the right gastrocnemius veins.  3. Negative for deep venous thrombosis in the left lower extremity.    XAVIER BOWLES MD   CT Abdomen Pelvis w Contrast    Narrative    CT ABDOMEN AND PELVIS WITH CONTRAST 9/5/2017 6:25 PM     HISTORY: Increasing lipase and bilirubin, fever workup.     COMPARISON: CT abdomen and pelvis 8/22/2017.    TECHNIQUE: Axial images from the lung bases to the symphysis are  performed with additional coronal reformatted images. 98 mL of Isovue  370 are given intravenously.  Radiation dose for this scan was reduced  using automated exposure control, adjustment of the mA and/or kV  according to patient size, or iterative reconstruction technique. Oral  contrast is also given.    FINDINGS:     Patchy infiltrate worse at the right lung base is noted and represents  a change from the prior exam. Pleural thickening posterior left lung  base is unchanged and the previously noted left lung base infiltrate  has resolved. No significant pleural fluid.    Abdomen: The liver, spleen, gallbladder, pancreas, adrenal glands and  kidneys are unremarkable. Small bilateral renal cortical cysts are  present all measuring 1 cm and smaller in size. No obvious renal  calculi or hydronephrosis. No enlarged abdominal lymph nodes. Feeding  tube terminates in the proximal jejunum in good position. No bowel  obstruction, diverticulitis or appendicitis. Possible constipation.    Pelvis: Bladder is decompressed with a Higgins catheter. Rectal tube is  present in the rectum. No enlarged pelvic lymph nodes or free fluid.  Bone window examination is unremarkable. Prior fusion at L5-S1.      Impression    IMPRESSION:  1. New right lung base infiltrate with clearing of the previously  noted left lung base infiltrate. Findings are concerning for a new  right lower lobe pneumonia.  2. No  acute changes in the abdomen or pelvis to account for patient's  symptoms. No evidence of abscess. No bowel obstruction, diverticulitis  or appendicitis.    UNIQUE MOTA MD

## 2017-09-06 NOTE — PLAN OF CARE
Problem: Goal Outcome Summary  Goal: Goal Outcome Summary  PT: Patient still in restraints, not following commands. Hold PT for today. Not appropriate for therapies.

## 2017-09-06 NOTE — PLAN OF CARE
Problem: Goal Outcome Summary  Goal: Goal Outcome Summary  Outcome: No Change  VSS, LS dim.  Mostly unresponsive, occas spontaneously opens eyes, unable to follow commands. Appears restless, fidgets in bed, twitches eyelids.  Occcas atempts to pull at lines, restraints left in place.  Freq assessments, PROM performed on all extremities. Tachy low 100's-120s.  TF @ 40, abd appears mildly distended but soft, non-tender.  Rectal tube in place, scant stool in tubing, some leaking around.  Adequate UOP per daniel.

## 2017-09-06 NOTE — PLAN OF CARE
Problem: Goal Outcome Summary  Goal: Goal Outcome Summary  Discharge Planner OT   Patient plan for discharge: ARU  Current status: Pt lethargic, not responding to verbal or tactile stimuli, required hand over hand total assist  to complete facial hygiene task no response to tactile stimuli.    Barriers to return to prior living situation: current level of assist   Recommendations for discharge: ARU per plan established by the Occupational Therapist  Rationale for recommendations: Pt would benefit from daily therapy to return to PLOF       Entered by: Itzel Childs 09/06/2017 7:49 AM

## 2017-09-06 NOTE — PLAN OF CARE
Problem: Goal Outcome Summary  Goal: Goal Outcome Summary  Outcome: No Change  Patient alert to self, starting to open his eyes, but unable to answer any questions at this time.  Neuro's attempted but unable to follow commands, but does move his arms/legs spontaneously.  Lungs diminished in bases, on 4 liters O2, sats 95%.  Rectal tube removed/stool to thick to empty in bag, had 1 large incontinent stool.  Higgins cath in place.  Turned Q2 hours while in bed.  Soft wrist restraints in place bilaterally to prevent pulling at tubes.  Holding tube feed today due to increase in lipase/continue with 60cc flush Q4.  Started 1/2 NS at 75cc, along with zosyn & vanco. Temp this am 100 (AX), tylenol given & down to 99.3 (AX).  BGMs 160/105.

## 2017-09-06 NOTE — PHARMACY-VANCOMYCIN DOSING SERVICE
Pharmacy Vancomycin Initial Note  Date of Service 2017  Patient's  1965  52 year old, male    Indication: [facility]-Associated Pneumonia    Current estimated CrCl = Estimated Creatinine Clearance: 84.8 mL/min (based on Cr of 1.25).    Creatinine for last 3 days  2017:  5:20 AM Creatinine 1.22 mg/dL  2017:  4:30 AM Creatinine 1.26 mg/dL  2017:  6:45 AM Creatinine 1.25 mg/dL    Recent Vancomycin Level(s) for last 3 days  No results found for requested labs within last 72 hours.      Vancomycin IV Administrations (past 72 hours)      No vancomycin orders with administrations in past 72 hours.                Nephrotoxins and other renal medications (Future)    Start     Dose/Rate Route Frequency Ordered Stop    17 0930  vancomycin 1250 mg in 0.9% NaCl 250 mL PREMIX      1,250 mg Intravenous EVERY 8 HOURS 17 0922      17 0900  piperacillin-tazobactam (ZOSYN) 4.5 g vial to attach to  mL bag     Comments:  Pharmacy can adjust dose based on renal function.    4.5 g  over 30 Minutes Intravenous EVERY 6 HOURS 17 0855            Contrast Orders - past 72 hours (72h ago through future)    Start     Dose/Rate Route Frequency Ordered Stop    17 1830  iopamidol (ISOVUE-370) solution 98 mL      98 mL Intravenous ONCE 17 1817 17 1819    17 1815  iopamidol (ISOVUE-370) solution 98 mL  Status:  Discontinued      98 mL Intravenous ONCE 17 1807 17 1826    17 1530  iohexol (OMNIPAQUE) solution 50 mL      50 mL Oral ONCE 17 1522 17 1539                Plan:  1.  Start vancomycin  1250 mg IV q8h.   2.  Goal Trough Level: 15-20 mg/L   3.  Pharmacy will check trough levels as appropriate in 1-3 Days.    4. Serum creatinine levels will be ordered daily for the first week of therapy and at least twice weekly for subsequent weeks.    5. [place] method utilized to dose vancomycin therapy: Method 1    Jessica Melendez

## 2017-09-06 NOTE — PROGRESS NOTES
SP[facility] Progress Note  [facility]    D: The patient did not verbally respond but did respond with some gestures with his head in reference to the conversation. In one instance, the patient turned his head to the window when emmanuel weather was mentioned.      I: The  visited the patient and commented on the weather, a presence in the room.    A: The patient at times appeared to track the conversation.     P: SH will plan additional visit.     Thierry De La Torre  Chaplain Resident

## 2017-09-06 NOTE — PROGRESS NOTES
White port good blood return and flushed with 20 ml. Of normal saline. Picc noted to be positional

## 2017-09-07 NOTE — PLAN OF CARE
Problem: Goal Outcome Summary  Goal: Goal Outcome Summary  Outcome: No Change  Alert to self only. Opens eyes, but unable to follow commands. Moves arms/legs spontaneously. Total care. VSS on 4L nc, sats mid 90s. Higgins. Turn and repo q2h.  Bilateral Soft wrist restraints in place to prevent pulling at tubes. Tube feed on hold due to increase in lipase. IVF, IV abx. , 103. Sepsis protocol fired, lactic 0.7. Tele SR with BBB. R PICC patent. Continue to monitor.

## 2017-09-07 NOTE — PLAN OF CARE
Problem: Goal Outcome Summary  Goal: Goal Outcome Summary  OT: attempted to see pt for OT session. Pt lethargic, pt open eyes one time briefly when responding to verbal stimuli then no further responses to any verbal or tactile stimuli.  Pt not appropriate for OT at this time.

## 2017-09-07 NOTE — PROGRESS NOTES
[facility]  Hospitalist Progress Note          Assessment and Plan:    Mr Niko Mireles is a 52 year old male with a past medical history of hypertension, depression, obsessive-compulsive disorder and diverticulitis.  He presented with left lower chest pain and shortness of breath.  He was found to have a loculated pleural effusion with multifocal pneumonia, developed hypoxic respiratory distress and eventually developed acute respiratory distress syndrome, required intubation and pronging on 08/23, status post thoracentesis on 08/23 and bronchoscopy on 08/24.  He was extubated on 09/02, weaned from BiPAP now down to 4 liters nasal cannula.  He has completed a course of vancomycin, Zosyn and azithromycin for his infection.  He is still suffering from numerous metabolic issues, encephalopathy and new fever but has been transferred to the hospitalist service for ongoing management on 9/5.       S/p Acute respiratory distress syndrome and hypoxic respiratory failure due to  Loculated left-sided pleural effusion with multifocal pneumonia with subsequent:  -This patient was initially seen and thought to have either PE versus pneumonia and was started on heparin drip and ceftriaxone with azithromycin.  He was eventually able to go for CT chest with contrast and found to have loculated pleural effusion with multifocal pneumonia.  Unfortunately, his respiratory status deteriorated rapidly and he developed hypoxic respiratory failure and then ARDS.  He was intubated on 08/23, spent a significant time on vent, up until 09/02, came off BiPAP, spent 2 days on BiPAP, now weaned down to 4 liters nasal cannula.  He is improving from a respiratory standpoint overall.  He has completed a course of vancomycin, Zosyn and azithromycin 9/1.     - weaning O2, on 2 L NC this am with sat's 94%.    - He will continue to work with respiratory therapy on pulmonary hygiene and will continue try to wean O2.   - Nebs prn.       - unable to do IS, so get chest PT for atelectasis as pt unable to do IS.       Suspect new RLL HCAP - 9/6   CT abdomen completed 9/5 for ongoing fevers and elevated lipase shows a new RLL infiltrate compared to previous exam 8/22 with resolution of the previously seen LLL infiltrate. No abd source for fever noted. Continues to spike fever with Tmax 101.5 in the last 24 hours. Still with leukocytosis with left shift.    - Follow repeat pan culture from 9/5.   - Start Vaco and Zosyn 9/6.   - Repeat Procal pending.    - Follow wbc and fever curve.        Persistent encephalopathy.   Suspect that this is related to his prolonged ICU stay, as he was requiring paralytic agents to remain on vent and he was also critically ill.  He is requiring restraints at times due to pulling at lines and tubes.    - CT head showed ml atrophy  - nl ammonia level 9/7  - On exam he is awake when called, garbled speech incoherent, does not follow commands.Moves all extremities. Does withdrawal from pain.   - Hold narcotics and benzo's.    - continued need for bilat wrist Restraints , form signed .  Sitter if needed.      Elevated LFTs & Lipase  Nl Lipase & LFts on admission then vega with improving LFTs but increasing Lipase   -Lipase worsening 40 on admission--> 1,106-->1,312--> 1840 in the last 3 days.   - nl  T bili-1.3, nl alpo4- 112, with stable AST-96 & ALT- 73  - Abd US on 8/29 showed GB sludge with no stones or evidence of cholecystitis. CT abd 9/5 shows a normal GB and no inflamation around the pancrease.  - etiology likely non GI infection or possibly related to tube feeds?   - neg exam 9/7.  BS +.   - restart tube feeds after being held yesterday  - cont to follow, request GI consult if needed. .   - CMP in am.           Hypernatremia  He has not had anything p.o. for over a week. Had been adjusting his free water while in ICU and was recently increased to 100 mL every 3 hours 9/5.  - Na up 9/7 to 151 due to IVF with ns-  d/richard   - restart water flushes 100 cc Q 3 hrs   - BMP in am.   - check daily wts     Hyperglycemia.    This is likely induced by stress, given his critical illness, and his blood glucose remains controlled with medium sliding scale insulin.  He is really only requiring the occasional unit of NovoLog.    - Goals are to keep his glucose under 180.     Acute renal insufficiency  Much better than when he first came in.  This was significantly elevated to 2.31 on admission but resuscitated with IV fluids.  Cr has been normal since 09/01 and overall stable since.  - creatinine worse 1.25-> 1.43 today with IV Vanco started back 9/6  - restart water flushes & d/c IVF  - avoid other nephrotoxins  - BMP in am.      Acute right Gastrocnemius thrombus - 9/2  Acute DVT right peroneal vein - 9/5  Found on RLE LE US completed for edema and fever 9/2. Suspect this is a result  from prolonged ICU stay, but had not required anticoagulation to this point.  The intensivist put in orders for Do[place] of the area to assess for any change in the clot 9/5 and noted with a new occlusive DVT in the right peroneal vein.  - Initiated on therapeutic Lovenox 1.5 mg/kg daily 9/5.   - Will need at least 3 months of therapy on discharge.       Fluids, electrolytes, nutrition:    Tube feeds with 100 mL of free water every 3 hours.  He is otherwise n.p.o.       DVT Prophylaxis: Enoxaparin (Lovenox) SQ  Code Status: Full Code   Disposition: will require ARU when ready.    Silvia Devine MD.  Hospitalist [phone] (7am -6 pm)                 Interval History:   Per RN Plugged Picc, fever this am 101.3, repeat BC drawn(including from Picc.   Tube feeding to be restarted today.  Check Ammonia level  Request chest PT              Medications:       alteplase  2 mg Intravenous Q2H     vancomycin (VANCOCIN) IV  1,500 mg Intravenous Q12H     piperacillin-tazobactam  4.5 g Intravenous Q6H     enoxaparin  1.5 mg/kg Subcutaneous Q24H     amLODIPine  10 mg  "Per Feeding Tube Daily     sodium chloride (PF)  10 mL Intracatheter Q7 Days     pantoprazole  40 mg Per Feeding Tube Daily     metoprolol  50 mg Per Feeding Tube BID     insulin aspart  1-6 Units Subcutaneous Q4H     ipratropium - albuterol 0.5 mg/2.5 mg/3 mL, HOLD MEDICATION, lidocaine (buffered or not buffered), lidocaine 4%, sodium chloride (PF), heparin lock flush, sodium chloride (PF), acetaminophen, glucose **OR** dextrose **OR** glucagon, labetalol, potassium chloride, potassium chloride, potassium chloride, potassium chloride with lidocaine, potassium chloride, miconazole, naloxone, hypromellose-dextran, lidocaine (buffered or not buffered), lidocaine 4%, - MEDICATION INSTRUCTIONS -, acetaminophen, senna-docusate, bisacodyl, [DISCONTINUED] ondansetron **OR** ondansetron               Physical Exam:   Blood pressure 125/73, pulse 111, temperature 98.4  F (36.9  C), temperature source Oral, resp. rate 20, height 1.854 m (6' 0.99\"), weight 87.7 kg (193 lb 5.5 oz), SpO2 96 %.  Wt Readings from Last 4 Encounters:   17 87.7 kg (193 lb 5.5 oz)         Vital Sign Ranges  Temperature Temp  Av.5  F (37.5  C)  Min: 98.4  F (36.9  C)  Max: 101.3  F (38.5  C)   Blood pressure Systolic (24hrs), Av , Min:117 , Max:133        Diastolic (24hrs), Av, Min:68, Max:86      Pulse Pulse  Av  Min: 111  Max: 111   Respirations Resp  Av.5  Min: 18  Max: 28   Pulse oximetry SpO2  Av.5 %  Min: 91 %  Max: 98 %         Intake/Output Summary (Last 24 hours) at 17 1211  Last data filed at 17 0900   Gross per 24 hour   Intake             2254 ml   Output             1800 ml   Net              454 ml       Constitutional: Lethargic, opens eyes when briefly when called garbled speech non coherent no apparent resp distress on O2 via NC   Lungs: diminished bilaterally, basal crackles , no wheezing   Cardiovascular: Regular rate and rhythm, normal S1 and S2, and no murmur noted   Abdomen: Normal " bowel sounds, soft, non-distended, non-tender   Skin: No rashes, no cyanosis, no edema   Neuro: Moves ext randomly & with painful stimulus               Data:   All laboratory data reviewed

## 2017-09-07 NOTE — PLAN OF CARE
Problem: Goal Outcome Summary  Goal: Goal Outcome Summary  PT: Attempted to see patient for PT session. Pt adamently refusing any activity at this time. Pt has refused PT multiple days in a row, will dec frequency.

## 2017-09-07 NOTE — PLAN OF CARE
Problem: Goal Outcome Summary  Goal: Goal Outcome Summary  Outcome: No Change  Opens eyes, makes garbled sounds at times.  Unable to follow commands.  VSS.  Mid 90's on 4L NC.  Total care, turned q2h.  IVF.  IV antibiotics.  Tele SR/ST with BBB.  Bilateral wrist restraints.  TF on hold due to elevated lipase.  R PICC line.

## 2017-09-07 NOTE — PHARMACY-VANCOMYCIN DOSING SERVICE
Pharmacy Vancomycin Note  Date of Service 2017  Patient's  1965   52 year old, male    Indication: [facility] Pneum[facility]  Goal Trough Level: 15-20 mg/L  Day of Therapy: 2  Current Vancomycin regimen:  1250 mg IV q8h    Current estimated CrCl = Estimated Creatinine Clearance: 74.1 mL/min (based on Cr of 1.43).    Creatinine for last 3 days  2017:  4:30 AM Creatinine 1.26 mg/dL  2017:  6:45 AM Creatinine 1.25 mg/dL  2017:  6:25 AM Creatinine 1.43 mg/dL    Recent Vancomycin Levels (past 3 days)  2017: 10:45 AM Vancomycin Level 22.3 mg/L    Vancomycin IV Administrations (past 72 hours)                   vancomycin 1250 mg in 0.9% NaCl 250 mL PREMIX (mg) 1,250 mg New Bag 17 0241     1,250 mg New Bag 17 1908     1,250 mg New Bag  1100                Nephrotoxins and other renal medications (Future)    Start     Dose/Rate Route Frequency Ordered Stop    17 1500  vancomycin (VANCOCIN) 1500 mg in 0.9% NaCl 250 mL PREMIX      1,500 mg  over 60 Minutes Intravenous EVERY 12 HOURS 17 1208      17 0900  piperacillin-tazobactam (ZOSYN) 4.5 g vial to attach to  mL bag     Comments:  Pharmacy can adjust dose based on renal function.    4.5 g  over 30 Minutes Intravenous EVERY 6 HOURS 17 0855               Contrast Orders - past 72 hours (72h ago through future)    Start     Dose/Rate Route Frequency Ordered Stop    17 1830  iopamidol (ISOVUE-370) solution 98 mL      98 mL Intravenous ONCE 17 18117 18117 1815  iopamidol (ISOVUE-370) solution 98 mL  Status:  Discontinued      98 mL Intravenous ONCE 17 18017 18217 1530  iohexol (OMNIPAQUE) solution 50 mL      50 mL Oral ONCE 17 1522 17 1539          Interpretation of levels and current regimen:  Trough level is  Supratherapeutic    Has serum creatinine changed > 50% in last 72 hours: No    Urine output:  unable to determine    Renal  Function: Worsening    Plan:  1.  Decrease Dose to 1500 mg q12h  2.  Pharmacy will check trough levels as appropriate in 1-3 Days.    3. Serum creatinine levels will be ordered daily for the first week of therapy and at least twice weekly for subsequent weeks.      Anderson Reyes        .

## 2017-09-07 NOTE — PLAN OF CARE
Problem: Goal Outcome Summary  Goal: Goal Outcome Summary  Outcome: No Change  Pt was drowsy most of the time but occasionally responds to voice and opens eyes spontaneously. At times got restless. Follows commands, wiggled his toes and squeezed arms appropriately. Withdraws to pain stimulus. Had an elevated temperature this morning at 101.3. Gave tylenol and temperature is back to normal now. On nasal canula sating at mid 90s with 3L. Crackles noted on auscultation. Feeding tube resumed and running at 65. Vanco dose held as creatinine level was elevated. Received bisacodyl suppository and had 1 BM this shift, which was soft. PICC line was not drawing well and alteplase ordered. Still on bilateral soft wrist restraints. Continue monitoring.

## 2017-09-07 NOTE — PROVIDER NOTIFICATION
MD Notification    Notified Person:  MD    Notified Persons Name: Dr Devine    Notification Date/Time: 09/07/2017 0822    Notification Interaction:  Textpaged Physician    Purpose of Notification:    Orders Received:    Comments:  Provider notified about pt temp 101.3

## 2017-09-08 NOTE — PROGRESS NOTES
Focus:  Posterior Nect  S: Pt consulted for above focus. History, progress notes and orders in room. Wife in room. Pt sleeping in chair.        Nursing noted above area today during skin inspection. Wife aware of area today but unable to relate any history of       Area.   O: Posterior Neck: .3cm dried, rounded intact scab with no drainage, no timbo-scab erythema.  Extending medially from this        Scab is a 2.0cm x .1cm peeling pale scab revealing 100% pink epithelium   A:  Posterior Neck:  Unable to determine etiology and 95% healed.   I:   No dressing required  P: Monitor but close to resolution.      Will sign off. Please re-consult if further assistance required    Face time: 15 mintues

## 2017-09-08 NOTE — PROGRESS NOTES
CLINICAL NUTRITION SERVICES - REASSESSMENT NOTE      EVALUATION OF PROGRESS TOWARD GOALS   Diet:  NPO    Nutrition Support Enteral:  Type of Feeding Tube:  ND  Enteral Frequency:  Continuous  Enteral Regimen:  Isosource 1.5 at 65 mL/hr  Total Enteral Provisions:  2340 kcals (27 kcal/kg), 106 gm pro (1.2 gm/kg), 1185 mL H20, 23 gm fiber, 275 gm CHO  Free Water Flush:  100 mL every 3 hrs. Total fluid (TF + flushes) = 2000 mL/day    Intake:  TF was held 9/6 due to rising lipase but resumed at goal yesterday, 9/7.  A rectal tube was placed 9/5 but removed 9/6. Pt is having small amount of stool per flow sheets.  -135, on med SSI.        ASSESSED NUTRITION NEEDS Dosing Weight:  87.7 kg (9/4 wt):    Estimated Energy Needs:  1844-2848 kcals (25-30 Kcal/Kg)  Justification: Maintenance  Estimated Protein Needs:  105-132 grams protein (1.2-1.5 g pro/Kg)  Justification: hypercatabolism with critical illness and CKD      NEW FINDINGS:   Na 152 (H)  Pt in wrist restraints    No weight since 9/4  Vitals:    08/21/17 2346 08/22/17 0424 08/23/17 0654 08/25/17 0400   Weight: 113.4 kg (250 lb) 114.9 kg (253 lb 3.2 oz) 115.2 kg (254 lb) 114.2 kg (251 lb 12.3 oz)    08/27/17 0528 08/28/17 0436 08/29/17 0400 08/30/17 0400   Weight: 115.6 kg (254 lb 13.6 oz) 114.6 kg (252 lb 10.4 oz) 108 kg (238 lb 1.6 oz) 104.5 kg (230 lb 6.1 oz)    08/31/17 0400 09/01/17 0600 09/02/17 0200 09/02/17 2148   Weight: 102.2 kg (225 lb 5 oz) 102.5 kg (225 lb 15.5 oz) 107.1 kg (236 lb 1.8 oz) 101.2 kg (223 lb 1.7 oz)    09/04/17 0400   Weight: 87.7 kg (193 lb 5.5 oz)         Previous Goals:   TF Isosource 1.5 at 65 mL/hr will meet % estimated needs  Pt will stool at least once daily  Evaluation: Met    Previous Nutrition Diagnosis:   Inadequate enteral nutrition infusion related to low TF rate as evidenced by TF meeting only 23% estimated needs.   Evaluation: Resolved      CURRENT NUTRITION DIAGNOSIS  No nutrition diagnosis identified at this time      INTERVENTIONS  Recommendations / Nutrition Prescription  Continue current TF  H2O flushes per MD    Implementation  Ordered daily weights    Goals  Isosource 1.5 @ goal of 65 mL/hr will continue to meet 100% of needs      MONITORING AND EVALUATION:  Progress towards goals will be monitored and evaluated per protocol and Practice Guidelines    Amalia Hilario RD  Pager [phone] (M-F)            [phone] (W/E & Hol)

## 2017-09-08 NOTE — PLAN OF CARE
Problem: Goal Outcome Summary  Goal: Goal Outcome Summary  Discharge Planner OT   Patient plan for discharge: ARU  Current status: pt responded initially when OT approached for session. when attempting to have pt participate with ADL task pt eyes closed and does not respond to verbal commands. pt dependent hand over hand assist for facial hygiene. session shortened due to pt lethargic and not following commands to participate with functional activity.   Barriers to return to prior living situation: level of assist  Recommendations for discharge: ARU per plan established by the Occupational Therapist  Rationale for recommendations: not at baseline       Entered by: Itzel Childs 09/08/2017 10:25 AM       OT: Due to limited tolerance and participation, decreased frequency to 3x/week. Possibly needs TCU at discharge rather than ARU.

## 2017-09-08 NOTE — PLAN OF CARE
Problem: Goal Outcome Summary  Goal: Goal Outcome Summary  Outcome: Improving  VSS.  Somnolent, able to make some vocalizations this shift when asked simple questions, unable to follow commands.  Mid 90's on 3L NC.  Crackles in bases.  Turned q2h.  Bilateral wrist restraints due to pulling at tubes.  TF at 65ml/hr.  Tele NSR.  IV antibiotics.   and 117.  Na 151, receiving 100ml  water flushes via [place] tube every 3 hrs.

## 2017-09-08 NOTE — PROGRESS NOTES
[facility]  Hospitalist Progress Note  Date of service (when I saw the patient) 09/08/2017:         Assessment and Plan:    Mr Niko Mireles is a 52 year old male with a past medical history of hypertension, depression, obsessive-compulsive disorder and diverticulitis.  He presented with left lower chest pain and shortness of breath.  He was found to have a loculated pleural effusion with multifocal pneumonia, developed hypoxic respiratory distress and eventually developed acute respiratory distress syndrome, required intubation and pronging on 08/23, status post thoracentesis on 08/23 and bronchoscopy on 08/24.  He was extubated on 09/02, weaned from BiPAP now down to 4 liters nasal cannula.  He has completed a course of vancomycin, Zosyn and azithromycin for his infection.  He is still suffering from numerous metabolic issues, encephalopathy and new fever but has been transferred to the hospitalist service for ongoing management on 9/5.       S/p Acute respiratory distress syndrome and hypoxic respiratory failure due to  Loculated left-sided pleural effusion with multifocal pneumonia with subsequent:  -This patient was initially seen and thought to have either PE versus pneumonia and was started on heparin drip and ceftriaxone with azithromycin.  He was eventually able to go for CT chest with contrast and found to have loculated pleural effusion with multifocal pneumonia.  Unfortunately, his respiratory status deteriorated rapidly and he developed hypoxic respiratory failure and then ARDS.  He was intubated on 08/23, spent a significant time on vent, up until 09/02, came off BiPAP, spent 2 days on BiPAP, now weaned down to 4 liters nasal cannula.  He is improving from a respiratory standpoint overall.  He has completed a course of vancomycin, Zosyn and azithromycin 9/1.     - weaning O2, on 2 L NC this am with sat's 94%.    - He will continue to work with respiratory therapy on pulmonary  hygiene and will continue try to wean O2.   - Nebs prn.      - unable to do IS, so get chest PT for atelectasis as pt unable to do IS.       Suspect new RLL HCAP - 9/6   CT abdomen completed 9/5 for ongoing fevers and elevated lipase shows a new RLL infiltrate compared to previous exam 8/22 with resolution of the previously seen LLL infiltrate. No abd source for fever noted. Continues to spike fever with Tmax 101.5 in the last 24 hours. Still with leukocytosis with left shift.    - Follow repeat pan culture from [facility] from 9/5, 9/6, 9/7.   - Start Vaco and Zosyn 9/6- continue   -  Unchanged procalcitonin    - Follow wbc and fever curve.         Intermittent Fever:  - no evidence for new infection, cultures remain negative as above, ? Atelectasis, getting chest PT  - continue Vaco and Zosyn started 9/6  - follow cult    Persistent encephalopathy.   Suspect that this is related to his prolonged ICU stay, as he was requiring paralytic agents to remain on vent and he was also critically ill.  He is requiring restraints at times due to pulling at lines and tubes.    - CT head showed ml atrophy  - nl ammonia level 9/7  - improving slowly, more awake   - Hold narcotics and benzo's.    - continued need for bilat wrist Restraints , form signed .  Sitter if needed.      Elevated LFTs & Lipase  Nl Lipase & LFts on admission then vega with improving LFTs but increasing Lipase   -Lipase worsening 40 on admission--> 1,106-->1,312--> 1840- > 1764 today.   - nl  T bili-1.3, nl alpo4- 112, with stable AST-96 & ALT- 73  - Abd US on 8/29 showed GB sludge with no stones or evidence of cholecystitis. CT abd 9/5 shows a normal GB and no inflamation around the pancrease.  - etiology likely non GI infection or possibly related to tube feeds?   - neg exam 9/7.  BS +.   - restart tube feeds after being held yesterday  - cont to follow, request GI consult if needed. .   - CMP early next week.           Hypernatremia  He has not had anything  p.o. for over a week. Had been adjusting his free water while in ICU and was recently increased to 100 mL every 3 hours 9/5.  - Na worsening up 9/7 to 151 due to IVF with ns- d/richard   - restart water flushes 100 cc Q 3 hrs   - BMP in am.   - check daily wts     Hyperglycemia.    This is likely induced by stress, given his critical illness, and his blood glucose remains controlled with medium sliding scale insulin.  He is really only requiring the occasional unit of NovoLog.    - Goals are to keep his glucose under 180.     Acute renal insufficiency  Much better than when he first came in.  This was significantly elevated to 2.31 on admission but resuscitated with IV fluids.  Cr has been normal since 09/01 and overall stable since.  - creatinine worse 1.25-> 1.43-> 137  today with IV Vanco started back 9/6  - restart water flushes & d/c IVF  - avoid other nephrotoxins  - BMP in am.      Acute right Gastrocnemius thrombus - 9/2  Acute DVT right peroneal vein - 9/5  Found on RLE LE US completed for edema and fever 9/2. Suspect this is a result  from prolonged ICU stay, but had not required anticoagulation to this point.  The intensivist put in orders for Dopp[place] of the area to assess for any change in the clot 9/5 and noted with a new occlusive DVT in the right peroneal vein.  - Initiated on therapeutic Lovenox 1.5 mg/kg daily 9/5.   - Will need at least 3 months of therapy on discharge.       Fluids, electrolytes, nutrition:    Tube feeds with increase free water flushes to 150 every 3 hours (9/8).  He is otherwise n.p.o.       DVT Prophylaxis: Enoxaparin (Lovenox) SQ  Code Status: Full Code   Disposition: will require ARU when ready.PT/OT involved.     Silvia Devine MD.  Hospitalist [phone] (7am -6 pm)                 Interval History:   No new c/o, more awake up in chair. Wife at bedside , still requiring wrist restraints. Has a temp spike again this am. Seems dry              Medications:       vancomycin  "(VANCOCIN) IV  1,500 mg Intravenous Q12H     piperacillin-tazobactam  4.5 g Intravenous Q6H     enoxaparin  1.5 mg/kg Subcutaneous Q24H     amLODIPine  10 mg Per Feeding Tube Daily     sodium chloride (PF)  10 mL Intracatheter Q7 Days     pantoprazole  40 mg Per Feeding Tube Daily     metoprolol  50 mg Per Feeding Tube BID     insulin aspart  1-6 Units Subcutaneous Q4H     ipratropium - albuterol 0.5 mg/2.5 mg/3 mL, HOLD MEDICATION, lidocaine (buffered or not buffered), lidocaine 4%, sodium chloride (PF), heparin lock flush, sodium chloride (PF), acetaminophen, glucose **OR** dextrose **OR** glucagon, labetalol, potassium chloride, potassium chloride, potassium chloride, potassium chloride with lidocaine, potassium chloride, miconazole, naloxone, hypromellose-dextran, lidocaine (buffered or not buffered), lidocaine 4%, - MEDICATION INSTRUCTIONS -, acetaminophen, senna-docusate, bisacodyl, [DISCONTINUED] ondansetron **OR** ondansetron               Physical Exam:   Blood pressure 114/75, pulse 107, temperature 99.7  F (37.6  C), temperature source Axillary, resp. rate 18, height 1.854 m (6' 0.99\"), weight 87.7 kg (193 lb 5.5 oz), SpO2 93 %.  Wt Readings from Last 4 Encounters:   17 87.7 kg (193 lb 5.5 oz)         Vital Sign Ranges  Temperature Temp  Av.5  F (37.5  C)  Min: 98.4  F (36.9  C)  Max: 101.3  F (38.5  C)   Blood pressure Systolic (24hrs), Av , Min:117 , Max:133        Diastolic (24hrs), Av, Min:68, Max:86      Pulse Pulse  Av  Min: 111  Max: 111   Respirations Resp  Av.5  Min: 18  Max: 28   Pulse oximetry SpO2  Av.5 %  Min: 91 %  Max: 98 %         Intake/Output Summary (Last 24 hours) at 17 1211  Last data filed at 17 0900   Gross per 24 hour   Intake             2254 ml   Output             1800 ml   Net              454 ml     NGT in place  Constitutional: Lethargic sitting up in chair, opens eyes when called, not verbalizing,no apparent resp distress on O2 " via NC   Lungs: diminished bilaterally, basal crackles , no wheezing   Cardiovascular: Regular rate and rhythm, normal S1 and S2, and no murmur noted   Abdomen: Normal bowel sounds, soft, non-distended, non-tender   Skin: No rashes, no cyanosis, no edema   Neuro: Moves ext randomly & with painful stimulus               Data:   All laboratory data reviewed

## 2017-09-08 NOTE — PLAN OF CARE
"Problem: Goal Outcome Summary  Goal: Goal Outcome Summary  Outcome: No Change  Pt repositioned every 2 hours.  Lethargic, responds to questions/stimuli occasionally.  When wife asked \"what do you think\" pt stated \"about what\"  Occasionally squeezes hand.  TF running tolerating well.  Na 152 free water flushes increased to 150cc every 3 hours.  Blood sugars 135, 120  Temp 101.5 ax this am.  Lactic acid 0.9. Vanco/zosyn already ordered. Bilateral wrist restraints still required as pt can pull at tubes.  Pt up in the chair with lift.  LS diminished.  92-95% 2LNC   Tele- ST  Non prod weak cough.       "

## 2017-09-08 NOTE — PLAN OF CARE
Problem: Goal Outcome Summary  Goal: Goal Outcome Summary  Outcome: No Change  Pt lethargic/somnolent, occasionally vocalizes and sometimes answers simple questions, unable to follow commands. VSS on 2L, sats mid 90s. Crackles in bases. Turned q2h, total cares.  Bilateral wrist restraints due to pulling at tubes.  TF at 65ml/hr.  Tele: Sinus Tach. Higgins. Incontinent of stool.  IV abx.   and 109. Continue to monitor.

## 2017-09-08 NOTE — PROGRESS NOTES
Vest therapy was not done, pt is currently on feeding tube and that is contraindicated at this time, risk for aspiration.  Will continue to follow.  9/7/2017  Stanley Avery

## 2017-09-09 NOTE — PLAN OF CARE
Problem: Goal Outcome Summary  Goal: Goal Outcome Summary  Outcome: No Change  LADI orientation, pt lethargic for most of shift. Speech is garbled, occasionally can understand 1-2 words. VSS on 2L O2, afebrile this shift. TF @ 65 ml/hr, free water flushes increased to 150 ml q3h. /131. T&R q2h, WOC nurse consulted for scabbed wound on back of neck, open to air. Higgins intact/patent. D/c pending progress. Continue to monitor.

## 2017-09-09 NOTE — PHARMACY-VANCOMYCIN DOSING SERVICE
Pharmacy Vancomycin Note  Date of Service 2017  Patient's  1965   52 year old, male    Indication: [facility] Pneumonia  Goal Trough Level: 15-20 mg/L  Day of Therapy: 3  Current Vancomycin regimen:  1500 mg IV q12h    Current estimated CrCl = Estimated Creatinine Clearance: 76.6 mL/min (based on Cr of 1.37).    Creatinine for last 3 days  2017:  6:45 AM Creatinine 1.25 mg/dL  2017:  6:25 AM Creatinine 1.43 mg/dL  2017:  6:25 AM Creatinine 1.37 mg/dL    Recent Vancomycin Levels (past 3 days)  2017: 10:45 AM Vancomycin Level 22.3 mg/L  2017:  2:15 AM Vancomycin Level 22.1 mg/L    Vancomycin IV Administrations (past 72 hours)                   vancomycin (VANCOCIN) 1500 mg in 0.9% NaCl 250 mL PREMIX (mg) 1,500 mg New Bag 17 1432     1,500 mg New Bag  0329     1,500 mg New Bag 17 1803    vancomycin 1250 mg in 0.9% NaCl 250 mL PREMIX (mg) 1,250 mg New Bag 17 0241     1,250 mg New Bag 17 1908     1,250 mg New Bag  1100                Nephrotoxins and other renal medications (Future)    Start     Dose/Rate Route Frequency Ordered Stop    17 1000  vancomycin (VANCOCIN) 2,000 mg in NaCl 0.9 % 500 mL intermittent infusion      2,000 mg Intravenous EVERY 24 HOURS 17 0309      17 0900  piperacillin-tazobactam (ZOSYN) 4.5 g vial to attach to  mL bag     Comments:  Pharmacy can adjust dose based on renal function.    4.5 g  over 30 Minutes Intravenous EVERY 6 HOURS 17 0855               Contrast Orders - past 72 hours     None          Interpretation of levels and current regimen:  Trough level is  Supratherapeutic    Has serum creatinine changed > 50% in last 72 hours:     Urine output:      Renal Function:     Plan:  1.  Decrease Dose to 2000mg q24h  2.  Pharmacy will check trough levels as appropriate in 1-3 Days.    3. Serum creatinine levels will be ordered daily for the first week of therapy and at least twice weekly  for subsequent weeks.      Av Rodgers        .

## 2017-09-09 NOTE — PROGRESS NOTES
[facility]  Hospitalist Progress Note  Date of service (when I saw the patient)09/09/2017:         Assessment and Plan:   Mr Niko Mireles is a 52 year old male with a past medical history of hypertension, depression, obsessive-compulsive disorder and diverticulitis.  He presented with left lower chest pain and shortness of breath.  He was found to have a loculated pleural effusion with multifocal pneumonia, developed hypoxic respiratory distress and eventually developed acute respiratory distress syndrome, required intubation and pronging on 08/23, status post thoracentesis on 08/23 and bronchoscopy on 08/24.  He was extubated on 09/02, weaned from BiPAP now down to 4 liters nasal cannula.  He has completed a course of vancomycin, Zosyn and azithromycin for his infection.  He is still suffering from numerous metabolic issues, encephalopathy and new fever but has been transferred to the hospitalist service for ongoing management on 9/5.       S/p Acute respiratory distress syndrome and hypoxic respiratory failure due to  Loculated left-sided pleural effusion with multifocal pneumonia with subsequent:  -This patient was initially seen and thought to have either PE versus pneumonia and was started on heparin drip and ceftriaxone with azithromycin.  He was eventually able to go for CT chest with contrast and found to have loculated pleural effusion with multifocal pneumonia.  Unfortunately, his respiratory status deteriorated rapidly and he developed hypoxic respiratory failure and then ARDS.  He was intubated on 08/23, spent a significant time on vent, up until 09/02, spent 2 days on BiPAP,  weaned down to 4 liters nasal cannula. He improved from a respiratory standpoint overall, completed a course of vancomycin, Zosyn and azithromycin 9/1. Transferred out of ICU on 9/5  - weaned O2 to 2 L NC with sat's 93%.   - unable to do IS, & unable to do chest PT as he has TFs  - get pt out of  bed      Suspect new RLL HCAP - 9/6   CT abdomen completed 9/5 for ongoing fevers and elevated lipase shows a new RLL infiltrate compared to previous exam 8/22 with resolution of the previously seen LLL infiltrate. No abd source for fever noted. Continues to spike fever with Tmax 101.5 in the last 24 hours. Still with leukocytosis with left shift.   - Follow repeat pan culture from [facility] from 9/5, 9/6, 9/7.  - nl wbc and afebrile x last 24 hrs.     - Started Vaco and Zosyn 9/6 & complete 7 days       Intermittent Fever:  - no evidence for new infection, cultures remain negative as above, ? Atelectasis, getting chest PT  - afebrile in past 24 hrs,last temp spike am of 9/8  - continue Vaco and Zosyn started 9/6  - follow cult     Persistent encephalopathy.   Suspect that this is related to his prolonged ICU stay, as he was requiring paralytic agents to remain on vent and he was also critically ill.  He is requiring restraints at times due to pulling at lines and tubes.    - CT head 9/5 showed ml atrophy,nl ammonia level 9/7, unable to do MRI may not stay still   - improving slowly, more awake  - Hold narcotics and benzo's.   - continued need for bilat wrist Restraints , form signed .  Sitter if needed.       Elevated LFTs & Lipase  - improving  - etiology likely non GI infection or possibly related to tube feeds?  - Abd US on 8/29 showed GB sludge with no stones or evidence of cholecystitis. CT abd 9/5 shows a normal GB and no inflamation around the pancrease.  - neg exam,  BS +.   - follow CMP early next week.            Hypernatremia  He has not had anything p.o. for over a week. Had been adjusting his free water while in ICU and was recently increased to 100 mL every 3 hours 9/5.  - Na improving off IVF & with increased water flushes 150 cc Q3 hrs   - BMP in am.   - check daily wts creeping up, monitor      Hyperglycemia.    - likely induced by stress, given his critical illness  - controlled with medium sliding  scale insulin.      Acute renal insufficiency  Much better than when he first came in.  This was significantly elevated to 2.31 on admission but resuscitated with IV fluids.  Cr has been normal since 09/01 and overall stable since.  - creatinine improving 1.16 today  with increased water flushes  - avoid other nephrotoxins  - repeat BMP early next week      Acute right Gastrocnemius thrombus - 9/2  Acute DVT right peroneal vein - 9/5  Found on RLE LE US completed for edema and fever 9/2. Suspect this is a result  from prolonged ICU stay, but had not required anticoagulation to this point.  The intensivist put in orders for Dopplers of the area to assess for any change in the clot 9/5 and noted with a new occlusive DVT in the right peroneal vein.  - Initiated on therapeutic Lovenox 1.5 mg/kg daily 9/5.   - Will need at least 3 months of therapy on discharge.        Fluids, electrolytes, nutrition:    -NPO, onTube feeds (tolerating) +free water flushes to 150 every 3 hours (9/8).    GI prophylaxis:  protonix  Restraints: cont bilat soft wrist restraints  DVT Prophylaxis: Enoxaparin (Lovenox) SQ  Code Status: Full Code     Disposition: will require ARU when ready.PT/OT involved.      Silvia Devine MD.  Hospitalist [phone] (7am -6 pm)               Interval History:   No change , now new c/o, no acute over night events.              Medications:       vancomycin (VANCOCIN) IV  2,000 mg Intravenous Q24H     piperacillin-tazobactam  4.5 g Intravenous Q6H     enoxaparin  1.5 mg/kg Subcutaneous Q24H     amLODIPine  10 mg Per Feeding Tube Daily     sodium chloride (PF)  10 mL Intracatheter Q7 Days     pantoprazole  40 mg Per Feeding Tube Daily     metoprolol  50 mg Per Feeding Tube BID     insulin aspart  1-6 Units Subcutaneous Q4H     ipratropium - albuterol 0.5 mg/2.5 mg/3 mL, HOLD MEDICATION, lidocaine (buffered or not buffered), lidocaine 4%, sodium chloride (PF), heparin lock flush, sodium chloride (PF),  "acetaminophen, glucose **OR** dextrose **OR** glucagon, labetalol, potassium chloride, potassium chloride, potassium chloride, potassium chloride with lidocaine, potassium chloride, miconazole, naloxone, hypromellose-dextran, lidocaine (buffered or not buffered), lidocaine 4%, - MEDICATION INSTRUCTIONS -, acetaminophen, senna-docusate, bisacodyl, [DISCONTINUED] ondansetron **OR** ondansetron               Physical Exam:   Blood pressure 128/78, pulse 102, temperature 98.5  F (36.9  C), temperature source Axillary, resp. rate 18, height 1.854 m (6' 0.99\"), weight 98.8 kg (217 lb 14.4 oz), SpO2 93 %.  Wt Readings from Last 4 Encounters:   17 98.8 kg (217 lb 14.4 oz)         Vital Sign Ranges  Temperature Temp  Av.8  F (37.1  C)  Min: 98.5  F (36.9  C)  Max: 99.1  F (37.3  C)   Blood pressure Systolic (24hrs), Av , Min:115 , Max:128        Diastolic (24hrs), Av, Min:69, Max:79      Pulse Pulse  Av  Min: 102  Max: 102   Respirations Resp  Av  Min: 18  Max: 18   Pulse oximetry SpO2  Av.8 %  Min: 92 %  Max: 96 %         Intake/Output Summary (Last 24 hours) at 17 1210  Last data filed at 17 0840   Gross per 24 hour   Intake             2784 ml   Output             1800 ml   Net              984 ml       Constitutional: Awake, alert, cooperative, no apparent distress   Lungs: Clear to auscultation bilaterally, no crackles or wheezing   Cardiovascular: Regular rate and rhythm, normal S1 and S2, and no murmur noted   Abdomen: Normal bowel sounds, soft, non-distended, non-tender   Skin: No rashes, no cyanosis, no edema   Neuro:                Data:   All laboratory data reviewed    "

## 2017-09-09 NOTE — PLAN OF CARE
Problem: Goal Outcome Summary  Goal: Goal Outcome Summary  Outcome: No Change  Pt disorientedx4. Speech garbled and illogical. Up with lift/total care. VSS on ra. Tele NSR w/ BBB. 2pt wrist restraints d/t pt pulling at lines/tubes. NJ tube feeding 65cc/hr with 150cc flushes q3h. Higgins patent with adequate output. Rt PICC saline locked. T/R q2h. /100. D/c pending progress. Cont to monitor.

## 2017-09-09 NOTE — PLAN OF CARE
Problem: Goal Outcome Summary  Goal: Goal Outcome Summary  Outcome: No Change  Lethargic,garbled speech, able to answer simple questions. VSS on 2L O2. Turn and repo q2h, total cares. NPO- TF @ 65 ml/hr, free water flushes at 150 ml q3h. , 116. Higgins intact/patent. Incontinent of stool. R PICC patent. Vanco dosage and frequency adjusted. Tele SR with BBB. D/c pending progress possibly to LTAC. Continue to monitor.

## 2017-09-10 NOTE — PROGRESS NOTES
Patient refused the vest therapy and stated that vest doesn't benefit from it. Will continue to follow as ordered.    Stanley Vela RT  9/9/2017

## 2017-09-10 NOTE — PROGRESS NOTES
[facility]  Hospitalist Progress Note        Cheryl Park MD  09/10/2017        Interval History:      Still confused.  Opens his eyes   Very sleepy  Per nursing staff he has been still confused but off of oxygen.         Assessment and Plan:        52 year old male with a past medical history of hypertension, depression,  OCD and diverticulitis.  He presented with left lower chest pain and shortness of breath.  He was found to have a loculated pleural effusion with multifocal pneumonia, developed hypoxic respiratory distress and eventually developed acute respiratory distress syndrome, required intubation and pronging on 08/23, status post thoracentesis on 08/23 and bronchoscopy on 08/24.  He was extubated on 09/02, to  BiPAP then to oxygen via nasal cannula and now off of oxygent.  He has completed a course of vancomycin, Zosyn and azithromycin for his infection.  He is still suffering from numerous metabolic issues, encephalopathy and new fever but has been transferred to the hospitalist service for ongoing management on 9/5.       S/p Acute respiratory distress syndrome and hypoxic respiratory failure due to  Loculated left-sided pleural effusion with multifocal pneumonia :  - initially seen and thought to have either PE versus pneumonia and was started on heparin drip and ceftriaxone with azithromycin.  He got CT chest with contrast and found to have loculated pleural effusion with multifocal pneumonia. he developed hypoxic respiratory failure and then ARDS and  was intubated on 08/23,and extubated on 9/2 spent 2 days on BiPAP,  weaned down to 4 liters nasal cannula and now at  He improved from a respiratory standpoint overall, completed a course of vancomycin, Zosyn and azithromycin 9/1. Transferred out of ICU on 9/5  - unable to do IS, & unable to do chest PT as he has TFs      Suspect new RLL HCAP - 9/6   -CT abdomen completed 9/5 for ongoing fevers and elevated lipase shows a new RLL  infiltrate compared to previous exam 8/22 with resolution of the previously seen LLL infiltrate. No abd source for fever noted. Continues to spike fever with Tmax 101.5 in the last 24 hours. Still with leukocytosis with left shift.   - repeat pan culture from [facility] N[facility] from 9/5, 9/6, 9/7  .afebrile now     - Started Vaco and Zosyn 9/6 & complete 7 days last dose on 9/12  -if remains afebrile for another 24 hours then we may discontinue vancomycin.        Persistent encephalopathy.   -due to his prolonged ICU stay, as he was requiring paralytic agents to remain on vent and he was also critically ill.  He is requiring restraints due to pulling at lines and tubes.    - CT head 9/5 showed no acute finding unable to do MRI may not stay still   - improving slowly, more awake  - Hold narcotics and benzo's.   - continued need for bilat wrist Restraints       Elevated LFTs & Lipase  - improving  - etiology likely non GI infection or possibly related to tube feeds?  - Abd US on 8/29 showed GB sludge with no stones or evidence of cholecystitis. CT abd 9/5 shows a normal GB and no inflamation around the pancrease.  - neg exam,  BS +.       Hypernatremia  -slowly improviing        Hyperglycemia.    - likely induced by stress, given his critical illness  - controlled with medium sliding scale insulin.      Acute renal insufficiency  -he was resuscitated with IV fluids.  Cr has been normal since 09/01 and overall stable since.  - creatinine improving  with increased water flushes  - avoid other nephrotoxins  - monitor BMP      Acute right Gastrocnemius thrombus - 9/2  Acute DVT right peroneal vein - 9/5  Found on RLE LE US completed for edema and fever 9/2. Suspect this is a result  from prolonged ICU stay,  noted with a new occlusive DVT in the right peroneal vein.  - Initiated on therapeutic Lovenox 1.5 mg/kg daily 9/5.   - Will need at least 3 months of therapy on discharge.        Fluids, electrolytes, nutrition:    -NPO, onTube  "feeds (tolerating) +free water flushes to 150 every 3 hours (9/8).      GI prophylaxis:    -protonix    Restraints:  - cont bilat soft wrist restraints    DVT Prophylaxis:   -Enoxaparin (Lovenox) SQ    Code Status:  - Full Code      Disposition:  -pending clinical course expect > 2 days  - will require ARU when ready.PT/OT involved.     Discussed care with his RN.       Physical Exam:      Heart Rate: 85, Blood pressure 136/82, pulse 83, temperature 97.4  F (36.3  C), temperature source Axillary, resp. rate 18, height 1.854 m (6' 0.99\"), weight 100.9 kg (222 lb 6.4 oz), SpO2 90 %.  Vitals:    09/09/17 0549 09/09/17 1142 09/10/17 0600   Weight: 98.6 kg (217 lb 6.4 oz) 98.8 kg (217 lb 14.4 oz) 100.9 kg (222 lb 6.4 oz)     Vital Signs with Ranges  Temp:  [97.4  F (36.3  C)-99.2  F (37.3  C)] 97.4  F (36.3  C)  Pulse:  [83] 83  Heart Rate:  [85-94] 85  Resp:  [18-19] 18  BP: (114-136)/(75-82) 136/82  SpO2:  [90 %-94 %] 90 %  I/O's Last 24 hours  I/O last 3 completed shifts:  In: 2552 [I.V.:100; NG/GT:2452]  Out: 2100 [Urine:2100]    Constitutional: Alert awake and confused   Lungs: Good air entry on both sides of lungs     Cardiovascular: S1 and S2 no S3      Abdomen: Abdomen soft no guarding ,no rigidity, bowel sounds are positive     Skin:    Other:           Medications:          vancomycin (VANCOCIN) IV  2,000 mg Intravenous Q24H     enoxaparin  1.5 mg/kg Subcutaneous Q24H     piperacillin-tazobactam  4.5 g Intravenous Q6H     amLODIPine  10 mg Per Feeding Tube Daily     sodium chloride (PF)  10 mL Intracatheter Q7 Days     pantoprazole  40 mg Per Feeding Tube Daily     metoprolol  50 mg Per Feeding Tube BID     insulin aspart  1-6 Units Subcutaneous Q4H     PRN Meds: ipratropium - albuterol 0.5 mg/2.5 mg/3 mL, HOLD MEDICATION, lidocaine (buffered or not buffered), lidocaine 4%, sodium chloride (PF), heparin lock flush, sodium chloride (PF), acetaminophen, glucose **OR** dextrose **OR** glucagon, labetalol, " potassium chloride, potassium chloride, potassium chloride, potassium chloride with lidocaine, potassium chloride, miconazole, naloxone, hypromellose-dextran, lidocaine (buffered or not buffered), lidocaine 4%, - MEDICATION INSTRUCTIONS -, acetaminophen, senna-docusate, bisacodyl, [DISCONTINUED] ondansetron **OR** ondansetron         Data:      All new lab and imaging data was reviewed.   Recent Labs   Lab Test  09/08/17   0625  09/07/17   0625  09/05/17   0430  09/04/17   0520   08/28/17   0440  08/27/17   0510  08/26/17   0400   WBC  9.1  10.7  16.3*  18.3*   < >  9.2  7.6  7.8   HGB  10.8*   --   12.6*  12.7*   < >  9.8*  9.6*  9.3*   MCV  96   --   95  95   < >  90  91  93   PLT  361   --   463*  468*   < >  417  402  334   INR   --    --    --    --    --   1.19*  1.21*  1.24*    < > = values in this interval not displayed.      Recent Labs   Lab Test  09/10/17   0632  09/09/17   0640  09/08/17   0625   NA  146*  149*  152*   POTASSIUM  3.8  4.0  4.0   CHLORIDE  113*  115*  119*   CO2  26  26  26   BUN  33*  35*  41*   CR  1.09  1.16  1.37*   ANIONGAP  7  8  7   BENOIT  8.8  8.7  8.4*   GLC  133*  143*  129*     Recent Labs   Lab Test  08/22/17   0450  08/22/17   0025   TROPI  <0.015  <0.015

## 2017-09-10 NOTE — PLAN OF CARE
[id number]: Alert to self only. VSS. Restraints in place. Up with lift. Voiding. Incontinent. TF running. Antibiotics ran. Through picc. Repositioned q 2 hours. Plan to continue to monitor.

## 2017-09-10 NOTE — PLAN OF CARE
Problem: Goal Outcome Summary  Goal: Goal Outcome Summary  Outcome: No Change  Disoriented x 4; confused; speech--illogical, garbled at times--pt. Follows simple commands with encouragement, answers short/simple closed ended questions appropriately; restless/pulling at lines (soft restraints in place; PROM performed on upper and lower extremities), cooperative with cares.  Low grade temperature--99.2 otherwise VSS on RA.  Tele--SR with BBB.  LS--Anterior/posterior--crackles/diminished x all lobes ; 120.  PICC RA--patent, flushes, SL.  Sputum sample needs to be recollected.  Mechanical lift; Total care; turned and repositioned Q2H; Rooke boots equally bilateral.  NG Tube feeding at 65 mL/hr (goal rate); patent; 150 mL flushes Q3H.  Higgins patent; incontinent of bowel x 2.  Non-blanchable redness nape of neck--open to air.  NPO.  D/C pending.

## 2017-09-10 NOTE — PLAN OF CARE
Problem: Goal Outcome Summary  Goal: Goal Outcome Summary  PT: Patient remains confused, in wrist restraints due to pulling at tube feeding lines. Pt is not appropriate for skilled PT at this time. Not able to follow commands consistently for safe and effective therapy.

## 2017-09-10 NOTE — PLAN OF CARE
Problem: Goal Outcome Summary  Goal: Goal Outcome Summary  Outcome: No Change  Afebrile, no signs of pain. Awake several hours today, has illogical conversation. Confused, restless, pulling at his TF line. Bilateral UE restraints are on. Higgins removed. 2 soft brown stools. Turned and repositioned q 2hrs. Blood sugars were 123 and 119. TF running at 65cc/hr. Diminished lung sounds, no cough. Tele is SR with BBB. Sister visited-concerned about pts cognition and lack of progress. Sister stated that she had a concern that pt might have been taking narcotic pain medication for chronic back pain prior to admission. Attempted to contact wife to update.

## 2017-09-10 NOTE — PLAN OF CARE
Problem: Goal Outcome Summary  Goal: Goal Outcome Summary  Outcome: No Change  Pt has been more vocal this evening, Clear less garbled but still illogical speech. VSS on RA, O2 sats in mid 90's, desats when supine. TF @ 65 ml/hr, 150 ml q3h free water flushes. Tele NSR w/ BBB. IV abx continued. /133. Incontinent BM x1, daniel intact/patent. LS crackles/diminished. T&R q2h, wrist retraints still on d/t pulling at lines. PICC in R arm patent saline locked. D/C pending progress.

## 2017-09-11 NOTE — PLAN OF CARE
Problem: Goal Outcome Summary  Goal: Goal Outcome Summary  Discharge Planner SLP   Patient plan for discharge: Patient not able to state.  Current status: Bedside swallow evaluation completed. Patient presents with severe oral/pharyngeal dysphagia secondary to prolonged intubation and encephalopathy. Patient was able to intermittently follow a few simple commands with delayed response time. He demonstrated reduced bolus awareness, control and manipulation of a small ice chip. Premature entry, delayed swallow response and reduced laryngeal elevation. Suspect silent aspiration given facial grimacing. Premature entry of nectar thick liquids by spoon with multiple swallows to clear, but no overt Sx of aspiration. Can not rule out silent aspiration. Patient continues to be a high risk for aspiration given his current deficits and cognitive status. Recommend: 1. Continue NPO with TF. 2. SLP will continue to follow for PO readiness.   Barriers to return to prior living situation: Dysphagia and cognition.  Recommendations for discharge: TCU  Rationale for recommendations: Continue ST at TCU for swallowing to transition from TF to oral PO intake. May need a cognitive linguistic evaluation if he does not clear.        Entered by: Lydia Boss 09/11/2017 2:50 PM

## 2017-09-11 NOTE — PLAN OF CARE
"Problem: Goal Outcome Summary  Goal: Goal Outcome Summary  Discharge Planner OT   Patient plan for discharge: rehab  Current status: pt more alert today, eyes open and pt responding to questions.  Pt was not able to state his name,  or place. Pt not following commands to participate with ADL task. Pt required hand over hand dependent assist for grooming/hygiene task.  Pt stated \"I'll do that later\" when instructed pt to complete facial hygiene task. Pt then closed eyes and did not respond further to commands.  Pt with both restraints on UE on when OT exited room.   Barriers to return to prior living situation: current level of assist  Recommendations for discharge: TCU per plan established by the Occupational Therapist  Rationale for recommendations: pt would benefit from therapy to return to PLOF       Entered by: Itzel Childs 2017 12:31 PM         "

## 2017-09-11 NOTE — PLAN OF CARE
Problem: Goal Outcome Summary  Goal: Goal Outcome Summary  Outcome: No Change   Alert to self only. VSS. Restraints in place upper limbs. Up with lift. Voiding. Incontinent. TF running at goal 65cc. Antibiotics ran. Through picc. Repositioned q 2 hours. Brother in law was present and stated the patient did recognize him and was able to answer some simple questions  But at times was not able to recall things. Plan to continue to monitor.

## 2017-09-11 NOTE — PLAN OF CARE
Problem: Goal Outcome Summary  Goal: Goal Outcome Summary  Outcome: Improving  A/O self only, confused, lethargic. Able to respond to some questions yes/no, follows commands at times, however sometimes will not respond when asked questions. Soft wrist restraints order renewed, still confused and pulls at lines/restless at times. Temp max 99.1, other VSS on RA. Tele NSR w/ BBB, now D/c'd. A x2 and lift, turn/repositioned Q2hrs, up to chair for a couple hours today, rooke boots on. NPO, TF held this AM for a couple hours due to episode of emesis (bile and foamy in appearance), given zofran with relief. Xray abdomen completed, no ileus or obstruction, TF restarted at 65 cc/hr. Flushes 150cc Q3hrs. Incontinent of urine, no BM. BS active. Continue IV zosyn, vanco d/c'd. PICC WDL. Psych consulted today. Speech consulted to advance diet. Nursing will continue to monitor.

## 2017-09-11 NOTE — PROGRESS NOTES
09/11/17 1427   General Information   Start of Care Date 09/11/17   Patient Profile Review/OT: Additional Occupational Profile Info See Profile for full history and prior level of function   Patient/Family Goals Statement Patient did not state.   Swallowing Evaluation Bedside swallow evaluation   Behaviorial Observations Alert;Confused;Distractible   Mode of current nutrition [place]   Respiratory Status Extubated on (date);Room air  (Intubated 8/23/17-9/2/17.)   Comments 52 year old male with a past medical history of hypertension, depression,  OCD and diverticulitis.  He presented with left lower chest pain and shortness of breath.  He was found to have a loculated pleural effusion with multifocal pneumonia, developed hypoxic respiratory distress and eventually developed acute respiratory distress syndrome, required intubation and pronging on 08/23, status post thoracentesis on 08/23 and bronchoscopy on 08/24.  He was extubated on 09/02, to  BiPAP then to oxygen via nasal cannula and now off of oxygen.   Clinical Swallow Evaluation   Oral Musculature unable to assess due to poor participation/comprehension   Structural Abnormalities none present   Dentition present and adequate   Mucosal Quality adequate   Laryngeal Function Swallow;Voicing initiated   Swallow Eval   Feeding Assistance dependent   Clinical Swallow Eval: Thin Liquid Texture Trial   Mode of Presentation, Thin Liquids spoon;fed by clinician   Volume of Liquid or Food Presented 2 ice chips and 1 teaspoon of water.    Oral Phase of Swallow Poor AP movement;Premature pharyngeal entry   Pharyngeal Phase of Swallow impaired;reduction in laryngeal movement;repeated swallows   Diagnostic Statement Sx of silent aspiration.    Clinical Swallow Eval: Nectar Thick Liquid Texture Trial   Mode of Presentation, Nectar spoon;fed by clinician   Volume of Nectar Presented 2 teaspoons   Oral Phase, Nectar Poor AP movement;Premature pharyngeal entry   Pharyngeal Phase,  Nectar impaired;reduction in laryngeal movement;repeated swallows   Diagnostic Statement Delay with multiple swallows no overt Sx of aspiration.   Swallow Compensations   Swallow Compensations Pacing;Reduce amounts;Multiple swallow   Results Oral difficulties only;Suspect silent aspiration   Swallow Eval: Clinical Impressions   Skilled Criteria for Therapy Intervention Skilled criteria met.  Treatment indicated.   Functional Assessment Scale (FAS) 1   Treatment Diagnosis Severe dysphagia   Diet texture recommendations NPO   Therapy Frequency daily   Predicted Duration of Therapy Intervention (days/wks) 1 week.   Anticipated Discharge Disposition inpatient rehabilitation facility   Risks and Benefits of Treatment have been explained. Yes   Patient, family and/or staff in agreement with Plan of Care Yes   Clinical Impression Comments Patient presents with severe oral/pharyngeal dysphagia secondary to prolonged intubation and encephalopathy. Patient was able to intermittently follow a few simple commands with delayed response time. He demonstrated reduced bolus awareness, control and manipulation of a small ice chip. Premature entry, delayed swallow response and reduced laryngeal elevation. Suspect silent aspiration given facial grimacing. Premature entry of nectar thick liquids by spoon with multiple swallows to clear, but no overt Sx of aspiration. Can not rule out silent aspiration. Patient continues to be a high risk for aspiration given his current deficits and cognitive status. Recommend: 1. Continue NPO with TF.    Total Evaluation Time   Total Evaluation Time (Minutes) 17

## 2017-09-11 NOTE — PROGRESS NOTES
Patient on room air, SpO2 94%.  Able to use acapella x 1 during the afternoon.  Non productive cough.  Will continue to follow for chest physiotherapy.

## 2017-09-11 NOTE — PHARMACY-VANCOMYCIN DOSING SERVICE
Pharmacy Vancomycin Note  Date of Service 2017  Patient's  1965   52 year old, male    Indication: [facility] Pneumonia  Goal Trough Level: 15-20 mg/L  Day of Therapy: 3  Current Vancomycin regimen:  2000 mg IV q24h    Current estimated CrCl = Estimated Creatinine Clearance: 106.7 mL/min (based on Cr of 1).    Creatinine for last 3 days  2017:  6:40 AM Creatinine 1.16 mg/dL  9/10/2017:  6:32 AM Creatinine 1.09 mg/dL  2017:  6:28 AM Creatinine 1.00 mg/dL    Recent Vancomycin Levels (past 3 days)  2017:  2:15 AM Vancomycin Level 22.1 mg/L  2017:  6:28 AM Vancomycin Level 13.8 mg/L    Vancomycin IV Administrations (past 72 hours)                   vancomycin (VANCOCIN) 2,000 mg in NaCl 0.9 % 500 mL intermittent infusion (mg) 2,000 mg New Bag 09/10/17 1054     2,000 mg New Bag 17 0901                Nephrotoxins and other renal medications (Future)    Start     Dose/Rate Route Frequency Ordered Stop    17 2100  vancomycin (VANCOCIN) 1500 mg in 0.9% NaCl 250 mL PREMIX      1,500 mg Intravenous EVERY 12 HOURS 17 0828      17 0830  vancomycin (VANCOCIN) 2,000 mg in NaCl 0.9 % 500 mL intermittent infusion      2,000 mg Intravenous ONCE 17 0827      09/10/17 2200  piperacillin-tazobactam (ZOSYN) 4.5 g vial to attach to  mL bag     Comments:  Pharmacy can adjust dose based on renal function.    4.5 g  over 30 Minutes Intravenous EVERY 6 HOURS 09/10/17 2144               Contrast Orders - past 72 hours     None          Interpretation of levels and current regimen:  Trough level is  Subtherapeutic and also was drawn 2.5 hours early    Has serum creatinine changed > 50% in last 72 hours: No, renal function appears to be improving    Urine output:  good urine output    Renal Function: Improving    Plan:  1.  Increase Dose to 1500mg iv q12h  2.  Pharmacy will check trough levels as appropriate in 1-3 Days, if continued  3. Serum creatinine  levels will be ordered daily for the first week of therapy and at least twice weekly for subsequent weeks.      Negin Mccormick, PharmD        .

## 2017-09-11 NOTE — PLAN OF CARE
Problem: Goal Outcome Summary  Goal: Goal Outcome Summary  Outcome: No Change  Disoriented x 3-4--pt. Able to tell me his name and month/day of his  at beginning of shift, towards end of shift unable to give name or ; confused; speech--illogical at times, clear--pt. Follows simple commands with encouragement, answers short/simple closed ended questions appropriately; restless/pulling at lines (soft restraints in place; AROM performed on upper and lower extremities), cooperative with cares.  VSS on RA.  Tele--SR with BBB and PVC.  LS--Anterior/posterior-diminished x all lobes ; 109.  PICC RA--patent, flushes, SL.  Sputum sample needs to be recollected.  Mechanical lift; Total care; turned and repositioned Q2H; Rooke boots equally bilateral.  NG Tube feeding at 65 mL/hr (goal rate); patent; 150 mL flushes Q3H.  Incontinent of urine and bowel; BM x 1.  Non-blanchable redness nape of neck--open to air.  NPO.  D/C pending.

## 2017-09-11 NOTE — PROGRESS NOTES
[facility]  Hospitalist Progress Note  Date of service (when I saw the patient)09/11/2017:         Assessment and Plan:   Mr Niko Mireles is a 52 year old male with a past medical history of hypertension, depression, obsessive-compulsive disorder and diverticulitis.  He presented with left lower chest pain and shortness of breath.  He was found to have a loculated pleural effusion with multifocal pneumonia, developed hypoxic respiratory distress and eventually developed acute respiratory distress syndrome, required intubation and pronging on 08/23, status post thoracentesis on 08/23 and bronchoscopy on 08/24.  He was extubated on 09/02, weaned from BiPAP now down to 4 liters nasal cannula.  He has completed a course of vancomycin, Zosyn and azithromycin for his infection.  He is still suffering from numerous metabolic issues, encephalopathy and new fever but has been transferred to the hospitalist service for ongoing management on 9/5.       S/p Acute respiratory distress syndrome and hypoxic respiratory failure due to  Loculated left-sided pleural effusion with multifocal pneumonia with subsequent:  -This patient was initially seen and thought to have either PE versus pneumonia and was started on heparin drip and ceftriaxone with azithromycin.  He was eventually able to go for CT chest with contrast and found to have loculated pleural effusion with multifocal pneumonia.  Unfortunately, his respiratory status deteriorated rapidly and he developed hypoxic respiratory failure and then ARDS.  He was intubated on 08/23, spent a significant time on vent, up until 09/02, spent 2 days on BiPAP,  weaned down to 4 liters nasal cannula. He improved from a respiratory standpoint overall, completed a course of vancomycin, Zosyn and azithromycin 9/1. Transferred out of ICU on 9/5  - weaned O2 to 2 L NC with sat's 93%.   - unable to do IS, & unable to do chest PT as he has TFs  - get pt out of  bed      Suspect new RLL HCAP vs aspiration - 9/6   CT abdomen completed 9/5 for ongoing fevers and elevated lipase shows a new RLL infiltrate compared to previous exam 8/22 with resolution of the previously seen LLL infiltrate. No abd source for fever noted. Continues to spike fever with Tmax 101.5 in the last 24 hours. Still with leukocytosis with left shift.   - Follow repeat pan culture from [facility] from 9/5, 9/6, 9/7.  - wbc remains nl and pt now afebrile    - was on Vaco and Zosyn 9/6 &, d/c Vanco today 9/11& continue Zosyn till 9/13 to complete 7 days.  -follow cultures     Dysphagia/ increased risk for aspiration:  - SLP consulted  - continue NPO, TF via Nasal tube  - hopefully with addition of his psych medications he will improve  - may need PEG if he does not improve.     Emesis on 9/11 am:  - abd distended & sl tenderness  - although had a BM this am  - abd x-ray 9/11 showed non specific gas patern,  - TF held, restart this evening  - cont anti emetics      Persistent encephalopathy:  -initially felt to be related to his prolonged ICU stay/ delirium , as he was requiring paralytic agents to remain on vent and he was also critically ill.  He is requiring restraints at times due to pulling at lines and tubes.    - CT head 9/5 showed ml atrophy,nl ammonia level 9/7, unable to do MRI may not stay still   - improved infection but remains with altered mentation with intermittent agitation requiring restraints  -? Now due to underlying psych hx, plan as below  - Hold narcotics and benzo's.   - continued need for bilat wrist Restraints , form signed .  Sitter if needed.      Hx obsessive- compulsive disorder:  - ? Other psych illness causing his continued behavioral changes.   spoke to Dr Piña,will be seeing pt in am, recommended clonazepam 0.5 mg bid for poss catatonia   - restarted PTA Buspar, Remeron & Prozac but changed to 20 mg/day instead of tid     Intermittent Fever:  - resolved, treatment as  above      Elevated LFTs & Lipase  - etiology likely non GI infection or possibly related to sepsis  - Abd US on 8/29 showed GB sludge with no stones or evidence of cholecystitis. CT abd 9/5 shows a normal GB and no inflamation around the pancrease.     - improved, labs today T bili 0.9, nl Alpo4, ASt & ALT      Hypernatremia  - Na improved off IVF & with increased water flushes 150 cc Q3 hrs  - Na 143 this am.       Hyperglycemia.    - likely induced by stress, given his critical illness  - - 130's  - controlled with medium sliding scale insulin.      Acute renal insufficiency  - duet to sepsis, cr on 2.31 on 8/22  - resolved.  - creatinine  1.00 today  with increased water flushes  - avoid other nephrotoxins  - monitor       Acute right Gastrocnemius thrombus - 9/2  Acute DVT right peroneal vein - 9/5  Found on RLE LE US completed for edema and fever 9/2. Suspect this is a result  from prolonged ICU stay, but had not required anticoagulation to this point.  The intensivist put in orders for Dopplers of the area to assess for any change in the clot 9/5 and noted with a new occlusive DVT in the right peroneal vein.  - Initiated on therapeutic Lovenox 1.5 mg/kg daily 9/5.   - Will need at least 3 months of therapy on discharge.        Fluids, electrolytes, nutrition:    -NPO, onTube feeds (tolerating) +free water flushes to 150 every 3 hours (9/8).    GI prophylaxis:  protonix  Restraints: cont bilat soft wrist restraints  DVT Prophylaxis: Enoxaparin (Lovenox) SQ  Code Status: Full Code     Disposition: will require ARU when ready.PT/OT /SPL involved.      Silvia Devine MD.  Hospitalist [phone] (7am -6 pm)               Interval History:   Cont to be encephalopathic /intermittent agitation requiring restraints. Pt responds briefly when addressed by name initialy with saying a single yes but does no answer or respond to further commands.- PTA psych meds restarted & psych consult placed for am.  seen with  "RN present in room- reports emesis ( frothy material this am). abd X ray ordered  Pt unable to get chest PT & unable to use accapella due to encephalopathy.              Medications:       vancomycin (VANCOCIN) IV  1,500 mg Intravenous Q12H     FLUoxetine  20 mg Per NG tube Daily     busPIRone  30 mg Per NG tube BID     mirtazapine  15 mg Per NG tube At Bedtime     metoprolol  50 mg Per Feeding Tube BID     piperacillin-tazobactam  4.5 g Intravenous Q6H     enoxaparin  1.5 mg/kg Subcutaneous Q24H     amLODIPine  10 mg Per Feeding Tube Daily     sodium chloride (PF)  10 mL Intracatheter Q7 Days     pantoprazole  40 mg Per Feeding Tube Daily     insulin aspart  1-6 Units Subcutaneous Q4H     ipratropium - albuterol 0.5 mg/2.5 mg/3 mL, HOLD MEDICATION, lidocaine (buffered or not buffered), sodium chloride (PF), heparin lock flush, sodium chloride (PF), acetaminophen, glucose **OR** dextrose **OR** glucagon, labetalol, potassium chloride, potassium chloride, potassium chloride, potassium chloride with lidocaine, potassium chloride, miconazole, naloxone, hypromellose-dextran, lidocaine (buffered or not buffered), lidocaine 4%, - MEDICATION INSTRUCTIONS -, acetaminophen, senna-docusate, bisacodyl, [DISCONTINUED] ondansetron **OR** ondansetron               Physical Exam:   Blood pressure 127/81, pulse 83, temperature 99.1  F (37.3  C), temperature source Oral, resp. rate 20, height 1.854 m (6' 0.99\"), weight 101 kg (222 lb 9.6 oz), SpO2 94 %.  Wt Readings from Last 4 Encounters:   17 101 kg (222 lb 9.6 oz)         Vital Sign Ranges  Temperature Temp  Av.8  F (37.1  C)  Min: 98.5  F (36.9  C)  Max: 99.1  F (37.3  C)   Blood pressure Systolic (24hrs), Av , Min:115 , Max:128        Diastolic (24hrs), Av, Min:69, Max:79      Pulse Pulse  Av  Min: 102  Max: 102   Respirations Resp  Av  Min: 18  Max: 18   Pulse oximetry SpO2  Av.8 %  Min: 92 %  Max: 96 %         Intake/Output Summary (Last 24 " hours) at 09/09/17 1210  Last data filed at 09/09/17 0840   Gross per 24 hour   Intake             2784 ml   Output             1800 ml   Net              984 ml       Constitutional: Lethargic,intermittent response to verbal stimulus,no resp apparent distress, lying in bed   Lungs: Poor resp effort, no crackles or wheezing   Cardiovascular: Regular rate and rhythm, normal S1 and S2, and no murmur noted   Abdomen: distended, soft, scattered tenderness    Skin: No rashes, no cyanosis, no edema   Neuro:                Data:   All laboratory data reviewed

## 2017-09-11 NOTE — CONSULTS
Attempted to see Pt. He was unavailable as he was undergoing X-Rays at the time, and appeared catatonic so determined it will be best to return tomorrow. Suggested Internal Medicine start him on Klonopin 0.5mg bid today, as he was maintained on Klonopin prior to admission, and it may help with catatonia. Will consider Abilify as well.     -Dr. Nuno Piña MD

## 2017-09-12 NOTE — PLAN OF CARE
Problem: Goal Outcome Summary  Goal: Goal Outcome Summary  Outcome: No Change  Pt alert O to self only.VSS on RA.BS active. LS clear. Confused to the situation and lethargic. Unable to respond to questions or  follows commands.Denied pain. Soft wrist restraints continued. Pt confused and  Attempt pulls at lines/restless at times.  Use lift, turn/rep Q2hrs, rooke boots on. Bed extender placed to prevent leg injury. NPO, TF patent. Flushes 150cc Q3hrs. Incontinent of B&B. D/c pending.

## 2017-09-12 NOTE — PROGRESS NOTES
Respiratory therapy following this patient for chest physiotherapy, pt has been refusing vest therapy but has been able to perform acapella therapy well. Pt still coughing ,dry, non-productive. And he is on room air this time and keeping sats  above 90%. RT will continue to follow and help the patient do acapella therapy as ordered.

## 2017-09-12 NOTE — PLAN OF CARE
Problem: Goal Outcome Summary  Goal: Goal Outcome Summary  Discharge Planner SLP   Patient plan for discharge: Patient did not state.   Current status: SLP: Patient was seen for treatment, improving cognition and ability to follow 1 step directions. He was able to provide his name and date of birth, knew he was in the hospital at [facility]. Told me his wife's name is Lula and has 2 step daughters. Followed 3/5, 1 step directions. He took 3 very small sips from the spoon of nectar thick apple juice. Swallow response more timely. Refused further trials of nectar thick liquids does not like the taste. Session ended due to patient needing to use the bathroom. Recommend: 1. Continue NPO with TF. 2. SLP will continue to follow for PO readiness/video swallow study.   Barriers to return to prior living situation: Dysphagia and cognition.  Recommendations for discharge: ARU  Rationale for recommendations: Patient with improved participation and demonstrating improvement towards stated goals. Will need on going ST at discharge for swallowing and cognitive linguistic deficits.         Entered by: Lydia Boss 09/12/2017 9:44 AM

## 2017-09-12 NOTE — PROGRESS NOTES
[facility]  Hospitalist Progress Note  Bassam Ocampo D.O.  Date of service (Date I saw patient): 09/12/2017    Assessment & Plan   Mr Niko Mireles is a 52 year old male with a past medical history of hypertension, depression, obsessive-compulsive disorder and diverticulitis.  He presented with left lower chest pain and shortness of breath.  He was found to have a loculated pleural effusion with multifocal pneumonia, developed hypoxic respiratory distress and eventually developed acute respiratory distress syndrome, required intubation and proning on 08/23, status post thoracentesis on 08/23 and bronchoscopy on 08/24.  He was extubated on 09/02, weaned from BiPAP and oxygen. He has completed a course of vancomycin, Zosyn and azithromycin for his infection.  He is still suffering from numerous metabolic issues, encephalopathy and new fever but has been transferred to the hospitalist service for ongoing management on 9/5.       S/p Acute respiratory distress syndrome and hypoxic respiratory failure due to  Loculated left-sided pleural effusion with multifocal pneumonia with subsequent ARDS:  -This patient was initially seen and thought to have either PE versus pneumonia and was started on heparin drip and ceftriaxone with azithromycin.  He was eventually able to go for CT chest with contrast and found to have loculated pleural effusion with multifocal pneumonia.  Unfortunately, his respiratory status deteriorated rapidly and he developed hypoxic respiratory failure and then ARDS.  He was intubated on 08/23, spent a significant time on vent, up until 09/02, spent 2 days on BiPAP,  weaned down to 4 liters nasal cannula and eventually to room air. He improved from a respiratory standpoint overall, completed a course of vancomycin, Zosyn and azithromycin 9/1. Transferred out of ICU on 9/5  - unable to do IS, & unable to do chest PT as he has TFs  - get pt out of bed as able      Suspect new RLL  aspiration pneumonia - 9/6   CT abdomen completed 9/5 for ongoing fevers and elevated lipase shows a new RLL infiltrate compared to previous exam 8/22 with resolution of the previously seen LLL infiltrate. No abd source for fever noted. Was spiking fevers until 9/8, leukocytosis now resolved.  Blood cultures no grown from 9/5 and 9/7.   - was started on Vaco and Zosyn 9/6 &, d/c Vanco 9/11& continue Zosyn till 9/13 to complete 7 days.      Dysphagia/ increased risk for aspiration:  - SLP consulted  - continue NPO, TF via Nasal tube  - hopefully with addition of his psych medications he will improve  - may need PEG if he does not improve.      Emesis on 9/11 am: abd distended & sl tenderness but had BM in the morning.  abd x-ray 9/11 showed non specific gas patern,  - TF held, restart evening 9/11  - cont anti emetics      Persistent encephalopathy:  -initially felt to be related to his prolonged ICU stay/delirium , as he was requiring paralytic agents to remain on vent and he was also critically ill.  CT head 9/5 showed mild atrophy, nl ammonia level 9/7, unable to do MRI as pt will not stay still.   - improved with treatment of infection but remains with altered mentation with intermittent agitation requiring restraints  - ? Now due to underlying psych hx, plan as below  - Hold narcotics and benzo's.   - continued need for bilat wrist Restraints, Sitter if needed.       Hx obsessive- compulsive disorder:  - ? Other psych illness causing his continued behavioral changes.  - Dr Piña recommended clonazepam 0.5 mg bid for possible catatonia on 9/11  - restarted PTA Buspar, Remeron & Prozac but changed to 20 mg/day instead of tid      Elevated LFTs & Lipase: resolved. Abd US on 8/29 showed GB sludge with no stones or evidence of cholecystitis. CT abd 9/5 shows a normal GB and no inflamation around the pancrease.  etiology likely non GI infection or possibly related to sepsis.        Hypernatremia: Na improved off  "IVF & with increased water flushes 150 cc Q3 hrs.  Now appears to be on 60 mL q 4 hrs.   - repeat BMP tomorrow      Hyperglycemia.  likely induced by stress, given his critical illness  - controlled with medium sliding scale insulin.      Acute kidney injury: resolved due to sepsis, cr on 2.31 on 8/22  - avoid other nephrotoxins  - monitor       Acute right Gastrocnemius thrombus - 9/2  Acute DVT right peroneal vein - 9/5  Found on RLE LE US completed for edema and fever 9/2. Suspect this is a result  from prolonged ICU stay, but had not required anticoagulation initially.  The intensivist put in orders for Dopplers of the area to assess for any change in the clot 9/5 and noted with a new occlusive DVT in the right peroneal vein.  - Initiated on therapeutic Lovenox 1.5 mg/kg daily 9/5.   - Will need at least 3 months of therapy on discharge.        GI prophylaxis:  protonix  Restraints: cont bilat soft wrist restraints  DVT Prophylaxis: Enoxaparin (Lovenox) SQ  Code Status: Full Code      Disposition: eventually TCU.  Needs to have resolution of his psychiatric issues and be off restraints.  2+ days?  Unclear at this point.     Interval History   Lying in bed, improved today compared to yesterday per RN.  He does not have any pain or shortness of breath at this point.  He is able to tell me he is in a hospital but not the name or city.  He knows we are in [place] but cannot tell me the year.      -Data reviewed today: I reviewed all new labs and imaging over the last 24 hours. I personally reviewed no images or EKG's today.    Physical Exam   Heart Rate: 87, Blood pressure 118/76, pulse 85, temperature 98.7  F (37.1  C), temperature source Oral, resp. rate 18, height 1.854 m (6' 0.99\"), weight 101 kg (222 lb 9.6 oz), SpO2 94 %.  Vitals:    09/09/17 1142 09/10/17 0600 09/11/17 0745   Weight: 98.8 kg (217 lb 14.4 oz) 100.9 kg (222 lb 6.4 oz) 101 kg (222 lb 9.6 oz)     Vital Signs with Ranges  Temp:  [98  F (36.7  C)-98.8 "  F (37.1  C)] 98.7  F (37.1  C)  Pulse:  [75-85] 85  Heart Rate:  [87] 87  Resp:  [18] 18  BP: (103-123)/(65-84) 118/76  SpO2:  [94 %] 94 %  I/O's Last 24 hours  I/O last 3 completed shifts:  In: 1371 [NG/GT:1371]  Out: -     Constitutional: awake, alert, cooperative    Respiratory: Clear to auscultation bilaterally, no crackles or wheezing noted.  Good air exchange.     Cardiovascular: Regular rate and rhythm.  No murmur, rub or gallop noted.     GI: Positive bowel sounds, soft, non-distended, non-tender.  No masses or organomegaly noted.     Musculoskeletal: moving all extremities    Neurologic: Awake, alert and oriented to name, place and time.  Cranial nerves II-XII are grossly intact.      Lymphatic: No edema noted      Medications   All medications were reviewed.    - MEDICATION INSTRUCTIONS -         FLUoxetine  20 mg Per NG tube Daily     busPIRone  30 mg Per NG tube BID     mirtazapine  15 mg Per NG tube At Bedtime     clonazePAM  0.5 mg Per NG tube BID     metoprolol  50 mg Per Feeding Tube BID     piperacillin-tazobactam  4.5 g Intravenous Q6H     enoxaparin  1.5 mg/kg Subcutaneous Q24H     amLODIPine  10 mg Per Feeding Tube Daily     sodium chloride (PF)  10 mL Intracatheter Q7 Days     pantoprazole  40 mg Per Feeding Tube Daily     insulin aspart  1-6 Units Subcutaneous Q4H        Data     Recent Labs  Lab 09/12/17  0600 09/11/17  0628 09/10/17  0632  09/08/17  0625 09/07/17  0625   WBC  --  7.0  --   --  9.1 10.7   HGB  --  11.8*  --   --  10.8*  --    MCV  --  92  --   --  96  --    PLT  --  399  --   --  361  --     143 146*  < > 152* 151*   POTASSIUM 4.1 4.0 3.8  < > 4.0 4.1   CHLORIDE 109 111* 113*  < > 119* 118*   CO2 28 24 26  < > 26 26   BUN 26 29 33*  < > 41* 43*   CR 1.06 1.00 1.09  < > 1.37* 1.43*   ANIONGAP 6 8 7  < > 7 7   BENOIT 9.0 8.7 8.8  < > 8.4* 8.8   * 130* 133*  < > 129* 108*   ALBUMIN  --  2.5*  --   --  2.3* 2.3*   PROTTOTAL  --  7.3  --   --  7.2 7.6   BILITOTAL  --   0.8  --   --  1.2 1.3   ALKPHOS  --  109  --   --  116 112   ALT  --  70  --   --  76* 96*   AST  --  38  --   --  49* 73*   LIPASE  --   --   --   --  1764* 1840*   < > = values in this interval not displayed.    No results found for this or any previous visit (from the past 24 hour(s)).

## 2017-09-12 NOTE — PROGRESS NOTES
CLINICAL NUTRITION SERVICES - REASSESSMENT NOTE      EVALUATION OF PROGRESS TOWARD GOALS   Diet:  NPO. SLP following    Nutrition Support Enteral:  Type of Feeding Tube:  ND  Enteral Frequency:  Continuous  Enteral Regimen:  Isosource 1.5 at 65 mL/hr  Total Enteral Provisions:  2340 kcals (27 kcal/kg), 106 gm pro (1.2 gm/kg), 1185 mL H20, 23 gm fiber, 275 gm CHO  Free Water Flush per MD order 9/8:  150 mL every 3 hrs. Total fluid (TF + flushes) = 2400 mL/day    Intake/Tolerance:   TF was held yesterday from 11 am - 7 pm due to emesis. It was resumed after abd x-ray.  -135 on med SSI  BM x 2  Pt had a rectal tube placed 9/5 and removed 9/6.      ASSESSED NUTRITION NEEDS Dosing Weight:  87.7 kg (9/4 wt):    Estimated Energy Needs:  6405-8711 kcals (25-30 Kcal/Kg) - Maintenance  Estimated Protein Needs:  105-132 grams protein (1.2-1.5 g pro/Kg) - hypercatabolism with critical illness and CKD      NEW FINDINGS:   Pt is A/O to self only    Vitals:    08/21/17 2346 08/22/17 0424 08/23/17 0654 08/25/17 0400   Weight: 113.4 kg (250 lb) 114.9 kg (253 lb 3.2 oz) 115.2 kg (254 lb) 114.2 kg (251 lb 12.3 oz)    08/27/17 0528 08/28/17 0436 08/29/17 0400 08/30/17 0400   Weight: 115.6 kg (254 lb 13.6 oz) 114.6 kg (252 lb 10.4 oz) 108 kg (238 lb 1.6 oz) 104.5 kg (230 lb 6.1 oz)    08/31/17 0400 09/01/17 0600 09/02/17 0200 09/02/17 2148   Weight: 102.2 kg (225 lb 5 oz) 102.5 kg (225 lb 15.5 oz) 107.1 kg (236 lb 1.8 oz) 101.2 kg (223 lb 1.7 oz)    09/04/17 0400 09/08/17 1440 09/09/17 0549 09/09/17 1142   Weight: 87.7 kg (193 lb 5.5 oz) 97.2 kg (214 lb 3.2 oz) 98.6 kg (217 lb 6.4 oz) 98.8 kg (217 lb 14.4 oz)    09/10/17 0600 09/11/17 0745   Weight: 100.9 kg (222 lb 6.4 oz) 101 kg (222 lb 9.6 oz)         Previous Goals:   Isosource 1.5 @ goal of 65 mL/hr will continue to meet 100% of needs  Evaluation: Met    Previous Nutrition Diagnosis:   None identified      CURRENT NUTRITION DIAGNOSIS  No nutrition diagnosis identified at this  time     INTERVENTIONS  Recommendations / Nutrition Prescription  Continue Isosource 1.5 @ goal of 65 mL/hr    Implementation  Collaboration and Referral of Nutrition care - discussed TF, stools with Radha RN.    Goals  Isosource 1.5 @ goal of 65 mL/hr will continue to meet assessed needs      MONITORING AND EVALUATION:  Progress towards goals will be monitored and evaluated per protocol and Practice Guidelines    Amalia Hilario, RD  Pager [phone] (M-F)            [phone] (W/E & Hol)

## 2017-09-12 NOTE — CONSULTS
Pt seen for initial psychiatric evaluation, please see my dictation for details and recommendations.   98.6

## 2017-09-12 NOTE — PLAN OF CARE
Problem: Goal Outcome Summary  Goal: Goal Outcome Summary  Outcome: Improving  Pt A&Ox1.  Repositioned every 2 hours.  No c/o pain. TF running.  Flushes every 3 hours.  Bilateral wrist restraints to keep from pulling on feeding tube.  BMx1.  Inc of urine.  Answers some questions.  Confused.  Asking writer about his blood sugars today. awaiting Psych consult

## 2017-09-12 NOTE — PLAN OF CARE
Problem: Goal Outcome Summary  Goal: Goal Outcome Summary  Outcome: Improving  A&O to self only and sometimes place, confused and lethargic. When alert at times, pt is able to respond to some questions yes/no, follows commands at times, however sometimes will not respond when asked questions. Pt. Recognized wife Lula when she visited this evening, wife reported first time he knew who she was and alert since being hospitalized. Denied pain. Soft wrist restraints continued, confused and pulls at lines/restless at times. VSS on RA. A x2 and lift, turn/repositioned Q2hrs, rooke boots on. NPO, TF at 65 cc/hr. Flushes 150cc Q3hrs. Incontinent of B&B, smear of BM this shift. BS active. Continue IV zosyn. PICC WDL. Psych consulted today. Speech following.  Nursing will continue to monitor

## 2017-09-13 NOTE — PLAN OF CARE
Problem: Goal Outcome Summary  Goal: Goal Outcome Summary  Pt disoriented, restless this shift. VSS on RA. Denied pain, SOB. TF 65ml/hr with 150ml water flushes q3H. , . q 1-2 hr turn and repo with ROM; bilateral soft wrist restrained skin assessed. Educated not to reach the TF; no evidence of learning. Pscyh, Pt/OT, speech following. Rook boots on. D/c pending.

## 2017-09-13 NOTE — PLAN OF CARE
"Problem: Goal Outcome Summary  Goal: Goal Outcome Summary  Outcome: Therapy, progress toward functional goals is gradual  Patient plan for discharge: None stated.  Current status: Pt sitting in chair upon PT arrival. Contiues to have difficulty following commands, follows ~30% of the time. Cues to keep eyes open throughout. Pt states he needs to \"use the restroom.\" Sling adjusted behind pt with verbal and tactile cues for pt to lean forwards/side to side. Dependent lift to BSC, however, pt unsuccessful on BSC and pt returned to chair with use of sling. Attempted to have pt perform seated exs, however, then another care provider arriving. All needs in reach and chair alarm on upon PT exit.  Barriers to return to prior living situation: Level of assist, cognition, command following, fatigue, decreased balance and impaired functional activity tolerance.  Recommendations for discharge: ARU per the plan established by the Physical Therapist   Rationale for recommendations: Pt was previously independent, worked full time, has great family support.           "

## 2017-09-13 NOTE — CONSULTS
PSYCHIATRIC CONSULTATION       DATE OF SERVICE:  09/12/2017      REQUESTING PHYSICIAN:  Bassam Ocampo DO      REASON FOR CONSULTATION:  Catatonic, anxiety, psychiatric management.      IDENTIFYING DATA:  Niko Mireles is a 52-year-old man who reportedly is .  He works for [employer] in [place] and reportedly works with adults with disabilities.  He presented to the hospital on 08/22/2017 on account of chest pain and was found to have moderate left-sided abdominal pain, sinus tachycardia with left bundle branch block, radiographic evidence of left lower lobe pneumonia on account of which he was admitted for stabilization.  He has a history of renal insufficiency, hypertension, hyperlipidemia, obsessive-compulsive disorder and major depressive disorder.  Psychiatry was consulted on account of his recent catatonia and ongoing mental status changes.  Information was gathered through direct patient contact in the presence of his sister-in-law as well as chart review.      CHIEF COMPLAINT:  None given.      HISTORY OF PRESENT ILLNESS:  Mr. Niko Mireles reportedly has an established history of hypertension and diverticulitis as well as depression and obsessive-compulsive disorder.  He presented to the hospital with left lower chest pain as well as shortness of breath and was found to have a loculated pleural effusion with multifocal pneumonia.  He subsequently developed hypoxic respiratory distress which quickly evolved to acute respiratory distress syndrome requiring intubation.  He underwent thoracentesis on 08/23 and bronchoscopy on 08/24 and was extubated on 09/02.  He has since been weaned off BiPAP and oxygen and has completed a course of vancomycin, Zosyn and azithromycin for his infection.  He has been having waxing and waning mentation but no fevers.  On direct interview his sister-in-law, who was present at the bedside with the patient's permission, indicated that this was the  first time the patient was responding appropriately to her queries and even cracked a joke.  Per her report, he has a longstanding history of depression and obsessive-compulsive disorder.  She reports that he has never been one who has been very motivated and, per her account, she does not feel he had ever benefited from his prescribed Prozac and clonazepam; however, the patient reports that he has been on clonazepam for more than 30 years and feels that Prozac has been helpful.  He currently does not report neurovegetative symptoms of depression and denies experiencing ruminative worry, panic attacks, obsessions or compulsions at the time of this assessment.  He admits that he has been having a hard time remembering things and even when asked what he does for a living, he struggled with responding to the question.  With prompting, he was able to recall the name of his employers and his sister-in-law guided him to describe what he does for a living.  He does not endorse the presence of auditory or visual hallucinations and does not endorse self-harm thoughts, plans or intent.  There is no history of illicit drug use or alcohol use.  He does not use tobacco products.  He claims he had been psychiatrically hospitalized in the past but could not recall at which facility, even though his sister-in-law doubted that this had been the case.  Review of records from [facility] does suggest that he had been on multiple psychotropic medications in the past, including Elavil, buspirone, clonazepam, fluoxetine, gabapentin, and had brief hospitalizations on account of excessive sedation.      CHEMICAL USE HISTORY:  None reported.      PAST MEDICAL HISTORY:     1.  Nephrolithiasis.     2.  Chronic kidney disease.     3.  Hypertension.     4.  Diverticulitis.      PAST SURGICAL HISTORY:  Orthopedic surgery.      FAMILY PSYCHIATRIC HISTORY:  Unknown.      SOCIAL HISTORY:  The patient is , lives independently.  He was  "employed.      REVIEW OF SYSTEMS:  A 10-point review of systems was attempted but could not be completed on account of the patient's waxing and waning mentation.      VITAL SIGNS:  Blood pressure 118/76, pulse 85, respirations 18, temperature 98.7.  Weight 222 pounds.      MENTAL STATUS EXAMINATION:  Niko Mireles is a 52-year-old man who appears older than his stated age.  He is seen at his bedside, where he is dressed in hospital garb and receiving nutrition via NG tube.  Has both his wrists in soft restraints.  He wears eyeglasses.  He keeps his eyes closed throughout most of this interview.  His mood is described as \"better.\"  His affect is restricted in range.  His thought process is difficult to assess, as he is minimally verbal.  He denies the presence of auditory hallucinations but claims he has been talking to people that are trying to give him money, but he is unclear what he means by this or if he is experiencing visual hallucinations.  There is no evidence of tremulousness.  He denies self-harm thoughts or plans.  His gait and station are not assessed, as he is currently bedbound.  His language is appropriate.  His associations are concrete.  His recall of recent and remote events is poor, but he is oriented to person and place.  His attention span and concentration are poor.  His impulse control is marginal.  He displays limited insight and judgment.  Risk assessment at this time is considered moderate.      DIAGNOSTIC IMPRESSION:  Niko Mireles is a 52-year-old man with longstanding history of depression and anxiety previously characterized as obsessive-compulsive disorder.  He presented to the hospital on account of chest pain and was found to have a loculated pleural effusion with multifocal pneumonia.  He had been catatonic at some point during his hospitalization and has continued to be intermittently confused.  It appears that he was taken off his Klonopin while he was in the ICU and may " have experienced some encephalopathy from withdrawal from long-term benzodiazepine use.  He is currently on a combination of mirtazapine, Prozac, buspirone and clonazepam and claims he is doing well on these medications.  Conversation with his sister-in-law suggests that he is showing remarkable improvement today compared to the preceding days.      DIAGNOSES:   1.  Delirium due to multiple etiologies.   2.  Obsessive-compulsive disorder, by history.   3.  Major depressive disorder, recurrent, moderate.   4.  Sedating/hypnotic use disorder (iatrogenic).   5.  Possible right lower lobe aspiration pneumonia.   6.  Status post acute respiratory distress syndrome and hypoxic respiratory failure due to loculated left-sided pleural effusion with multifocal pneumonia.   7.  Dysphagia.   8.  Hypernatremia.    9.  Hyperglycemia.      RECOMMENDATIONS:   1.  Medical management as you are.   2.  Continue current psychiatric medications as prescribed, as it appears that there has been improvement in his mentation and functioning over the last 24 hours per review of records and conversations with the patient and his sister-in-law.  He was not making any attempts to remove tubing from his nose today at the time of this assessment and it may be possible to attempt to discontinue soft restraints tomorrow.  Psychiatry will reevaluate him tomorrow to see if further medication revisions are necessary.      Thanks for the consult.         LILIANE ARIAS MD             D: 2017 17:31   T: 2017 20:12   MT:       Name:     PATRICK AHUJA   MRN:      [id number]        Account:       [id number]   :      1965           Consult Date:  2017      Document: [id number]

## 2017-09-13 NOTE — PROGRESS NOTES
Care Transition Initial Assessment -   Reason For Consult: discharge planning, facility placement  Met with: Spoke with wifeLula    Active Problems:    Community acquired bacterial pneumonia    ARDS (adult respiratory distress syndrome) (H)    Parapneumonic effusion    Encephalopathy    Non morbid obesity due to excess calories         DATA  Lives With: spouse  Living Arrangements: house  Description of Support System: Involved, Supportive  Who is your support system?: Wife, Sibling(s)  Support Assessment: Adequate family and caregiver support. Previously living at home independently.   Identified issues/concerns regarding health management: PT/OT recommend TCU. SW explained/discussed TCU with spouse. She is in agreement. She would prefer a facility close to home. Discussed referrals to [facility] and [facility].       Quality Of Family Relationships: involved, supportive  Transportation Available: car, family or friend will provide      ASSESSMENT  Cognitive Status:  Confused per nursing notes. Now cooperative but still has NJ.   Concerns to be addressed: Anticipate facilies will deny pt due to NJ and periods of pulling on it. Will wait on referrals until NJ removed.     PLAN  Financial costs for the patient includes: N/A.  Patient given options and choices for discharge: Yes.  Patient/family is agreeable to the plan?  YES  Patient Goals and Preferences: TCU.  Patient anticipates discharging to: TCU.    PAM Armendariz, LGSW  [id number]

## 2017-09-13 NOTE — PROGRESS NOTES
[facility]  Hospitalist Progress Note  Date of service (when I saw the patient) 09/13/2017:         Assessment and Plan:    Mr Niko Mireles is a 52 year old male with a past medical history of hypertension, depression, obsessive-compulsive disorder and diverticulitis.  He presented with left lower chest pain and shortness of breath.  He was found to have a loculated pleural effusion with multifocal pneumonia, developed hypoxic respiratory distress and eventually developed acute respiratory distress syndrome, required intubation and pronging on 08/23, status post thoracentesis on 08/23 and bronchoscopy on 08/24.  He was extubated on 09/02, weaned from BiPAP now down to 4 liters nasal cannula.  He has completed a course of vancomycin, Zosyn and azithromycin for his infection.  He is still suffering from numerous metabolic issues, encephalopathy and new fever but has been transferred to the hospitalist service for ongoing management on 9/5.       S/p Acute respiratory distress syndrome and hypoxic respiratory failure due to  Loculated left-sided pleural effusion with multifocal pneumonia with subsequent:  -This patient was initially seen and thought to have either PE versus pneumonia and was started on heparin drip and ceftriaxone with azithromycin.  He was eventually able to go for CT chest with contrast and found to have loculated pleural effusion with multifocal pneumonia.  Unfortunately, his respiratory status deteriorated rapidly and he developed hypoxic respiratory failure and then ARDS.  He was intubated on 08/23, spent a significant time on vent, up until 09/02, spent 2 days on BiPAP,  weaned down to 4 liters nasal cannula. He improved from a respiratory standpoint overall, completed a course of vancomycin, Zosyn and azithromycin 9/1. Transferred out of ICU on 9/5  - weaned O2 to 2 L NC with sat's 93%.   - unable to do IS, & unable to do chest PT as he has TFs  - get pt out of  bed      Suspect new RLL HCAP vs aspiration - 9/6   CT abdomen completed 9/5 for ongoing fevers and elevated lipase shows a new RLL infiltrate compared to previous exam 8/22 with resolution of the previously seen LLL infiltrate. No abd source for fever noted. Continues to spike fever with Tmax 101.5 in the last 24 hours. Still with leukocytosis with left shift.   - Follow repeat pan culture from [facility] from 9/5, 9/6, 9/7.  - wbc remains nl and pt now afebrile    - was on Vaco and Zosyn 9/6 &, d/c Vanco today 9/11& continue Zosyn till 9/13 to complete 7 days.  -follow cultures      Dysphagia/ increased risk for aspiration:  - SLP following  - NPO, TF via Nasal tube  - hopefully with addition of his psych medications he will improve  - may need PEG placement if he does not improve.      Emesis on 9/11 am:  - improved  - abd x-ray 9/11 showed non specific gas patern,  - tolerating TF held      Persistent encephalopathy:  Hx obsessive- compulsive disorder:  -initially related to his prolonged ICU stay/ delirium , as he was requiring paralytic agents to remain on vent and he was also critically ill.   - now due to catatonia    - CT head 9/5 showed ml atrophy,nl ammonia level 9/7, unable to do MRI may not stay still   - improving with restarting psych medications on 9/11 with clonazepam 0.5 mg bid for & PTA Buspar, Remeron & Prozac but changed to 20 mg/day instead of tid  - Psych following greatly appreciated  - continues to require restraints at times due to pulling at lines and tubes.   - Hold narcotics and benzo's.       Intermittent Fever:  - resolved, treatment as above      Elevated LFTs & Lipase  - etiology likely non GI infection or possibly related to sepsis  - Abd US on 8/29 showed GB sludge with no stones or evidence of cholecystitis. CT abd 9/5 shows a normal GB and no inflamation around the pancrease.     - improved, T bili 0.9, nl Alpo4, ASt & ALT      Hypernatremia  - Na improved off IVF & with increased  water flushes 150 cc Q3 hrs  - Na 143 on 9/12.       Hyperglycemia.    - likely induced by stress, given his critical illness  - - 130's  - controlled with medium sliding scale insulin.      Acute renal insufficiency  - duet to sepsis, cr on 2.31 on 8/22  - resolved.  - on increased water flushes  - avoid other nephrotoxins  - monitor       Acute right Gastrocnemius thrombus - 9/2  Acute DVT right peroneal vein - 9/5  Found on RLE LE US completed for edema and fever 9/2. Suspect this is a result  from prolonged ICU stay, but had not required anticoagulation to this point.  The intensivist put in orders for [facility] of the area to assess for any change in the clot 9/5 and noted with a new occlusive DVT in the right peroneal vein.  - Initiated on therapeutic Lovenox 1.5 mg/kg daily 9/5.   - Will need at least 3 months of therapy on discharge.        Fluids, electrolytes, nutrition:    -NPO, onTube feeds (tolerating) +free water flushes to 150 every 3 hours (9/8).    GI prophylaxis:  protonix  Restraints: cont bilat soft wrist restraints  DVT Prophylaxis: Enoxaparin (Lovenox) SQ  Code Status: Full Code      Disposition: will require ARU when ready.PT/OT /SPL involved.       Silvia Devine MD.  Hospitalist [phone] (7am -6 pm)               Interval History:   Confused, unable to give hx. Mentation better but still requiring restraints (bilat wrists).no new complaints/ overnight events.              Medications:       FLUoxetine  20 mg Per NG tube Daily     busPIRone  30 mg Per NG tube BID     mirtazapine  15 mg Per NG tube At Bedtime     clonazePAM  0.5 mg Per NG tube BID     metoprolol  50 mg Per Feeding Tube BID     piperacillin-tazobactam  4.5 g Intravenous Q6H     enoxaparin  1.5 mg/kg Subcutaneous Q24H     amLODIPine  10 mg Per Feeding Tube Daily     sodium chloride (PF)  10 mL Intracatheter Q7 Days     pantoprazole  40 mg Per Feeding Tube Daily     insulin aspart  1-6 Units Subcutaneous Q4H  "    ipratropium - albuterol 0.5 mg/2.5 mg/3 mL, HOLD MEDICATION, lidocaine (buffered or not buffered), sodium chloride (PF), heparin lock flush, sodium chloride (PF), acetaminophen, glucose **OR** dextrose **OR** glucagon, labetalol, potassium chloride, potassium chloride, potassium chloride, potassium chloride with lidocaine, potassium chloride, miconazole, naloxone, hypromellose-dextran, lidocaine (buffered or not buffered), lidocaine 4%, - MEDICATION INSTRUCTIONS -, acetaminophen, senna-docusate, bisacodyl, [DISCONTINUED] ondansetron **OR** ondansetron               Physical Exam:   Blood pressure 123/82, pulse 75, temperature 98.1  F (36.7  C), temperature source Oral, resp. rate 18, height 1.854 m (6' 0.99\"), weight 93.6 kg (206 lb 5.6 oz), SpO2 93 %.  Wt Readings from Last 4 Encounters:   17 93.6 kg (206 lb 5.6 oz)         Vital Sign Ranges  Temperature Temp  Av.7  F (37.1  C)  Min: 98.1  F (36.7  C)  Max: 99.2  F (37.3  C)   Blood pressure Systolic (24hrs), Av , Min:118 , Max:127        Diastolic (24hrs), Av, Min:76, Max:87      Pulse Pulse  Av.7  Min: 75  Max: 85   Respirations Resp  Av  Min: 15  Max: 18   Pulse oximetry SpO2  Av %  Min: 92 %  Max: 94 %         Intake/Output Summary (Last 24 hours) at 17 0734  Last data filed at 17 0700   Gross per 24 hour   Intake             3494 ml   Output                0 ml   Net             3494 ml       Constitutional: More awake, alert, cooperative, no apparent distress, following commands, answers questions. Disoriented to time.   Lungs: Diminished but clear to auscultation bilaterally, no crackles or wheezing   Cardiovascular: Regular rate and rhythm, normal S1 and S2, and no murmur noted   Abdomen: Normal bowel sounds, soft, distended, non-tender. NGT in place   Skin: No rashes, no cyanosis, no edema   Neuro:                Data:   All laboratory data reviewed    "

## 2017-09-13 NOTE — PLAN OF CARE
"Problem: Goal Outcome Summary  Goal: Goal Outcome Summary  SLP: ST attempted to assess for PO readiness. Pt initially asleep, but easily aroused. Pt unable to sustain attention for PO trials. Attempted to position pt in upright position and pt stated, \"no.\" Pt educated on importance of ST. Pt shook his head. Will attempt tomorrow.       "

## 2017-09-13 NOTE — CONSULTS
[facility] Psych[facility] Consult Progress Note      Interval History:   Pt seen, care reviewed with treatment team. Patient is continuing to show improvement in his mentation. He is much less restless. He is sitting out of bed without restraints. He reports absence of medication side effects. However, he continues to talk about talking to people who want to give him money and he says they do not want him telling anyone else. It appears these are perceptual distortions that he finds ego syntonic as he is smiling broadly when describing this. Denies suicidal or homicidal ideation.     Review of systems:   The Review of Systems is negative other than noted in the HPI     Medications:       FLUoxetine  20 mg Per NG tube Daily     busPIRone  30 mg Per NG tube BID     mirtazapine  15 mg Per NG tube At Bedtime     clonazePAM  0.5 mg Per NG tube BID     metoprolol  50 mg Per Feeding Tube BID     piperacillin-tazobactam  4.5 g Intravenous Q6H     enoxaparin  1.5 mg/kg Subcutaneous Q24H     amLODIPine  10 mg Per Feeding Tube Daily     sodium chloride (PF)  10 mL Intracatheter Q7 Days     pantoprazole  40 mg Per Feeding Tube Daily     insulin aspart  1-6 Units Subcutaneous Q4H     ipratropium - albuterol 0.5 mg/2.5 mg/3 mL, HOLD MEDICATION, lidocaine (buffered or not buffered), sodium chloride (PF), heparin lock flush, sodium chloride (PF), acetaminophen, glucose **OR** dextrose **OR** glucagon, labetalol, potassium chloride, potassium chloride, potassium chloride, potassium chloride with lidocaine, potassium chloride, miconazole, naloxone, hypromellose-dextran, lidocaine (buffered or not buffered), lidocaine 4%, - MEDICATION INSTRUCTIONS -, acetaminophen, senna-docusate, bisacodyl, [DISCONTINUED] ondansetron **OR** ondansetron      Mental Status Examination:     Appearance:  awake, alert and appeared older than stated age  Eye Contact:  poor   Speech:  decreased prosody and paucity of speech  Psychomotor Behavior:  no  evidence of tardive dyskinesia, dystonia, or tics  Mood:  better  Affect:  mood congruent and intensity is blunted  Thought Process:  linear no loose associations  Thought Content:  no evidence of suicidal ideation or homicidal ideation and patient appears to be responding to internal stimuli  Oriented to:  person and place  Attention Span and Concentration:  limited  Recent and Remote Memory:  poor  Fund of Knowledge: delayed  Muscle Strength and Tone: normal  Gait and Station: NA  Insight:  limited  Judgment:  poor        Labs/vitals:     Recent Results (from the past 24 hour(s))   Glucose by meter    Collection Time: 09/12/17 12:18 PM   Result Value Ref Range    Glucose 118 (H) 70 - 99 mg/dL   Glucose by meter    Collection Time: 09/12/17 12:45 PM   Result Value Ref Range    Glucose 125 (H) 70 - 99 mg/dL   Glucose by meter    Collection Time: 09/12/17  6:19 PM   Result Value Ref Range    Glucose 128 (H) 70 - 99 mg/dL   Glucose by meter    Collection Time: 09/12/17  9:00 PM   Result Value Ref Range    Glucose 112 (H) 70 - 99 mg/dL   Glucose by meter    Collection Time: 09/13/17 12:58 AM   Result Value Ref Range    Glucose 116 (H) 70 - 99 mg/dL   Glucose by meter    Collection Time: 09/13/17  5:11 AM   Result Value Ref Range    Glucose 121 (H) 70 - 99 mg/dL   Glucose by meter    Collection Time: 09/13/17  8:58 AM   Result Value Ref Range    Glucose 119 (H) 70 - 99 mg/dL     B/P: 123/82, T: 98.1, P: 75, R: 18    Impression:   Niko Mireles is a 52-year-old man with longstanding history of depression and anxiety previously characterized as obsessive-compulsive disorder.  He presented to the hospital on account of chest pain and was found to have a loculated pleural effusion with multifocal pneumonia.  He had been catatonic at some point during his hospitalization and has continued to be intermittently confused.  It appears that he was taken off his Klonopin while he was in the ICU and may have experienced some  encephalopathy from withdrawal from long-term benzodiazepine use.  He is currently on a combination of mirtazapine, Prozac, buspirone and clonazepam and claims he is doing well on these medications.  Conversation with his sister-in-law suggests that he is showing remarkable improvement today compared to the preceding days.           DIagnoses:     1.  Delirium due to multiple etiologies.   2.  Obsessive-compulsive disorder, by history.   3.  Major depressive disorder, recurrent, moderate.   4.  Sedating/hypnotic use disorder (iatrogenic).   5.  Possible right lower lobe aspiration pneumonia.   6.  Status post acute respiratory distress syndrome and hypoxic respiratory failure due to loculated left-sided pleural effusion with multifocal pneumonia.   7.  Dysphagia.   8.  Hypernatremia.    9.  Hyperglycemia.          Plan:   1. Written information given on medications. Side effects, risks, benefits reviewed.  2. Medical management as you are.  3. I will add low dose Zyprexa Zydis 2.5 mg bid  4. Continue other medications as ordered. Psych will f/u tomorrow.      Attestation:  Patient has been seen and evaluated by me,  Diallo Kohli MD

## 2017-09-13 NOTE — PLAN OF CARE
Problem: Goal Outcome Summary  Goal: Goal Outcome Summary  Outcome: No Change  Pt disoriented x4. VSS.  and 112, no S/S insulin was given. TF 65ml/hr with 150ml water flushes q3H. Turned and repositioned ever 1-2 hours. Pt frequently slides down in the bed. ROM performed with Repositioning. Pt continues to reach for TF at nose. TF continues to mesure at 100cm. No C/O pain or GI discomfort. Family came to visit and was updated on pts plan of care. Pt continues to be cooperative. He had 1 small BM this shift. LS clear. Continue to monitor pt.

## 2017-09-13 NOTE — PLAN OF CARE
Problem: Goal Outcome Summary  Goal: Goal Outcome Summary  Outcome: Improving  Pt remains confused but is cooperative. Was up in chair for a couple hours, c/o of dizziness initially when he got up, but it resolved once in the chair. Stood with assist of 2 and walker to get to the commode and then took a few steps back to bed. Has had a couple soft stools this shift. VSS. Wrist restraints off all morning, has not pulled at any tubes. Blood sugars stable.

## 2017-09-13 NOTE — PLAN OF CARE
Problem: Goal Outcome Summary  Goal: Goal Outcome Summary  Discharge Planner OT   Patient plan for discharge: rehab  Current status: pt in bed sleeping, pt opens eyes and responds to therapist questions. Pt agreeable to working with OT however when instructed to complete sitting up to EOB pt required max A to advance BLE over to EOB when attempting to assist pt with transfer for trunk pt brings BLE back up to bed and does not assist with participation in completing transfers, transfer attempted several times.  Pt had eyes closed throughout attempts to sit EOB.    Barriers to return to prior living situation: current level of assist for transfers and ADLS  Recommendations for discharge: TCU per plan established by the Occupational Therapist  Rationale for recommendations: not at baseline       Entered by: Itzel Childs 09/13/2017 2:41 PM

## 2017-09-13 NOTE — PLAN OF CARE
Problem: Restraint for Non-Violent/Non-Self-Destructive Behavior  Goal: Prevent/Manage Potential Problems  Maintain safety of patient and others during period of restraint.  Promote psychological and physical wellbeing.  Prevent injury to skin and involved body parts.  Promote nutrition, hydration, and elimination.   Outcome: Completed Date Met:  09/13/17  Pt not needing restraints today, not pulling on tubes.

## 2017-09-14 NOTE — PLAN OF CARE
Problem: Goal Outcome Summary  Goal: Goal Outcome Summary  Outcome: Improving  Pt A/Ox1, oriented to self. Confused. Assist x2 with walker and gait belt, fall risk. No complaints of pain. VSS on RA. Tube feed infusing. Speech consult tomorrow. IV Zosyn. Lung sounds clear. Pt hasn't been pulling at any tubes since wrist restraints removed this morning. D/C pending progress.

## 2017-09-14 NOTE — PLAN OF CARE
Problem: Goal Outcome Summary  Goal: Goal Outcome Summary  Discharge Planner SLP   Patient plan for discharge: Did not state  Current status: SLP: Follow up dysphagia treatment provided with notable improvments in participation, ability to follow commands and converse. Pt agreeable to trial thin liquids via cup, puree and regular solids. Functional oral phase and no overt s/sx of aspiration throughout on multiple trials. Pt instructed in safe swallow strategies. Recommended: regular diet, thin liquids, upright with all intake, small bites and sips, alternate solids and liquids.   Barriers to return to prior living situation: Cognition; weakness  Recommendations for discharge: Per PT/OT recommendations  Rationale for recommendations: Short term ST to target meal tolerance and further safe swallow strategy training       Entered by: Jana Chirinos 09/14/2017 10:39 AM

## 2017-09-14 NOTE — PROGRESS NOTES
[facility]  Hospitalist Progress Note          Assessment and Plan:   Mr Niko Mireles is a 52 year old male with a past medical history of hypertension, depression, obsessive-compulsive disorder and diverticulitis.  He presented with left lower chest pain and shortness of breath.  He was found to have a loculated pleural effusion with multifocal pneumonia, developed hypoxic respiratory distress and eventually developed acute respiratory distress syndrome, required intubation and pronging on 08/23, status post thoracentesis on 08/23 and bronchoscopy on 08/24.  He was extubated on 09/02, weaned from BiPAP now down to 4 liters nasal cannula.  He has completed a course of vancomycin, Zosyn and azithromycin for his infection.  He is still suffering from numerous metabolic issues, encephalopathy and new fever but has been transferred to the hospitalist service for ongoing management on 9/5.       S/p Acute respiratory distress syndrome and hypoxic respiratory failure due to  Loculated left-sided pleural effusion with multifocal pneumonia with subsequent:  -This patient was initially seen and thought to have either PE versus pneumonia and was started on heparin drip and ceftriaxone with azithromycin.  He was eventually able to go for CT chest with contrast and found to have loculated pleural effusion with multifocal pneumonia.  Unfortunately, his respiratory status deteriorated rapidly and he developed hypoxic respiratory failure and then ARDS.  He was intubated on 08/23, spent a significant time on vent, up until 09/02, spent 2 days on BiPAP,  weaned down to 4 liters nasal cannula. He improved from a respiratory standpoint overall, completed a course of vancomycin, Zosyn and azithromycin 9/1. Transferred out of ICU on 9/5  - off supplemental o2, improved mentation      Suspect new RLL HCAP vs aspiration - 9/6   CT abdomen completed 9/5 for ongoing fevers and elevated lipase shows a new RLL infiltrate  compared to previous exam 8/22 with resolution of the previously seen LLL infiltrate. No abd source for fever noted. Continues to spike fever with Tmax 101.5 in the last 24 hours. Still with leukocytosis with left shift.   - Follow repeat pan culture from [facility]  from 9/5, 9/6, 9/7.  - wbc remains nl and pt afebrile    - completed Vaco (9/6-11)and Zosyn (9/6 -13)       Dysphagia/ increased risk for aspiration:  - improved,SLP following,no aspiration, recommended reg diet today 9/14   - Nurition following - ? If Tube feeds can be d/richard & started on po supplements  - rif no TF needed remove nasal feeding tube-discussed with RN       Emesis on 9/11 am:  - resolved  - abd x-ray 9/11 showed non specific gas patern,      Prolonged encephalopathy/catatonia:  Hx obsessive- compulsive disorder:   Improved   -initially related to his prolonged ICU stay/ delirium , as he was requiring paralytic agents to remain on vent and he was also critically ill.   - now due to catatonia    - CT head 9/5 showed ml atrophy,nl ammonia level 9/7, unable to do MRI may not stay still   - improving with restarting psych medications on 9/11 with clonazepam 0.5 mg bid for & PTA Buspar, Remeron & Prozac but changed to 20 mg/day instead of tid  - Psych following greatly appreciated, added prn seroquel if needed  - off restraints x >48 hrs  - Hold narcotics and benzo's.       Intermittent Fever:  - resolved, treatment as above      Elevated LFTs & Lipase  - etiology likely non GI infection or possibly related to sepsis  - Abd US on 8/29 showed GB sludge with no stones or evidence of cholecystitis. CT abd 9/5 shows a normal GB and no inflamation around the pancrease.     - improved, T bili 0.9, nl Alpo4, ASt & ALT      Hypernatremia  - resolved, Na 138 today      Hyperglycemia.    - likely induced by stress, given his critical illness  - - 123's, check A1c  - continue medium sliding scale insulin.      Acute renal insufficiency  - duet to sepsis, cr  on 2.31 on 8/22  - resolved, 1.05 today.    Acute right Gastrocnemius thrombus - 9/2  Acute DVT right peroneal vein - 9/5  Found on RLE LE US completed for edema and fever 9/2. Suspect this is a result  from prolonged ICU stay, but had not required anticoagulation to this point.  The intensivist put in orders for Do[place] of the area to assess for any change in the clot 9/5 and noted with a new occlusive DVT in the right peroneal vein.  - Initiated on therapeutic Lovenox 1.5 mg/kg daily 9/5.   - consider changing to NOAC in am for d/c  - Will need at least 3 months of therapy on discharge.        Fluids, electrolytes, nutrition:  PO diet started today, change liq medications to tablets in am  GI prophylaxis:  protonix  Restraints: cont bilat soft wrist restraints  DVT Prophylaxis: Enoxaparin (Lovenox) SQ  Code Status: Full Code       Disposition: will require ARU when ready.PT/OT /SPL involved.       Silvia Devine MD.  Hospitalist [phone] (7am -6 pm)             Interval History:   No new c/o. Mentation back to baseline, passed swallow test this morning- diet advanced to regular.              Medications:       FLUoxetine  20 mg Per NG tube Daily     busPIRone  30 mg Per NG tube BID     mirtazapine  15 mg Per NG tube At Bedtime     clonazePAM  0.5 mg Per NG tube BID     metoprolol  50 mg Per Feeding Tube BID     enoxaparin  1.5 mg/kg Subcutaneous Q24H     amLODIPine  10 mg Per Feeding Tube Daily     sodium chloride (PF)  10 mL Intracatheter Q7 Days     pantoprazole  40 mg Per Feeding Tube Daily     insulin aspart  1-6 Units Subcutaneous Q4H     QUEtiapine, ipratropium - albuterol 0.5 mg/2.5 mg/3 mL, HOLD MEDICATION, lidocaine (buffered or not buffered), sodium chloride (PF), heparin lock flush, sodium chloride (PF), acetaminophen, glucose **OR** dextrose **OR** glucagon, labetalol, potassium chloride, potassium chloride, potassium chloride, potassium chloride with lidocaine, potassium chloride, miconazole,  "naloxone, hypromellose-dextran, lidocaine (buffered or not buffered), lidocaine 4%, - MEDICATION INSTRUCTIONS -, acetaminophen, senna-docusate, bisacodyl, [DISCONTINUED] ondansetron **OR** ondansetron               Physical Exam:   Blood pressure 115/77, pulse 88, temperature 99  F (37.2  C), temperature source Axillary, resp. rate 16, height 1.854 m (6' 0.99\"), weight 93.6 kg (206 lb 5.6 oz), SpO2 94 %.  Wt Readings from Last 4 Encounters:   17 93.6 kg (206 lb 5.6 oz)         Vital Sign Ranges  Temperature Temp  Av.4  F (36.9  C)  Min: 97.9  F (36.6  C)  Max: 99  F (37.2  C)   Blood pressure Systolic (24hrs), Av , Min:110 , Max:132        Diastolic (24hrs), Av, Min:77, Max:92      Pulse Pulse  Av  Min: 88  Max: 88   Respirations Resp  Av.8  Min: 15  Max: 18   Pulse oximetry SpO2  Av.3 %  Min: 92 %  Max: 97 %         Intake/Output Summary (Last 24 hours) at 17 1158  Last data filed at 17 0600   Gross per 24 hour   Intake             3570 ml   Output                0 ml   Net             3570 ml       Constitutional: Significantly improved.Awake, alert, cooperative, no apparent distress, sitting up in chair> has nasal FT in place (TF held while waiting for new bag)   Lungs: Clear to auscultation bilaterally, no crackles or wheezing   Cardiovascular: Regular rate and rhythm, normal S1 and S2, and no murmur noted   Abdomen: Normal bowel sounds, soft, non-distended, non-tender   Skin: No rashes, no cyanosis, no edema   Neuro: Nl speech               Data:   All laboratory data reviewed    "

## 2017-09-14 NOTE — CONSULTS
[facility] Psychiatric Consult Progress Note      Interval History:   Pt seen, care reviewed with treatment team.  I reviewed Niko's records including the initial psychiatric consultation.  He is now taking his baseline psychiatric medications.  He has extremely complex medical issues and what appears to be a resolving delirium.  He is still disoriented, knows he is in the hospital, but thought he was at [facility], was unable to name the day of the week, month or date.  He looks disheveled, but he was calm during my visit.  He is not overtly delusional,  though Dr. Kohli's note yesterday suggested that he may been having some perceptual disturbances.  He mentioned in his note that he was going to start him on Zyprexa but never did so.  At this point since  he is showing general improvement, I would probably recommend we have some p.r.n. Seroquel available, and not schedule antipsychotics at this point unless he develops persistent agitation and psychotic symptomatology.     Review of systems:   The Review of Systems is negative other than noted in the HPI     Medications:       FLUoxetine  20 mg Per NG tube Daily     busPIRone  30 mg Per NG tube BID     mirtazapine  15 mg Per NG tube At Bedtime     clonazePAM  0.5 mg Per NG tube BID     metoprolol  50 mg Per Feeding Tube BID     piperacillin-tazobactam  4.5 g Intravenous Q6H     enoxaparin  1.5 mg/kg Subcutaneous Q24H     amLODIPine  10 mg Per Feeding Tube Daily     sodium chloride (PF)  10 mL Intracatheter Q7 Days     pantoprazole  40 mg Per Feeding Tube Daily     insulin aspart  1-6 Units Subcutaneous Q4H     QUEtiapine, ipratropium - albuterol 0.5 mg/2.5 mg/3 mL, HOLD MEDICATION, lidocaine (buffered or not buffered), sodium chloride (PF), heparin lock flush, sodium chloride (PF), acetaminophen, glucose **OR** dextrose **OR** glucagon, labetalol, potassium chloride, potassium chloride, potassium chloride, potassium chloride with  lidocaine, potassium chloride, miconazole, naloxone, hypromellose-dextran, lidocaine (buffered or not buffered), lidocaine 4%, - MEDICATION INSTRUCTIONS -, acetaminophen, senna-docusate, bisacodyl, [DISCONTINUED] ondansetron **OR** ondansetron      Mental Status Examination:     Appearance:  awake, alert and appeared older than stated age, disheveled, NG tube in place  Eye Contact:  poor   Speech:  decreased prosody and paucity of speech  Psychomotor Behavior:  no evidence of tardive dyskinesia, dystonia, or tics  Mood:  better  Affect:  mood congruent and intensity is blunted  Thought Process:  linear no loose associations  Thought Content:  no evidence of suicidal ideation or homicidal ideation and But he is a little bit distracted and confused  Oriented to:  Person only  Attention Span and Concentration:  poor  Recent and Remote Memory:  poor  Fund of Knowledge: delayed  Muscle Strength and Tone: normal  Gait and Station: Impaired due to weakness  Insight:  limited  Judgment:  poor        Labs/vitals:     Recent Results (from the past 24 hour(s))   Glucose by meter    Collection Time: 09/13/17  8:58 AM   Result Value Ref Range    Glucose 119 (H) 70 - 99 mg/dL   Glucose by meter    Collection Time: 09/13/17 11:52 AM   Result Value Ref Range    Glucose 120 (H) 70 - 99 mg/dL   Glucose by meter    Collection Time: 09/13/17  5:00 PM   Result Value Ref Range    Glucose 114 (H) 70 - 99 mg/dL   Glucose by meter    Collection Time: 09/13/17  8:43 PM   Result Value Ref Range    Glucose 104 (H) 70 - 99 mg/dL   Glucose by meter    Collection Time: 09/14/17  1:06 AM   Result Value Ref Range    Glucose 111 (H) 70 - 99 mg/dL   Glucose by meter    Collection Time: 09/14/17  5:12 AM   Result Value Ref Range    Glucose 116 (H) 70 - 99 mg/dL     B/P: 123/82, T: 98.1, P: 75, R: 18    Impression:   Niko Mireles is a 52-year-old man with longstanding history of depression and anxiety previously characterized as  obsessive-compulsive disorder.  He presented to the hospital on account of chest pain and was found to have a loculated pleural effusion with multifocal pneumonia. He appears delirious, but is calm at this point.  I will write for some p.r.n. Seroquel to address agitation and mood regulation.    DIagnoses:     1.  Delirium due to multiple etiologies.   2.  Obsessive-compulsive disorder, by history.   3.  Major depressive disorder, recurrent, moderate.   4.  Sedating/hypnotic use disorder (iatrogenic).   5.  Possible right lower lobe aspiration pneumonia.   6.  Status post acute respiratory distress syndrome and hypoxic respiratory failure due to loculated left-sided pleural effusion with multifocal pneumonia.   7.  Dysphagia.   8.  Hypernatremia.    9.  Hyperglycemia.          Plan:   1. Written information given on medications. Side effects, risks, benefits reviewed.  2. Medical management as you are.  3. I will add low dose Seroquel 25 mg q.6 hours p.r.n.   4. Continue other medications as ordered. Psych will f/u as needed, please reconsult us if necessary  Attestation:  Patient has been seen and evaluated by me,  Sagar Daly MD

## 2017-09-14 NOTE — PLAN OF CARE
Problem: Goal Outcome Summary  Goal: Goal Outcome Summary  Outcome: Improving  A&O. VSS on RA except low grade temperature of 99 degrees F (axillary). Denies pain. LS clear. No cough noted. No complaints of SOB/GUERRERO. Up to chair with assist of 2 and lift. Diet advanced to regular diet, thin liquids. C/o generalized weakness. BG checks every 4 hours. Contact precautions maintained. PICC to L upper arm. NG tube clamped; awaiting nutritionist recommendations regarding whether or not NG tube can be discontinued. Continues on IV abx. Plan is to possibly discharge tomorrow to TCU pending NG tube status. Nursing will continue to monitor.

## 2017-09-14 NOTE — CONSULTS
Received RN consult - pt now on regular diet, provider wondering if TF can be d/c'd.  Pt ordered an omelet for lunch, nothing else.    Will start calorie count today and evaluate adequacy of oral intake. Ideally, pt will consume at least 2/3 of assessed needs before d/c'ing tahmian count.    Amalia Hilario RD  Pager [phone] (M-F)            [phone] (W/E & Hol)

## 2017-09-14 NOTE — PROGRESS NOTES
D: DESTINY following for DC planning. Possible DC Friday.  I: DESTINY faxed referrals via DOD to [facility] and [facility], per discussion with spouse yesterday.   P: Will follow.     PAM Armendariz, LGSW  [phone]

## 2017-09-14 NOTE — PROGRESS NOTES
MD notified NG tube has been clamped temporarily due to writer puncturing TF bag prior to hanging new bag; diet advanced to regular, thin liquids per SLP. Per MD, okay to keep NG tube clamped for now to see how patient tolerates regular diet and consult nutrition for possible TF/NG tube discontinuation. Nutrition consult ordered.

## 2017-09-14 NOTE — PROVIDER NOTIFICATION
MD Notification    Notified Person:  MD    Notified Persons Name: Sybil    Notification Date/Time: 9/14/2017 3:45 PM    Notification Interaction:  Text paged Physician    Purpose of Notification: Pt had poor oral intake for first regular diet meal (25%). Do you still want TF removed/Restarted? Thank You.    Orders Received: Leave tube in. Don't restart feeding because then he will continue to not have an appetite. Continue with calorie count as Nutritionist has ordered.     Comments:

## 2017-09-14 NOTE — PLAN OF CARE
Problem: Goal Outcome Summary  Goal: Goal Outcome Summary  Pt disoriented, slept this shift, minimal impulsive. VSS on RA. No c/o of pain, SOB. TF 65ml/hr with 150ml water flushes q3H. , 116.  Pchych, Pt/OT, speech following. Rook boots off. Assist x2 with walker and gait belt, fall risk. Speech consult today. D/c pending.

## 2017-09-15 NOTE — PLAN OF CARE
Problem: Goal Outcome Summary  Goal: Goal Outcome Summary  Speech Language Therapy Discharge Summary     Reason for therapy discharge:    All goals and outcomes met, no further needs identified.     Progress towards therapy goal(s). See goals on Care Plan in [facility] electronic health record for goal details.  Goals met. Pt tolerated regular textures and thin liquids with no overt s/sx of aspiration. Pt demonstrated independent use of safe swallow strategies and positioning. No further ST needs identified. Pt educated on calorie counts and dietician to TF order.      Therapy recommendation(s):    No further therapy is recommended.

## 2017-09-15 NOTE — PROVIDER NOTIFICATION
MD Notification     Notified Person:  MD     Notified Persons Name: Sybil     Notification Date/Time: 9/15/2017 3:50 PM     Notification Interaction:  Text paged Physician     Purpose of Notification: Grey port in PICC does not flush. IV team has seen pt, discontinue orders needed to discontinue PICC.     Orders Received: Order to discontinue PICC placed.     Comments:

## 2017-09-15 NOTE — PROGRESS NOTES
Discussed changing to noc TF with Tian, care coordinator, to see if pt's appetite will improve. Also discussed with Dr. Devine.  Pt is on a regular diet and so far has had very poor intake.    Will change to noc feeding: Isosource 1.5 at 65 mL/hr x 12 hr = 1170 kcals (13 kcal/kg), 53 gm pro (0.6 gm/kg), 600 mL H20, 11 gm fiber.  Calorie counts in progress, if intake remains poor, will resume continuous feeds.    Amalia Hilario, PATTIE  Pager [phone] (M-F)            [phone] (W/E & Hol)

## 2017-09-15 NOTE — PROGRESS NOTES
DESTINY  D:   Patient was declined by ARU based on input from therapists.  N/G feeding restarted due to poor intake.  [facility] declined taking patient as they do not have a private room available and patient has recent dx of MRSA.  [facility] does not accept patients who have a n/g feeding tube.    Left message with Farhana hamm[facility] [place] to update their assessment that patient will d/c with a n/g feeding tube.

## 2017-09-15 NOTE — PROGRESS NOTES
[facility]  Hospitalist Progress Note  Date of service (when I saw the patient) 09/15/2017:         Assessment and Plan:    Mr Niko Mireles is a 52 year old male with a past medical history of hypertension, depression, obsessive-compulsive disorder and diverticulitis.  He presented with left lower chest pain and shortness of breath.  He was found to have a loculated pleural effusion with multifocal pneumonia, developed hypoxic respiratory distress and eventually developed acute respiratory distress syndrome, required intubation and pronging on 08/23, status post thoracentesis on 08/23 and bronchoscopy on 08/24.  He was extubated on 09/02, weaned from BiPAP now down to 4 liters nasal cannula.  He has completed a course of vancomycin, Zosyn and azithromycin for his infection.  He is still suffering from numerous metabolic issues, encephalopathy and new fever but has been transferred to the hospitalist service for ongoing management on 9/5.       S/p Acute respiratory distress syndrome and hypoxic respiratory failure due to  Loculated left-sided pleural effusion with multifocal pneumonia with subsequent:  -This patient was initially seen and thought to have either PE versus pneumonia and was started on heparin drip and ceftriaxone with azithromycin. CT chest w/c showed loculated pleural effusion with multifocal pneumonia. His respiratory status deteriorated rapidly and he developed hypoxic respiratory failure and then ARDS. Transferred to ICU & intubated on 08/23, spent a significant time on vent, up until 09/02, spent 2 days on BiPAP,  weaned down to 4 liters nasal cannula.   - completed a course of vancomycin, Zosyn and azithromycin 9/1.   - Transferred out of ICU on 9/5  - weaned off o2, mentation slow to improve, improving with restarting psych meds      Suspect new RLL HCAP vs aspiration - 9/6   CT abdomen completed 9/5 for ongoing fevers and elevated lipase shows a new RLL infiltrate  compared to previous exam 8/22 with resolution of the previously seen LLL infiltrate. No abd source for fever noted. Continues to spike fever with Tmax 101.5 in the last 24 hours. Still with leukocytosis with left shift.   - Follow repeat pan culture from [facility]  from 9/5, 9/6, 9/7.  - wbc remains nl and pt afebrile    - completed Vaco (9/6-11)and Zosyn (9/6 -13)       Dysphagia/ increased risk for aspiration:  - SLP following,no aspiration, recommended reg diet today 9/14   - v.poor po intake,Nurition following   - Tube feeds resumed via Nasal FT  - discussed with RN, supervised feeding & psych reconsulted in am (? Psych med adjustment).  - may need a PEG    - rif no TF needed remove nasal feeding tube-discussed with RN      Prolonged encephalopathy/catatonia:  Hx obsessive- compulsive disorder:   Improved   -initially related to his prolonged ICU stay/ delirium , as he was requiring paralytic agents to remain on vent and he was also critically ill.   - now due to catatonia    - CT head 9/5 showed ml atrophy,nl ammonia level 9/7, unable to do MRI may not stay still   - initially significant improvment with restarting psych medications on 9/11 with clonazepam 0.5 mg bid for & PTA Buspar, Remeron & Prozac but changed to 20 mg/day instead of tid, prn seroquel if needed  - now poor appetite ? If pt would benefit from further  Psych medication adjustment, so Psych consult requested for am.  - off restraints x >48 hrs   - Hold narcotics and benzo's.        Intermittent Fever:  - resolved, treatment as above       Emesis on 9/11 am:  - resolved  - abd x-ray 9/11 showed non specific gas patern,      Elevated LFTs & Lipase  - etiology likely non GI infection or possibly related to sepsis  - Abd US on 8/29 showed GB sludge with no stones or evidence of cholecystitis. CT abd 9/5 shows a normal GB and no inflamation around the pancrease.     - improved, T bili 0.9, nl Alpo4, ASt & ALT      Hypernatremia  - resolved, Na 138  today      Hyperglycemia.    - likely induced by stress, given his critical illness  - - 123's, check A1c  - continue medium sliding scale insulin.      Acute renal insufficiency  - duet to sepsis, cr on 2.31 on 8/22  - resolved, 1.05 today.     Acute right Gastrocnemius thrombus - 9/2  Acute DVT right peroneal vein - 9/5  Found on RLE LE US completed for edema and fever 9/2. Suspect this is a result  from prolonged ICU stay, but had not required anticoagulation to this point.  The intensivist put in orders for Dopplers of the area to assess for any change in the clot 9/5 and noted with a new occlusive DVT in the right peroneal vein.  - Initiated on therapeutic Lovenox 1.5 mg/kg daily 9/5.   - Continue Lovenox for now incase he may need a PEG,   - if no further intervention consider changing to NOAC  before d/c  - Will need at least 3 months of therapy on discharge.        Fluids, electrolytes, nutrition:     PO diet started 9/14 but due to v poor po intake , continued on nasal Tube feeds(consider nocturnal TFs).   - discussed with RN, supervised feeding & psych reconsulted in am (? Psych med adjustment).  GI prophylaxis:  protonix  Restraints: none   DVT Prophylaxis: Enoxaparin (Lovenox) SQ  Code Status: Full Code       Disposition: waiting for placement toTCU/ ARU if he may need continuous TF. PT/OT /SPL involved.       Silvia Devine MD.  Hospitalist [phone] (7am -6 pm)             Interval History:   Poor po intake so nutrition restarted nasal tube feeds.              Medications:       [START ON 9/16/2017] FLUoxetine  20 mg Oral Daily     [START ON 9/16/2017] pantoprazole  40 mg Oral QAM     busPIRone  30 mg Per NG tube BID     mirtazapine  15 mg Per NG tube At Bedtime     clonazePAM  0.5 mg Per NG tube BID     metoprolol  50 mg Per Feeding Tube BID     enoxaparin  1.5 mg/kg Subcutaneous Q24H     amLODIPine  10 mg Per Feeding Tube Daily     sodium chloride (PF)  10 mL Intracatheter Q7 Days      "insulin aspart  1-6 Units Subcutaneous Q4H     QUEtiapine, ipratropium - albuterol 0.5 mg/2.5 mg/3 mL, HOLD MEDICATION, lidocaine (buffered or not buffered), sodium chloride (PF), heparin lock flush, sodium chloride (PF), acetaminophen, glucose **OR** dextrose **OR** glucagon, labetalol, potassium chloride, potassium chloride, potassium chloride, potassium chloride with lidocaine, potassium chloride, miconazole, naloxone, hypromellose-dextran, lidocaine (buffered or not buffered), lidocaine 4%, - MEDICATION INSTRUCTIONS -, acetaminophen, senna-docusate, bisacodyl, [DISCONTINUED] ondansetron **OR** ondansetron               Physical Exam:   Blood pressure 112/77, pulse 78, temperature 98.4  F (36.9  C), temperature source Oral, resp. rate 18, height 1.854 m (6' 0.99\"), weight 92.2 kg (203 lb 4.2 oz), SpO2 94 %.  Wt Readings from Last 4 Encounters:   09/15/17 92.2 kg (203 lb 4.2 oz)         Vital Sign Ranges  Temperature Temp  Av.7  F (37.1  C)  Min: 98.1  F (36.7  C)  Max: 99.3  F (37.4  C)   Blood pressure Systolic (24hrs), Av , Min:104 , Max:129        Diastolic (24hrs), Av, Min:62, Max:77      Pulse Pulse  Av  Min: 78  Max: 78   Respirations Resp  Av  Min: 16  Max: 18   Pulse oximetry SpO2  Av.3 %  Min: 94 %  Max: 97 %         Intake/Output Summary (Last 24 hours) at 09/15/17 1255  Last data filed at 09/15/17 1246   Gross per 24 hour   Intake              880 ml   Output              150 ml   Net              730 ml     Nasal feeding tube in place with Tube feeds running  Constitutional: Sleeping in the middle of the day, seems sluggish but wakes up when called,  cooperative, no apparent distress   Lungs: Clear to auscultation bilaterally, no crackles or wheezing   Cardiovascular: Regular rate and rhythm, normal S1 and S2, and no murmur noted   Abdomen: Distended, soft,normal bowel sounds,non-tender   Skin: No rashes, no cyanosis, no edema   Neuro:                Data:   All laboratory " data reviewed

## 2017-09-15 NOTE — PLAN OF CARE
Problem: Goal Outcome Summary  Goal: Goal Outcome Summary  Outcome: No Change  Pt alert, oriented to self, able to tell current year.  Very forgetful.  VSS on RA. Denies pain.  L/s clear, dim at base.  +BS.  No BM this shift.  Incontinent of urine x3.  Also using urinal at bedside.  Pt on reg diet with thin liquid.  No oral intake overnight.  Denies nausea.  BG 89, 87.  Pt on calorie count. TF on hold at this time, Clamped.  Pt able to roll side to side independently in bed. Up assist x2 with Walker/GB to bedside commode.  PICC line intact/patent.  D/c to TCU possibly on Friday.  SW following for placement referrals. Nursing continue to monitor.

## 2017-09-15 NOTE — PLAN OF CARE
Problem: Goal Outcome Summary  Goal: Goal Outcome Summary  Outcome: Improving  A/O x1, alter to self only. VSS on room air. No C/O pain. Assist of 1/Walker/GB to bedside commode. Assist of 2 to reposition in bed. Had 2 BM this shift in Bedside commode. Voiding multiple times via beside commode. incontinent at times. Poor appetite. Ate a couple bites of his omelet, cantaloupe, ice cream, and requested chicken broth at bedtime. Drinking lots of water and requested ginger ale. No complaints of GI distress or Nausea. He states he just feels full easily. BG was 89/101, no S/S insulin given this shift. Family visited this evening and Wife would like update on plan of care after MD rounds in the AM. Continue to monitor pt.

## 2017-09-15 NOTE — PROGRESS NOTES
Spoke with dietary. Discussed putting TF on hold for now until 1800 in which it should be restarted.

## 2017-09-15 NOTE — PLAN OF CARE
Problem: Goal Outcome Summary  Goal: Goal Outcome Summary  Outcome: No Change  A&Ox2. Disoriented to place and situation. Cooperative with cares. VSS on RA. LS coarse. Tolerating regular diet, but diet is poor. TF started at 1030; TF on hold now (1500) until 1800 when it should be restarted again. Nutrition following. Up to commode/chair with assist of 1-2, gait belt, and walker. BG checks every 4 hours, no coverage needed. Voiding adequately, incontinent at times. BM x 1 this shift. PICC, grey lumen not flushing; IV access paged to assess. Wife visited briefly. Psych, PT/OT following. Nursing will continue to monitor.

## 2017-09-15 NOTE — PROGRESS NOTES
IV team paged to assess PICC to R arm. Grey lumen is not flushing. Red and white lumens do flush.

## 2017-09-15 NOTE — PLAN OF CARE
Problem: Goal Outcome Summary  Goal: Goal Outcome Summary  Discharge Planner OT   Patient plan for discharge: rehab  Current status: Supine > sit with CGA. Sit <> stand from EOB x 2 trials with min assist x 2 and FWW. Verbal cues and hand over hand assist needed for safe UE placement during transfers. Encouragement needed to complete self-cares in standing in order to address strength and endurance as he reported 7/10 low back pain. Pt eventually agreeable to washing wash in standing with FWW and min assist for balance. He completed oral hygiene cares while seated EOB with set up. Pt able to follow commands ~95% of the time. Sit > supine with CGA. Verbal cues only for bed repositioning. Will be increasing frequency to 5 x per week.   Barriers to return to prior living situation: current level of assist for transfers and ADLS  Recommendations for discharge: TCU   Rationale for recommendations: Pt would benefit from TCU at discharge in order to maximize safety and independence with ADL/IADLs and return to PLOF.

## 2017-09-15 NOTE — PROGRESS NOTES
[place] [facility] update. Update given. Questions answered to her satisfaction. Nothing further needed at this time.

## 2017-09-15 NOTE — PROGRESS NOTES
CALORIE COUNT      Approximate Oral Intake for:   9/14  Calories: 55  Protein:   5 gm    Number of Meals Recorded:  2       Estimated Needs:    Calories: 4726-2114  Protein:    105-130 gm    Summary:   TF has been on hold since started diet yesterday afternoon.   Pt only ordered an omelet for lunch and ice cream and fruit for dinner.  He ate 25% of the omelet and only a few bites at dinner.  Discussed with RN, will resume TF since intake so poor.    Amalia Hilario RD  Pager [phone] (M-F)            [phone] (W/E & Hol)

## 2017-09-16 NOTE — PROGRESS NOTES
"Pt was agitated and wanting to leave the facility. Pt is confuse to time, place, and situation. Unable to reorient pt or redirect. Gave PRN Seroquel, ineffective. Wished to call wife Lula. Called wife, pt talked to her. Wife stated \"I can't come to get him\" Was able to calm down around 0245. Pt had sitter at bedside since 0130. Pt was agitated again at 0300. Threatened to leave the facility, walked to the door with unsteady gait. When staff attempted to explain the situation, pt threw fist to the unit charge nurse. Called code 21. One time dose of IM Zyprexa given per order. Pt calmed down at this time. Will continue to monitor.  "

## 2017-09-16 NOTE — PLAN OF CARE
Problem: Goal Outcome Summary  Goal: Goal Outcome Summary  Outcome: Improving  A/O x2-3, disoriented to situation, knew it was September 2017 but not date. VSS on RA. Tele: NSR, BBB, now discontinued. Up w/1 GB/walker. Denies pain/nausea/SOB. Up in the chair for meals. Poor appetite, encourage PO, calorie counts, tube feeding to start at 1800. /103. LS diminished. Incontinent at times. BM x1 soft/brown. No IV access, PICC removed yesterday. Psych to see patient, calm and cooperative throughout shift. D/C pending placement to TCU/ARF. Nursing will continue to monitor.

## 2017-09-16 NOTE — PROGRESS NOTES
[facility]  Hospitalist Progress Note  Date of service (when I saw the patient) 09/15/2017:         Assessment and Plan:    Mr Niko Mireles is a 52 year old male with a past medical history of hypertension, depression, obsessive-compulsive disorder and diverticulitis.  He presented with left lower chest pain and shortness of breath.  He was found to have a loculated pleural effusion with multifocal pneumonia, developed hypoxic respiratory distress and eventually developed acute respiratory distress syndrome, required intubation and pronging on 08/23, status post thoracentesis on 08/23 and bronchoscopy on 08/24.  He was extubated on 09/02, weaned from BiPAP now stable on RA.  He has completed a course of vancomycin, Zosyn and azithromycin for his infection.  He is still suffering from numerous metabolic issues, encephalopathy and new fever but has been transferred to the hospitalist service for ongoing management on 9/5.       S/p Acute respiratory distress syndrome and hypoxic respiratory failure due to  Loculated left-sided pleural effusion with multifocal pneumonia with subsequent:  -This patient was initially seen and thought to have either PE versus pneumonia and was started on heparin drip and ceftriaxone with azithromycin. CT chest w/c showed loculated pleural effusion with multifocal pneumonia. His respiratory status deteriorated rapidly and he developed hypoxic respiratory failure and then ARDS. Transferred to ICU & intubated on 08/23, spent a significant time on vent, up until 09/02, spent 2 days on BiPAP,  weaned down to 4 liters nasal cannula, now stable on RA   - completed a course of vancomycin, Zosyn and azithromycin 9/1.   - Transferred out of ICU on 9/5,  mentation slow to improve, improving with restarting psych meds      Suspect new RLL HCAP vs aspiration - 9/6   CT abdomen completed 9/5 for ongoing fevers and elevated lipase shows a new RLL infiltrate compared to previous  exam 8/22 with resolution of the previously seen LLL infiltrate. No abd source for fever noted..- wbc remains nl and pt afebrile    - completed Vaco (9/6-11)and Zosyn (9/6 -13)       Dysphagia/ increased risk for aspiration:  - SLP following,no aspiration, recommended reg diet 9/14   Pt tolerating regular diet  Ate small amount of meals TID yesterday. On nocturnal TF now.      Prolonged encephalopathy/catatonia:  Hx obsessive- compulsive disorder:   Improved   -initially related to his prolonged ICU stay/ delirium , as he was requiring paralytic agents to remain on vent and he was also critically ill.   - now due to catatonia    - CT head 9/5 showed ml atrophy,nl ammonia level 9/7, unable to do MRI may not stay still   - initially significant improvment with restarting psych medications on 9/11 with clonazepam 0.5 mg bid for & PTA Buspar, Remeron & Prozac but changed to 20 mg/day instead of tid, prn seroquel if needed  - now poor appetite ? If pt would benefit from further  Psych medication adjustment, so Psych consult requested for today  - off restraints x >48 hrs   - Hold narcotics and benzo's.        Intermittent Fever:  - resolved, treatment as above       Emesis on 9/11 am:  - resolved  - abd x-ray 9/11 showed non specific gas patern,      Elevated LFTs & Lipase  - etiology likely non GI infection or possibly related to sepsis  - Abd US on 8/29 showed GB sludge with no stones or evidence of cholecystitis. CT abd 9/5 shows a normal GB and no inflamation around the pancrease.     - improved, T bili 0.9, nl Alpo4, ASt & ALT      Hypernatremia  - resolved      Hyperglycemia.    - likely induced by stress, given his critical illness  - - 123's, check A1c  - continue medium sliding scale insulin.      Acute renal insufficiency  - duet to sepsis, cr on 2.31 on 8/22  - resolved     Acute right Gastrocnemius thrombus - 9/2  Acute DVT right peroneal vein - 9/5  Found on RLE LE US completed for edema and fever 9/2.  Suspect this is a result  from prolonged ICU stay, but had not required anticoagulation to this point.  The intensivist put in orders for Dopplers of the area to assess for any change in the clot 9/5 and noted with a new occlusive DVT in the right peroneal vein.  - Initiated on therapeutic Lovenox 1.5 mg/kg daily 9/5.   - Continue Lovenox --if po intake improves further, would change to NOAC- - Will need at least 3 months of therapy on discharge.        Fluids, electrolytes, nutrition:     PO diet started 9/14 but due to v poor po intake , continued on nasal Tube feeds(consider nocturnal TFs).   - discussed with RN, supervised feeding & psych reconsulted in am (? Psych med adjustment).  GI prophylaxis:  protonix  Restraints: none   DVT Prophylaxis: Enoxaparin (Lovenox) SQ  Code Status: Full Code       Disposition: waiting for placement toTCU/ ARU if he may need continuous TF. PT/OT /SPL involved.                    Interval History:   Doing ok. Eating some now. No new complaints              Medications:       FLUoxetine  20 mg Oral Daily     pantoprazole  40 mg Oral QAM     busPIRone  30 mg Per NG tube BID     mirtazapine  15 mg Per NG tube At Bedtime     clonazePAM  0.5 mg Per NG tube BID     metoprolol  50 mg Per Feeding Tube BID     enoxaparin  1.5 mg/kg Subcutaneous Q24H     amLODIPine  10 mg Per Feeding Tube Daily     sodium chloride (PF)  10 mL Intracatheter Q7 Days     insulin aspart  1-6 Units Subcutaneous Q4H     QUEtiapine, ipratropium - albuterol 0.5 mg/2.5 mg/3 mL, HOLD MEDICATION, lidocaine (buffered or not buffered), sodium chloride (PF), heparin lock flush, sodium chloride (PF), acetaminophen, glucose **OR** dextrose **OR** glucagon, labetalol, potassium chloride, potassium chloride, potassium chloride, potassium chloride with lidocaine, potassium chloride, miconazole, naloxone, hypromellose-dextran, lidocaine (buffered or not buffered), lidocaine 4%, - MEDICATION INSTRUCTIONS -, acetaminophen,  "senna-docusate, bisacodyl, [DISCONTINUED] ondansetron **OR** ondansetron               Physical Exam:   Blood pressure 117/77, pulse 74, temperature 98  F (36.7  C), temperature source Oral, resp. rate 16, height 1.854 m (6' 0.99\"), weight 91.2 kg (201 lb 1 oz), SpO2 92 %.  Wt Readings from Last 4 Encounters:   17 91.2 kg (201 lb 1 oz)         Vital Sign Ranges  Temperature Temp  Av.7  F (37.1  C)  Min: 98.1  F (36.7  C)  Max: 99.3  F (37.4  C)   Blood pressure Systolic (24hrs), Av , Min:104 , Max:129        Diastolic (24hrs), Av, Min:62, Max:77      Pulse Pulse  Av  Min: 78  Max: 78   Respirations Resp  Av  Min: 16  Max: 18   Pulse oximetry SpO2  Av.3 %  Min: 94 %  Max: 97 %         Intake/Output Summary (Last 24 hours) at 09/15/17 1255  Last data filed at 09/15/17 1246   Gross per 24 hour   Intake              880 ml   Output              150 ml   Net              730 ml     Nasal feeding tube in place with Tube feeds running  Constitutional: Sleeping in the middle of the day, seems sluggish but wakes up when called,  cooperative, no apparent distress   Lungs: Clear to auscultation bilaterally, no crackles or wheezing   Cardiovascular: Regular rate and rhythm, normal S1 and S2, and no murmur noted   Abdomen: Distended, soft,normal bowel sounds,non-tender   Skin: No rashes, no cyanosis, no edema   Neuro:                Data:   All laboratory data reviewed    "

## 2017-09-16 NOTE — PROGRESS NOTES
CALORIE COUNT      Approximate Oral Intake for:  (9/15)     Calories: 500 cals  Protein: 30 gms      Nocturnal TF: (6pm-6am)  Isosource 1.5 at 65 mL/hr x 12 hr = 1170 kcals (13 kcal/kg), 53 gm pro (0.6 gm/kg), 600 mL H20, 11 gm fiber.    TF + po intake = 1670 cals, 83 gm pro      Estimated Needs:    Calories: 6360-9676 cals  Protein: 105-132 cals      Summary:   Pt tolerating regular diet  Ate small amount of meals TID yesterday  Will send a high tahmina/pro supplement with meals

## 2017-09-16 NOTE — PLAN OF CARE
Problem: Goal Outcome Summary  Goal: Goal Outcome Summary  Discharge Planner OT   Patient plan for discharge: TCU  Current status: Pt completed sit to stand from chair Dalila, amb with FWW with cues to keep in front of self during mobility to/from bathroom, toilet transfer with RTS CGA, standing at sink for ADL task CGA, cues needed to problem solve with LE dressing when pt had BLE in same pant hole when changing of depends.    Barriers to return to prior living situation: strength, current level of assist  Recommendations for discharge: TCU per plan established by the Occupational Therapist  Rationale for recommendations: Pt would benefit from TCU at discharge in order to maximize safety and independence with ADL/IADLs and return to PLOF       Entered by: Itzel Childs 09/16/2017 3:00 PM

## 2017-09-16 NOTE — PLAN OF CARE
Problem: Goal Outcome Summary  Goal: Goal Outcome Summary  Outcome: No Change  Pt confused to time, place, situation. Very difficulty to reorient. VSS on RA. Denies pain. Tele SR with BBB. Up to beside commode to 1-2 assist with walker+GB. Fall risk. Tolerating reg diet, but poor appetite. TF running at 65 cc for overnight. BG 94, 124, no coverage per ISS. Voiding adequately. Awaiting for TCU placement. Nursing continue to monitor.

## 2017-09-16 NOTE — PLAN OF CARE
Problem: Goal Outcome Summary  Goal: Goal Outcome Summary  Outcome: No Change  vss, alert, oriented x2, disoriented to place and situation. Poor appetite, but did tolerate approx 25-33% of dinner meal.  ng tube feeding stopped during day, restarted and continued overnight. PICC discontinued today. Tele: SR with BBB. Contact precautions for MRSA in nares. Regular diet, up with 1-2 to the commode. Awaiting placement TCU.

## 2017-09-16 NOTE — PROVIDER NOTIFICATION
MD Notification    Notified Person:  MD    Notified Persons Name: Tristen    Notification Date/Time:9/16/17    Notification Interaction:  Talked with Physician    Purpose of Notification:Pt with orders for SSI, now eating and nocturnal TF.  Also requesting free water flush amount to be clarified.    Orders Received:MD telephone order to D/C SSI and diabetes orders.  Order to 60cc free water q4 hr.    Comments:

## 2017-09-16 NOTE — CONSULTS
"[facility] Psychiatric Consult Progress Note      Interval History:   Pt seen, care reviewed with treatment team. Staff report that Code 21 was called last night on account of patient's agitation. He was insisting on being discharged and would not respond to redirection. He was also said to have been throwing punches and he had to be medicated with IM Zyprexa which he responded to. Today he has been very calm and cooperative. He has not required any more psychotropics. He is more alert and oriented. Denies suicidal or homicidal ideation. He denies earlier delusions. He jokingly states that \"the people that wanted to give me money took all the money and left.\" He denies current A/V hallucinations.     Review of systems:   The Review of Systems is negative other than noted in the HPI     Medications:       OLANZapine  2.5 mg Oral At Bedtime     FLUoxetine  20 mg Oral Daily     pantoprazole  40 mg Oral QAM     busPIRone  30 mg Per NG tube BID     mirtazapine  15 mg Per NG tube At Bedtime     clonazePAM  0.5 mg Per NG tube BID     metoprolol  50 mg Per Feeding Tube BID     enoxaparin  1.5 mg/kg Subcutaneous Q24H     amLODIPine  10 mg Per Feeding Tube Daily     sodium chloride (PF)  10 mL Intracatheter Q7 Days     insulin aspart  1-6 Units Subcutaneous Q4H     QUEtiapine, ipratropium - albuterol 0.5 mg/2.5 mg/3 mL, HOLD MEDICATION, lidocaine (buffered or not buffered), sodium chloride (PF), heparin lock flush, sodium chloride (PF), acetaminophen, glucose **OR** dextrose **OR** glucagon, labetalol, potassium chloride, potassium chloride, potassium chloride, potassium chloride with lidocaine, potassium chloride, miconazole, naloxone, hypromellose-dextran, lidocaine (buffered or not buffered), lidocaine 4%, - MEDICATION INSTRUCTIONS -, acetaminophen, senna-docusate, bisacodyl, [DISCONTINUED] ondansetron **OR** ondansetron      Mental Status Examination:     Appearance:  awake, alert and appeared older than stated " age  Eye Contact:  better  Speech:  clear, coherent  Psychomotor Behavior:  no evidence of tardive dyskinesia, dystonia, or tics  Mood:  better  Affect:  appropriate and in normal range  Thought Process:  linear no loose associations  Thought Content:  no evidence of suicidal ideation or homicidal ideation and no evidence of psychotic thought  Oriented to:  time, person, and place  Attention Span and Concentration:  limited  Recent and Remote Memory:  poor  Fund of Knowledge: delayed  Muscle Strength and Tone: normal  Gait and Station: NA  Insight:  limited  Judgment:  limited        Labs/vitals:     Recent Results (from the past 24 hour(s))   Glucose by meter    Collection Time: 09/15/17  5:01 PM   Result Value Ref Range    Glucose 92 70 - 99 mg/dL   Glucose by meter    Collection Time: 09/15/17  9:05 PM   Result Value Ref Range    Glucose 121 (H) 70 - 99 mg/dL   Glucose by meter    Collection Time: 09/16/17  1:14 AM   Result Value Ref Range    Glucose 94 70 - 99 mg/dL   Glucose by meter    Collection Time: 09/16/17  4:21 AM   Result Value Ref Range    Glucose 124 (H) 70 - 99 mg/dL   Basic metabolic panel    Collection Time: 09/16/17  7:40 AM   Result Value Ref Range    Sodium 136 133 - 144 mmol/L    Potassium 3.6 3.4 - 5.3 mmol/L    Chloride 103 94 - 109 mmol/L    Carbon Dioxide 23 20 - 32 mmol/L    Anion Gap 10 3 - 14 mmol/L    Glucose 120 (H) 70 - 99 mg/dL    Urea Nitrogen 23 7 - 30 mg/dL    Creatinine 1.06 0.66 - 1.25 mg/dL    GFR Estimate 73 >60 mL/min/1.7m2    GFR Estimate If Black 88 >60 mL/min/1.7m2    Calcium 9.4 8.5 - 10.1 mg/dL   Glucose by meter    Collection Time: 09/16/17  8:50 AM   Result Value Ref Range    Glucose 124 (H) 70 - 99 mg/dL   Glucose by meter    Collection Time: 09/16/17 12:30 PM   Result Value Ref Range    Glucose 103 (H) 70 - 99 mg/dL     B/P: 123/82, T: 98.1, P: 75, R: 18    Impression:   Niko Mireles is a 52-year-old man with longstanding history of depression and anxiety  previously characterized as obsessive-compulsive disorder.  He presented to the hospital on account of chest pain and was found to have a loculated pleural effusion with multifocal pneumonia.  He had been catatonic at some point during his hospitalization and has continued to be intermittently confused.  It appears that he was taken off his Klonopin while he was in the ICU and may have experienced some encephalopathy from withdrawal from long-term benzodiazepine use.  He is currently on a combination of mirtazapine, Prozac, buspirone and clonazepam and claims he is doing well on these medications.          DIagnoses:     1.  Delirium due to multiple etiologies.   2.  Obsessive-compulsive disorder, by history.   3.  Major depressive disorder, recurrent, moderate.   4.  Sedating/hypnotic use disorder (iatrogenic).   5.  Possible right lower lobe aspiration pneumonia.   6.  Status post acute respiratory distress syndrome and hypoxic respiratory failure due to loculated left-sided pleural effusion with multifocal pneumonia.   7.  Dysphagia.   8.  Hypernatremia.    9.  Hyperglycemia.          Plan:   1. Written information given on medications. Side effects, risks, benefits reviewed.  2. Medical management as you are.  3. I will add low dose Zyprexa 2.5 mg qhs    Attestation:  Patient has been seen and evaluated by me,  Diallo Kohli MD

## 2017-09-16 NOTE — PROGRESS NOTES
DESTINY  I:  Writer spoke with Lou @ [facility] TCU and explained it is anticipated patient will be discharge on a nocturnal n/g feeding tube schedule until his oral intake is adequate.    Received a call today stating they can accept patient.  Updated Lou that patient had a period of agitation and wanting to leave last evening/night and based on this episode the care coordinator is requesting psychiatry be re -consulted.    P: To [facility] once behavior improved.  Will update wife regarding the availability of [facility].    [facility] and is is fine with this plan

## 2017-09-16 NOTE — SIGNIFICANT EVENT
Date of service: 9/16/2017     I came to see this patient in response to a code 21. The patient was agitated and wanting to leave the hospital; however, floor staff could not reason with the patient. The patient is refusing to stay in bed despite being given oral seroquel. The patient does not appear to have proper insight to make medical decisions. Will order 10 mg IM zyprexa to help with the patient's agitation.     5 minutes spent with patient     Saji Shah  [place] Physician

## 2017-09-16 NOTE — PLAN OF CARE
Problem: Goal Outcome Summary  Goal: Goal Outcome Summary  Discharge Planner PT   Patient plan for discharge: TCU  Current status: Pt seated up in chair, completes sit<>stand transfers with CGA initial attempt, SBA second attempt. Pt participates in seated and standing LE ex and pre-gait activities; pt ambulates at bedside with FWW and MinAx1.   Barriers to return to prior living situation: current level of assist, falls risk, decreased activity tolerance, weakness, decreased balance  Recommendations for discharge: TCU  Rationale for recommendations: pt would continue to benefit from increased levels of assist and supervision with mobility as well as daily therapies to progress safety and IND with functional mobility.       Entered by: Rosalva Doe 09/16/2017 3:48 PM     Goals and POC update per patient progression and activity tolerance (pt previously scheduled 3x/week and last seen 9/13).

## 2017-09-17 NOTE — PLAN OF CARE
Problem: Goal Outcome Summary  Goal: Goal Outcome Summary  Outcome: Improving  A&Ox3-4.  VSS.  Up with 1, belt and walker to bathroom and chair.  Denied pain.  PO good at dinner, on calorie count.  Nocturnal TF started at 1800.  Psych saw today and started scheduled zyprexa at bedtime.  Possible d/c tomorrow to rehab.

## 2017-09-17 NOTE — PLAN OF CARE
Problem: Goal Outcome Summary  Goal: Goal Outcome Summary  Outcome: No Change  Patient is alert and oriented, slightly forgetful.  Fall risk.  Up with staff assist of one and walker/belt to bathroom.  Denies pain.  Tolerating tube feeding at 65cc from 6p-6a.  Contact isolation for MRSA nares.  Patient calm and cooperative this shift, using call light, no inappropriate behaviors.

## 2017-09-17 NOTE — PLAN OF CARE
Problem: Goal Outcome Summary  Goal: Goal Outcome Summary  Discharge Planner PT   Patient plan for discharge: TCU  Current status: Pt seated in chair, completes sit<>stand with close SBA; ambulation in vargas with FWW with CGA, occasional MinAx1 at FWW for cues to maintain proximity. Pt with good tolerance for LE ex.  Barriers to return to prior living situation: current level of assist, falls risk/decreased balance, decreased activity tolerance, decreased strength  Recommendations for discharge: TCU  Rationale for recommendations: pt continues to benefit from daily therapies and increased levels of supervision/assist with mobility prior to return to home.       Entered by: Rosalva Doe 09/17/2017 9:42 AM

## 2017-09-17 NOTE — PLAN OF CARE
Problem: Goal Outcome Summary  Goal: Goal Outcome Summary  Outcome: Improving  A/O x4 VSS on RA. Up w/1 GB/walker, ambulated x2. Denies pain/nausea/SOB. Up in the chair for meals. Good appetite, encourage PO, calorie counts, tube feeding to start at 1800. 60 cc flushes via NG Q4hrs. Incontinent at times.  D/C to [facility] likely tomorrow, if good PO today possible removal of NG. Nursing will continue to monitor.

## 2017-09-17 NOTE — PROGRESS NOTES
DESTINY  D:  Per care coordinator, if patient eats adequately at lunch, MD will discharge patient to TCU.  I:  Writer updated Mariusz and spoke with Lou.    Their DON would like Lou to complete an on site assessment tomorrow morning before accepting patient.  This is because of the episode which occurred on the morning of 9/16.    Discussed with care coordinator. Writer will another facility to assess patient for admission today.  [facility] ([facility]) has a male vacancy today and will assess.  P:;  Will await assessment by [facility]    Upate:  [facility] wants to see how patient does overnight before making a decision.  So on Monday both [facility] and [facility] will re-assess for admission.

## 2017-09-17 NOTE — PROGRESS NOTES
[facility]  Hospitalist Progress Note  Date of service (when I saw the patient) 09/15/2017:         Assessment and Plan:    Mr Niko Mireles is a 52 year old male with a past medical history of hypertension, depression, obsessive-compulsive disorder and diverticulitis.  He presented with left lower chest pain and shortness of breath.  He was found to have a loculated pleural effusion with multifocal pneumonia, developed hypoxic respiratory distress and eventually developed acute respiratory distress syndrome, required intubation and pronging on 08/23, status post thoracentesis on 08/23 and bronchoscopy on 08/24.  He was extubated on 09/02, weaned from BiPAP now stable on RA.  He has completed a course of vancomycin, Zosyn and azithromycin for his infection.  He is still suffering from numerous metabolic issues, encephalopathy and new fever but has been transferred to the hospitalist service for ongoing management on 9/5.       S/p Acute respiratory distress syndrome and hypoxic respiratory failure due to  Loculated left-sided pleural effusion with multifocal pneumonia with subsequent:  -This patient was initially seen and thought to have either PE versus pneumonia and was started on heparin drip and ceftriaxone with azithromycin. CT chest w/c showed loculated pleural effusion with multifocal pneumonia. His respiratory status deteriorated rapidly and he developed hypoxic respiratory failure and then ARDS. Transferred to ICU & intubated on 08/23, spent a significant time on vent, up until 09/02, spent 2 days on BiPAP,  weaned down to 4 liters nasal cannula, now stable on RA . Completed a course of vancomycin, Zosyn and azithromycin 9/1.   Transferred out of ICU on 9/5.  -stable since.      Suspect new RLL HCAP vs aspiration - 9/6   CT abdomen completed 9/5 for ongoing fevers and elevated lipase shows a new RLL infiltrate compared to previous exam 8/22 with resolution of the previously seen LLL  infiltrate. No abd source for fever noted..- wbc remains nl and pt afebrile  .Completed Vaco (9/6-11)and Zosyn (9/6 -13)       Dysphagia/ increased risk for aspiration:   SLP following,no aspiration, recommended reg diet 9/14   Pt tolerating regular diet. Ate small amount of meals TID yesterday. On nocturnal TF now.  -d/c nocturnal TF in a day or so. Nutrition /dietary following pt.      Prolonged encephalopathy/catatonia:  Hx obsessive- compulsive disorder:   Improved   -initially related to his prolonged ICU stay/ delirium , as he was requiring paralytic agents to remain on vent and he was also critically ill. Later  due to catatonia    - CT head 9/5 showed ml atrophy,nl ammonia level 9/7, unable to do MRI may not stay still   Has been stable last several days. Back on all psych meds as at home.   -continue current meds.         Elevated LFTs & Lipase  - etiology likely non GI infection or possibly related to sepsis  - Abd US on 8/29 showed GB sludge with no stones or evidence of cholecystitis. CT abd 9/5 shows a normal GB and no inflamation around the pancrease.     - iresolved      Hypernatremia  - resolved        Acute renal insufficiency  - duet to sepsis, cr on 2.31 on 8/22  - resolved     Acute right Gastrocnemius thrombus - 9/2  Acute DVT right peroneal vein - 9/5  Found on RLE LE US completed for edema and fever 9/2. Suspect this is a result  from prolonged ICU stay, but had not required anticoagulation to this point.  The intensivist put in orders for [place] of the area to assess for any change in the clot 9/5 and noted with a new occlusive DVT in the right peroneal vein.  - Initiated on therapeutic Lovenox 1.5 mg/kg daily 9/5.   - Continue Lovenox --if po intake improves further, TF done,  would change to NOAC- - Will need at least 3 months of therapy on discharge.        GI prophylaxis:  protonix  Restraints: none   DVT Prophylaxis: Enoxaparin (Lovenox) SQ  Code Status: Full Code       Disposition:  "waiting for placement toTCU                 Interval History:   Doing ok. Eating better. No new complaints              Medications:       OLANZapine  2.5 mg Oral At Bedtime     FLUoxetine  20 mg Oral Daily     pantoprazole  40 mg Oral QAM     busPIRone  30 mg Per NG tube BID     mirtazapine  15 mg Per NG tube At Bedtime     clonazePAM  0.5 mg Per NG tube BID     metoprolol  50 mg Per Feeding Tube BID     enoxaparin  1.5 mg/kg Subcutaneous Q24H     amLODIPine  10 mg Per Feeding Tube Daily     sodium chloride (PF)  10 mL Intracatheter Q7 Days     QUEtiapine, ipratropium - albuterol 0.5 mg/2.5 mg/3 mL, HOLD MEDICATION, lidocaine (buffered or not buffered), sodium chloride (PF), sodium chloride (PF), acetaminophen, labetalol, potassium chloride, potassium chloride, potassium chloride, potassium chloride with lidocaine, potassium chloride, miconazole, naloxone, hypromellose-dextran, lidocaine (buffered or not buffered), lidocaine 4%, - MEDICATION INSTRUCTIONS -, acetaminophen, senna-docusate, bisacodyl, [DISCONTINUED] ondansetron **OR** ondansetron               Physical Exam:   Blood pressure 113/77, pulse 96, temperature 98.4  F (36.9  C), temperature source Oral, resp. rate 18, height 1.854 m (6' 0.99\"), weight 91.2 kg (201 lb 1 oz), SpO2 95 %.  Wt Readings from Last 4 Encounters:   17 91.2 kg (201 lb 1 oz)         Vital Sign Ranges  Temperature Temp  Av.7  F (37.1  C)  Min: 98.1  F (36.7  C)  Max: 99.3  F (37.4  C)   Blood pressure Systolic (24hrs), Av , Min:104 , Max:129        Diastolic (24hrs), Av, Min:62, Max:77      Pulse Pulse  Av  Min: 78  Max: 78   Respirations Resp  Av  Min: 16  Max: 18   Pulse oximetry SpO2  Av.3 %  Min: 94 %  Max: 97 %         Intake/Output Summary (Last 24 hours) at 09/15/17 1255  Last data filed at 09/15/17 1246   Gross per 24 hour   Intake              880 ml   Output              150 ml   Net              730 ml     Nasal feeding tube in place with " Tube feeds running  Constitutional: Sleeping in the middle of the day, seems sluggish but wakes up when called,  cooperative, no apparent distress   Lungs: Clear to auscultation bilaterally, no crackles or wheezing   Cardiovascular: Regular rate and rhythm, normal S1 and S2, and no murmur noted   Abdomen: Distended, soft,normal bowel sounds,non-tender   Skin: No rashes, no cyanosis, no edema   Neuro:                Data:   All laboratory data reviewed

## 2017-09-17 NOTE — PROGRESS NOTES
CALORIE COUNT      Approximate Oral Intake for:    (9/16)  Calories: 900 cals  Protein: 36 gm pro    Nocturnal TF: (6pm-6am)  Isosource 1.5 at 65 mL/hr x 12 hr = 1170 kcals (13 kcal/kg), 53 gm pro (0.6 gm/kg), 600 mL H20, 11 gm fiber.     TF + po intake = 2070 cals, 89 gm pro        Estimated Needs:    Calories: 1418-2666 cals  Protein: 105-132 cals      Summary:   Po intake continues to improve  Pt sitting up eating breakfast - has eaten almost 100%  (1100 cals, 40 gm pro)  Sending a PLUS2 milkshake with meals for added cals/pro - he really likes these  If pt able to show continued improvement in po intake today, would consider dc TF in next 1-2 days  (would like to see pt taking 75% est needs orally)

## 2017-09-17 NOTE — PLAN OF CARE
Problem: Goal Outcome Summary  Goal: Goal Outcome Summary  Discharge Planner OT   Patient plan for discharge: TCU  Current status: Pt completes bed mobility SBA, amb with CGA FWW into hallway ~ 150' to progress strength for daily task.  Pt fatigue with activity.  Pt returned to bed SBA.    Barriers to return to prior living situation: decrease strength  Recommendations for discharge: TCU per plan established by the Occupational therapist  Rationale for recommendations: Pt would benefit from daily therapy to return to PLOF       Entered by: Itzel Childs 09/17/2017 3:13 PM

## 2017-09-18 NOTE — PROGRESS NOTES
CLINICAL NUTRITION SERVICES - REASSESSMENT NOTE      RECOMMENDATIONS FOR MD/PROVIDER TO ORDER:   Recommend d/c noc TF, intake is improving daily.       EVALUATION OF PROGRESS TOWARD GOALS   Diet:  Regular. Sending Plus2 shakes with meals.  Nutrition Support: Noc feeding - Isosource 1.5 at 65 mL/hr x 12 hr = 1170 kcals (13 kcal/kg), 53 gm pro (0.6 gm/kg), 600 mL H20, 11 gm fiber    Oral intake has improved daily. Per calorie count for 9/17, pt ate 2800 tahmina, 117 gm pro.  Average daily oral intake past 3 days: 1400 tahmina, 61 gm pro.      ASSESSED NUTRITION NEEDS Dosing Weight:  87.7 kg (9/4 wt):    Estimated Energy Needs:  3026-1514 kcals (25-30 Kcal/Kg) - Maintenance  Estimated Protein Needs:  105-132 grams protein (1.2-1.5 g pro/Kg) - hypercatabolism with critical illness and CKD      Previous Goals:   Isosource 1.5 @ goal of 65 mL/hr will continue to meet assessed needs  Evaluation: Changed to noc feeding on 9/15    Previous Nutrition Diagnosis:   None identified      CURRENT NUTRITION DIAGNOSIS  No nutrition diagnosis identified at this time    INTERVENTIONS  Recommendations / Nutrition Prescription  Continue regular diet and supplements, recommend d/c TF.    Implementation  Collaboration and Referral of Nutrition care - discussed my recommendations with JIMENEZ Benton.    Goals  Pt will continue to meet at least 2/3 needs orally      MONITORING AND EVALUATION:  Progress towards goals will be monitored and evaluated per protocol and Practice Guidelines    Amalia Hilario RD  Pager [phone] (M-F)            [phone] (W/E & Hol)

## 2017-09-18 NOTE — PLAN OF CARE
Problem: Goal Outcome Summary  Goal: Goal Outcome Summary  Outcome: Improving  A&Ox4.  VSS.  Up with 1, belt and walker.  Ambulated in vargas x1.  Up to bathroom and chair.  Incont at times.  Appetite improving.  Nocturnal tube feeding started at 6pm.  Possible [place] tube removal tomorrow if appetite continues to improve.  Probable d/c to TCU tomorrow.

## 2017-09-18 NOTE — PROGRESS NOTES
[facility]  Hospitalist Progress Note        Chino Angel, DO  09/18/2017        Interval History:      Patient states that he is doing well. Tolerating diet, however, reports bleeding from penis today. Discussed urology evaluation with patient and nursing staff.          Assessment and Plan:        Mr Niko Mireles is a 52 year old male with a past medical history of hypertension, depression, obsessive-compulsive disorder and diverticulitis.  He presented with left lower chest pain and shortness of breath.  He was found to have a loculated pleural effusion with multifocal pneumonia, developed hypoxic respiratory distress and eventually developed acute respiratory distress syndrome, required intubation and pronging on 08/23, status post thoracentesis on 08/23 and bronchoscopy on 08/24.  He was extubated on 09/02, weaned from BiPAP now stable on RA.  He has completed a course of vancomycin, Zosyn and azithromycin for his infection.  He is still suffering from numerous metabolic issues, encephalopathy and new fever but has been transferred to the hospitalist service for ongoing management on 9/5.       S/p Acute respiratory distress syndrome and hypoxic respiratory failure due to Loculated left-sided pleural effusion with multifocal pneumonia with subsequent: This patient was initially seen and thought to have either PE versus pneumonia and was started on heparin drip and ceftriaxone with azithromycin. CT chest w/c showed loculated pleural effusion with multifocal pneumonia. His respiratory status deteriorated rapidly and he developed hypoxic respiratory failure and then ARDS. Transferred to ICU & intubated on 08/23, spent a significant time on vent, up until 09/02, spent 2 days on BiPAP,  weaned down to 4 liters nasal cannula, now stable on RA . Completed a course of vancomycin, Zosyn and azithromycin 9/1. Transferred out of ICU on 9/5.  - Monitor.   - CXR  - Procalcitonin.        Hematuria: Patient with bleeding reported on 9/18.   - Urology consulted.     Suspect new RLL HCAP vs aspiration: CT abdomen completed 9/5 for ongoing fevers and elevated lipase shows a new RLL infiltrate compared to previous exam 8/22 with resolution of the previously seen LLL infiltrate. No abd source for fever noted.  - Completed Vaco (9/6-11)and Zosyn (9/6 -13)      Dysphagia/ increased risk for aspiration: SLP following, recommended reg diet 9/14. Pt tolerating regular diet.   - Nutrition/SLP following.       Prolonged encephalopathy/catatonia, Hx obsessive- compulsive disorder, Improved initially related to his prolonged ICU stay/ delirium , as he was requiring paralytic agents to remain on vent and he was also critically ill. Later  due to catatonia. CT head 9/5 showed ml atrophy,nl ammonia level 9/7.   - Monitor.       Elevated LFTs & Lipase etiology likely non GI infection or possibly related to sepsis. Abd US on 8/29 showed GB sludge with no stones or evidence of cholecystitis. CT abd 9/5 shows a normal GB and no inflamation around the pancrease.     - Resolved.       Hypernatremia resolved  - Monitor.       Acute renal insufficiency due to sepsis, cr on 2.31 on 8/22  - Resolved      Acute right Gastrocnemius thrombus (9/2)Acute DVT right peroneal vein (9/5) Found on RLE LE US completed for edema and fever 9/2. Suspect this is a result  from prolonged ICU stay, but had not required anticoagulation to this point.  The intensivist put in orders for Dopp[place] of the area to assess for any change in the clot 9/5 and noted with a new occlusive DVT in the right peroneal vein.  - Initiated on therapeutic Lovenox 1.5 mg/kg daily 9/5.   - Continue Lovenox --if po intake improves further, TF done,  would change to NOAC  - Will need at least 3 months of therapy on discharge.        DVT Prophylaxis: Eliquis.     Code: Full Code       Disposition: Plan for urology evaluation today, possible discharge to TCU today  "or tomorrow pending course.                    Physical Exam:      Heart Rate: 81    Blood pressure 119/80, pulse 96, temperature 98.8  F (37.1  C), temperature source Oral, resp. rate 16, height 1.854 m (6' 0.99\"), weight 91.1 kg (200 lb 13.4 oz), SpO2 93 %.    Vitals:    09/15/17 0700 17 0639 17 0654   Weight: 92.2 kg (203 lb 4.2 oz) 91.2 kg (201 lb 1 oz) 91.1 kg (200 lb 13.4 oz)       Vital Sign Ranges  Temperature Temp  Av.6  F (37  C)  Min: 98.3  F (36.8  C)  Max: 98.8  F (37.1  C)   Blood pressure Systolic (24hrs), Av , Min:113 , Max:140        Diastolic (24hrs), Av, Min:77, Max:86      Pulse No Data Recorded   Respirations Resp  Av.3  Min: 16  Max: 18   Pulse oximetry SpO2  Av.3 %  Min: 93 %  Max: 97 %     Vital Signs with Ranges  Temp:  [98.3  F (36.8  C)-98.8  F (37.1  C)] 98.8  F (37.1  C)  Heart Rate:  [81-97] 81  Resp:  [16-18] 16  BP: (113-140)/(77-86) 119/80  SpO2:  [93 %-97 %] 93 %    I/O Last 3 Shifts:   I/O last 3 completed shifts:  In: 2235 [P.O.:1540; NG/GT:695]  Out: 750 [Urine:750]    I/O past 24 hours:     Intake/Output Summary (Last 24 hours) at 17 0842  Last data filed at 17 0758   Gross per 24 hour   Intake             2235 ml   Output              950 ml   Net             1285 ml     GENERAL: Alert and oriented. NAD. Conversational, appropriate.   HEENT: Normocephalic. EOMI. No icterus or injection. Nares normal. NG in place.   LUNGS: Clear to auscultation. No dyspnea at rest.   HEART: Regular rate. Extremities perfused.   ABDOMEN: Soft, nontender, and nondistended. Positive bowel sounds.   EXTREMITIES: No LE edema noted.   NEUROLOGIC: Moves extremities x4. No acute focal neurologic abnormalities noted.          Prior to Admission Medications:        Prescriptions Prior to Admission   Medication Sig Dispense Refill Last Dose     fluticasone (FLONASE) 50 MCG/ACT spray Spray 2 sprays into both nostrils daily        Gabapentin (NEURONTIN PO) Take " 400 mg by mouth At Bedtime        LISINOPRIL PO Take 20 mg by mouth At Bedtime        NAPROXEN PO Take 500 mg by mouth 2 times daily (with meals)        OMEPRAZOLE PO Take 20 mg by mouth every morning        SILDENAFIL CITRATE PO Take 20 mg by mouth once as needed   8/12/2017     Tolterodine Tartrate (DETROL LA PO) Take 4 mg by mouth daily        FLUoxetine HCl (PROZAC PO) Take 20 mg by mouth 3 times daily Am, noon, hs        BusPIRone HCl (BUSPAR PO) Take 30 mg by mouth 2 times daily Also see dinner dose        BusPIRone HCl (BUSPAR PO) Take 15 mg by mouth daily (with dinner)        ClonazePAM (KLONOPIN PO) Take 1.5 mg by mouth At Bedtime        Mirtazapine (REMERON PO) Take 15 mg by mouth At Bedtime        VITAMIN D, CHOLECALCIFEROL, PO Take 1,000 Units by mouth daily        Omega-3 Fatty Acids (OMEGA 3 PO) Take 1,000 mg by mouth 2 times daily        Amitriptyline HCl (ELAVIL PO) Take 100 mg by mouth At Bedtime               Medications:        Current Facility-Administered Medications   Medication Last Rate     - MEDICATION INSTRUCTIONS -       Current Facility-Administered Medications   Medication Dose Route Frequency     OLANZapine  2.5 mg Oral At Bedtime     FLUoxetine  20 mg Oral Daily     pantoprazole  40 mg Oral QAM     busPIRone  30 mg Per NG tube BID     mirtazapine  15 mg Per NG tube At Bedtime     clonazePAM  0.5 mg Per NG tube BID     metoprolol  50 mg Per Feeding Tube BID     enoxaparin  1.5 mg/kg Subcutaneous Q24H     amLODIPine  10 mg Per Feeding Tube Daily     sodium chloride (PF)  10 mL Intracatheter Q7 Days     Current Facility-Administered Medications   Medication Dose Route Frequency     QUEtiapine  25 mg Oral Q6H PRN     ipratropium - albuterol 0.5 mg/2.5 mg/3 mL  3 mL Nebulization Q2H PRN     HOLD MEDICATION   Does not apply HOLD     lidocaine (buffered or not buffered)  0.5-5 mL Other Once PRN     sodium chloride (PF)  5-50 mL Intracatheter Once PRN     sodium chloride (PF)  10-20 mL  Intracatheter Q1H PRN     acetaminophen  650 mg Oral or Feeding Tube Q4H PRN     labetalol  10-20 mg Intravenous Q6H PRN     potassium chloride  20-40 mEq Oral Q2H PRN     potassium chloride  20-40 mEq Oral or Feeding Tube Q2H PRN     potassium chloride  10 mEq Intravenous Q1H PRN     potassium chloride with lidocaine  10 mEq Intravenous Q1H PRN     potassium chloride  20 mEq Intravenous Q1H PRN     miconazole   Topical Q1H PRN     naloxone  0.1-0.4 mg Intravenous Q2 Min PRN     hypromellose-dextran  1 drop Both Eyes Q1H PRN     lidocaine (buffered or not buffered)  1 mL Other Q1H PRN     lidocaine 4%   Topical Q1H PRN     - MEDICATION INSTRUCTIONS -   Does not apply Continuous PRN     acetaminophen  650 mg Oral Q4H PRN     senna-docusate  1-2 tablet Oral BID PRN     bisacodyl  10 mg Rectal Daily PRN     ondansetron  4 mg Intravenous Q6H PRN            Data:      Lab data reviewed.     Recent Labs  Lab 09/17/17  0805 09/16/17  0740 09/15/17  0645 09/14/17  0910     --   --  382    136 141 138   POTASSIUM 3.9 3.6 4.2 4.2   CHLORIDE 105 103 107 106   CO2 22 23 24 24   BUN 20 23 20 22   CR 1.06 1.06 1.04 1.05   ANIONGAP 11 10 10 8   BENOIT 9.6 9.4 9.0 8.9   * 120* 94 117*           Imaging:      Imaging data reviewed.     Dr. Chino Angel D.O.  [facility]  Pager [phone]

## 2017-09-18 NOTE — PROGRESS NOTES
PAS-RR    D: Per DHS regulation, SW completed and submitted PAS-RR to [place] [employer] Direct Connect via the Senior LinkAge Line.  PAS-RR confirmation # is : [id number]    I: SW spoke with patient and they are aware a PAS-RR has been submitted.  SW reviewed with patient that they may be contacted for a follow up appointment within 10 days of hospital discharge if their SNF stay is < 30 days.  Contact information for Senior LinkAge Line was also provided.    A: Patient verbalized understanding.    P: Further questions may be directed to [facility] LinkAge Line at #[phone], option #4 for PAS-RR staff.    Patient was declined by [facility].   Lou at [facility] has accepted patient for today. Orders and the PAS were faxed. Spoke with patient about transport and he states his spouse can provide this. Spoke with spouse, Lula who will be here at 1630 to transport patient.  D/C to [facility].

## 2017-09-18 NOTE — CONSULTS
"[facility] Psychiatric Consult Progress Note      Interval History:   Pt seen, care reviewed with treatment team. He is calm and appropriate today. Denies suicidal or homicidal ideation. Tolerating medications without side effects. Side effects, risks, and benefits of medications reviewed with patient.  He seems to be much improved, slept well last night. He apparently sees Dr. Chen at [facility] for psych f/u. Denies suicidal or homicidal ideation. We discussed the rationale for zyprexa to help sleep and mood regulation.  He stated \"I guess they thought I might have that but it was more like OCD\".  It's interesting to note that he is doing better on less antidepressant medication small dose of mood stabilizer/atypical and psychotic.  The chart notes suggest he may have had some \"catatonic\" features on admission so I think it makes sense to have him on the mood stabilizer at this point.  The Review of Systems is negative other than noted in the HPI     Medications:       OLANZapine  2.5 mg Oral At Bedtime     FLUoxetine  20 mg Oral Daily     pantoprazole  40 mg Oral QAM     busPIRone  30 mg Per NG tube BID     mirtazapine  15 mg Per NG tube At Bedtime     clonazePAM  0.5 mg Per NG tube BID     metoprolol  50 mg Per Feeding Tube BID     enoxaparin  1.5 mg/kg Subcutaneous Q24H     amLODIPine  10 mg Per Feeding Tube Daily     sodium chloride (PF)  10 mL Intracatheter Q7 Days     QUEtiapine, ipratropium - albuterol 0.5 mg/2.5 mg/3 mL, HOLD MEDICATION, lidocaine (buffered or not buffered), sodium chloride (PF), sodium chloride (PF), acetaminophen, labetalol, potassium chloride, potassium chloride, potassium chloride, potassium chloride with lidocaine, potassium chloride, miconazole, naloxone, hypromellose-dextran, lidocaine (buffered or not buffered), lidocaine 4%, - MEDICATION INSTRUCTIONS -, acetaminophen, senna-docusate, bisacodyl, [DISCONTINUED] ondansetron **OR** ondansetron      Mental Status Examination: " "    Appearance:  awake, alert and appeared older than stated age  Eye Contact:  better  Speech:  clear, coherent  Psychomotor Behavior:  no evidence of tardive dyskinesia, dystonia, or tics  Mood:  better  Affect:  appropriate and in normal range  Thought Process:  linear no loose associations  Thought Content:  no evidence of suicidal ideation or homicidal ideation and no evidence of psychotic thought  Oriented to:  time, person, and place  Attention Span and Concentration:  limited  Recent and Remote Memory:  poor  Fund of Knowledge: delayed  Muscle Strength and Tone: normal  Gait and Station: NA  Insight:  limited  Judgment:  limited        Labs/vitals:     Recent Results (from the past 24 hour(s))   Basic metabolic panel    Collection Time: 09/17/17  8:05 AM   Result Value Ref Range    Sodium 138 133 - 144 mmol/L    Potassium 3.9 3.4 - 5.3 mmol/L    Chloride 105 94 - 109 mmol/L    Carbon Dioxide 22 20 - 32 mmol/L    Anion Gap 11 3 - 14 mmol/L    Glucose 112 (H) 70 - 99 mg/dL    Urea Nitrogen 20 7 - 30 mg/dL    Creatinine 1.06 0.66 - 1.25 mg/dL    GFR Estimate 73 >60 mL/min/1.7m2    GFR Estimate If Black 88 >60 mL/min/1.7m2    Calcium 9.6 8.5 - 10.1 mg/dL   Platelet count    Collection Time: 09/17/17  8:05 AM   Result Value Ref Range    Platelet Count 374 150 - 450 10e9/L   Glucose by meter    Collection Time: 09/17/17  8:30 AM   Result Value Ref Range    Glucose 110 (H) 70 - 99 mg/dL     Vitals: /82 (BP Location: Right arm)  Pulse 96  Temp 98.3  F (36.8  C) (Oral)  Resp 18  Ht 1.854 m (6' 0.99\")  Wt 91.1 kg (200 lb 13.4 oz)  SpO2 96%  BMI 26.5 kg/m2  BMI= Body mass index is 26.5 kg/(m^2).    Impression:   Niko seems to be doing better, has complex psychiatric issues on top of his medical condition. His delirium sees to be clearing.       DIagnoses:     1.  Delirium due to multiple etiologies.   2.  Obsessive-compulsive disorder, by history.   3.  Major depressive disorder, recurrent, moderate. "   4.  Sedating/hypnotic use disorder (iatrogenic).   5.  Possible right lower lobe aspiration pneumonia.   6.  Status post acute respiratory distress syndrome and hypoxic respiratory failure due to loculated left-sided pleural effusion with multifocal pneumonia.   7.  Dysphagia.   8.  Hypernatremia.    9.  Hyperglycemia.          Plan:   1. Written information given on medications. Side effects, risks, benefits reviewed.  2. Medical management as you are.  3. Continue psych meds including low dose zyprexa  4. F/U with [facility], Dr. Grijalva from psych is his OP provider  Attestation:  Patient has been seen and evaluated by me,  Sagar Daly MD

## 2017-09-18 NOTE — PLAN OF CARE
Problem: Goal Outcome Summary  Goal: Goal Outcome Summary  Outcome: Improving  VSS.  No c/o chest pain or SOB.  LS dim.  Up with SBA in room and hallway. A&OX4.   Uses urinal.   Occasionally incontinent.  Blood noted in urine and on tip of penis.  Urology consulted.  Post void residual ordered which showed 16mL after voiding 300mL. Plan is to discharge to rehab possibly later this evening.

## 2017-09-18 NOTE — CONSULTS
"Urology consult for: Hematuria, pt on Lovenox for DVT  Req provider: Dr. Angel of the hospitalist service      HPI: Pt is an assigned 51yo male pt of Dr. Monroy's with a urologic PMH significant for overactive bladder treated with Detrol, and kidney stones (no recent episodes). Pt was last seen 6/6/16 in the office. Pt first seen 1/24/15- pt had a cystoscopy at that time, which was benign. Pt was admitted to [facility] 8/22/17 for chest pain and shortness of breath. Pt has remained admitted for pneumonia/respiratory distress, sepsis, new HCAP, encephalopathy, right DVT (on Lovenox), and OCD. Today, 9/18/17, pt was noted to have \"Bright red blood noted in pt's urine and at tip of penis\" by nursing, so urology was consulted.     Today, pt is found sitting in a chair at bedside. Pt confirms the above history. Pt says he was having a little bit of right-sided abdominal pain this morning, which isn't unusual for him. It jazmín reminded him of pain like he's had with past stones. Pt reports it \"felt a little weird\" when he voided this morning, though it didn't burn. Pt reports it was more of a pressure or discomfort that required straining with voiding. Pt says his urine that time was red. Since then, pt has voided fine and his urine has returned to yellow. Pt's baseline incontinence has remained the same. Pt is doing ok on Detrol, though he hasn't been taking it while hospitalized. Pt denies penile discharge or pain. Pt has had painless hematuria in the past with his kidney stones and stents.       Past Medical History:   Diagnosis Date     Hypertension      OCD (obsessive compulsive disorder)      Review Of Systems:  General: Denies F/C  Respiratory: Denies SOB  Cardiovascular: Denies CP  Gastrointestinal: Denies N/V  Genitourinary: See HPI  Musculoskeletal: Denies LE pain  Neurologic: Denies HA  Psychiatric: Denies confusion  Skin: no concerning lesions  Hematology/immunology: no unexpected bruising    Past Surgical " History:   Procedure Laterality Date     BRONCHOSCOPY FLEXIBLE AND RIGID N/A 8/24/2017    Procedure: BRONCHOSCOPY FLEXIBLE AND RIGID;  bronchoscopy;  Surgeon: Humberto Wilkins MD;  Location:  GI     ORTHOPEDIC SURGERY       Social Hx:  Social History     Social History     Marital status:      Spouse name: N/A     Number of children: N/A     Years of education: N/A     Occupational History     Not on file.     Social History Main Topics     Smoking status: Never Smoker     Smokeless tobacco: Never Used     Alcohol use No     Drug use: No     Sexual activity: Not on file     Other Topics Concern     Not on file     Social History Narrative     Meds:  Prescriptions Prior to Admission   Medication Sig Dispense Refill Last Dose     fluticasone (FLONASE) 50 MCG/ACT spray Spray 2 sprays into both nostrils daily        Gabapentin (NEURONTIN PO) Take 400 mg by mouth At Bedtime        LISINOPRIL PO Take 20 mg by mouth At Bedtime        NAPROXEN PO Take 500 mg by mouth 2 times daily (with meals)        OMEPRAZOLE PO Take 20 mg by mouth every morning        SILDENAFIL CITRATE PO Take 20 mg by mouth once as needed   8/12/2017     Tolterodine Tartrate (DETROL LA PO) Take 4 mg by mouth daily        FLUoxetine HCl (PROZAC PO) Take 20 mg by mouth 3 times daily Am, noon, hs        BusPIRone HCl (BUSPAR PO) Take 30 mg by mouth 2 times daily Also see dinner dose        BusPIRone HCl (BUSPAR PO) Take 15 mg by mouth daily (with dinner)        ClonazePAM (KLONOPIN PO) Take 1.5 mg by mouth At Bedtime        Mirtazapine (REMERON PO) Take 15 mg by mouth At Bedtime        VITAMIN D, CHOLECALCIFEROL, PO Take 1,000 Units by mouth daily        Omega-3 Fatty Acids (OMEGA 3 PO) Take 1,000 mg by mouth 2 times daily        Amitriptyline HCl (ELAVIL PO) Take 100 mg by mouth At Bedtime        Allergies   Allergen Reactions     Compazine [Prochlorperazine]      Labs:  ROUTINE IP LABS (Last four results)  BMP  Recent Labs  Lab  09/18/17  0911 09/17/17  0805 09/16/17  0740 09/15/17  0645    138 136 141   POTASSIUM 4.0 3.9 3.6 4.2   CHLORIDE 106 105 103 107   BENOIT 9.0 9.6 9.4 9.0   CO2 22 22 23 24   BUN 20 20 23 20   CR 1.05 1.06 1.06 1.04   * 112* 120* 94     CBC  Recent Labs  Lab 09/17/17  0805 09/14/17  0910    382     INRNo lab results found in last 7 days.    UA RESULTS:  Recent Labs   Lab Test  09/05/17   1355   COLOR  Yellow   APPEARANCE  Clear   URINEGLC  Negative   URINEBILI  Negative   URINEKETONE  Negative   SG  1.022   UBLD  Negative   URINEPH  5.5   PROTEIN  10*   NITRITE  Negative   LEUKEST  Negative   RBCU  2   WBCU  3*     UC: UA with reflex to UC ordered    Imaging:CT ABDOMEN AND PELVIS WITH CONTRAST 9/5/2017 6:25 PM      HISTORY: Increasing lipase and bilirubin, fever workup.      COMPARISON: CT abdomen and pelvis 8/22/2017.     TECHNIQUE: Axial images from the lung bases to the symphysis are  performed with additional coronal reformatted images. 98 mL of Isovue  370 are given intravenously.  Radiation dose for this scan was reduced  using automated exposure control, adjustment of the mA and/or kV  according to patient size, or iterative reconstruction technique. Oral  contrast is also given.     FINDINGS:      Patchy infiltrate worse at the right lung base is noted and represents  a change from the prior exam. Pleural thickening posterior left lung  base is unchanged and the previously noted left lung base infiltrate  has resolved. No significant pleural fluid.     Abdomen: The liver, spleen, gallbladder, pancreas, adrenal glands and  kidneys are unremarkable. Small bilateral renal cortical cysts are  present all measuring 1 cm and smaller in size. No obvious renal  calculi or hydronephrosis. No enlarged abdominal lymph nodes. Feeding  tube terminates in the proximal jejunum in good position. No bowel  obstruction, diverticulitis or appendicitis. Possible constipation.     Pelvis: Bladder is decompressed  "with a Higgins catheter. Rectal tube is  present in the rectum. No enlarged pelvic lymph nodes or free fluid.  Bone window examination is unremarkable. Prior fusion at L5-S1.    IMPRESSION:  1. New right lung base infiltrate with clearing of the previously  noted left lung base infiltrate. Findings are concerning for a new  right lower lobe pneumonia.  2. No acute changes in the abdomen or pelvis to account for patient's  symptoms. No evidence of abscess. No bowel obstruction, diverticulitis  or appendicitis.     UNIQUE MOTA MD    Exam:  /80 (BP Location: Left arm)  Pulse 96  Temp 98.8  F (37.1  C) (Oral)  Resp 16  Ht 1.854 m (6' 0.99\")  Wt 91.1 kg (200 lb 13.4 oz)  SpO2 93%  BMI 26.5 kg/m2    Intake/Output Summary (Last 24 hours) at 09/18/17 1237  Last data filed at 09/18/17 0758   Gross per 24 hour   Intake             1615 ml   Output              950 ml   Net              665 ml     EXAM:   Constitutional: healthy, alert, no acute distress and cooperative   Cardiovascular: RRR  Respiratory: no secondary muscle use  Psychiatric: mentation appears normal and affect normal/bright  Neck: no asymmetry  Abdomen: abdomen soft, non-tender. No masses, no CVAT  : No urinary catheter in place. Uncirc penis without lesions or discharge per meatus; no blood per meatus. Bilat descended testes without mass or tenderness  MSK: no calf tenderness, no edema.  Skin: no open lesions, extremities warm and well perfused  Hematology: no bruising on visible skin      Assessment/plan:    Hematuria, pt on Lovenox for DVT. Pt had a CT of the abdomen 9/5/17 and cystoscopy 1/2015, which were both benign.    -UA with reflex to UC ordered. Pt has completed a course of vancomycin, Zosyn and azithromycin for his lung infection(s), but will check UA/reflex UC anyways   -Resume PTA Detrol   -Will have nursing check a PVR   -Will arrange for an outpt F/U in 4-6 weeks for repeat cystoscopy- see AVS for details   -Pt clear to D/C from a " urologic perspective, discussed with Dr. Angel of     Discussed exam findings, lab and imaging results and plan with Dr. Monroy.  Please contact me with any questions or concerns.     Melva Parson PA-C  [facility]  Pager:  [phone]  After 4pm and on weekends, please call [phone]

## 2017-09-18 NOTE — PLAN OF CARE
Problem: Goal Outcome Summary  Goal: Goal Outcome Summary  Discharge Planner OT   Patient plan for discharge: TCU  Current status: Pt independent to complete supine to sit EOB, CGA sit to stand, with FWW pt amb to/from bathroom CGA, pt stood at sink SBA for ADLS, amb in hallway to progress strength for daily task. Pt sitting up in chair with alarm on and all needs within reach.    Barriers to return to prior living situation: decrease strength, balance  Recommendations for discharge: TCU per plan established by the Occupational THerapist  Rationale for recommendations: pt not at baseline       Entered by: Itzel Childs 09/18/2017 1:04 PM

## 2017-09-18 NOTE — PLAN OF CARE
Problem: Goal Outcome Summary  Goal: Goal Outcome Summary  Discharge Planner PT   Patient plan for discharge: TCU  Current status: pt with SBA for mob, amb 250' with FWW, laura standing ex  Barriers to return to prior living situation: stairs not yet assessed, decreased functional mob laura  Recommendations for discharge: TCU short stay  Rationale for recommendations: not yet at baseline, progress with gait and transfers to I       Entered by: BRIAN MARTINEZ 09/18/2017 2:32 PM

## 2017-09-18 NOTE — PLAN OF CARE
Problem: Goal Outcome Summary  Goal: Goal Outcome Summary  Outcome: No Change  Patient is alert and oriented.  Up with assist of one and walker/gait belt.  Complains of back pain, prn Tylenol given with relief.  Tolerating tube feeding at 65cc per hour from 6p-6a.  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

## 2017-09-18 NOTE — PROVIDER NOTIFICATION
Bright red blood noted in pt's urine and at tip of penis.  C/o urinary hesitation and burning.  MD updated.

## 2017-09-18 NOTE — DISCHARGE SUMMARY
[facility]    Discharge Summary  Hospitalist    Date of Admission:  8/22/2017  Date of Discharge:  9/18/2017  Discharging Provider: Chino Angel  Date of Service (when I saw the patient): 09/18/17    Discharge Diagnoses      Pneumonia of left lower lobe due to infectious organism (H)  Acute chest pain  Dehydration  Community acquired bacterial pneumonia  ARDS (adult respiratory distress syndrome) (H)    History of Present Illness   Mr. Niko Mireles is a 52-year-old male who has a past medical history of diverticulitis, high blood pressure and nephrolithiasis.  This patient has had pain for about the past week.  He says it started in his left lower quadrant, felt like his prior bouts of diverticulitis.  He did not seek treatment for this.  Over the past few days, it has changed location from his left lower quadrant to his left lower chest.  He says that the pain increases significantly when he tries to take deep breaths.  He has had no productive cough with this.  He has had increased shortness of breath for some time.  He knows that his urine looks darker in color.  He is denying any diarrhea, nausea or vomiting.  He has not noted any fever.  His laboratory workup in ED shows acute renal insufficiency with elevated creatinine at 2.31 and a BUN of 40.  Troponin is negative.  He does have a white cell count of 15.8 with a left shift and they did an abdominal x-ray which did not show anything in the abdomen, but indicated there was some airspace disease in the left lower lungs so they followed this up with a chest x-ray which shows basically the same finding, but the radiologist commented concern for possible pulmonary embolism.  A D-dimer was drawn and this was also positive at about 0.7.  Unfortunately, due to his GFR being 30 and his elevated creatinine, he is unable to get a CT scan, so he will be admitted for workup of a community-acquired pneumonia versus pulmonary embolism or possibly  for both.  The patient denies any sick contacts.  He denies any long periods of travel or inactivity.     Hospital Course   Niko Mireles was admitted on 8/22/2017.  The following problems were addressed during his hospitalization:    Mr Niko Mireles is a 52 year old male with a past medical history of hypertension, depression, obsessive-compulsive disorder and diverticulitis.  He presented with left lower chest pain and shortness of breath.  He was found to have a loculated pleural effusion with multifocal pneumonia, developed hypoxic respiratory distress and eventually developed acute respiratory distress syndrome, required intubation and pronging on 08/23, status post thoracentesis on 08/23 and bronchoscopy on 08/24.  He was extubated on 09/02, weaned from BiPAP now stable on RA.  He has completed a course of vancomycin, Zosyn and azithromycin for his infection.  He is still suffering from numerous metabolic issues, encephalopathy and new fever but has been transferred to the hospitalist service for ongoing management on 9/5.       S/p Acute respiratory distress syndrome and hypoxic respiratory failure due to Loculated left-sided pleural effusion with multifocal pneumonia with subsequent: This patient was initially seen and thought to have either PE versus pneumonia and was started on heparin drip and ceftriaxone with azithromycin. CT chest w/c showed loculated pleural effusion with multifocal pneumonia. His respiratory status deteriorated rapidly and he developed hypoxic respiratory failure and then ARDS. Transferred to ICU & intubated on 08/23, spent a significant time on vent, up until 09/02, spent 2 days on BiPAP,  weaned down to 4 liters nasal cannula, now stable on RA . Completed a course of vancomycin, Zosyn and azithromycin 9/1. Transferred out of ICU on 9/5.  - Improved.   - Pulmonary hygiene.       Hematuria: Patient with bleeding reported on 9/18.   - Urology consulted and cleared for  discharge. Discussed at length, including anticoagulation, recommendation to continue per Urology.      Suspect new RLL HCAP vs aspiration: CT abdomen completed 9/5 for ongoing fevers and elevated lipase shows a new RLL infiltrate compared to previous exam 8/22 with resolution of the previously seen LLL infiltrate. No abd source for fever noted.  - Completed Vaco (9/6-11)and Zosyn (9/6 -13)      Dysphagia/ increased risk for aspiration: SLP following, recommended reg diet 9/14. Pt tolerating regular diet.   - Nutrition/SLP following.       Prolonged encephalopathy/catatonia, Hx obsessive- compulsive disorder, Improved initially related to his prolonged ICU stay/ delirium , as he was requiring paralytic agents to remain on vent and he was also critically ill. Later  due to catatonia. CT head 9/5 showed ml atrophy,nl ammonia level 9/7.   - Monitor.       Elevated LFTs & Lipase etiology likely non GI infection or possibly related to sepsis. Abd US on 8/29 showed GB sludge with no stones or evidence of cholecystitis. CT abd 9/5 shows a normal GB and no inflamation around the pancrease.     - Resolved.       Hypernatremia resolved  - Monitor.       Acute renal insufficiency due to sepsis, cr on 2.31 on 8/22  - Resolved      Acute right Gastrocnemius thrombus (9/2)Acute DVT right peroneal vein (9/5) Found on RLE LE US completed for edema and fever 9/2. Suspect this is a result  from prolonged ICU stay, but had not required anticoagulation to this point.  The intensivist put in orders for Do[place] of the area to assess for any change in the clot 9/5 and noted with a new occlusive DVT in the right peroneal vein.  - Started on NOAC  - Will need at least 3 months of therapy on discharge.        Pending Results   These results will be followed up by PCP  Unresulted Labs Ordered in the Past 30 Days of this Admission     Date and Time Order Name Status Description    9/11/2017 0628 CBC with platelets In process     9/11/2017  0628 Prealbumin In process     9/11/2017 0628 Phosphorus In process     9/11/2017 0628 Magnesium In process     9/11/2017 0628 Comprehensive metabolic panel In process     8/24/2017 0946 Nocardia culture - BAL Site 2 Preliminary     8/24/2017 0946 Fungus Culture, non-blood - BAL Site 2 Preliminary     8/24/2017 0946 Nocardia culture - BAL Site 1 Preliminary     8/24/2017 0946 Fungus Culture, non-blood - BAL Site 1 Preliminary     8/24/2017 0946 AFB Culture Non Blood -- BAL Site 1 Preliminary     8/23/2017 1548 Fungus Culture, non-blood Preliminary           Code Status   Full Code       Primary Care Physician   No Ref-Primary Verified    Physical Exam   Temp: 98.8  F (37.1  C) Temp src: Oral BP: 119/80   Heart Rate: 81 Resp: 16 SpO2: 93 % O2 Device: None (Room air)    Vitals:    09/15/17 0700 09/16/17 0639 09/18/17 0654   Weight: 92.2 kg (203 lb 4.2 oz) 91.2 kg (201 lb 1 oz) 91.1 kg (200 lb 13.4 oz)     Vital Signs with Ranges  Temp:  [98.3  F (36.8  C)-98.8  F (37.1  C)] 98.8  F (37.1  C)  Heart Rate:  [81-87] 81  Resp:  [16-18] 16  BP: (117-140)/(78-86) 119/80  SpO2:  [93 %-97 %] 93 %  I/O last 3 completed shifts:  In: 2235 [P.O.:1540; NG/GT:695]  Out: 750 [Urine:750]      GENERAL: Alert and oriented. NAD. Conversational, appropriate.   HEENT: Normocephalic. EOMI. No icterus or injection. Nares normal. NG in place.   LUNGS: Clear to auscultation. No dyspnea at rest.   HEART: Regular rate. Extremities perfused.   ABDOMEN: Soft, nontender, and nondistended. Positive bowel sounds.   EXTREMITIES: No LE edema noted.   NEUROLOGIC: Moves extremities x4. No acute focal neurologic abnormalities noted.     Discharge Disposition   Discharged to TCU  Condition at discharge: Stable    Consultations This Hospital Stay   PHARMACY TO DOSE HEPARIN  PHARMACY TO DOSE HEPARIN  PULMONARY IP CONSULT  RESPIRATORY CARE IP CONSULT  INTENSIVIST IP CONSULT  THORACIC SURGERY IP CONSULT  PHARMACY TO DOSE VANCO  NUTRITION SERVICES ADULT IP  CONSULT  PHARMACY IP CONSULT  PHYSICAL THERAPY ADULT IP CONSULT  VASCULAR ACCESS ADULT IP CONSULT  PHYSICAL THERAPY ADULT IP CONSULT  OCCUPATIONAL THERAPY ADULT IP CONSULT  PHARMACY TO DOSE VANCO  WOUND OSTOMY CONTINENCE NURSE  IP CONSULT  PSYCHIATRY IP CONSULT  SPEECH LANGUAGE PATH ADULT IP CONSULT  PSYCHIATRY IP CONSULT  PSYCHIATRY IP CONSULT  NUTRITION SERVICES ADULT IP CONSULT  PSYCHIATRY IP CONSULT  PSYCHIATRY IP CONSULT  UROLOGY IP CONSULT  PHYSICAL THERAPY ADULT IP CONSULT  OCCUPATIONAL THERAPY ADULT IP CONSULT  SPEECH LANGUAGE PATH ADULT IP CONSULT    Time Spent on this Encounter   I, Chino Angel, personally saw the patient today and spent greater than 30 minutes discharging this patient.    Discharge Orders     General info for SNF   Length of Stay Estimate: Short Term Care: Estimated # of Days <30  Condition at Discharge: Improving  Level of care:skilled   Rehabilitation Potential: Good  Admission H&P remains valid and up-to-date: Yes  Recent Chemotherapy: N/A  Use Nursing Home Standing Orders: Yes     Mantoux instructions   Give two-step Mantoux (PPD) Per Facility Policy Yes     Reason for your hospital stay   Pneumonia     Follow Up and recommended labs and tests   Follow up with intermediate physician.  The following labs/tests are recommended: cbc/bmp.  Follow up with primary care provider.   Follow up with psychiatry.     Activity - Up with nursing assistance     Full Code     Physical Therapy Adult Consult   Evaluate and treat as clinically indicated.    Reason:  Physical deconditioning.     Occupational Therapy Adult Consult   Evaluate and treat as clinically indicated.    Reason:  Physical deconditioning.     Speech Language Path Adult Consult   Evaluate and treat as clinically indicated.    Reason:  Dysphagia.     Fall precautions     Advance Diet as Tolerated   Follow this diet upon discharge: Orders Placed This Encounter  Regular Diet Adult       Discharge Medications   Current  Discharge Medication List      START taking these medications    Details   !! apixaban ANTICOAGULANT (ELIQUIS) 5 MG tablet Take 2 tablets (10 mg) by mouth 2 times daily for 7 days  Qty: 28 tablet, Refills: 0    Associated Diagnoses: Pneumonia of left lower lobe due to infectious organism (H)      !! apixaban ANTICOAGULANT (ELIQUIS) 5 MG tablet Take 1 tablet (5 mg) by mouth 2 times daily  Qty: 30 tablet, Refills: 0    Associated Diagnoses: Pneumonia of left lower lobe due to infectious organism (H)      mirtazapine (REMERON SOL-TAB) 15 MG ODT tab Take 1 tablet (15 mg) by mouth At Bedtime  Qty: 30 tablet, Refills: 0    Associated Diagnoses: Pneumonia of left lower lobe due to infectious organism (H)      OLANZapine (ZYPREXA) 2.5 MG tablet Take 1 tablet (2.5 mg) by mouth At Bedtime  Qty: 60 tablet, Refills: 0    Associated Diagnoses: Pneumonia of left lower lobe due to infectious organism (H)      QUEtiapine (SEROQUEL) 25 MG tablet Take 1 tablet (25 mg) by mouth every 6 hours as needed (agitation)  Qty: 60 tablet, Refills: 0    Associated Diagnoses: Pneumonia of left lower lobe due to infectious organism (H)      amLODIPine (NORVASC) 10 MG tablet Take 1 tablet (10 mg) by mouth daily  Qty: 30 tablet, Refills: 0    Associated Diagnoses: Pneumonia of left lower lobe due to infectious organism (H)      pantoprazole (PROTONIX) 40 MG EC tablet Take 1 tablet (40 mg) by mouth every morning  Qty: 30 tablet, Refills: 0    Associated Diagnoses: Pneumonia of left lower lobe due to infectious organism (H)       !! - Potential duplicate medications found. Please discuss with provider.      CONTINUE these medications which have CHANGED    Details   clonazePAM (KLONOPIN) 0.5 MG tablet Take 1 tablet (0.5 mg) by mouth 2 times daily  Qty: 30 tablet, Refills: 0    Associated Diagnoses: Pneumonia of left lower lobe due to infectious organism (H)      FLUoxetine (PROZAC) 20 MG capsule Take 1 capsule (20 mg) by mouth daily  Qty: 30 capsule,  Refills: 0    Associated Diagnoses: Pneumonia of left lower lobe due to infectious organism (H)         CONTINUE these medications which have NOT CHANGED    Details   BusPIRone HCl (BUSPAR PO) Take 30 mg by mouth 2 times daily Also see dinner dose      VITAMIN D, CHOLECALCIFEROL, PO Take 1,000 Units by mouth daily         STOP taking these medications       fluticasone (FLONASE) 50 MCG/ACT spray Comments:   Reason for Stopping:         Gabapentin (NEURONTIN PO) Comments:   Reason for Stopping:         LISINOPRIL PO Comments:   Reason for Stopping:         NAPROXEN PO Comments:   Reason for Stopping:         OMEPRAZOLE PO Comments:   Reason for Stopping:         SILDENAFIL CITRATE PO Comments:   Reason for Stopping:         Tolterodine Tartrate (DETROL LA PO) Comments:   Reason for Stopping:         Mirtazapine (REMERON PO) Comments:   Reason for Stopping:         Omega-3 Fatty Acids (OMEGA 3 PO) Comments:   Reason for Stopping:         Amitriptyline HCl (ELAVIL PO) Comments:   Reason for Stopping:             Allergies   Allergies   Allergen Reactions     Compazine [Prochlorperazine]      Data   Most Recent 3 CBC's:  Recent Labs   Lab Test  09/17/17   0805 09/14/17   0910  09/11/17   0628  09/08/17   0625  09/07/17   0625  09/05/17   0430   WBC   --    --   7.0  9.1  10.7  16.3*   HGB   --    --   11.8*  10.8*   --   12.6*   MCV   --    --   92  96   --   95   PLT  374  382  399  361   --   463*      Most Recent 3 BMP's:  Recent Labs   Lab Test  09/18/17   0911  09/17/17   0805  09/16/17   0740   NA  137  138  136   POTASSIUM  4.0  3.9  3.6   CHLORIDE  106  105  103   CO2  22  22  23   BUN  20  20  23   CR  1.05  1.06  1.06   ANIONGAP  9  11  10   BENOIT  9.0  9.6  9.4   GLC  133*  112*  120*     Most Recent 2 LFT's:  Recent Labs   Lab Test  09/11/17 0628 09/08/17   0625   AST  38  49*   ALT  70  76*   ALKPHOS  109  116   BILITOTAL  0.8  1.2     Most Recent INR's and Anticoagulation Dosing History:  Anticoagulation  Dose History     Recent Dosing and Labs Latest Ref Rng & Units 8/24/2017 8/24/2017 8/25/2017 8/26/2017 8/27/2017 8/28/2017    INR 0.86 - 1.14 1.23(H) 1.32(H) 1.12 1.24(H) 1.21(H) 1.19(H)        Most Recent 3 Troponin's:  Recent Labs   Lab Test  08/22/17   0450  08/22/17   0025   TROPI  <0.015  <0.015     Most Recent Cholesterol Panel:  Recent Labs   Lab Test  08/28/17   0440   TRIG  388*     Most Recent 6 Bacteria Isolates From Any Culture (See EPIC Reports for Culture Details):  Recent Labs   Lab Test  09/09/17   2100  09/07/17   1045  09/07/17   1016  09/05/17   1515  09/05/17   1420  09/01/17   1335   CULT  Canceled, Test credited  >10 Squamous epithelial cells/low power field indicates oral contamination. Please   recollect.  *  Notification of test cancellation was given to  Tiara cruz RN at [id number]    No growth  No growth  No growth  No growth  No growth     Most Recent TSH, T4 and A1c Labs:  Recent Labs   Lab Test  09/14/17   0910   A1C  5.5     Results for orders placed or performed during the hospital encounter of 08/22/17   Abd/pelvis CT no contrast - Stone Protocol    Narrative    CT ABDOMEN PELVIS W/O CONTRAST  8/22/2017 1:49 AM     INDICATION: Ten days history of left lower quadrant abdominal pain,  now pleuritic chest pain and abdominal pain, renal insufficiency,  evaluate diverticulitis with perforation.      TECHNIQUE: Thin axial images through the abdomen and pelvis without  contrast. Coronal reformatted images. Radiation dose for this scan was  reduced using automated exposure control, adjustment of the mA and/or  kV according to patient size, or iterative reconstruction technique.    COMPARISON: None.    FINDINGS: No urinary stones or hydronephrosis. Liver, gallbladder,  spleen, pancreas, adrenal glands and kidneys demonstrate no worrisome  findings. Small cyst upper pole right kidney.    Pelvic structures within normal limits. No bowel obstruction or  ascites. Moderate stool in  the colon. No diverticulitis or free  intraperitoneal air. Small left pleural effusion is partially  loculated with patchy associated infiltrate or atelectasis at the  right base.      Impression    IMPRESSION:  1. No urinary stones or hydronephrosis. No other acute findings within  the abdomen or pelvis.  2. Small partially loculated left pleural effusion with patchy  air-space disease at the left base. Findings could be due to  pneumonia. Other etiologies such as pulmonary embolism should be  considered. Correlate clinically.    PATRICK KAY MD   XR Chest 2 Views    Narrative    XR CHEST 2 VW  8/22/2017 2:09 AM     INDICATION: Chest pain.    COMPARISON: CT 8/22/2017.      Impression    IMPRESSION: Shallow inspiration. Moderate patchy air-space disease in  the left lower lung and a small amount of pleural fluid at the left  base. Findings could be due to pneumonia. Correlate clinically. Heart  size exaggerated by shallow inspiration is likely normal.    PATRICK KAY MD   NM Lung Scan Ventilation and Perfusion    Narrative    NUCLEAR MEDICINE LUNG SCAN VENTILATION AND PERFUSION August 22, 2017  9:38 AM     HISTORY: Suspect pulmonary embolus in patient with GFR of 30.     COMPARISON: Chest x-ray 8/22/2017.    TECHNIQUE: Patient was administered 3.3 mCi Tc 99m MAA intravenously  prior to performing the perfusion images. Patient was then given  aerosolized 72 mCi Tc 99m DTPA for the ventilation images.    FINDINGS: No mismatched peripheral defects are present to indicate  pulmonary emboli. A large matched defect in the region of the  posterior segment left upper lobe and superior segment left lower lobe  is noted and corresponds to an infiltrate on chest x-ray performed  earlier today. Findings would indicate a triple match and is  consistent with an intermediate probability for pulmonary embolism.      Impression    IMPRESSION: Intermediate probability for pulmonary embolism.         UNIQUE MOTA MD     Lower Extremity Venous Duplex Bilateral    Narrative    VENOUS ULTRASOUND BOTH LEGS  8/22/2017 8:15 AM     HISTORY: Rule out DVT    COMPARISON: None.    FINDINGS:  Examination of the deep veins with graded compression and  color flow Doppler with spectral wave form analysis was performed.      Impression    IMPRESSION: No evidence of deep venous thrombosis.    JOHN BRISENO MD   CT Chest Pulmonary Embolism w Contrast    Narrative    CT CHEST PULMONARY EMBOLISM WITH CONTRAST   8/23/2017 12:22 PM     HISTORY: Hypoxic respiratory failure with elevated D-dimer    TECHNIQUE: CT of the chest was performed following the administration  of 80 mL Isovue-370. Radiation dose for this scan was reduced using  automated exposure control, adjustment of the mA and/or kV according  to patient size, or iterative reconstruction technique.    COMPARISON: None.    FINDINGS: No evidence for a pulmonary embolism.    Large multilobulated left pleural effusion. This may be partially  loculated. Associated adjacent compressive atelectasis/consolidation  of the left lower lobe.    Small right pleural effusion. Diffuse patchy and confluent airspace  opacities in the right upper lobe, right middle lobe, and to a lesser  extent in the right lower lobe.    Multiple lymph nodes measuring 1 cm or less in the mediastinum. Fluid  density within the superior anterior mediastinum.      Impression    IMPRESSION:  1. Airspace disease in the lungs could represent multifocal pneumonia.  2. Large partially loculated left pleural effusion with adjacent  compressive atelectasis/consolidation of the left lower lobe.  3. Nonspecific fluid density within the anterior superior mediastinum.  4. No pulmonary embolism.    JOHN BRISENO MD   XR Chest Port 1 View    Narrative    XR CHEST PORT 1 VW  8/23/2017 4:08 PM     HISTORY:  recent thoracentesis, concern for pneumo    COMPARISON: CT dated 8/23    FINDINGS:  Left pleural effusion is seen. No pneumothorax  is  identified. The left pleural effusion has increased since the film  from 8/22. Increase infiltrate is seen in the left lung. There is also  been increased infiltrate in the right lung. The infiltrates could be  due to pulmonary edema. Pneumonitis could also have this appearance.      Impression    IMPRESSION:  1. No pneumothorax identified.  2. Increasing bilateral infiltrates. This could be due to pulmonary  edema. It could also be due to pneumonitis.  3. Left pleural effusion also appears to have increased.    NICCI GORDON MD   XR Abdomen Port 1 View    Narrative    XR ABDOMEN PORT F1 VW 8/24/2017 10:20 AM    HISTORY: OG placement verification.    COMPARISON: None.    FINDINGS: Feeding tube tip is in the projection of the stomach.  Bilateral basilar airspace opacity in the lungs.      Impression    IMPRESSION: Feeding tube tip appears to be in the stomach.    JERAD YING MD   XR Chest Port 1 View    Narrative    XR CHEST PORT 1 VW 8/24/2017 10:00 AM    HISTORY: ET tube placement.    COMPARISON: 8/23/2017    FINDINGS: The endotracheal tube tip is 2 cm above the porsha. There is  diffuse bilateral airspace opacity. Enteric tube tip is beyond the  lower margin of the film.      Impression    IMPRESSION: Endotracheal tube tip is 2 cm above the porsha.    JERAD YING MD   XR Chest Port 1 View    Narrative    XR CHEST PORT 1 VW 8/24/2017 1:40 PM    HISTORY: Confirm line placement.    COMPARISON: 8/24/2017 at 10:10    FINDINGS: Right central line tip is in the projection of the SVC/RA  junction. Endotracheal tube and enteric tube appear stable. Diffuse  bilateral airspace opacity is redemonstrated.      Impression    IMPRESSION: Right central line appears appropriately positioned.    JERAD YING MD   XR Chest 1 View    Narrative    XR CHEST 1 VW 8/25/2017 11:20 AM    HISTORY: Intubation.    COMPARISON: August 24, 2017.      Impression    IMPRESSION: Tubes are unchanged in position. Diffuse bilateral  pulmonary  opacities as before, consistent with diffuse lung disease.  No pneumothorax.    RADHA DAMON MD   US Abdomen Limited    Narrative    ULTRASOUND ABDOMEN LIMITED 8/25/2017 12:43 PM    HISTORY:  52-year-old patient with question of liver lesions on  echocardiac and ultrasound exam. Request made for formal ultrasound  evaluation. Of note, echocardiac images not available for review.    FINDINGS: Liver size is upper limit of normal at 18.4 cm. No focal  liver lesion. Sludge noted within the gallbladder lumen, though no  gallbladder wall thickening, pericholecystic fluid, and negative  sonographic [place] sign. Small simple cyst in the cortex and superior  pole of the right kidney measuring 1.3 x 1.5 x 1.3 cm.      Impression    IMPRESSION:  1. No focal liver lesion identified.  2. Sludge in the gallbladder lumen, though no complicating additional  features such as wall thickening.   3. Simple cyst in the superior pole of the right kidney.    XAVIER BOWLES MD   US Chest Pleural Effusion Imaging    Narrative    ULTRASOUND CHEST/PLEURAL EFFUSION IMAGING  8/25/2017 12:36 PM    HISTORY: Evaluate for volume of pleural fluid.    FINDINGS: Trace amount of left pleural fluid visible. Therefore, no  plan for subsequent chest tube or thoracentesis.    XAVIER BOWLES MD   XR Chest Port 1 View    Narrative    CHEST PORTABLE ONE VIEW 8/27/2017 11:28 AM     COMPARISON: Frontal chest x-ray 8/25/2017.    HISTORY: Intubated.      Impression    IMPRESSION: Tip of the endotracheal tube remains approximately 3 cm  above the porsha. Nasogastric tube has been withdrawn. Right internal  jugular central venous catheter again noted.    Tiny bilateral pleural effusions again noted. Patchy airspace  opacities in both lungs again noted possibly representing pneumonia or  pulmonary edema. There is no pneumothorax. Heart size remains within  normal limits.    GILSON COX MD   XR Abdomen Port 1 View    Narrative    XR ABDOMEN PORT F1 VW 8/27/2017  2:35 PM    COMPARISON: None.    HISTORY: Orogastric tube placement.      Impression    IMPRESSION: Enteric tube tip and sidehole project over the stomach.  Nonobstructed bowel gas pattern.    CHRISTINE HERNÁNDEZ MD   XR Chest Port 1 View    Narrative    XR CHEST PORT 1 VW 8/27/2017 2:34 PM    COMPARISON: Chest x-ray earlier on the same day.    HISTORY: Central venous catheter.      Impression    IMPRESSION: Right internal jugular central venous catheter tip in the  mid right atrium. Recommend retracting approximately 3 cm.  Endotracheal tube tip approximately 3 cm above the porsha. Enteric  tube courses below the diaphragm, tip not included in this image. No  pneumothorax on either side. Mixed opacities over both lungs are not  significantly changed.    CHRISTINE HERNÁNDEZ MD   XR Chest Port 1 View    Narrative    CHEST PORTABLE ONE VIEW 8/27/2017 4:18 PM     HISTORY: CVC tip verification.    COMPARISON: 8/27/2017      Impression    IMPRESSION: Right jugular central line has been retracted slightly and  now lies at the inferior aspect of the superior vena cava. Remainder  of tubes and lines are unchanged. Again seen is a pulmonary edema  pattern with bilateral patchy perihilar infiltrates and additional  left basilar opacity consistent with left pleural  effusion/atelectasis/infiltrate. Left basilar opacity has slightly  increased since the prior exam. Otherwise no change.    MAHI TRONCOSO MD   XR Abdomen Port 1 View    Narrative    ABDOMEN ONE VIEW   8/28/2017 12:59 PM     HISTORY: Feeding tube placement.    COMPARISON: 8/27/2017.      Impression    IMPRESSION: Enteric tube is in place, with tip near the ligament of  Treitz. Visualized bowel gas pattern is within normal limits.    MARTINE YBARRA MD   XR Chest Port 1 View    Narrative    XR CHEST PORT 1 VW  8/29/2017 5:12 AM     HISTORY:  ards    COMPARISON: Film performed on 8/27    FINDINGS:  ET tube and feeding tubes are in place. Right jugular  venous catheter is again  noted. Extensive interstitial and alveolar  infiltrates are again noted. Small pleural effusions.      Impression    IMPRESSION: Stable, no significant change in the bilateral infiltrate.    NICCI GORDON MD   US Abdomen Limited Portable    Narrative    US ABDOMEN LIMITED PORTABLE   8/29/2017 2:40 PM     HISTORY: Increasing bilirubin, evaluate for gallbladder disease.    COMPARISON: CTA 2/22/2017    FINDINGS: Liver size is upper normal at 18.4 cm. No liver mass or  intrahepatic biliary ductal dilatation. The gallbladder contains  sludge. No shadowing gallstones. No gallbladder wall thickening or  pericholecystic fluid. No tenderness with direct transducer pressure  over the gallbladder. No gallstones identified. Common bile duct  measures 2 mm. Pancreas is obscured by intestinal gas. The right  kidney measures 13.0 cm in length and contains a single simple cyst.      Impression    IMPRESSION: Gallbladder sludge without evidence of acute  cholecystitis.    EZIO SEPULVEDA MD   XR Chest Port 1 View    Narrative    CHEST ONE VIEW PORTABLE   8/31/2017 6:05 AM     HISTORY: ARDS.    COMPARISON: 8/29/2017.    FINDINGS: ET tube with tip well above the porsha. Feeding tube  directed into the stomach. Right jugular central line with tip in the  SVC. Moderate opacities in both lung bases, likely infiltrate and  unchanged in the interval.      Impression    IMPRESSION: Stable chest support hardware in good condition and  unchanged bilateral pulmonary infiltrates.    BRUCE RIDER MD   XR Chest Port 1 View    Narrative    CHEST ONE VIEW PORTABLE  9/2/2017 6:52 AM     HISTORY: ARDS.    COMPARISON: 8/31/2017      Impression    IMPRESSION: Endotracheal tube with the tip projecting over the mid  trachea and enteric tube coursing below the level of the diaphragm are  unchanged. Interval removal of the right IJ central venous catheter.  Interval placement of a right upper extremity PICC with the tip  projecting over the right atrium;  this could be pulled back 4 cm.  Moderate bilateral opacities are not changed.    RHONA ZAZUETA DO   US Lower Extremity Venous Duplex Bilateral    Narrative    VENOUS ULTRASOUND BILATERAL LOWER EXTREMITY  9/2/2017 12:36 PM     HISTORY: Evaluate source of fever, lower extremity edema.    COMPARISON: 8/22/2017    FINDINGS:  Examination of the deep veins with graded compression and  color flow Doppler with spectral wave form analysis shows some venous  thrombosis isolated to the right gastrocnemius veins in the mid calf.  The common femoral veins, femoral veins, popliteal veins, posterior  tibial veins, and greater saphenous veins show normal compressibility,  augmentation, and flow characteristics.      Impression    IMPRESSION:  1. Occlusive thrombus isolated to the right gastrocnemius veins in the  mid calf.  2. No evidence for any other thrombus within either lower extremity.    SERENA VAN MD   XR Chest Port 1 View    Narrative    CHEST ONE VIEW PORTABLE   9/4/2017 6:05 AM     HISTORY: Evaluate infiltrates.    COMPARISON: 9/2/2017      Impression    IMPRESSION: Interval extubation. Feeding tube remains in place as does  a central venous line. There is some bibasilar infiltrates or  atelectasis which are perhaps slightly worse in the right lung base.  Heart remains enlarged. Pulmonary vasculature is minimally prominent.    SERENA VAN MD   US Lower Extremity Venous Duplex Bilateral    Narrative    VENOUS ULTRASOUND BILATERAL LOWER EXTREMITIES  9/5/2017 4:05 PM     HISTORY: Evaluate for propagation of below knee thrombosis, new deep  venous thrombosis.    COMPARISON: 9/2/2017    TECHNIQUE: Color Doppler and spectral waveform analysis performed  throughout the deep veins of both lower extremities.    FINDINGS: Both common femoral, proximal greater saphenous, femoral,  and popliteal veins demonstrate normal blood flow, compression, and  augmentation. Deep venous thrombosis in right gastrocnemius veins  again  identified and unchanged. Occlusive deep venous thrombosis also  noted in a peroneal vein, new since the previous exam. Posterior  tibial vein is patent. The left calf veins are patent.      Impression    IMPRESSION:  1. New deep venous thrombosis, occlusive, in a right peroneal vein  compared to previous exam.  2. Persistent occlusive thrombus in the right gastrocnemius veins.  3. Negative for deep venous thrombosis in the left lower extremity.    XAVIER BOWLES MD   CT Head w/o Contrast    Narrative    CT HEAD W/O CONTRAST  9/5/2017 2:45 PM    HISTORY: Rule out stroke.    TECHNIQUE: Scans were obtained through the head without IV contrast.   Radiation dose for this scan was reduced using automated exposure  control, adjustment of the mA and/or kV according to patient size, or  iterative reconstruction technique.    COMPARISON: None.    FINDINGS: Mild atrophy for age.  No hemorrhage, mass lesion, or focal  area of acute infarction identified. Paranasal sinuses are normal. No  bony abnormality.      Impression    IMPRESSION:   1. Mild atrophy.    BRUCE RIDER MD   CT Abdomen Pelvis w Contrast    Narrative    CT ABDOMEN AND PELVIS WITH CONTRAST 9/5/2017 6:25 PM     HISTORY: Increasing lipase and bilirubin, fever workup.     COMPARISON: CT abdomen and pelvis 8/22/2017.    TECHNIQUE: Axial images from the lung bases to the symphysis are  performed with additional coronal reformatted images. 98 mL of Isovue  370 are given intravenously.  Radiation dose for this scan was reduced  using automated exposure control, adjustment of the mA and/or kV  according to patient size, or iterative reconstruction technique. Oral  contrast is also given.    FINDINGS:     Patchy infiltrate worse at the right lung base is noted and represents  a change from the prior exam. Pleural thickening posterior left lung  base is unchanged and the previously noted left lung base infiltrate  has resolved. No significant pleural  fluid.    Abdomen: The liver, spleen, gallbladder, pancreas, adrenal glands and  kidneys are unremarkable. Small bilateral renal cortical cysts are  present all measuring 1 cm and smaller in size. No obvious renal  calculi or hydronephrosis. No enlarged abdominal lymph nodes. Feeding  tube terminates in the proximal jejunum in good position. No bowel  obstruction, diverticulitis or appendicitis. Possible constipation.    Pelvis: Bladder is decompressed with a Higgins catheter. Rectal tube is  present in the rectum. No enlarged pelvic lymph nodes or free fluid.  Bone window examination is unremarkable. Prior fusion at L5-S1.      Impression    IMPRESSION:  1. New right lung base infiltrate with clearing of the previously  noted left lung base infiltrate. Findings are concerning for a new  right lower lobe pneumonia.  2. No acute changes in the abdomen or pelvis to account for patient's  symptoms. No evidence of abscess. No bowel obstruction, diverticulitis  or appendicitis.    UNIQUE MOTA MD   XR Abdomen Port 1 View    Narrative    XR ABDOMEN PORT F1 VW 9/11/2017 11:40 AM    HISTORY: N/V, poss ileus.    COMPARISON: 8/28/2017    FINDINGS: The bowel gas pattern is nonobstructive. The enteric tube  tip appears to be in the proximal small bowel.      Impression    IMPRESSION: Nonobstructive gas pattern.    JERAD YING MD   XR Chest Port 1 View    Narrative    XR CHEST PORT 1 VW 9/18/2017 12:05 PM    COMPARISON: 9/4/2017    HISTORY: Pneumonia.      Impression    IMPRESSION: Mildly enlarged cardiac silhouette is again seen and  unchanged. Scarring in the left midlung is again seen. Mild mixed  interstitial and airspace opacities over both lungs appear slightly  decreased since comparison study. No significant pleural effusion. No  pneumothorax.    CHRISTINE HERNÁNDEZ MD

## 2017-09-18 NOTE — PROVIDER NOTIFICATION
Pt was seen by urologist.  Urologist wrote for UA but did not order it.  Spoke with Urologist who stated not to complete it at this time and pt may discharge to TCU.

## 2017-09-19 NOTE — PROGRESS NOTES
[facility]  PRIMARY CARE PROVIDER AND CLINIC:  Verified, No Ref-Primary   Chief Complaint   Patient presents with     Hospital F/U       HPI:    Niko Mireles is a 52 year old  (1965),admitted to the [facility]  from [facility].  Hospital stay 08/22/17 through 09/18/17.  Admitted to this facility for  rehab, medical management and nursing care.  HPI information obtained from: facility chart records.  Current issues are:        Mr Niko Mireles is a 52 year old male with a past medical history of hypertension, depression, obsessive-compulsive disorder and diverticulitis.  He presented with left lower chest pain and shortness of breath.  He was found to have a loculated pleural effusion with multifocal pneumonia, developed hypoxic respiratory distress and eventually developed acute respiratory distress syndrome, required intubation from 8/23-9/2, with a thoracentesis on 08/23 and bronchoscopy on 08/24.        Acute respiratory distress syndrome and hypoxic respiratory failure due to Loculated left-sided pleural effusion with multifocal pneumonia  CT chest showed loculated pleural effusion with multifocal pneumonia. His respiratory status deteriorated rapidly and he developed hypoxic respiratory failure and then ARDS requiring intubation for nine days. He was treated with Vancomycin (9/6-11) and Zosyn (9/6 -13). He becomes short of breath with exertion. Denies cough, fever, body aches or chills.       Acute right Gastrocnemius thrombus (9/2)  Acute DVT right peroneal vein (9/5)  Found on RLE LE US completed for edema and fever 9/2.  A doppler of the area to assess for any change in the clot 9/5 and noted with a new occlusive DVT in the right peroneal vein.  --He continues on Apixaban 10 mg BID x 7 days, then 5 mg BID there after. He will need at least 3 months of anticoagulation therapy.     Major depressive disorder  Obsessive compulsive  "disorder  Insomnia  Was seen by inpatient psych for delirium and agitation. \"It appears that he was taken off his Klonopin while he was in the ICU and may have experienced some encephalopathy from withdrawal from long-term benzodiazepine use.\"   He is currently on a combination of mirtazapine 15 mg at HS, Prozac 20 mg daily, buspirone 30 mg in the a.m and HS--patient also takes 15 mg at noon which was not ordered at discharge, clonazepam 0.5 mg BID, olanzapine 2.5 mg at HS, quetiapine 25 mg q6h PRN. He has been sleeping well and feels like symptoms are controlled.     Hypertension  PTA lisinopril was discontinued in the hospital. Currently on Amlodipine 10 mg, and blood pressure on admission to the TCU was 119/72. No complaints of chest pain or palpitations.     Chronic back pain with sciatica  Long history of back pain with a Lumbar fusion in 86,89, 99, L4-S1 multiple low back surgeries and lumbar diskectomy. Previously taking naproxen for pain management but unable to do so as he is on apixaban. Previously on gabapentin up to 2700 mg per day which was discontinued in the hospital. \"It didn't really work.\" continues to have back pain but does not wish to have pain medication at this time and will work with his PCP to determine a pain regimen.     GERD  No complaints of symptoms. Continues on pantoprazole 40 mg daily.    Physical Deconditioning  In the setting of prolonged hospitalization.      CODE STATUS/ADVANCE DIRECTIVES DISCUSSION:   CPR/Full code   Patient's living condition: lives with spouse    ALLERGIES:Compazine [prochlorperazine]  PAST MEDICAL HISTORY:  has a past medical history of Hypertension and OCD (obsessive compulsive disorder).  PAST SURGICAL HISTORY:  has a past surgical history that includes orthopedic surgery and Bronchoscopy flexible and rigid (N/A, 8/24/2017).  FAMILY HISTORY: family history is not on file.  SOCIAL HISTORY:  reports that he has never smoked. He has never used smokeless " tobacco. He reports that he does not drink alcohol or use illicit drugs.    Post Discharge Medication Reconciliation Status: discharge medications reconciled and changed, per note/orders (see AVS).  Current Outpatient Prescriptions   Medication Sig Dispense Refill     busPIRone (BUSPAR) 5 MG tablet Take 6 tablets (30 mg) by mouth 2 times daily And 15 mg at 1200 90 tablet      apixaban ANTICOAGULANT (ELIQUIS) 5 MG tablet Take 2 tablets (10 mg) by mouth 2 times daily for 7 days 28 tablet 0     [START ON 9/25/2017] apixaban ANTICOAGULANT (ELIQUIS) 5 MG tablet Take 1 tablet (5 mg) by mouth 2 times daily 30 tablet 0     clonazePAM (KLONOPIN) 0.5 MG tablet Take 1 tablet (0.5 mg) by mouth 2 times daily 30 tablet 0     FLUoxetine (PROZAC) 20 MG capsule Take 1 capsule (20 mg) by mouth daily 30 capsule 0     mirtazapine (REMERON SOL-TAB) 15 MG ODT tab Take 1 tablet (15 mg) by mouth At Bedtime 30 tablet 0     OLANZapine (ZYPREXA) 2.5 MG tablet Take 1 tablet (2.5 mg) by mouth At Bedtime 60 tablet 0     QUEtiapine (SEROQUEL) 25 MG tablet Take 1 tablet (25 mg) by mouth every 6 hours as needed (agitation) 60 tablet 0     amLODIPine (NORVASC) 10 MG tablet Take 1 tablet (10 mg) by mouth daily 30 tablet 0     pantoprazole (PROTONIX) 40 MG EC tablet Take 1 tablet (40 mg) by mouth every morning 30 tablet 0     VITAMIN D, CHOLECALCIFEROL, PO Take 1,000 Units by mouth daily         ROS:  10 point ROS of systems including Constitutional, Eyes, Respiratory, Cardiovascular, Gastroenterology, Genitourinary, Integumentary, Muscularskeletal, Psychiatric were all negative except for pertinent positives noted in my HPI.    Exam:  /72  Pulse 88  Temp 99.1  F (37.3  C)  Resp 18  SpO2 98%  GENERAL APPEARANCE:  Alert, in no distress, pleasant, cooperative, oriented x 3  EYES:  EOM, lids, pupils and irises normal, sclera clear and conjunctiva normal, no discharge or mattering on lids or lashes noted  ENT:  Mouth normal, moist mucous  membranes, nose without drainage or crusting, external ears without lesions, hearing acuity intact  NECK:  Nontender, supple, symmetrical  RESP:  respiratory effort and palpation of chest normal, no chest wall tenderness, no respiratory distress, Lung sounds clear, patient is on room air  CV:  Palpation and auscultation of heart done, rate and rhythm regular, no murmur, no rub or gallop. Edema none in lower extremities. VASCULAR: no open areas. Warm.   ABDOMEN:  normal bowel sounds, soft, nontender  M/S:   Gait and station ambulates independently with walker, no tenderness or swelling of the joints; able to move all extremities   SKIN:  Inspection and palpation of skin and subcutaneous tissue: skin warm, dry and intact without rashes  NEURO: cranial nerves 2-12 grossly intact, no facial asymmetry, no speech deficits and able to follow directions, moves all extremities symmetrically  PSYCH:  insight and judgement intact, memory intact, affect and mood normal      Lab/Diagnostic data:  CBC RESULTS:   Recent Labs   Lab Test  09/17/17   0805  09/14/17   0910  09/11/17   0628  09/08/17   0625   WBC   --    --   7.0  9.1   RBC   --    --   3.97*  3.65*   HGB   --    --   11.8*  10.8*   HCT   --    --   36.6*  35.2*   MCV   --    --   92  96   MCH   --    --   29.7  29.6   MCHC   --    --   32.2  30.7*   RDW   --    --   14.8  15.2*   PLT  374  382  399  361       Last Basic Metabolic Panel:  Recent Labs   Lab Test  09/18/17   0911  09/17/17   0805   NA  137  138   POTASSIUM  4.0  3.9   CHLORIDE  106  105   BENOIT  9.0  9.6   CO2  22  22   BUN  20  20   CR  1.05  1.06   GLC  133*  112*       Liver Function Studies -   Recent Labs   Lab Test  09/11/17 0628 09/08/17   0625   PROTTOTAL  7.3  7.2   ALBUMIN  2.5*  2.3*   BILITOTAL  0.8  1.2   ALKPHOS  109  116   AST  38  49*   ALT  70  76*       Lab Results   Component Value Date    A1C 5.5 09/14/2017       ASSESSMENT/PLAN:  (I82.4Y1) Acute deep vein thrombosis (DVT) of  proximal vein of right lower extremity (H)  (primary encounter diagnosis)  Comment: chronic secondary to immobility while in the hospital.  Plan: apixaban 10 mg BID x 7 days then 5 mg BID there after for at least three months.     (F33.1) Moderate episode of recurrent major depressive disorder (H)  (F42.9) Obsessive-compulsive disorder, unspecified type  Comment: chronic, controlled.  Plan: busPIRone 30 mg at a.m and HS. Will add in 15 mg noon dose as that is what he was taking at home, fluoxetine 20 mg daily. Mirtazapine 15 mg at HS, olanzapine 2.5 mg at HS and PRN seroquel. A follow up appt has already been scheduled with his primary psych MD.    (N28.9) Acute renal insufficiency  Comment: creatinine on discharge was 1.05  Plan: BMP on 9/22 to ensure stability     (K21.9) Gastroesophageal reflux disease without esophagitis  Comment: chronic, controlled symptoms.  Plan: continue with pantoprazole 40 mg daily.     (I10) Essential hypertension  Comment: chronic, controlled. Lisinopril was not restarted at discharge.   Plan: continues on amlodipine 10 mg daily.    (G62.9) Neuropathy (H)  (M54.40,  G89.29) Chronic low back pain with sciatica, sciatica laterality unspecified, unspecified back pain laterality  Comment: chronic likely secondary to multiple back surgeries and chronic back pain. Gabapentin was not restarted at discharge.   Plan: declines pain medication at this time and wishes to work with PCP on pain management after discahrge.     (R53.81) Physical deconditioning  Comment: acute in the setting of prolonged hospitalization.   Plan: PT/OT.    Electronically signed by:  TREMAINE Crump CNP

## 2017-09-19 NOTE — PLAN OF CARE
Problem: Goal Outcome Summary  Goal: Goal Outcome Summary  Pt discharged to TCU on 9/18. PT goals not met.     Physical Therapy Discharge Summary     Reason for therapy discharge:    Discharged to transitional care facility.     Progress towards therapy goal(s). See goals on Care Plan in [facility] electronic health record for goal details.  Goals not met.  Barriers to achieving goals:   discharge from facility.     Therapy recommendation(s):    Continued therapy is recommended.  Rationale/Recommendations:  eval and treat at TCU.

## 2017-09-19 NOTE — PLAN OF CARE
Problem: Goal Outcome Summary  Goal: Goal Outcome Summary  Occupational Therapy Discharge Summary     Reason for therapy discharge:    Discharged to transitional care facility.     Progress towards therapy goal(s). See goals on Care Plan in [facility] electronic health record for goal details.  Goals not met.  Barriers to achieving goals:   discharge from facility.     Therapy recommendation(s):    Continued therapy is recommended.  Rationale/Recommendations:  OT at TCU to increase ADL independence to PLOF..

## 2017-09-25 NOTE — PROGRESS NOTES
"[facility]  INITIAL VISIT NOTE  September 25, 2017    PRIMARY CARE PROVIDER AND CLINIC:  No primary care provider on file. No primary physician on file.    Chief Complaint   Patient presents with     Hospital F/U       HPI:    Niko Mireles is a 52 year old  (1965) male who [facility] 2017 for an initial visit. Medical history is notable for HTN, nephrolithiasis and diverticulitis. He was hosptialized at [facility] from 8/22/17 to 9/18/17 where he presented with chest pain (started as LLQ abdominal pain and migrated) and dyspnea. On presentation to the ER he was hypoxic and labs notable for a leukocytosis and acute renal failure. V/Q scan with intermediate probability of pulmonary embolism as well as concern for a PNA. He was started on anticoagulation and antibiotics; however, respiratory status continued to decline and he was transferred to the MICU with acute hypoxic respiratory failure and need for BiPAP. He continued to deteriorate and developed ARDS for which he was endotracheally intubated on 8/23/17. He is s/p thoracentesis of 900 cc fluids  (8/23/17) as well as bronchoscopy (8/24/17). Ultimately felt NOT to have a pulmonary embolism. He was able to be extubated on 9/2/17. Course further complicated by acute encephalopathy and agitation. Also found to have a acute RLE DVT on 9/5 now on apixaban with anticipated 3 month course. He was admitted to this facility for medical management and rehab.     Today, Mr. Mireles is seen in his room. Tells me he is doing well. No specific questions or concerns. No chest pain. No dyspnea. No abdominal pain. Having BMs. Sleeping \"very well\". Has not noticed any bleeding or bruising from the anticoagulation. Working with therapies.     CODE STATUS:   CPR/Full code     ALLERGIES:     Allergies   Allergen Reactions     Compazine [Prochlorperazine]        PAST MEDICAL HISTORY:   Past Medical History: "   Diagnosis Date     Hypertension      OCD (obsessive compulsive disorder)        PAST SURGICAL HISTORY:   Past Surgical History:   Procedure Laterality Date     BRONCHOSCOPY FLEXIBLE AND RIGID N/A 8/24/2017    Procedure: BRONCHOSCOPY FLEXIBLE AND RIGID;  bronchoscopy;  Surgeon: Humberto Wilkins MD;  Location:  GI     ORTHOPEDIC SURGERY         FAMILY HISTORY:   No family history on file.    SOCIAL HISTORY:   Lives with spouse     MEDICATIONS:  Current Outpatient Prescriptions   Medication Sig Dispense Refill     busPIRone (BUSPAR) 5 MG tablet Take 6 tablets (30 mg) by mouth 2 times daily And 15 mg at 1200 90 tablet      apixaban ANTICOAGULANT (ELIQUIS) 5 MG tablet Take 2 tablets (10 mg) by mouth 2 times daily for 7 days 28 tablet 0     apixaban ANTICOAGULANT (ELIQUIS) 5 MG tablet Take 1 tablet (5 mg) by mouth 2 times daily 30 tablet 0     clonazePAM (KLONOPIN) 0.5 MG tablet Take 1 tablet (0.5 mg) by mouth 2 times daily 30 tablet 0     FLUoxetine (PROZAC) 20 MG capsule Take 1 capsule (20 mg) by mouth daily 30 capsule 0     mirtazapine (REMERON SOL-TAB) 15 MG ODT tab Take 1 tablet (15 mg) by mouth At Bedtime 30 tablet 0     OLANZapine (ZYPREXA) 2.5 MG tablet Take 1 tablet (2.5 mg) by mouth At Bedtime 60 tablet 0     QUEtiapine (SEROQUEL) 25 MG tablet Take 1 tablet (25 mg) by mouth every 6 hours as needed (agitation) 60 tablet 0     amLODIPine (NORVASC) 10 MG tablet Take 1 tablet (10 mg) by mouth daily 30 tablet 0     pantoprazole (PROTONIX) 40 MG EC tablet Take 1 tablet (40 mg) by mouth every morning 30 tablet 0     VITAMIN D, CHOLECALCIFEROL, PO Take 1,000 Units by mouth daily         Post Discharge Medication Reconciliation Status: medication reconcilation previously completed during another office visit.    ROS:  10 point ROS neg other than the symptoms noted above in the HPI.    PHYSICAL EXAM:  /72  Pulse 86  Temp 98.3  F (36.8  C)  Resp 18  Wt 215 lb (97.5 kg)  SpO2 97%  BMI 28.37  kg/m2  Gen: sitting in bed, alert, cooperative and in no acute distress  HEENT: normocephalic; oropharynx clear  Card: RRR, S1, S2, no murmurs  Resp: lungs clear to auscultation bilaterally  GI: abdomen soft, not-tender  MSK: normal muscle tone, no LE edema  Neuro: CX II-XII grossly in tact; ROM in all four extremities grossly in tact  Psych: alert and oriented x3; normal affect    LABORATORY/IMAGING DATA:  Reviewed as per Epic    ASSESSMENT/PLAN:    ARDS  Acute Hypoxic Respiratory Failure  Pneumonia  Resolved. Lings clear. No dyspnea.   -- follow clinically     Toxic Metabolic Encephalopathy, Resolved  In setting of above.  -- continues on olanzapine 2.5 mg qhs and quetiapine 25 mg q6h PRN    -- both new during hospitalization    -- he has follow up scheduled with his primary psychiatrist - will defer to    them re: management of these meds  -- follow clinically    RLE DVT  Diagnosed 9/5/17. Has not noted any bleeding or bruising from anticoagulation.   -- continues on apixaban 10 mg BID x7 days followed by 5 mg BID (anticipate 3 mo course)    Dysphagia, Resolved  In setting of above.  -- follow clinically    OCD  By history. PTA on clonazepam 1.5 mg qhs and fluoxetine 20 mg TID as well as amitriptyline 100 mg qhs. Psychiatry consulted during hospitalization and med changes as below.  -- continues on buspirone 30 mg BID and 15 mg at noon, clonazepam 0.5 mg BID and fluoxetine 20 mg daily and mirtazapine 15 mg qhs  -- supportive cares  -- follow up with outpatient psychiatry as scheduled     HTN  PTA on lisinopril 20 mg qhs (d/c due to renal failure). New on amlodipine. SBPs 110s-120s with limited data.   -- continues on amlodipine 10 mg daily  -- follow BPs and adjust medications as needed    Overactive Bladder  PTA on tolterodine 4 mg daily. Discontinued during hospitalization. No urinary sx.   -- follow clinically     Diverticulitis  By history. No abdominal pain today.   -- noted    GERD  -- continues on  pantoprazole 40 mg daily    Physical Deconditioning  In setting of hospitalization and underlying medical conditions  -- ongoing PT/OT      Electronically signed by:  Helen Chan MD

## 2017-09-26 NOTE — PROGRESS NOTES
[facility] DISCHARGE SUMMARY    PATIENT'S NAME: Niko Mireles  YOB: 1965  MEDICAL RECORD NUMBER:  [id number]    PRIMARY CARE PROVIDER AND CLINIC RESPONSIBLE AFTER TRANSFER: No primary care provider on file. No primary physician on file.    CODE STATUS/ADVANCE DIRECTIVES DISCUSSION:   CPR/Full code        Allergies   Allergen Reactions     Compazine [Prochlorperazine]        TRANSFERRING PROVIDERS: TREMAINE Crump CNP, Dr. Rocio MD  DATE OF SNF ADMISSION:  September / 18 / 2017  DATE OF SNF (anticipated) DISCHARGE: September / 29 / 2017  DISCHARGE DISPOSITION: Home   Nursing Facility: [facility] stay 08/22/17 to 09/18/17.     Condition on Discharge:  Improving.  Function:  Ambulates independently with walker  Equipment: walker    DISCHARGE DIAGNOSIS:   1. Acute deep vein thrombosis (DVT) of right lower extremity, unspecified vein (H)    2. Moderate episode of recurrent major depressive disorder (H)    3. Obsessive-compulsive disorder, unspecified type    4. Acute renal insufficiency    5. Gastroesophageal reflux disease without esophagitis    6. Essential hypertension    7. Neuropathy (H)    8. Chronic low back pain with sciatica, sciatica laterality unspecified, unspecified back pain laterality    9. Physical deconditioning        HPI Nursing Facility Course:  HPI information obtained from: facility chart records, facility staff, patient report and [facility] chart review.    Niko Mireles is a 52 year old  (1965) male with a medical history notable for HTN, nephrolithiasis and diverticulitis. He was hosptialized at [facility] from 8/22/17 to 9/18/17 where he presented with chest pain (started as LLQ abdominal pain and migrated) and dyspnea. On presentation to the ER he was hypoxic and labs notable for a leukocytosis and acute renal failure. V/Q scan with intermediate probability of pulmonary embolism as  "well as concern for a PNA. He was started on anticoagulation and antibiotics; however, respiratory status continued to decline and he was transferred to the MICU with acute hypoxic respiratory failure and need for BiPAP. He continued to deteriorate and developed ARDS for which he was endotracheally intubated on 8/23/17. He is s/p thoracentesis of 900 cc fluids  (8/23/17) as well as bronchoscopy (8/24/17). Ultimately felt NOT to have a pulmonary embolism. He was able to be extubated on 9/2/17. Course further complicated by acute encephalopathy and agitation. Also found to have an acute RLE DVT on 9/5 now on apixaban with anticipated 3 month course.      Acute respiratory distress syndrome and hypoxic respiratory failure due to Loculated left-sided pleural effusion with multifocal pneumonia  CT chest showed loculated pleural effusion with multifocal pneumonia. His respiratory status deteriorated rapidly and he developed hypoxic respiratory failure and then ARDS requiring intubation for nine days. He was treated with Vancomycin (9/6-11) and Zosyn (9/6 -13). He becomes short of breath with exertion, which has improved over the course of treatment at the TCU. Denies cough, fever, body aches or chills.   BP: 119-128/72-81 mmHg  P: 86-92  O2: 97-98% on room air    Acute right Gastrocnemius thrombus (9/2)  Acute DVT right peroneal vein (9/5)  Found on RLE LE US completed for edema and fever 9/2.  A doppler of the area to assess for any change in the clot 9/5 and noted with a new occlusive DVT in the right peroneal vein.  --He continues on Apixaban 5 mg BID for at least 3 months. Further management per PCP.     Major depressive disorder  Obsessive compulsive disorder  Insomnia  Was seen by inpatient psych for delirium and agitation. \"It appears that he was taken off his Klonopin while he was in the ICU and may have experienced some encephalopathy from withdrawal from long-term benzodiazepine use.\"   He is currently on a " "combination of mirtazapine 15 mg at HS, Prozac 20 mg daily, buspirone 30 mg in the a.m and HS--patient also takes 15 mg at noon which was not ordered at discharge, clonazepam 0.5 mg BID, olanzapine 2.5 mg at HS, quetiapine 25 mg q6h PRN. He has been sleeping well and feels like symptoms are controlled.   --A follow up psych appt is scheduled for November 9th.     Hypertension  PTA lisinopril was discontinued in the hospital. Currently on Amlodipine 10 mg and blood pressure remains in control. No complaints of chest pain or palpitations.  --continue with Amlodipine 10 mg daily.      Chronic back pain with sciatica  Long history of back pain with a Lumbar fusion in 1986, 89', 99', L4-S1 multiple low back surgeries and lumbar diskectomy. Previously taking naproxen for pain management but unable to do so as he is on apixaban. Previously on gabapentin up to 2700 mg per day which was discontinued in the hospital. \"It didn't really work.\" continues to have back pain but does not wish to have pain medication at this time and requests to work with his PCP to determine a pain regimen.      GERD  No complaints of symptoms.   --Continues on pantoprazole 40 mg daily.     Physical Deconditioning  In the setting of prolonged hospitalization and acute illness.   --Home PT/OT     PAST MEDICAL HISTORY:  has a past medical history of Hypertension and OCD (obsessive compulsive disorder).    DISCHARGE MEDICATIONS:  Current Outpatient Prescriptions   Medication Sig Dispense Refill     busPIRone (BUSPAR) 15 MG tablet Take 2 tablets (30 mg) by mouth 2 times daily 180 tablet 3     apixaban ANTICOAGULANT (ELIQUIS) 5 MG tablet Take 1 tablet (5 mg) by mouth 2 times daily 30 tablet 0     clonazePAM (KLONOPIN) 0.5 MG tablet Take 1 tablet (0.5 mg) by mouth 2 times daily 30 tablet 0     FLUoxetine (PROZAC) 20 MG capsule Take 1 capsule (20 mg) by mouth daily 30 capsule 0     mirtazapine (REMERON SOL-TAB) 15 MG ODT tab Take 1 tablet (15 mg) by mouth " At Bedtime 30 tablet 0     OLANZapine (ZYPREXA) 2.5 MG tablet Take 1 tablet (2.5 mg) by mouth At Bedtime 60 tablet 0     QUEtiapine (SEROQUEL) 25 MG tablet Take 1 tablet (25 mg) by mouth every 6 hours as needed (agitation) 60 tablet 0     amLODIPine (NORVASC) 10 MG tablet Take 1 tablet (10 mg) by mouth daily 30 tablet 0     pantoprazole (PROTONIX) 40 MG EC tablet Take 1 tablet (40 mg) by mouth every morning 30 tablet 0     VITAMIN D, CHOLECALCIFEROL, PO Take 1,000 Units by mouth daily         MEDICATION CHANGES/RATIONALE:   NONE    Controlled medications sent with patient: Script for Clonazepam medication for 30 tabs and 0 refills given to patient at dischage to have them fill at their out patient pharmacy     ROS:    10 point ROS of systems including Constitutional, Eyes, Respiratory, Cardiovascular, Gastroenterology, Genitourinary, Integumentary, Muscularskeletal, Psychiatric were all negative except for pertinent positives noted in my HPI.    Physical Exam:   Vitals: /81  Pulse 92  Temp 97.2  F (36.2  C)  Resp 18  Wt 215 lb (97.5 kg)  SpO2 98%  BMI 28.37 kg/m2  BMI= Body mass index is 28.37 kg/(m^2).  GENERAL APPEARANCE:  Alert, in no distress, pleasant, cooperative, oriented x 4  EYES:  EOM, lids, pupils and irises normal, sclera clear and conjunctiva normal, no discharge or mattering on lids or lashes noted  ENT:  Mouth normal, moist mucous membranes, nose without drainage or crusting, external ears without lesions, hearing acuity intact  NECK:  Nontender, supple, symmetrical  RESP:  respiratory effort and palpation of chest normal, no chest wall tenderness, no respiratory distress, Lung sounds clear, patient is on room air  CV:  Palpation and auscultation of heart done, rate and rhythm regular, no murmur, no rub or gallop. Edema none in lower extremities. VASCULAR: no open areas. Warm.   ABDOMEN:  normal bowel sounds, soft, nontender  M/S:   Gait and station ambulates independently with walker, no  tenderness or swelling of the joints; able to move all extremities   SKIN:  Inspection and palpation of skin and subcutaneous tissue: skin warm, dry and intact without rashes  NEURO: cranial nerves 2-12 grossly intact, no facial asymmetry, no speech deficits and able to follow directions, moves all extremities symmetrically  PSYCH:  insight and judgement intact, memory intact, affect and mood normal    DISCHARGE PLAN:  Occupational Therapy, Physical Therapy and From:  [facility]  Patient instructed to follow-up with:  PCP in 7 days      Current [facility] scheduled appointments:  Future Appointments  Date Time Provider Department Center   9/26/2017 1:00 PM Brooklynn Mcnulty APRN CNP [facility]     Pending labs: NONE  SNF labs   CBC RESULTS:   Recent Labs   Lab Test  09/17/17   0805  09/14/17   0910  09/11/17   0628 09/08/17   0625   WBC   --    --   7.0  9.1   RBC   --    --   3.97*  3.65*   HGB   --    --   11.8*  10.8*   HCT   --    --   36.6*  35.2*   MCV   --    --   92  96   MCH   --    --   29.7  29.6   MCHC   --    --   32.2  30.7*   RDW   --    --   14.8  15.2*   PLT  374  382  399  361       Last Basic Metabolic Panel:  Recent Labs   Lab Test  09/18/17   0911  09/17/17   0805   NA  137  138   POTASSIUM  4.0  3.9   CHLORIDE  106  105   BENOIT  9.0  9.6   CO2  22  22   BUN  20  20   CR  1.05  1.06   GLC  133*  112*       Liver Function Studies -   Recent Labs   Lab Test  09/11/17 0628 09/08/17   0625   PROTTOTAL  7.3  7.2   ALBUMIN  2.5*  2.3*   BILITOTAL  0.8  1.2   ALKPHOS  109  116   AST  38  49*   ALT  70  76*         Lab Results   Component Value Date    A1C 5.5 09/14/2017       Discharge Treatments:NONE    TOTAL DISCHARGE TIME:   Greater than 30 minutes  Electronically signed by:  TREMAINE Crump CNP         Documentation of Face-to-Face and Certification for Home Health Services     Patient: Niko Mireles   YOB: 1965  MR Number: [id number]  Today's Date:  9/27/2017    I certify that patient: Niko Mireles is under my care and that I, or a nurse practitioner or physician's assistant working with me, had a face-to-face encounter that meets the physician face-to-face encounter requirements with this patient on: 9/26/17.    This encounter with the patient was in whole, or in part, for the following medical condition, which is the primary reason for home health care: Acute respiratory distress syndrome.    I certify that, based on my findings, the following services are medically necessary home health services: Occupational Therapy and Physical Therapy.    My clinical findings support the need for the above services because: Occupational Therapy Services are needed to assess and treat cognitive ability and address ADL safety due to impairment in mobility and dyspnea on exertion. and Physical Therapy Services are needed to assess and treat the following functional impairments: generalized weakness and deconditining and impairement in balance secondary to prolonged hospitalization.    Further, I certify that my clinical findings support that this patient is homebound (i.e. absences from home require considerable and taxing effort and are for medical reasons or [employer] services or infrequently or of short duration when for other reasons) because: patient requires assiatance while walking long distances due to deconditioning.    Based on the above findings. I certify that this patient is confined to the home and needs intermittent skilled nursing care, physical therapy and/or speech therapy.  The patient is under my care, and I have initiated the establishment of the plan of care.  This patient will be followed by a physician who will periodically review the plan of care.  Physician/Provider to provide follow up care: No primary care provider on file.    Responsible Medicare certified P[employer] Physician: Dr. Helen Chan  Physician Signature: See electronic signature  associated with these discharge orders.  Date: 9/27/2017

## 2018-08-22 LAB — BRONCHOSCOPY: NORMAL
